# Patient Record
Sex: MALE | Race: OTHER | Employment: OTHER | URBAN - NONMETROPOLITAN AREA
[De-identification: names, ages, dates, MRNs, and addresses within clinical notes are randomized per-mention and may not be internally consistent; named-entity substitution may affect disease eponyms.]

---

## 2017-01-10 ENCOUNTER — HOSPITAL ENCOUNTER (EMERGENCY)
Age: 32
Discharge: PSYCHIATRIC OR SUBSTANCE USE FACILITY | End: 2017-01-10
Attending: EMERGENCY MEDICINE
Payer: MEDICARE

## 2017-01-10 ENCOUNTER — APPOINTMENT (OUTPATIENT)
Dept: GENERAL RADIOLOGY | Age: 32
End: 2017-01-10
Payer: MEDICARE

## 2017-01-10 ENCOUNTER — HOSPITAL ENCOUNTER (OUTPATIENT)
Dept: HOSPITAL 58 - AMBL | Age: 32
End: 2017-01-10
Attending: INTERNAL MEDICINE
Payer: COMMERCIAL

## 2017-01-10 VITALS
HEART RATE: 72 BPM | RESPIRATION RATE: 18 BRPM | OXYGEN SATURATION: 99 % | SYSTOLIC BLOOD PRESSURE: 129 MMHG | DIASTOLIC BLOOD PRESSURE: 81 MMHG | TEMPERATURE: 97.9 F

## 2017-01-10 VITALS — BODY MASS INDEX: 20.2 KG/M2

## 2017-01-10 DIAGNOSIS — F30.2: Primary | ICD-10-CM

## 2017-01-10 DIAGNOSIS — F20.0: ICD-10-CM

## 2017-01-10 DIAGNOSIS — F29 PSYCHOSIS, UNSPECIFIED PSYCHOSIS TYPE (HCC): ICD-10-CM

## 2017-01-10 DIAGNOSIS — F20.0 PARANOID SCHIZOPHRENIA (HCC): Primary | ICD-10-CM

## 2017-01-10 LAB
ALBUMIN SERPL-MCNC: 4.7 G/DL (ref 3.5–5.2)
ALP BLD-CCNC: 56 U/L (ref 40–130)
ALT SERPL-CCNC: 18 U/L (ref 5–41)
AMPHETAMINE SCREEN, URINE: NEGATIVE
ANION GAP SERPL CALCULATED.3IONS-SCNC: 15 MMOL/L (ref 7–19)
AST SERPL-CCNC: 18 U/L (ref 5–40)
BARBITURATE SCREEN URINE: NEGATIVE
BASOPHILS ABSOLUTE: 0.1 K/UL (ref 0–0.2)
BASOPHILS RELATIVE PERCENT: 0.6 % (ref 0–1)
BENZODIAZEPINE SCREEN, URINE: NEGATIVE
BILIRUB SERPL-MCNC: 0.4 MG/DL (ref 0.2–1.2)
BUN BLDV-MCNC: 9 MG/DL (ref 6–20)
CALCIUM SERPL-MCNC: 9.5 MG/DL (ref 8.6–10)
CANNABINOID SCREEN URINE: NEGATIVE
CHLORIDE BLD-SCNC: 100 MMOL/L (ref 98–111)
CO2: 26 MMOL/L (ref 22–29)
COCAINE METABOLITE SCREEN URINE: NEGATIVE
CREAT SERPL-MCNC: 0.8 MG/DL (ref 0.5–1.2)
EOSINOPHILS ABSOLUTE: 0.2 K/UL (ref 0–0.6)
EOSINOPHILS RELATIVE PERCENT: 1.9 % (ref 0–5)
ETHANOL: <10 MG/DL (ref 0–0.08)
GFR NON-AFRICAN AMERICAN: >60
GLOBULIN: 3.7 G/DL
GLUCOSE BLD-MCNC: 93 MG/DL (ref 74–109)
HCT VFR BLD CALC: 44 % (ref 42–52)
HEMOGLOBIN: 14.4 G/DL (ref 14–18)
LYMPHOCYTES ABSOLUTE: 1.8 K/UL (ref 1.1–4.5)
LYMPHOCYTES RELATIVE PERCENT: 20.8 % (ref 20–40)
Lab: NORMAL
MCH RBC QN AUTO: 28.4 PG (ref 27–31)
MCHC RBC AUTO-ENTMCNC: 32.7 G/DL (ref 33–37)
MCV RBC AUTO: 86.8 FL (ref 80–94)
MONOCYTES ABSOLUTE: 0.7 K/UL (ref 0–0.9)
MONOCYTES RELATIVE PERCENT: 7.5 % (ref 0–10)
NEUTROPHILS ABSOLUTE: 6 K/UL (ref 1.5–7.5)
NEUTROPHILS RELATIVE PERCENT: 69 % (ref 50–65)
OPIATE SCREEN URINE: NEGATIVE
PDW BLD-RTO: 13.7 % (ref 11.5–14.5)
PLATELET # BLD: 290 K/UL (ref 130–400)
PMV BLD AUTO: 10.6 FL (ref 7.4–10.4)
POTASSIUM SERPL-SCNC: 4 MMOL/L (ref 3.5–5)
RBC # BLD: 5.07 M/UL (ref 4.7–6.1)
SODIUM BLD-SCNC: 141 MMOL/L (ref 136–145)
TOTAL PROTEIN: 8.4 G/DL (ref 6.6–8.7)
WBC # BLD: 8.6 K/UL (ref 4.8–10.8)

## 2017-01-10 PROCEDURE — G0480 DRUG TEST DEF 1-7 CLASSES: HCPCS

## 2017-01-10 PROCEDURE — 80053 COMPREHEN METABOLIC PANEL: CPT

## 2017-01-10 PROCEDURE — 85025 COMPLETE CBC W/AUTO DIFF WBC: CPT

## 2017-01-10 PROCEDURE — 6370000000 HC RX 637 (ALT 250 FOR IP): Performed by: EMERGENCY MEDICINE

## 2017-01-10 PROCEDURE — 80307 DRUG TEST PRSMV CHEM ANLYZR: CPT

## 2017-01-10 PROCEDURE — 99284 EMERGENCY DEPT VISIT MOD MDM: CPT

## 2017-01-10 PROCEDURE — 99283 EMERGENCY DEPT VISIT LOW MDM: CPT | Performed by: EMERGENCY MEDICINE

## 2017-01-10 PROCEDURE — 36415 COLL VENOUS BLD VENIPUNCTURE: CPT

## 2017-01-10 RX ORDER — OLANZAPINE 10 MG/1
5 TABLET, ORALLY DISINTEGRATING ORAL ONCE
Status: COMPLETED | OUTPATIENT
Start: 2017-01-10 | End: 2017-01-10

## 2017-01-10 RX ADMIN — OLANZAPINE 5 MG: 10 TABLET, ORALLY DISINTEGRATING ORAL at 11:41

## 2017-04-25 ENCOUNTER — HOSPITAL ENCOUNTER (EMERGENCY)
Dept: HOSPITAL 58 - ED | Age: 32
Discharge: HOME | End: 2017-04-25

## 2017-04-25 VITALS — TEMPERATURE: 98.5 F | SYSTOLIC BLOOD PRESSURE: 132 MMHG | DIASTOLIC BLOOD PRESSURE: 90 MMHG

## 2017-04-25 VITALS — BODY MASS INDEX: 27.8 KG/M2

## 2017-04-25 DIAGNOSIS — L02.213: Primary | ICD-10-CM

## 2017-04-25 PROCEDURE — 87186 SC STD MICRODIL/AGAR DIL: CPT

## 2017-04-25 PROCEDURE — 99284 EMERGENCY DEPT VISIT MOD MDM: CPT

## 2017-04-25 PROCEDURE — 87070 CULTURE OTHR SPECIMN AEROBIC: CPT

## 2017-04-25 PROCEDURE — 96372 THER/PROPH/DIAG INJ SC/IM: CPT

## 2017-05-01 ENCOUNTER — HOSPITAL ENCOUNTER (OUTPATIENT)
Dept: HOSPITAL 58 - LAB | Age: 32
Discharge: HOME | End: 2017-05-01
Attending: SPECIALIST

## 2017-05-01 VITALS — BODY MASS INDEX: 27.8 KG/M2

## 2017-05-01 DIAGNOSIS — R03.0: ICD-10-CM

## 2017-05-01 DIAGNOSIS — L02.91: Primary | ICD-10-CM

## 2017-05-01 LAB
ALBUMIN SERPL-MCNC: 4.2 G/DL (ref 3.4–5)
ALBUMIN/GLOB SERPL: 0.98 {RATIO}
ALP SERPL-CCNC: 44 U/L (ref 50–136)
ALT SERPL-CCNC: 14 U/L (ref 12–78)
ANION GAP SERPL CALC-SCNC: 14 MMOL/L
AST SERPL-CCNC: 17 U/L (ref 15–37)
BASOPHILS # BLD AUTO: 0.1 K/UL (ref 0–0.2)
BASOPHILS NFR BLD AUTO: 0.7 % (ref 0–3)
BILIRUB SERPL-MCNC: 0.6 MG/DL (ref 0–1.2)
BUN SERPL-MCNC: 11 MG/DL (ref 7–18)
BUN/CREAT SERPL: 10.09
CALCIUM SERPL-MCNC: 9.5 MG/DL (ref 8.2–10.2)
CHLORIDE SERPL-SCNC: 105 MMOL/L (ref 98–107)
CO2 BLD-SCNC: 23 MMOL/L (ref 21–32)
CREAT SERPL-MCNC: 1.09 MG/DL (ref 0.6–1.1)
EOSINOPHIL # BLD AUTO: 0.2 K/UL (ref 0–0.7)
EOSINOPHIL NFR BLD AUTO: 2.2 % (ref 0–7)
GFR SERPLBLD BASED ON 1.73 SQ M-ARVRAT: 80 ML/MIN
GLOBULIN SER CALC-MCNC: 4.3 G/L
GLUCOSE SERPL-MCNC: 114 MG/DL (ref 70–100)
HCT VFR BLD AUTO: 45.6 % (ref 42–52)
HGB BLD-MCNC: 15.1 G/DL (ref 14–18)
IMM GRANULOCYTES NFR BLD AUTO: 0.7 % (ref 0–5)
LYMPHOCYTES # SPEC AUTO: 2.4 K/UL (ref 0.6–3.4)
LYMPHOCYTES NFR BLD AUTO: 25.7 % (ref 10–50)
MCH RBC QN: 27.5 PG (ref 27–31)
MCHC RBC AUTO-ENTMCNC: 33.1 G/DL (ref 31.8–35.4)
MCV RBC: 83.1 FL (ref 80–94)
MONOCYTES # BLD AUTO: 0.6 K/UL (ref 0.4–2)
MONOCYTES NFR BLD AUTO: 5.9 % (ref 0–10)
NEUTROPHILS # BLD AUTO: 6.1 K/UL (ref 2–6.9)
NEUTROPHILS NFR BLD AUTO: 64.8 %
PLATELET # BLD AUTO: 308 10^3/UL (ref 140–440)
POTASSIUM SERPL-SCNC: 4 MMOL/L (ref 3.5–5.1)
PROT SERPL-MCNC: 8.5 G/DL (ref 6.4–8.2)
RBC # BLD AUTO: 5.49 10^6/UL (ref 4.7–6.1)
SODIUM SERPL-SCNC: 138 MMOL/L (ref 136–145)
WBC # BLD AUTO: 9.35 K/UL (ref 4.2–10.2)

## 2017-05-01 PROCEDURE — 36415 COLL VENOUS BLD VENIPUNCTURE: CPT

## 2017-05-01 PROCEDURE — 80053 COMPREHEN METABOLIC PANEL: CPT

## 2017-05-01 PROCEDURE — 85025 COMPLETE CBC W/AUTO DIFF WBC: CPT

## 2017-09-13 ENCOUNTER — HOSPITAL ENCOUNTER (OUTPATIENT)
Dept: HOSPITAL 58 - LAB | Age: 32
Discharge: HOME | End: 2017-09-13
Payer: COMMERCIAL

## 2017-09-13 VITALS — BODY MASS INDEX: 27.8 KG/M2

## 2017-09-13 DIAGNOSIS — F25.9: Primary | ICD-10-CM

## 2017-09-13 DIAGNOSIS — F12.90: ICD-10-CM

## 2017-09-13 LAB
ALBUMIN SERPL-MCNC: 4.3 G/DL (ref 3.4–5)
ALBUMIN/GLOB SERPL: 0.93 {RATIO}
ALP SERPL-CCNC: 53 U/L (ref 50–136)
ALT SERPL-CCNC: 21 U/L (ref 12–78)
ANION GAP SERPL CALC-SCNC: 15 MMOL/L
AST SERPL-CCNC: 19 U/L (ref 15–37)
BASOPHILS # BLD AUTO: 0.1 K/UL (ref 0–0.2)
BASOPHILS NFR BLD AUTO: 0.7 % (ref 0–3)
BILIRUB SERPL-MCNC: 0.63 MG/DL (ref 0–1.2)
BUN SERPL-MCNC: 11 MG/DL (ref 7–18)
BUN/CREAT SERPL: 11.11
CALCIUM SERPL-MCNC: 10 MG/DL (ref 8.2–10.2)
CHLORIDE SERPL-SCNC: 102 MMOL/L (ref 98–107)
CO2 BLD-SCNC: 27 MMOL/L (ref 21–32)
CREAT SERPL-MCNC: 0.99 MG/DL (ref 0.6–1.1)
EOSINOPHIL # BLD AUTO: 0.4 K/UL (ref 0–0.7)
EOSINOPHIL NFR BLD AUTO: 4.8 % (ref 0–7)
GFR SERPLBLD BASED ON 1.73 SQ M-ARVRAT: 89 ML/MIN
GLOBULIN SER CALC-MCNC: 4.6 G/L
GLUCOSE SERPL-MCNC: 75 MG/DL (ref 70–100)
HCT VFR BLD AUTO: 45.9 % (ref 42–52)
HGB BLD-MCNC: 15.2 G/DL (ref 14–18)
IMM GRANULOCYTES NFR BLD AUTO: 0.4 % (ref 0–5)
LYMPHOCYTES # SPEC AUTO: 2 K/UL (ref 0.6–3.4)
LYMPHOCYTES NFR BLD AUTO: 24.1 % (ref 10–50)
MCH RBC QN: 28.1 PG (ref 27–31)
MCHC RBC AUTO-ENTMCNC: 33.1 G/DL (ref 31.8–35.4)
MCV RBC: 85 FL (ref 80–94)
MONOCYTES # BLD AUTO: 0.5 K/UL (ref 0.4–2)
MONOCYTES NFR BLD AUTO: 5.7 % (ref 0–10)
NEUTROPHILS # BLD AUTO: 5.4 K/UL (ref 2–6.9)
NEUTROPHILS NFR BLD AUTO: 64.3 %
PLATELET # BLD AUTO: 305 10^3/UL (ref 140–440)
POTASSIUM SERPL-SCNC: 4 MMOL/L (ref 3.5–5.1)
PROT SERPL-MCNC: 8.9 G/DL (ref 6.4–8.2)
RBC # BLD AUTO: 5.4 10^6/UL (ref 4.7–6.1)
SODIUM SERPL-SCNC: 140 MMOL/L (ref 136–145)
WBC # BLD AUTO: 8.38 K/UL (ref 4.2–10.2)

## 2017-09-13 PROCEDURE — 80053 COMPREHEN METABOLIC PANEL: CPT

## 2017-09-13 PROCEDURE — 84146 ASSAY OF PROLACTIN: CPT

## 2017-09-13 PROCEDURE — 36415 COLL VENOUS BLD VENIPUNCTURE: CPT

## 2017-09-13 PROCEDURE — 85025 COMPLETE CBC W/AUTO DIFF WBC: CPT

## 2017-12-17 ENCOUNTER — HOSPITAL ENCOUNTER (EMERGENCY)
Dept: HOSPITAL 58 - ED | Age: 32
Discharge: HOME | End: 2017-12-17

## 2017-12-17 VITALS — TEMPERATURE: 98.2 F | SYSTOLIC BLOOD PRESSURE: 115 MMHG | DIASTOLIC BLOOD PRESSURE: 81 MMHG

## 2017-12-17 VITALS — BODY MASS INDEX: 22.8 KG/M2

## 2017-12-17 DIAGNOSIS — L73.9: ICD-10-CM

## 2017-12-17 DIAGNOSIS — L03.211: Primary | ICD-10-CM

## 2017-12-17 PROCEDURE — 99282 EMERGENCY DEPT VISIT SF MDM: CPT

## 2017-12-17 PROCEDURE — 96372 THER/PROPH/DIAG INJ SC/IM: CPT

## 2019-05-21 ENCOUNTER — HOSPITAL ENCOUNTER (OUTPATIENT)
Dept: HOSPITAL 58 - AMBL | Age: 34
Discharge: ANOTHER HOSPITAL | End: 2019-05-21
Attending: FAMILY MEDICINE

## 2019-05-21 ENCOUNTER — HOSPITAL ENCOUNTER (EMERGENCY)
Age: 34
Discharge: PSYCHIATRIC OR SUBSTANCE USE FACILITY | End: 2019-05-21
Payer: MEDICARE

## 2019-05-21 VITALS
OXYGEN SATURATION: 98 % | SYSTOLIC BLOOD PRESSURE: 132 MMHG | TEMPERATURE: 98.2 F | HEART RATE: 70 BPM | DIASTOLIC BLOOD PRESSURE: 74 MMHG | RESPIRATION RATE: 17 BRPM

## 2019-05-21 VITALS — BODY MASS INDEX: 22.8 KG/M2

## 2019-05-21 DIAGNOSIS — F20.0 PARANOID SCHIZOPHRENIA (HCC): Primary | ICD-10-CM

## 2019-05-21 DIAGNOSIS — Z91.14: ICD-10-CM

## 2019-05-21 DIAGNOSIS — F99: Primary | ICD-10-CM

## 2019-05-21 DIAGNOSIS — F91.9: ICD-10-CM

## 2019-05-21 DIAGNOSIS — R63.0: ICD-10-CM

## 2019-05-21 LAB
ALBUMIN SERPL-MCNC: 4.7 G/DL (ref 3.5–5.2)
ALP BLD-CCNC: 54 U/L (ref 40–130)
ALT SERPL-CCNC: 14 U/L (ref 5–41)
AMPHETAMINE SCREEN, URINE: NEGATIVE
ANION GAP SERPL CALCULATED.3IONS-SCNC: 12 MMOL/L (ref 7–19)
AST SERPL-CCNC: 20 U/L (ref 5–40)
BARBITURATE SCREEN URINE: NEGATIVE
BASOPHILS ABSOLUTE: 0.1 K/UL (ref 0–0.2)
BASOPHILS RELATIVE PERCENT: 0.5 % (ref 0–1)
BENZODIAZEPINE SCREEN, URINE: NEGATIVE
BILIRUB SERPL-MCNC: 0.8 MG/DL (ref 0.2–1.2)
BUN BLDV-MCNC: 12 MG/DL (ref 6–20)
CALCIUM SERPL-MCNC: 9.7 MG/DL (ref 8.6–10)
CANNABINOID SCREEN URINE: POSITIVE
CHLORIDE BLD-SCNC: 104 MMOL/L (ref 98–111)
CO2: 27 MMOL/L (ref 22–29)
COCAINE METABOLITE SCREEN URINE: NEGATIVE
CREAT SERPL-MCNC: 1 MG/DL (ref 0.5–1.2)
EOSINOPHILS ABSOLUTE: 0.2 K/UL (ref 0–0.6)
EOSINOPHILS RELATIVE PERCENT: 1.6 % (ref 0–5)
ETHANOL: <10 MG/DL (ref 0–0.08)
GFR NON-AFRICAN AMERICAN: >60
GLUCOSE BLD-MCNC: 111 MG/DL (ref 74–109)
HCT VFR BLD CALC: 47.5 % (ref 42–52)
HEMOGLOBIN: 15.6 G/DL (ref 14–18)
LYMPHOCYTES ABSOLUTE: 1.6 K/UL (ref 1.1–4.5)
LYMPHOCYTES RELATIVE PERCENT: 13.5 % (ref 20–40)
Lab: ABNORMAL
MCH RBC QN AUTO: 28.4 PG (ref 27–31)
MCHC RBC AUTO-ENTMCNC: 32.8 G/DL (ref 33–37)
MCV RBC AUTO: 86.5 FL (ref 80–94)
MONOCYTES ABSOLUTE: 0.9 K/UL (ref 0–0.9)
MONOCYTES RELATIVE PERCENT: 7.5 % (ref 0–10)
NEUTROPHILS ABSOLUTE: 9.2 K/UL (ref 1.5–7.5)
NEUTROPHILS RELATIVE PERCENT: 76.5 % (ref 50–65)
OPIATE SCREEN URINE: NEGATIVE
PDW BLD-RTO: 13.7 % (ref 11.5–14.5)
PLATELET # BLD: 311 K/UL (ref 130–400)
PMV BLD AUTO: 10.8 FL (ref 9.4–12.4)
POTASSIUM SERPL-SCNC: 3.5 MMOL/L (ref 3.5–5)
RBC # BLD: 5.49 M/UL (ref 4.7–6.1)
SODIUM BLD-SCNC: 143 MMOL/L (ref 136–145)
TOTAL PROTEIN: 8.5 G/DL (ref 6.6–8.7)
WBC # BLD: 12.1 K/UL (ref 4.8–10.8)

## 2019-05-21 PROCEDURE — 99285 EMERGENCY DEPT VISIT HI MDM: CPT | Performed by: NURSE PRACTITIONER

## 2019-05-21 PROCEDURE — 99285 EMERGENCY DEPT VISIT HI MDM: CPT

## 2019-05-21 PROCEDURE — G0480 DRUG TEST DEF 1-7 CLASSES: HCPCS

## 2019-05-21 PROCEDURE — 80053 COMPREHEN METABOLIC PANEL: CPT

## 2019-05-21 PROCEDURE — 36415 COLL VENOUS BLD VENIPUNCTURE: CPT

## 2019-05-21 PROCEDURE — 80307 DRUG TEST PRSMV CHEM ANLYZR: CPT

## 2019-05-21 PROCEDURE — 6370000000 HC RX 637 (ALT 250 FOR IP): Performed by: NURSE PRACTITIONER

## 2019-05-21 PROCEDURE — 85025 COMPLETE CBC W/AUTO DIFF WBC: CPT

## 2019-05-21 RX ORDER — LORAZEPAM 1 MG/1
1 TABLET ORAL ONCE
Status: COMPLETED | OUTPATIENT
Start: 2019-05-21 | End: 2019-05-21

## 2019-05-21 RX ORDER — OLANZAPINE 10 MG/1
10 TABLET, ORALLY DISINTEGRATING ORAL NIGHTLY
Status: DISCONTINUED | OUTPATIENT
Start: 2019-05-21 | End: 2019-05-21 | Stop reason: HOSPADM

## 2019-05-21 RX ADMIN — LORAZEPAM 1 MG: 1 TABLET ORAL at 16:41

## 2019-05-21 NOTE — ED NOTES
Agree with plan of mid-level. Patient medically clear. Patient will be transferred to [facility] per psychiatry. Patient stable.      Adrián Mckee MD  05/21/19 7343

## 2019-05-21 NOTE — ED PROVIDER NOTES
[facility]  eMERGENCY dEPARTMENT eNCOUnter      Pt Name: Brianda Virk  MRN: [id number]  [place] 1985  Date of evaluation: 5/21/2019  Provider: MIGUEL Thomas    CHIEF COMPLAINT       Chief Complaint   Patient presents with    Mental Health Problem         HISTORY OF PRESENT ILLNESS   (Location/Symptom, Timing/Onset,Context/Setting, Quality, Duration, Modifying Factors, Severity)  Note limiting factors. Aaliyah Sauer a 34 y.o. male who presents to the emergency department for evaluation of mental health problem. Pt presents with mom who gives history of patient with bipolar disorder and paranoid schizophrenia. She relates that she has not been able to establish follow up with psychiatrist. She tells me that he has been taking zyprexa as prescribed he has not taken celexa over past couple of days. He has had increased decompensation over past week. He has been urinating in his bedroom. She doesn't endorse any new hallucinations or paranoia to me. Mom tells me that his mental status is same as previous. HPI    Nursing Notes were reviewed. REVIEW OF SYSTEMS    (2-9 systems for level 4, 10 or more for level 5)     Review of Systems   Unable to perform ROS: Psychiatric disorder       A complete review of systems was performed and is negative except as noted above in the HPI. PAST MEDICAL HISTORY   No past medical history on file. SURGICAL HISTORY     No past surgical history on file. CURRENT MEDICATIONS       There are no discharge medications for this patient. ALLERGIES     Patient has no known allergies. FAMILY HISTORY     No family history on file.        SOCIAL HISTORY       Social History     Socioeconomic History    Marital status: Single     Spouse name: Not on file    Number of children: Not on file    Years of education: Not on file    Highest education level: Not on file   Occupational History    Not on file   Social Needs    Financial resource strain: Not on file    Food insecurity:     Worry: Not on file     Inability: Not on file    Transportation needs:     Medical: Not on file     Non-medical: Not on file   Tobacco Use    Smoking status: Not on file   Substance and Sexual Activity    Alcohol use: Not on file    Drug use: Not on file    Sexual activity: Not on file   Lifestyle    Physical activity:     Days per week: Not on file     Minutes per session: Not on file    Stress: Not on file   Relationships    Social connections:     Talks on phone: Not on file     Gets together: Not on file     Attends Yazidi service: Not on file     Active member of club or organization: Not on file     Attends meetings of clubs or organizations: Not on file     Relationship status: Not on file    Intimate partner violence:     Fear of current or ex partner: Not on file     Emotionally abused: Not on file     Physically abused: Not on file     Forced sexual activity: Not on file   Other Topics Concern    Not on file   Social History Narrative    Not on file       SCREENINGS             PHYSICAL EXAM    (up to 7 for level 4, 8 or more for level 5)     ED Triage Vitals   BP Temp Temp src Pulse Resp SpO2 Height Weight   -- -- -- -- -- -- -- --     Vitals:    05/21/19 1907 05/21/19 1916   BP: 132/74    Pulse: 70    Resp:  17   Temp: 98.2 °F (36.8 °C)    SpO2: 98%        Physical Exam   Constitutional: He appears well-nourished. Tangential speech, answers questions intermittently    HENT:   Head: Normocephalic. Neck: Normal range of motion. Cardiovascular: Normal rate and regular rhythm. Pulmonary/Chest: Effort normal and breath sounds normal.   Abdominal: Soft. There is no tenderness. Musculoskeletal: Normal range of motion. Neurological: He is alert. Moves all 4 extremities equally   Vitals reviewed.       DIAGNOSTIC RESULTS     EKG: All EKG's are interpreted by the Emergency Department Physician who either signs or Co-signs this chart in the absence of acardiologist.        RADIOLOGY:   Non-plain film images such as CT, Ultrasound andMRI are read by the radiologist. Plain radiographic images are visualized and preliminarily interpreted by the emergency physician with the below findings:        Interpretation per the Radiologist below, if available at the time of this note:    No orders to display         ED BEDSIDE ULTRASOUND:   Performed by ED Physician - none    LABS:  Labs Reviewed   CBC WITH AUTO DIFFERENTIAL - Abnormal; Notable for the following components:       Result Value    WBC 12.1 (*)     MCHC 32.8 (*)     Neutrophils % 76.5 (*)     Lymphocytes % 13.5 (*)     Neutrophils # 9.2 (*)     All other components within normal limits   COMPREHENSIVE METABOLIC PANEL - Abnormal; Notable for the following components:    Glucose 111 (*)     All other components within normal limits   URINE DRUG SCREEN - Abnormal; Notable for the following components:    Cannabinoid Scrn, Ur Positive (*)     All other components within normal limits   ETHANOL       All other labs were within normal range or not returned as of this dictation. RE-ASSESSMENT     Discussed with Dr. Jaquelin Paredes. Mental health professional has evaluated patient with plan for transfer to [place] [address] and DIFFERENTIALDIAGNOSIS/MDM:   Vitals:    Vitals:    05/21/19 1907 05/21/19 1916   BP: 132/74    Pulse: 70    Resp:  17   Temp: 98.2 °F (36.8 °C)    SpO2: 98%        MDM      CONSULTS:  IP CONSULT TO PSYCHIATRY    PROCEDURES:  Unless otherwise notedbelow, none     Procedures    FINAL IMPRESSION     1. Paranoid schizophrenia ([facility] [address]          DISPOSITION/PLAN   DISPOSITION        PATIENT REFERRED TO:  No follow-up provider specified. DISCHARGE MEDICATIONS:       There are no discharge medications for this patient.       (Pleasenote that portions of this note were completed with a voice recognition program.  Efforts were made to edit the dictations but

## 2019-05-21 NOTE — BH NOTE
Psychiatry Initial Intake   TRANSFER TO [facility]    PATIENT ABOVE [facility] LEVEL OF CARE  SUICIDAL, CATATONIC, PARANOID SCHIZOPHRENIA WITH VISUAL & AUDITORY HALLUCINATIONS, MINIMAL VERBAL RESPONSE,   WS paperwork completed and given to CN in ED.    5/21/19     Hilda Lopez ,a 34 y.o. male, presents to the ED for a psychiatric assessment. MRN NUMBER:  [id number]    ED Admit time: 12:42  ED physician: [facility] Notification time: went down - 16:02  MADELAINE Assess time: 16:02  Psychiatrist call time:  Dr. Barrera [facility] 16:45    Patient is referred by: mother - reports he has had a mental breakdown, no med  within the last month other than gabapentin. Refuses to eat, refuses to bath, not taking his medication,  Has been urinating in his room, no threats at this time, other than being defiant, refuses. Mother reports he laughs to himself, isolates, awake at night, smokes cannabis. Reason for visit to ED - Presenting problem  Khanh stares into space, delays in answering, but mumbles incoherently. When asked why not taking medication, after long delay. \"maybe because I was taking medicine at the time. Questions were answered by yes or no - nod of head. Yes to SI, voices, visions, after long period of thought process. No response to harm others. No facial expressions, flat, staring eyes, sitting rigid in bed, legs out, hands on abd.  tense, eyes are wide, refused to change into scrubs,   Most of history was collateral from his mother.     Duration of symptoms: 1 week, not taking his schizophrenia medication for month,   Current Stressors:     jah  SI: yes  Wished you were dead, or go to sleep and not wake up?     [place]  Plan: jah  Past SI attempts:   Not that mother aware  Aborted, Interrupted, preparatory behavior:  Attention, revenge, could it have killed you:   Self injury:  Dates or Ages:   Currently able to contract for safety outside hospital:   C-SSRS Completed:  yes  HI: Surinder [place]  Delusions: Hallucinations:   Yes voices and seeing things  Risk of Harm to self:    Risk of Harm to others:   Anxiety 1-10:  juta  Agoraphobia:   Explain if increased:   Depression 1-10:  jah  Explain if increased:   Mood Swings/Irritability:  Racing/Persistent thoughts: jah  Risk taking behaviors (excessive spending, alcohol,drugs):  cannabis  Level of function outside hospital decreased:  Collateral from mother - not bathing, eating, urinating in room. Living Arrangements: lives with parents and daughter and 2 yr old     History of Psychiatric Treatment:   Previous Outpatient therapy Where & Dates of Outpatient treatment:   none  Are you compliant with appointments:   Psychiatric Hospitalizations: Where & When:   [facility], and [facility]   Previous Diagnosis:   Schizophrenia - 19  Previous psychiatric medications:   mutliple  Are you compliant with medications:   Violence and Trauma History:   History of violence by patient, court dates:  no  History of Trauma:   none  History of Abuse: By whom:   Family History:  Family history of mental illness & diagnosis  unaware   Family members with suicide attempt:     completed suicide:   denies    Substance Abuse History:   SBIRT Completed:   Current ETOH LEVELS:   ETOH Abuse:   Age of first drink:   occasional beer  Date of last drink:   Amount drinking daily:   Years of use:   Longest period of sobriety:   ETOH treatment history:   History of seizures, blackouts, etc. due to ETOH abuse:  Family history of ETOH abuse:    Legal consequences of chemical use:    Substance/Chemical Abuse/Recreational Drug History:   Age of first substance use:   Substance used:               Weed and unknown  Substance last used & date:    Amount of substance use daily:   Years of use:   Longest period of sobriety:  Substance treatment history:  Family history of substance abuse:   Legal consequences of chemical use: Tobacco use  Less than 1/2 pack   Opiates:  It was discussed with pt they would not be receiving opiates unless they were within 5 days post surgery/injury. Patient voiced understanding and agreed. Psychiatric Review Of Systems:   Recent Sleep changes:  Poor to none  Average Hours per night  With sleep aid:   Restful sleep:   Nightmares/flashbacks: no  Hypervigilance:  Difficulty falling asleep:    Difficulty staying asleep:  Difficulty awakening:    Repetitive behaviors (hand wash, praying, repeat words silently, etc)  Recent appetite changes:  Not eating  Recent weight changes:    Pounds gained (+) or lost (-) :   Energy level changes:  jah  Helpless/Worthless/Hopeless: Marlena Bianka  Interest/pleasure/anhedonia:    jah    Medical History:     Medical Diagnosis/Issues:  jah  Use of 02 or CPAP:  no  Ambulatory:  yes  Independent Self Care:  Yes, but not performing  Use of OTC:  denies  Somatic symptoms:    PCP: . None (Inactive)     Current Medications:   Scheduled Meds:   Current Facility-Administered Medications:     OLANZapine zydis (ZYPREXA) disintegrating tablet 10 mg, 10 mg, Oral, Nightly, Era Rogers, APRN    LORazepam (ATIVAN) tablet 1 mg, 1 mg, Oral, Once, Era Rogers, APRN  No current outpatient medications on file.        Mental Status Evaluation:     Appearance:   disheveled, unkempt   Behavior:  Catatonic, rigid, calm   Speech:  Little to none, mumbled, ncreased latency of response   Mood:  jah   Affect:  Flat, expressionless, catatonic   Thought Process:  blocked   Thought Content:  hallucinations and suicidal   Sensorium:  person   Cognition:  impaired due to psychosis     Insight:  limited   Judgment:  limited     Social Information:    Education:    11 grade  Employment where & how long   disability   Positive support system:  parents  Social Supports:  (History)    Collateral Information:  Relationship:   mother  Name:   Phone Number:   Collateral:     Disposition:      :        4. Transferred:       Patient was transferred due to:  [address] Martina Johnson

## 2019-05-22 NOTE — ED NOTES
Dr. Dickson Gregg spoke with Dr Erika Delong accept at [facility]\"     Lizett Lindsay RN  05/21/19 2921

## 2019-05-22 NOTE — ED NOTES
Report to Starla Rios at [facility].  [place] in ED for transport. Attempting to call pt's mom.      Riana Ohara RN  05/21/19 7457

## 2020-03-14 ENCOUNTER — HOSPITAL ENCOUNTER (EMERGENCY)
Age: 35
Discharge: PSYCHIATRIC OR SUBSTANCE USE FACILITY | End: 2020-03-15
Attending: EMERGENCY MEDICINE
Payer: MEDICARE

## 2020-03-14 VITALS
RESPIRATION RATE: 20 BRPM | DIASTOLIC BLOOD PRESSURE: 99 MMHG | OXYGEN SATURATION: 95 % | HEIGHT: 72 IN | TEMPERATURE: 98 F | HEART RATE: 95 BPM | SYSTOLIC BLOOD PRESSURE: 164 MMHG

## 2020-03-14 LAB
ALBUMIN SERPL-MCNC: 4.6 G/DL (ref 3.5–5.2)
ALP BLD-CCNC: 52 U/L (ref 40–130)
ALT SERPL-CCNC: 15 U/L (ref 5–41)
AMPHETAMINE SCREEN, URINE: NEGATIVE
ANION GAP SERPL CALCULATED.3IONS-SCNC: 17 MMOL/L (ref 7–19)
AST SERPL-CCNC: 21 U/L (ref 5–40)
BARBITURATE SCREEN URINE: NEGATIVE
BASOPHILS ABSOLUTE: 0.1 K/UL (ref 0–0.2)
BASOPHILS RELATIVE PERCENT: 0.6 % (ref 0–1)
BENZODIAZEPINE SCREEN, URINE: NEGATIVE
BILIRUB SERPL-MCNC: 0.9 MG/DL (ref 0.2–1.2)
BILIRUBIN URINE: NEGATIVE
BLOOD, URINE: NEGATIVE
BUN BLDV-MCNC: 10 MG/DL (ref 6–20)
CALCIUM SERPL-MCNC: 9.2 MG/DL (ref 8.6–10)
CANNABINOID SCREEN URINE: POSITIVE
CHLORIDE BLD-SCNC: 102 MMOL/L (ref 98–111)
CLARITY: CLEAR
CO2: 19 MMOL/L (ref 22–29)
COCAINE METABOLITE SCREEN URINE: NEGATIVE
COLOR: YELLOW
CREAT SERPL-MCNC: 1.1 MG/DL (ref 0.5–1.2)
EOSINOPHILS ABSOLUTE: 0.2 K/UL (ref 0–0.6)
EOSINOPHILS RELATIVE PERCENT: 1.7 % (ref 0–5)
ETHANOL: <10 MG/DL (ref 0–0.08)
GFR NON-AFRICAN AMERICAN: >60
GLUCOSE BLD-MCNC: 165 MG/DL (ref 74–109)
GLUCOSE URINE: NEGATIVE MG/DL
HBA1C MFR BLD: 5.2 % (ref 4–6)
HCT VFR BLD CALC: 48.4 % (ref 42–52)
HEMOGLOBIN: 16.1 G/DL (ref 14–18)
IMMATURE GRANULOCYTES #: 0 K/UL
KETONES, URINE: NEGATIVE MG/DL
LEUKOCYTE ESTERASE, URINE: NEGATIVE
LYMPHOCYTES ABSOLUTE: 2.7 K/UL (ref 1.1–4.5)
LYMPHOCYTES RELATIVE PERCENT: 24.1 % (ref 20–40)
Lab: ABNORMAL
MAGNESIUM: 1.8 MG/DL (ref 1.6–2.6)
MCH RBC QN AUTO: 28.6 PG (ref 27–31)
MCHC RBC AUTO-ENTMCNC: 33.3 G/DL (ref 33–37)
MCV RBC AUTO: 86.1 FL (ref 80–94)
MONOCYTES ABSOLUTE: 0.8 K/UL (ref 0–0.9)
MONOCYTES RELATIVE PERCENT: 7.1 % (ref 0–10)
NEUTROPHILS ABSOLUTE: 7.3 K/UL (ref 1.5–7.5)
NEUTROPHILS RELATIVE PERCENT: 66.2 % (ref 50–65)
NITRITE, URINE: NEGATIVE
OPIATE SCREEN URINE: NEGATIVE
PDW BLD-RTO: 13.2 % (ref 11.5–14.5)
PH UA: 6.5 (ref 5–8)
PLATELET # BLD: 323 K/UL (ref 130–400)
PMV BLD AUTO: 10.3 FL (ref 9.4–12.4)
POTASSIUM REFLEX MAGNESIUM: 3.3 MMOL/L (ref 3.5–5)
PROTEIN UA: NEGATIVE MG/DL
RBC # BLD: 5.62 M/UL (ref 4.7–6.1)
SODIUM BLD-SCNC: 138 MMOL/L (ref 136–145)
SPECIFIC GRAVITY UA: 1.02 (ref 1–1.03)
TOTAL PROTEIN: 8.4 G/DL (ref 6.6–8.7)
URINE REFLEX TO CULTURE: NORMAL
UROBILINOGEN, URINE: 1 E.U./DL
WBC # BLD: 11 K/UL (ref 4.8–10.8)

## 2020-03-14 PROCEDURE — 99284 EMERGENCY DEPT VISIT MOD MDM: CPT

## 2020-03-14 PROCEDURE — 81003 URINALYSIS AUTO W/O SCOPE: CPT

## 2020-03-14 PROCEDURE — 83735 ASSAY OF MAGNESIUM: CPT

## 2020-03-14 PROCEDURE — 85025 COMPLETE CBC W/AUTO DIFF WBC: CPT

## 2020-03-14 PROCEDURE — 80053 COMPREHEN METABOLIC PANEL: CPT

## 2020-03-14 PROCEDURE — G0480 DRUG TEST DEF 1-7 CLASSES: HCPCS

## 2020-03-14 PROCEDURE — 6370000000 HC RX 637 (ALT 250 FOR IP): Performed by: EMERGENCY MEDICINE

## 2020-03-14 PROCEDURE — 80307 DRUG TEST PRSMV CHEM ANLYZR: CPT

## 2020-03-14 PROCEDURE — 83036 HEMOGLOBIN GLYCOSYLATED A1C: CPT

## 2020-03-14 PROCEDURE — 36415 COLL VENOUS BLD VENIPUNCTURE: CPT

## 2020-03-14 RX ORDER — TOPIRAMATE 25 MG/1
50 CAPSULE, COATED PELLETS ORAL 2 TIMES DAILY
Status: ON HOLD | COMMUNITY
End: 2022-09-21

## 2020-03-14 RX ORDER — CITALOPRAM 20 MG/1
20 TABLET ORAL DAILY
COMMUNITY

## 2020-03-14 RX ORDER — OLANZAPINE 10 MG/1
10 TABLET, ORALLY DISINTEGRATING ORAL ONCE
Status: COMPLETED | OUTPATIENT
Start: 2020-03-14 | End: 2020-03-14

## 2020-03-14 RX ORDER — ARIPIPRAZOLE 20 MG/1
20 TABLET ORAL DAILY
COMMUNITY

## 2020-03-14 RX ADMIN — OLANZAPINE 10 MG: 10 TABLET, ORALLY DISINTEGRATING ORAL at 22:07

## 2020-03-14 NOTE — ED PROVIDER NOTES
[address] [facility]  eMERGENCY dEPARTMENT eNCOUnter      Pt Name: Abby Moeller  MRN: [id number]  [place] 1985  Date of evaluation: 3/14/2020  Provider: Elza Ko MD    CHIEF COMPLAINT       Chief Complaint   Patient presents with    Mental Health Problem     Bipolar, Schizophrenia         HISTORY OF PRESENT ILLNESS   (Location/Symptom, Timing/Onset,Context/Setting, Quality, Duration, Modifying Factors, Severity)  Note limiting factors. Abby Moeller is a 30 y.o. male who presents to the emergency department mental health evaluation    79-year-old male received to ED for mental health evaluation. History of paranoid schizophrenia. Last admission or transfer to [facility] by my records show May 2019. Patient's not cooperative with history. Or he cannot give me any history. He is speaking tangentially. Noticed some abrasions on his thenar eminence of the left hand. Patient states he was on the floor. That the police came her that they came into his house and took him and brought him here. According to the records he was brought in by police last time. He is unable to give me any more history. He denies being in pain. There is no one else here at this time to give me any other collateral information. I will attempt to get collateral information as we continue our evaluation. The history is provided by the patient and medical records. NursingNotes were reviewed. REVIEW OF SYSTEMS    (2-9 systems for level 4, 10 or more for level 5)     Review of Systems   Unable to perform ROS: Psychiatric disorder       A complete review of systems was performed and is negative except as noted above in the HPI. PAST MEDICAL HISTORY   No past medical history on file. SURGICAL HISTORY     No past surgical history on file.       CURRENT MEDICATIONS       Previous Medications    ARIPIPRAZOLE (ABILIFY) 20 MG TABLET    Take 20 mg by mouth daily    CITALOPRAM (CELEXA) 20 MG TABLET Head: Normocephalic and atraumatic. Right Ear: External ear normal.      Left Ear: External ear normal.      Nose: Nose normal.   Eyes:      General: No scleral icterus. Conjunctiva/sclera: Conjunctivae normal.      Pupils: Pupils are equal, round, and reactive to light. Neck:      Musculoskeletal: Normal range of motion. Cardiovascular:      Rate and Rhythm: Regular rhythm. Tachycardia present. Heart sounds: Normal heart sounds. Pulmonary:      Effort: Pulmonary effort is normal.      Breath sounds: Normal breath sounds. Musculoskeletal: Normal range of motion. General: Signs of injury (Superficial abrasions noted to the thenar eminence of the left hand. Full range of motion.) present. Skin:     General: Skin is warm and dry. Neurological:      Mental Status: He is alert. GCS: GCS eye subscore is 4. GCS verbal subscore is 5. GCS motor subscore is 6. Psychiatric:         Attention and Perception: He is inattentive (Patient keeps looking out the door. The ER is a little noisy. He seems paranoid. He will not confirm when I questioned him about hallucinations. ). Mood and Affect: Affect is inappropriate. Speech: Speech is tangential (He goes from one subject to the next. ). Behavior: Behavior is slowed. Thought Content:  Thought content is paranoid and delusional.         DIAGNOSTIC RESULTS     EKG: All EKG's are interpreted by the Emergency Department Physician who either signs or Co-signs this chart in the absence of a cardiologist.        RADIOLOGY:   Non-plain film images such as CT, Ultrasound and MRI are read by the radiologist. Plainradiographic images are visualized and preliminarily interpreted by the emergency physician with the below findings:        Interpretation per the Radiologist below, if available at the time of this note:    No orders to display         ED BEDSIDE ULTRASOUND:   Performed by ED Physician - none    LABS:  Labs Reviewed   COMPREHENSIVE METABOLIC PANEL W/ REFLEX TO MG FOR LOW K - Abnormal; Notable for the following components:       Result Value    Potassium reflex Magnesium 3.3 (*)     CO2 19 (*)     Glucose 165 (*)     All other components within normal limits   CBC WITH AUTO DIFFERENTIAL - Abnormal; Notable for the following components:    WBC 11.0 (*)     Neutrophils % 66.2 (*)     All other components within normal limits   URINE DRUG SCREEN - Abnormal; Notable for the following components:    Cannabinoid Scrn, Ur Positive (*)     All other components within normal limits   ETHANOL   URINE RT REFLEX TO CULTURE   MAGNESIUM   HEMOGLOBIN A1C       All other labs were within normal range or not returned as of this dictation. EMERGENCY DEPARTMENT COURSE and DIFFERENTIALDIAGNOSIS/MDM:   Vitals:    Vitals:    03/14/20 1527 03/14/20 1803   BP: (!) 157/105 (!) 164/99   Pulse: 125 95   Resp: 22 20   SpO2: 93% 95%   Height: 6' (1.829 m)        MDM  Number of Diagnoses or Management Options  Acute exacerbation of chronic paranoid schizophrenia [facility]):   Diagnosis management comments: 8 PM.  Psychiatry has evaluated to the best of their ability. Consensus from the psychiatrist on-call is transfer to [facility] for involuntary admission. So far the patient is remaining cooperative his mother is here. I will consider a dose of Zyprexa  closer to time for his trip for his comfort. Unless he becomes agitated or decompensates. 10 PM.  Patient's been evaluated by the qualified mental health professional.  Prince Rizo is been completed and the judges signature for order was of involuntary admission to [facility] completed. I discussed the case with Dr. Irais Garcia at [facility].  They are accepting would like the patient to be transported in the morning.   I am going to give the patient a dose of Zyprexa for his comfort and help him rest tonight while we make arrangements to transfer him first thing in the morning. [facility] unless there is  a change in his condition. 12 AM.  Patient is resting comfortably without incident. Awaiting transfer first thing in the morning. My shift has ended. I discussed the patient with Dr. Hyland Dubin the nighttime attending. He will have anything to do with this patient unless the patient requires other intervention. I just wanted him to be aware that the patient was boarded in the ED awaiting transfer. CONSULTS:  IP CONSULT TO PSYCHIATRY    PROCEDURES:  Unless otherwise notedbelow, none     Procedures    FINAL IMPRESSION     1.  Acute exacerbation of chronic paranoid schizophrenia ([facility] [address]          DISPOSITION/PLAN   DISPOSITION        PATIENT REFERRED TO:  @FUP@    DISCHARGE MEDICATIONS:  New Prescriptions    No medications on file          (Please note that portions of this note were completed with a voice recognition program.  Efforts were made to edit the dictations butoccasionally words are mis-transcribed.)    Xochitl Castaneda MD (electronically signed)  AttendingEmerArkansas State Psychiatric Hospital Physician          Bozena Hill MD  03/14/20 7705       Bozena Hill MD  03/14/20 0929

## 2020-03-14 NOTE — PROGRESS NOTES
ED Initial Intake Assessment     3/14/20    Jamey Siddiqi ,a 30 y.o. male, presents to the ED for a psychiatric assessment. ED Arrival time: [address]   [facility] Notification time: In ER  [facility] Assessment start time:   Psychiatrist call time:   Spoke with Dr. Nehemiah Akbar    Patient is referred by: Ambulance    Reason for visit to ED - Presenting problem:      Pt asked MADELAINE nurse \"Can you please get out of my room? \"    ER Physician Reports: Jamey Siddiqi is a 30 y.o. male who presents to the emergency department mental health evaluation  57-year-old male received to ED for mental health evaluation. History of paranoid schizophrenia. Last admission or transfer to [facility] by my records show May 2019. Patient's not cooperative with history. Or he cannot give me any history. He is speaking tangentially. Noticed some abrasions on his thenar eminence of the left hand. Patient states he was on the floor. That the police came her that they came into his house and took him and brought him here. According to the records he was brought in by police last time. He is unable to give me any more history. He denies being in pain. There is no one else here at this time to give me any other collateral information. I will attempt to get collateral information as we continue our evaluation.   The history is provided by the patient and medical records.      Duration of symptoms: NIXON    Current Stressors: NIXON  Current living arrangement:Lives with mom    C-SSRS Completed: no    SI:  NIXON   Plan: NIXON   Past SI attempts: NIXON   If yes, when and how many times:  Currently able to contract for safety outside hospital: NIXON   Describe:   HI: NIXON  If yes describe:   Delusions: NIXON  If yes describe:   Hallucinations: NIXON   If yes describe:   Risk of Harm to self: NIXON   If yes explain:   Was it within the past 6 months: NIXON   Risk of Harm to others: NIXON   If yes explain:   Was it within the past 6 months: NIXON Anxiety 1-10:  NIXON  Explain if increased:   Depression 1-10:  NIXON  Explain if increased:   Level of function outside hospital decreased: NIXON   If yes explain:     Psychiatric Hospitalizations: Yes   Where & When: [facility] 2017,2019  Outpatient Psychiatric Treatment:    Family History:    Family history of mental illness: NIXON   Family members with suicide attempt: NIXON   If yes explain:     Substance Abuse History:     SBIRT Completed: no     Current ETOH LEVELS: <10    ETOH Usage:     Amount drinking daily: NIXON   Date of last drink:     Substance/Chemical Abuse/Recreational Drug History:  Substance used: NIXON  Date of last substance use:      Opiates: It was discussed with pt they would not be receiving opiates unless they were within 3 days post surgery/acute injury. Patient voiced understanding and agreed. Psychiatric Review Of Systems:     Recent Sleep changes: NIXON   Recent appetite changes: NIXON   Recent weight changes/Pounds gained (+) or lost (-): NIXON      Medical History:     Medical Diagnosis/Issues:   CT today in ED:No  Use of 02 or CPAP: no  Ambulatory: yes  Independent or Need assistance with Self Care:     PCP: No primary care provider on file. Current Medications:   Scheduled Meds: No current facility-administered medications for this encounter.      Current Outpatient Medications:     ARIPiprazole (ABILIFY) 20 MG tablet, Take 20 mg by mouth daily, Disp: , Rfl:     topiramate (TOPAMAX SPRINKLE) 25 MG capsule, Take 50 mg by mouth 2 times daily, Disp: , Rfl:     citalopram (CELEXA) 20 MG tablet, Take 20 mg by mouth daily, Disp: , Rfl:      Mental Status Evaluation:     Appearance:  age appropriate   Behavior:  Restless & fidgety   Speech:  soft   Mood:  labile   Affect:  inappropriate   Thought Process:  tangential   Thought Content:  Paranoid and delusional   Sensorium:  nixon   Cognition:  impaired due to mental state   Insight:  limited       Collateral Information:     Name: Relationship:   Phone Number:   Collateral:     Disposition:     Choose one of the four options below for   disposition:     1. Decision to admit to :no    If yes, which unit Adult or Geriatric Unit:    Is patient voluntary:   If no has a 72 hold been initiated:   Does the patient have a guardian:   Has the guardian been notified:   Admission Diagnosis:     2. Referral to IOP/PHP:      3. Decision to Discharge:   Does not meet criteria for acceptance to   unit due to:     4.  Transferred:       Patient was transferred due to:Pt was transferred to [facility].    Other follow up information provided:      Paris Krueger RN

## 2020-03-15 PROCEDURE — 6360000002 HC RX W HCPCS: Performed by: EMERGENCY MEDICINE

## 2020-03-15 PROCEDURE — 96372 THER/PROPH/DIAG INJ SC/IM: CPT

## 2020-03-15 RX ORDER — ZIPRASIDONE MESYLATE 20 MG/ML
10 INJECTION, POWDER, LYOPHILIZED, FOR SOLUTION INTRAMUSCULAR ONCE
Status: COMPLETED | OUTPATIENT
Start: 2020-03-15 | End: 2020-03-15

## 2020-03-15 RX ORDER — LORAZEPAM 1 MG/1
2 TABLET ORAL ONCE
Status: DISCONTINUED | OUTPATIENT
Start: 2020-03-15 | End: 2020-03-15

## 2020-03-15 RX ADMIN — ZIPRASIDONE MESYLATE 10 MG: 20 INJECTION, POWDER, LYOPHILIZED, FOR SOLUTION INTRAMUSCULAR at 03:41

## 2020-03-15 NOTE — ED NOTES
Patient telling sitter \"you can't keep me here\"  Advised Dr. Moncada. Received order for Ativan 2 mg PO,  Patient refused to take the PO Ativan 2 mg.    Dr. Lynn Hui updated       Kerry Duggan RN  03/15/20 2545

## 2020-03-15 NOTE — ED NOTES
Attempted to give patient Geodon 10 MG IM. He became very agitated. Holding injection at this time and monitoring patient.        Cristian Lucio RN  03/15/20 8643

## 2022-08-14 ENCOUNTER — APPOINTMENT (OUTPATIENT)
Dept: GENERAL RADIOLOGY | Age: 37
End: 2022-08-14
Payer: MEDICARE

## 2022-08-14 ENCOUNTER — HOSPITAL ENCOUNTER (EMERGENCY)
Age: 37
Discharge: HOME | End: 2022-08-15
Attending: PEDIATRICS
Payer: MEDICARE

## 2022-08-14 DIAGNOSIS — R06.00 DYSPNEA, UNSPECIFIED TYPE: ICD-10-CM

## 2022-08-14 DIAGNOSIS — N39.0 URINARY TRACT INFECTION ASSOCIATED WITH INDWELLING URETHRAL CATHETER, INITIAL ENCOUNTER (HCC): Primary | ICD-10-CM

## 2022-08-14 DIAGNOSIS — T83.511A URINARY TRACT INFECTION ASSOCIATED WITH INDWELLING URETHRAL CATHETER, INITIAL ENCOUNTER (HCC): Primary | ICD-10-CM

## 2022-08-14 LAB
ALBUMIN SERPL-MCNC: 3.5 G/DL (ref 3.5–5.2)
ALP BLD-CCNC: 56 U/L (ref 40–130)
ALT SERPL-CCNC: 16 U/L (ref 5–41)
AMORPHOUS: ABNORMAL /HPF
ANION GAP SERPL CALCULATED.3IONS-SCNC: 11 MMOL/L (ref 7–19)
AST SERPL-CCNC: 12 U/L (ref 5–40)
BACTERIA: ABNORMAL /HPF
BASOPHILS ABSOLUTE: 0 K/UL (ref 0–0.2)
BASOPHILS RELATIVE PERCENT: 0.4 % (ref 0–1)
BILIRUB SERPL-MCNC: <0.2 MG/DL (ref 0.2–1.2)
BILIRUBIN URINE: NEGATIVE
BLOOD, URINE: ABNORMAL
BUN BLDV-MCNC: 14 MG/DL (ref 6–20)
CALCIUM SERPL-MCNC: 9.3 MG/DL (ref 8.6–10)
CHLORIDE BLD-SCNC: 101 MMOL/L (ref 98–111)
CLARITY: ABNORMAL
CO2: 26 MMOL/L (ref 22–29)
COARSE CASTS, UA: ABNORMAL /LPF (ref 0–5)
COLOR: YELLOW
CREAT SERPL-MCNC: 0.4 MG/DL (ref 0.5–1.2)
CRYSTALS, UA: ABNORMAL /HPF
EOSINOPHILS ABSOLUTE: 0.4 K/UL (ref 0–0.6)
EOSINOPHILS RELATIVE PERCENT: 5.1 % (ref 0–5)
GFR AFRICAN AMERICAN: >59
GFR NON-AFRICAN AMERICAN: >60
GLUCOSE BLD-MCNC: 119 MG/DL (ref 74–109)
GLUCOSE URINE: NEGATIVE MG/DL
HCT VFR BLD CALC: 37.2 % (ref 42–52)
HEMOGLOBIN: 10.9 G/DL (ref 14–18)
HYALINE CASTS: >87 /HPF (ref 0–8)
IMMATURE GRANULOCYTES #: 0 K/UL
KETONES, URINE: NEGATIVE MG/DL
LACTIC ACID: 1.8 MMOL/L (ref 0.5–1.9)
LEUKOCYTE ESTERASE, URINE: ABNORMAL
LYMPHOCYTES ABSOLUTE: 1.3 K/UL (ref 1.1–4.5)
LYMPHOCYTES RELATIVE PERCENT: 16.2 % (ref 20–40)
MCH RBC QN AUTO: 24.7 PG (ref 27–31)
MCHC RBC AUTO-ENTMCNC: 29.3 G/DL (ref 33–37)
MCV RBC AUTO: 84.2 FL (ref 80–94)
MONOCYTES ABSOLUTE: 0.6 K/UL (ref 0–0.9)
MONOCYTES RELATIVE PERCENT: 7 % (ref 0–10)
NEUTROPHILS ABSOLUTE: 5.7 K/UL (ref 1.5–7.5)
NEUTROPHILS RELATIVE PERCENT: 70.8 % (ref 50–65)
NITRITE, URINE: POSITIVE
PDW BLD-RTO: 17 % (ref 11.5–14.5)
PH UA: 8 (ref 5–8)
PLATELET # BLD: 296 K/UL (ref 130–400)
PMV BLD AUTO: 11.4 FL (ref 9.4–12.4)
POTASSIUM REFLEX MAGNESIUM: 4 MMOL/L (ref 3.5–5)
PRO-BNP: 18 PG/ML (ref 0–450)
PROTEIN UA: NEGATIVE MG/DL
RBC # BLD: 4.42 M/UL (ref 4.7–6.1)
RBC UA: ABNORMAL /HPF (ref 0–2)
SARS-COV-2, NAAT: DETECTED
SODIUM BLD-SCNC: 138 MMOL/L (ref 136–145)
SPECIFIC GRAVITY UA: 1.02 (ref 1–1.03)
TOTAL PROTEIN: 7.3 G/DL (ref 6.6–8.7)
TROPONIN: <0.01 NG/ML (ref 0–0.03)
UROBILINOGEN, URINE: 1 E.U./DL
WBC # BLD: 8 K/UL (ref 4.8–10.8)
WBC UA: ABNORMAL /HPF (ref 0–5)

## 2022-08-14 PROCEDURE — 71045 X-RAY EXAM CHEST 1 VIEW: CPT

## 2022-08-14 PROCEDURE — 71045 X-RAY EXAM CHEST 1 VIEW: CPT | Performed by: RADIOLOGY

## 2022-08-14 PROCEDURE — 84484 ASSAY OF TROPONIN QUANT: CPT

## 2022-08-14 PROCEDURE — 83605 ASSAY OF LACTIC ACID: CPT

## 2022-08-14 PROCEDURE — 87040 BLOOD CULTURE FOR BACTERIA: CPT

## 2022-08-14 PROCEDURE — 85025 COMPLETE CBC W/AUTO DIFF WBC: CPT

## 2022-08-14 PROCEDURE — 83880 ASSAY OF NATRIURETIC PEPTIDE: CPT

## 2022-08-14 PROCEDURE — 93005 ELECTROCARDIOGRAM TRACING: CPT

## 2022-08-14 PROCEDURE — 87086 URINE CULTURE/COLONY COUNT: CPT

## 2022-08-14 PROCEDURE — 36415 COLL VENOUS BLD VENIPUNCTURE: CPT

## 2022-08-14 PROCEDURE — 81001 URINALYSIS AUTO W/SCOPE: CPT

## 2022-08-14 PROCEDURE — 99285 EMERGENCY DEPT VISIT HI MDM: CPT

## 2022-08-14 PROCEDURE — 87186 SC STD MICRODIL/AGAR DIL: CPT

## 2022-08-14 PROCEDURE — 87635 SARS-COV-2 COVID-19 AMP PRB: CPT

## 2022-08-14 PROCEDURE — 80053 COMPREHEN METABOLIC PANEL: CPT

## 2022-08-15 VITALS
SYSTOLIC BLOOD PRESSURE: 120 MMHG | RESPIRATION RATE: 21 BRPM | DIASTOLIC BLOOD PRESSURE: 90 MMHG | OXYGEN SATURATION: 99 % | BODY MASS INDEX: 15.6 KG/M2 | HEART RATE: 102 BPM | TEMPERATURE: 98.5 F | WEIGHT: 115 LBS

## 2022-08-15 LAB
ALLENS TEST: ABNORMAL
BASE EXCESS ARTERIAL: 6.9 MMOL/L (ref -2–2)
CARBOXYHEMOGLOBIN ARTERIAL: 1.8 % (ref 0–5)
HCO3 ARTERIAL: 31.3 MMOL/L (ref 22–26)
HEMOGLOBIN, ART, EXTENDED: 11.4 G/DL (ref 14–18)
METHEMOGLOBIN ARTERIAL: 1 %
O2 CONTENT ARTERIAL: 15.9 ML/DL
O2 DELIVERY DEVICE: ABNORMAL
O2 SAT, ARTERIAL: 97.3 %
O2 THERAPY: ABNORMAL
PCO2 ARTERIAL: 43 MMHG (ref 35–45)
PH ARTERIAL: 7.47 (ref 7.35–7.45)
PO2 ARTERIAL: 155 MMHG (ref 80–100)
POTASSIUM, WHOLE BLOOD: 4

## 2022-08-15 PROCEDURE — 94640 AIRWAY INHALATION TREATMENT: CPT

## 2022-08-15 PROCEDURE — 82803 BLOOD GASES ANY COMBINATION: CPT

## 2022-08-15 PROCEDURE — 6360000002 HC RX W HCPCS: Performed by: PEDIATRICS

## 2022-08-15 PROCEDURE — 96365 THER/PROPH/DIAG IV INF INIT: CPT

## 2022-08-15 PROCEDURE — 36600 WITHDRAWAL OF ARTERIAL BLOOD: CPT

## 2022-08-15 RX ORDER — ACETYLCYSTEINE 200 MG/ML
600 SOLUTION ORAL; RESPIRATORY (INHALATION) 2 TIMES DAILY PRN
Status: DISCONTINUED | OUTPATIENT
Start: 2022-08-15 | End: 2022-08-15 | Stop reason: HOSPADM

## 2022-08-15 RX ORDER — GRANULES FOR ORAL 3 G/1
3 POWDER ORAL ONCE
Qty: 1 EACH | Refills: 0 | Status: SHIPPED | OUTPATIENT
Start: 2022-08-15 | End: 2022-08-15

## 2022-08-15 RX ORDER — MEROPENEM AND SODIUM CHLORIDE 1 G/50ML
1000 INJECTION, SOLUTION INTRAVENOUS ONCE
Status: COMPLETED | OUTPATIENT
Start: 2022-08-15 | End: 2022-08-15

## 2022-08-15 RX ORDER — ACETYLCYSTEINE 100 MG/ML
4 SOLUTION ORAL; RESPIRATORY (INHALATION) EVERY 4 HOURS
Qty: 30 ML | Refills: 0 | Status: ON HOLD | OUTPATIENT
Start: 2022-08-15

## 2022-08-15 RX ORDER — ALBUTEROL SULFATE 2.5 MG/3ML
2.5 SOLUTION RESPIRATORY (INHALATION) EVERY 4 HOURS PRN
Status: DISCONTINUED | OUTPATIENT
Start: 2022-08-15 | End: 2022-08-15 | Stop reason: HOSPADM

## 2022-08-15 RX ADMIN — ACETYLCYSTEINE 600 MG: 200 SOLUTION ORAL; RESPIRATORY (INHALATION) at 04:48

## 2022-08-15 RX ADMIN — MEROPENEM AND SODIUM CHLORIDE 1000 MG: 1 INJECTION, SOLUTION INTRAVENOUS at 03:23

## 2022-08-15 RX ADMIN — ALBUTEROL SULFATE 2.5 MG: 2.5 SOLUTION RESPIRATORY (INHALATION) at 04:48

## 2022-08-15 NOTE — PROGRESS NOTES
Latest Reference Range & Units 8/15/22 01:42   O2 Therapy  Unknown   Hemoglobin, Art, Extended 14.0 - 18.0 g/dL 11.4 (L)   pH, Arterial 7.350 - 7.450  7.470 (H)   pCO2, Arterial 35.0 - 45.0 mmHg 43.0   pO2, Arterial 80.0 - 100.0 mmHg 155.0 (H)   HCO3, Arterial 22.0 - 26.0 mmol/L 31.3 (H)   Base Excess, Arterial -2.0 - 2.0 mmol/L 6.9 (H)   O2 Sat, Arterial >92 % 97.3   O2 Content, Arterial Not Established mL/dL 15.9   Methemoglobin, Arterial <1.5 % 1.0   Carboxyhgb, Arterial 0.0 - 5.0 % 1.8   (L): Data is abnormally low  (H): Data is abnormally high  At+, left radial, R/A, results given to RN

## 2022-08-15 NOTE — DISCHARGE INSTRUCTIONS
Return with difficulty breathing, fever greater than 100.5, altered mental status, or other concerns.

## 2022-08-17 LAB
ORGANISM: ABNORMAL
URINE CULTURE, ROUTINE: ABNORMAL
URINE CULTURE, ROUTINE: ABNORMAL

## 2022-08-18 LAB
EKG P AXIS: 74 DEGREES
EKG P-R INTERVAL: 130 MS
EKG Q-T INTERVAL: 342 MS
EKG QRS DURATION: 84 MS
EKG QTC CALCULATION (BAZETT): 412 MS
EKG T AXIS: 81 DEGREES

## 2022-08-19 ASSESSMENT — ENCOUNTER SYMPTOMS
DIARRHEA: 0
VOMITING: 0
SHORTNESS OF BREATH: 1
EYE DISCHARGE: 0
COLOR CHANGE: 0
BLOOD IN STOOL: 0
RHINORRHEA: 0
COUGH: 1
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
## 2022-08-20 LAB
BLOOD CULTURE, ROUTINE: NORMAL
CULTURE, BLOOD 2: NORMAL

## 2022-09-02 ENCOUNTER — HOSPITAL ENCOUNTER (EMERGENCY)
Age: 37
Discharge: HOME | End: 2022-09-03
Payer: MEDICARE

## 2022-09-02 VITALS
RESPIRATION RATE: 16 BRPM | DIASTOLIC BLOOD PRESSURE: 73 MMHG | SYSTOLIC BLOOD PRESSURE: 105 MMHG | TEMPERATURE: 99.2 F | OXYGEN SATURATION: 99 % | HEART RATE: 77 BPM

## 2022-09-02 DIAGNOSIS — N30.01 ACUTE CYSTITIS WITH HEMATURIA: Primary | ICD-10-CM

## 2022-09-02 LAB
ALBUMIN SERPL-MCNC: 3.8 G/DL (ref 3.5–5.2)
ALP BLD-CCNC: 56 U/L (ref 40–130)
ALT SERPL-CCNC: 32 U/L (ref 5–41)
ANION GAP SERPL CALCULATED.3IONS-SCNC: 15 MMOL/L (ref 7–19)
AST SERPL-CCNC: 18 U/L (ref 5–40)
BACTERIA: ABNORMAL /HPF
BASOPHILS ABSOLUTE: 0 K/UL (ref 0–0.2)
BASOPHILS RELATIVE PERCENT: 0.5 % (ref 0–1)
BILIRUB SERPL-MCNC: 0.3 MG/DL (ref 0.2–1.2)
BILIRUBIN URINE: NEGATIVE
BLOOD, URINE: ABNORMAL
BUN BLDV-MCNC: 14 MG/DL (ref 6–20)
CALCIUM SERPL-MCNC: 9.1 MG/DL (ref 8.6–10)
CHLORIDE BLD-SCNC: 102 MMOL/L (ref 98–111)
CLARITY: ABNORMAL
CO2: 24 MMOL/L (ref 22–29)
COLOR: ABNORMAL
COMMENT UA: ABNORMAL
CREAT SERPL-MCNC: 0.4 MG/DL (ref 0.5–1.2)
EOSINOPHILS ABSOLUTE: 0.3 K/UL (ref 0–0.6)
EOSINOPHILS RELATIVE PERCENT: 3.8 % (ref 0–5)
GFR AFRICAN AMERICAN: >59
GFR NON-AFRICAN AMERICAN: >60
GLUCOSE BLD-MCNC: 94 MG/DL (ref 74–109)
GLUCOSE URINE: NEGATIVE MG/DL
HCT VFR BLD CALC: 36.4 % (ref 42–52)
HEMOGLOBIN: 11.2 G/DL (ref 14–18)
IMMATURE GRANULOCYTES #: 0.1 K/UL
KETONES, URINE: ABNORMAL MG/DL
LACTIC ACID, SEPSIS: 1 MG/DL (ref 0.5–1.9)
LEUKOCYTE ESTERASE, URINE: ABNORMAL
LYMPHOCYTES ABSOLUTE: 1.7 K/UL (ref 1.1–4.5)
LYMPHOCYTES RELATIVE PERCENT: 19.5 % (ref 20–40)
MCH RBC QN AUTO: 25.3 PG (ref 27–31)
MCHC RBC AUTO-ENTMCNC: 30.8 G/DL (ref 33–37)
MCV RBC AUTO: 82.2 FL (ref 80–94)
MONOCYTES ABSOLUTE: 0.6 K/UL (ref 0–0.9)
MONOCYTES RELATIVE PERCENT: 6.6 % (ref 0–10)
NEUTROPHILS ABSOLUTE: 6 K/UL (ref 1.5–7.5)
NEUTROPHILS RELATIVE PERCENT: 68.6 % (ref 50–65)
NITRITE, URINE: POSITIVE
PDW BLD-RTO: 17.8 % (ref 11.5–14.5)
PH UA: 6 (ref 5–8)
PLATELET # BLD: 352 K/UL (ref 130–400)
PMV BLD AUTO: 11.5 FL (ref 9.4–12.4)
POTASSIUM REFLEX MAGNESIUM: 4.1 MMOL/L (ref 3.5–5)
PROTEIN UA: 30 MG/DL
RBC # BLD: 4.43 M/UL (ref 4.7–6.1)
RBC UA: ABNORMAL /HPF (ref 0–2)
SODIUM BLD-SCNC: 141 MMOL/L (ref 136–145)
SPECIFIC GRAVITY UA: 1.02 (ref 1–1.03)
TOTAL PROTEIN: 7.4 G/DL (ref 6.6–8.7)
UROBILINOGEN, URINE: 1 E.U./DL
WBC # BLD: 8.8 K/UL (ref 4.8–10.8)
WBC UA: ABNORMAL /HPF (ref 0–5)

## 2022-09-02 PROCEDURE — 87186 SC STD MICRODIL/AGAR DIL: CPT

## 2022-09-02 PROCEDURE — 87086 URINE CULTURE/COLONY COUNT: CPT

## 2022-09-02 PROCEDURE — 96374 THER/PROPH/DIAG INJ IV PUSH: CPT

## 2022-09-02 PROCEDURE — 6360000002 HC RX W HCPCS: Performed by: PHYSICIAN ASSISTANT

## 2022-09-02 PROCEDURE — 87040 BLOOD CULTURE FOR BACTERIA: CPT

## 2022-09-02 PROCEDURE — 80053 COMPREHEN METABOLIC PANEL: CPT

## 2022-09-02 PROCEDURE — 6370000000 HC RX 637 (ALT 250 FOR IP): Performed by: PHYSICIAN ASSISTANT

## 2022-09-02 PROCEDURE — 83605 ASSAY OF LACTIC ACID: CPT

## 2022-09-02 PROCEDURE — 81001 URINALYSIS AUTO W/SCOPE: CPT

## 2022-09-02 PROCEDURE — 85025 COMPLETE CBC W/AUTO DIFF WBC: CPT

## 2022-09-02 PROCEDURE — 36415 COLL VENOUS BLD VENIPUNCTURE: CPT

## 2022-09-02 PROCEDURE — 99284 EMERGENCY DEPT VISIT MOD MDM: CPT

## 2022-09-02 PROCEDURE — 96375 TX/PRO/DX INJ NEW DRUG ADDON: CPT

## 2022-09-02 PROCEDURE — 2580000003 HC RX 258: Performed by: PHYSICIAN ASSISTANT

## 2022-09-02 RX ORDER — 0.9 % SODIUM CHLORIDE 0.9 %
500 INTRAVENOUS SOLUTION INTRAVENOUS ONCE
Status: COMPLETED | OUTPATIENT
Start: 2022-09-02 | End: 2022-09-02

## 2022-09-02 RX ORDER — HYDROCODONE BITARTRATE AND ACETAMINOPHEN 5; 325 MG/1; MG/1
1 TABLET ORAL ONCE
Status: COMPLETED | OUTPATIENT
Start: 2022-09-02 | End: 2022-09-02

## 2022-09-02 RX ORDER — LEVOFLOXACIN 750 MG/1
750 TABLET ORAL DAILY
Qty: 7 TABLET | Refills: 0 | Status: SHIPPED | OUTPATIENT
Start: 2022-09-02 | End: 2022-09-09

## 2022-09-02 RX ORDER — KETOROLAC TROMETHAMINE 30 MG/ML
30 INJECTION, SOLUTION INTRAMUSCULAR; INTRAVENOUS ONCE
Status: COMPLETED | OUTPATIENT
Start: 2022-09-02 | End: 2022-09-02

## 2022-09-02 RX ADMIN — WATER 1000 MG: 1 INJECTION INTRAMUSCULAR; INTRAVENOUS; SUBCUTANEOUS at 18:06

## 2022-09-02 RX ADMIN — SODIUM CHLORIDE 500 ML: 9 INJECTION, SOLUTION INTRAVENOUS at 17:30

## 2022-09-02 RX ADMIN — KETOROLAC TROMETHAMINE 30 MG: 30 INJECTION, SOLUTION INTRAMUSCULAR at 20:26

## 2022-09-02 RX ADMIN — HYDROCODONE BITARTRATE AND ACETAMINOPHEN 1 TABLET: 5; 325 TABLET ORAL at 20:26

## 2022-09-02 ASSESSMENT — ENCOUNTER SYMPTOMS
EYE DISCHARGE: 0
COUGH: 0
SHORTNESS OF BREATH: 0
BACK PAIN: 0
COLOR CHANGE: 0
APNEA: 0
EYE ITCHING: 0
PHOTOPHOBIA: 0

## 2022-09-02 ASSESSMENT — PAIN SCALES - GENERAL: PAINLEVEL_OUTOF10: 6

## 2022-09-02 NOTE — ED NOTES
Trach suctioned. Thick, white secretions produced. Pt tolerated well. Split gauze changed under trach.       Marielos Jones RN  09/02/22 7014

## 2022-09-02 NOTE — ED NOTES
Trach suctioned. Thick, white secretions suctioned out. Pt tolerated well.       Vipul Wilkins RN  09/02/22 8758

## 2022-09-02 NOTE — ED PROVIDER NOTES
[facility] EMERGENCY DEPT  eMERGENCYdEPARTMENT eNCOUnter      Pt Name: Curt Luna  MRN: [id number]  Birthdate 1985  Date of evaluation: 9/2/2022  Provider:MARINO Gannon    CHIEF COMPLAINT       Chief Complaint   Patient presents with    Other     Pt arrives to ED via EMS from home. Pt is non-verbal at baseline. Per EMS, pt caregiver (mother) noticed urine turning cloudy three days ago and is concerned for UTI. HISTORY OF PRESENT ILLNESS  (Location/Symptom, Timing/Onset, Context/Setting, Quality, Duration, Modifying Factors, Severity.)   Curt Luna is a 31 y.o. male who presents to the emergency department with complaints of concern for possible UTI. He has an indwelling Roca catheter chronic changed monthly prone to UTIs caregiver present tells me that he has been cloudy now for about 3 days he was having some mild abdominal pain has since resolved I think it was from constipation as he had a pretty successful bowel movement and is since been asymptomatic in that regard. 99.2 fever here caregiver says he is at baseline he is nonverbal as a feeding tube. 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 He is up-to-date on COVID vaccination. Was seen here on August 14 found to have a urinary tract infection at that time. Additionally had COVID at that time as well. On room air baseline 99% no tachycardia. HPI    Nursing Notes were reviewed and I agree. REVIEW OF SYSTEMS    (2-9 systems for level 4, 10 or more for level 5)     Review of Systems   Constitutional:  Negative for activity change, appetite change, chills and fever. HENT:  Negative for congestion and dental problem. Eyes:  Negative for photophobia, discharge and itching. Respiratory:  Negative for apnea, cough and shortness of breath. Cardiovascular:  Negative for chest pain. Genitourinary:         Cloudy urine.    Musculoskeletal:  Negative for arthralgias, back pain, gait problem, myalgias and neck pain. Skin:  Negative for color change, pallor and rash. Neurological:  Negative for dizziness, seizures and syncope. Psychiatric/Behavioral:  Negative for agitation. The patient is not nervous/anxious. Except as noted above the remainder of the review of systems was reviewed and negative. PAST MEDICAL HISTORY     Past Medical History:   Diagnosis Date    Nonverbal          SURGICAL HISTORY       Past Surgical History:   Procedure Laterality Date    TRACHEOSTOMY           CURRENT MEDICATIONS       Previous Medications    ACETYLCYSTEINE (MUCOMYST) 10 % NEBULIZER SOLUTION    Inhale 4 mLs into the lungs every 4 hours    ARIPIPRAZOLE (ABILIFY) 20 MG TABLET    Take 20 mg by mouth daily    CITALOPRAM (CELEXA) 20 MG TABLET    Take 20 mg by mouth daily    TOPIRAMATE (TOPAMAX SPRINKLE) 25 MG CAPSULE    Take 50 mg by mouth 2 times daily       ALLERGIES     Latex    FAMILY HISTORY     No family history on file. SOCIAL HISTORY       Social History     Socioeconomic History    Marital status: Single       SCREENINGS    [place] Coma Scale  Eye Opening: Spontaneous  Best Verbal Response: None  Best Motor Response: Obeys commands  [place] Coma Scale Score: 11      PHYSICAL EXAM    (up to 7 forlevel 4, 8 or more for level 5)     ED Triage Vitals [09/02/22 1550]   BP Temp Temp Source Heart Rate Resp SpO2 Height Weight   103/79 99.2 °F (37.3 °C) Axillary 88 20 99 % -- --       Physical Exam  Vitals and nursing note reviewed. Constitutional:       General: He is not in acute distress. Appearance: Normal appearance. He is well-developed. He is not diaphoretic. HENT:      Head: Normocephalic and atraumatic. Right Ear: External ear normal.      Left Ear: External ear normal.   Eyes:      Pupils: Pupils are equal, round, and reactive to light. Neck:      Trachea: No tracheal deviation. Cardiovascular:      Rate and Rhythm: Normal rate and regular rhythm.       Pulses: Normal pulses. Heart sounds: Normal heart sounds. No murmur heard. Pulmonary:      Effort: Pulmonary effort is normal.      Breath sounds: Normal breath sounds. No stridor. No wheezing. Chest:      Chest wall: No tenderness. Abdominal:      General: Bowel sounds are normal. There is no distension. Palpations: Abdomen is soft. Tenderness: There is no abdominal tenderness. Musculoskeletal:         General: Normal range of motion. Cervical back: Normal range of motion and neck supple. Skin:     General: Skin is warm and dry. Capillary Refill: Capillary refill takes less than 2 seconds. Neurological:      Mental Status: He is alert. Mental status is at baseline.    Psychiatric:      Comments: Nonverbal difficult to assess         DIAGNOSTIC RESULTS     RADIOLOGY:   Non-plain film images such as CT, Ultrasound and MRI are read by the radiologist. Plain radiographic images are visualized and preliminarilyinterpreted by No att. providers found with the below findings:      Interpretation per the Radiologist below, if available at the time of this note:    No orders to display       LABS:  Labs Reviewed   [address] [place] [address] - Abnormal; Notable for the following components:       Result Value    Color, UA DARK YELLOW (*)     Clarity, UA TURBID (*)     Ketones, Urine TRACE (*)     Blood, Urine LARGE (*)     Protein, UA 30 (*)     Nitrite, Urine POSITIVE (*)     Leukocyte Esterase, Urine LARGE (*)     All other components within normal limits   CBC WITH AUTO DIFFERENTIAL - Abnormal; Notable for the following components:    RBC 4.43 (*)     Hemoglobin 11.2 (*)     Hematocrit 36.4 (*)     MCH 25.3 (*)     MCHC 30.8 (*)     RDW 17.8 (*)     Neutrophils % 68.6 (*)     Lymphocytes % 19.5 (*)     All other components within normal limits   COMPREHENSIVE METABOLIC PANEL W/ REFLEX TO MG FOR LOW K - Abnormal; Notable for the following components:    Creatinine 0.4 (*)     All other components within normal limits   MICROSCOPIC URINALYSIS - Abnormal; Notable for the following components:    WBC, UA TNTC (*)     RBC, UA 31-50 (*)     Bacteria, UA 4+ (*)     All other components within normal limits   CULTURE, BLOOD 2   CULTURE, BLOOD 1   CULTURE, URINE   LACTATE, SEPSIS   LACTATE, SEPSIS       All other labs were within normal range or notreturned as of this dictation. RE-ASSESSMENT        EMERGENCY DEPARTMENT COURSE and DIFFERENTIAL DIAGNOSIS/MDM:   Vitals:    Vitals:    09/02/22 1550 09/02/22 1700 09/02/22 1800   BP: 103/79 108/73 105/73   Pulse: 88 85 77   Resp: 20 14 16   Temp: 99.2 °F (37.3 °C)     TempSrc: Axillary     SpO2: 99% 100% 99%         MDM  Plan will be for discharge. His white count is normal lactic is normal blood cultures pending. He does not have a fever here does not really look septic and his baseline per mother we did discuss possibility of an observation do not really feel that it strongly indicated we have a source that he will need to have this treated we have changed his Roca catheter and CLAIRE Steiner give him some Rocephin here before discharge. He grew out a couple different bacteria last time we can go and do Levaquin as that seemed to cover most of those last time as well. Mother can do through the feeding tube. They understand to follow closely with urology. PROCEDURES:    Procedures      FINAL IMPRESSION      1. Acute cystitis with hematuria          DISPOSITION/PLAN   DISPOSITION Discharge - Pending Orders Complete 09/02/2022 06:20:39 PM      PATIENT REFERRED TO:  [facility] EMERGENCY DEPT  Murray Albrecht  [phone]    If symptoms worsen    [place]oy.   [address] [place] [id number] [phone]  Schedule an appointment as soon as possible for a visit       DISCHARGE MEDICATIONS:  New Prescriptions    LEVOFLOXACIN (LEVAQUIN) 750 MG TABLET    Take 1 tablet by mouth daily for 7 days       (Please note that portions of this note were completed with a voice recognition program.  Efforts were made to edit the dictations but occasionallywords are mis-transcribed.)    Luther Allen, [address] [place] [address]  09/02/22 [address] [place] [address]  09/02/22 3198

## 2022-09-03 NOTE — ED NOTES
[facility] here to transport pt back home; paperwork given     Layne Broderick RN  09/03/22 6733

## 2022-09-04 LAB
ORGANISM: ABNORMAL
URINE CULTURE, ROUTINE: ABNORMAL
URINE CULTURE, ROUTINE: ABNORMAL

## 2022-09-07 LAB
BLOOD CULTURE, ROUTINE: NORMAL
CULTURE, BLOOD 2: NORMAL

## 2022-09-19 ENCOUNTER — HOSPITAL ENCOUNTER (INPATIENT)
Age: 37
LOS: 9 days | Discharge: HOME HEALTH | DRG: 698 | End: 2022-09-29
Attending: EMERGENCY MEDICINE | Admitting: HOSPITALIST
Payer: MEDICARE

## 2022-09-19 ENCOUNTER — APPOINTMENT (OUTPATIENT)
Dept: GENERAL RADIOLOGY | Age: 37
DRG: 698 | End: 2022-09-19
Payer: MEDICARE

## 2022-09-19 DIAGNOSIS — Z51.5 PALLIATIVE CARE PATIENT: ICD-10-CM

## 2022-09-19 DIAGNOSIS — T83.511A URINARY TRACT INFECTION ASSOCIATED WITH INDWELLING URETHRAL CATHETER, INITIAL ENCOUNTER (HCC): ICD-10-CM

## 2022-09-19 DIAGNOSIS — Z87.820 HISTORY OF TRAUMATIC BRAIN INJURY: ICD-10-CM

## 2022-09-19 DIAGNOSIS — J69.0 ASPIRATION PNEUMONIA OF UPPER LOBE, UNSPECIFIED ASPIRATION PNEUMONIA TYPE, UNSPECIFIED LATERALITY (HCC): ICD-10-CM

## 2022-09-19 DIAGNOSIS — J95.09 OTHER TRACHEOSTOMY COMPLICATION (HCC): Primary | ICD-10-CM

## 2022-09-19 DIAGNOSIS — S06.9X9D TRAUMATIC BRAIN INJURY WITH LOSS OF CONSCIOUSNESS, SUBSEQUENT ENCOUNTER: ICD-10-CM

## 2022-09-19 DIAGNOSIS — J96.90 RESPIRATORY FAILURE FOLLOWING TRAUMA (HCC): ICD-10-CM

## 2022-09-19 DIAGNOSIS — N39.0 URINARY TRACT INFECTION ASSOCIATED WITH INDWELLING URETHRAL CATHETER, INITIAL ENCOUNTER (HCC): ICD-10-CM

## 2022-09-19 PROCEDURE — 99285 EMERGENCY DEPT VISIT HI MDM: CPT

## 2022-09-19 PROCEDURE — 71045 X-RAY EXAM CHEST 1 VIEW: CPT

## 2022-09-19 PROCEDURE — 0B21XFZ CHANGE TRACHEOSTOMY DEVICE IN TRACHEA, EXTERNAL APPROACH: ICD-10-PCS | Performed by: HOSPITALIST

## 2022-09-19 PROCEDURE — 31502 CHANGE OF WINDPIPE AIRWAY: CPT

## 2022-09-19 RX ORDER — CEPHALEXIN 250 MG/5ML
500 POWDER, FOR SUSPENSION ORAL 2 TIMES DAILY
Qty: 140 ML | Refills: 0 | Status: SHIPPED | OUTPATIENT
Start: 2022-09-19 | End: 2022-09-28 | Stop reason: HOSPADM

## 2022-09-20 ENCOUNTER — APPOINTMENT (OUTPATIENT)
Dept: CT IMAGING | Age: 37
DRG: 698 | End: 2022-09-20
Payer: MEDICARE

## 2022-09-20 ENCOUNTER — APPOINTMENT (OUTPATIENT)
Dept: GENERAL RADIOLOGY | Age: 37
DRG: 698 | End: 2022-09-20
Payer: MEDICARE

## 2022-09-20 PROBLEM — A41.9 SEPSIS DUE TO PNEUMONIA (HCC): Status: ACTIVE | Noted: 2022-09-20

## 2022-09-20 PROBLEM — Z97.8 ENDOTRACHEAL TUBE PRESENT: Status: ACTIVE | Noted: 2022-09-20

## 2022-09-20 PROBLEM — J95.09 OTHER TRACHEOSTOMY COMPLICATION (HCC): Status: ACTIVE | Noted: 2022-09-20

## 2022-09-20 PROBLEM — N39.0 UTI (URINARY TRACT INFECTION): Status: ACTIVE | Noted: 2022-09-20

## 2022-09-20 PROBLEM — J18.9 SEPSIS DUE TO PNEUMONIA (HCC): Status: ACTIVE | Noted: 2022-09-20

## 2022-09-20 PROBLEM — R47.01 NONVERBAL: Status: ACTIVE | Noted: 2022-09-20

## 2022-09-20 PROBLEM — F20.9 SCHIZOPHRENIA (HCC): Status: ACTIVE | Noted: 2022-09-20

## 2022-09-20 PROBLEM — J69.0: Status: ACTIVE | Noted: 2022-09-20

## 2022-09-20 PROBLEM — E87.20 LACTIC ACIDOSIS: Status: ACTIVE | Noted: 2022-09-20

## 2022-09-20 PROBLEM — E43 SEVERE MALNUTRITION (HCC): Status: ACTIVE | Noted: 2022-09-20

## 2022-09-20 PROBLEM — F31.9 BIPOLAR DISORDER (HCC): Status: ACTIVE | Noted: 2022-09-20

## 2022-09-20 LAB
ALBUMIN SERPL-MCNC: 4.2 G/DL (ref 3.5–5.2)
ALP BLD-CCNC: 64 U/L (ref 40–130)
ALT SERPL-CCNC: 29 U/L (ref 5–41)
ANION GAP SERPL CALCULATED.3IONS-SCNC: 12 MMOL/L (ref 7–19)
ANION GAP SERPL CALCULATED.3IONS-SCNC: 13 MMOL/L (ref 7–19)
AST SERPL-CCNC: 19 U/L (ref 5–40)
BACTERIA: ABNORMAL /HPF
BASOPHILS ABSOLUTE: 0.1 K/UL (ref 0–0.2)
BASOPHILS ABSOLUTE: 0.1 K/UL (ref 0–0.2)
BASOPHILS RELATIVE PERCENT: 0.2 % (ref 0–1)
BASOPHILS RELATIVE PERCENT: 0.6 % (ref 0–1)
BILIRUB SERPL-MCNC: 0.4 MG/DL (ref 0.2–1.2)
BILIRUBIN URINE: NEGATIVE
BLOOD, URINE: ABNORMAL
BUN BLDV-MCNC: 13 MG/DL (ref 6–20)
BUN BLDV-MCNC: 16 MG/DL (ref 6–20)
CALCIUM SERPL-MCNC: 10 MG/DL (ref 8.6–10)
CALCIUM SERPL-MCNC: 10.4 MG/DL (ref 8.6–10)
CHLORIDE BLD-SCNC: 101 MMOL/L (ref 98–111)
CHLORIDE BLD-SCNC: 103 MMOL/L (ref 98–111)
CLARITY: ABNORMAL
CO2: 25 MMOL/L (ref 22–29)
CO2: 28 MMOL/L (ref 22–29)
COLOR: ABNORMAL
CREAT SERPL-MCNC: 0.4 MG/DL (ref 0.5–1.2)
CREAT SERPL-MCNC: 0.5 MG/DL (ref 0.5–1.2)
CRYSTALS, UA: ABNORMAL /HPF
EOSINOPHILS ABSOLUTE: 0.1 K/UL (ref 0–0.6)
EOSINOPHILS ABSOLUTE: 0.4 K/UL (ref 0–0.6)
EOSINOPHILS RELATIVE PERCENT: 0.2 % (ref 0–5)
EOSINOPHILS RELATIVE PERCENT: 3.4 % (ref 0–5)
EPITHELIAL CELLS, UA: 0 /HPF (ref 0–5)
GFR AFRICAN AMERICAN: >59
GFR AFRICAN AMERICAN: >59
GFR NON-AFRICAN AMERICAN: >60
GFR NON-AFRICAN AMERICAN: >60
GLUCOSE BLD-MCNC: 116 MG/DL (ref 74–109)
GLUCOSE BLD-MCNC: 128 MG/DL (ref 74–109)
GLUCOSE BLD-MCNC: 129 MG/DL (ref 70–99)
GLUCOSE URINE: NEGATIVE MG/DL
HCT VFR BLD CALC: 42.5 % (ref 42–52)
HCT VFR BLD CALC: 42.7 % (ref 42–52)
HEMOGLOBIN: 13.2 G/DL (ref 14–18)
HEMOGLOBIN: 13.4 G/DL (ref 14–18)
HYALINE CASTS: 3 /HPF (ref 0–8)
IMMATURE GRANULOCYTES #: 0 K/UL
IMMATURE GRANULOCYTES #: 0.1 K/UL
INR BLD: 1.23 (ref 0.88–1.18)
KETONES, URINE: ABNORMAL MG/DL
LACTIC ACID: 1.6 MMOL/L (ref 0.5–1.9)
LACTIC ACID: 2.2 MMOL/L (ref 0.5–1.9)
LEUKOCYTE ESTERASE, URINE: ABNORMAL
LYMPHOCYTES ABSOLUTE: 1.2 K/UL (ref 1.1–4.5)
LYMPHOCYTES ABSOLUTE: 2.8 K/UL (ref 1.1–4.5)
LYMPHOCYTES RELATIVE PERCENT: 25.4 % (ref 20–40)
LYMPHOCYTES RELATIVE PERCENT: 4.9 % (ref 20–40)
MAGNESIUM: 1.8 MG/DL (ref 1.6–2.6)
MCH RBC QN AUTO: 26 PG (ref 27–31)
MCH RBC QN AUTO: 26.2 PG (ref 27–31)
MCHC RBC AUTO-ENTMCNC: 31.1 G/DL (ref 33–37)
MCHC RBC AUTO-ENTMCNC: 31.4 G/DL (ref 33–37)
MCV RBC AUTO: 82.9 FL (ref 80–94)
MCV RBC AUTO: 84.3 FL (ref 80–94)
MONOCYTES ABSOLUTE: 0.8 K/UL (ref 0–0.9)
MONOCYTES ABSOLUTE: 1.5 K/UL (ref 0–0.9)
MONOCYTES RELATIVE PERCENT: 6.5 % (ref 0–10)
MONOCYTES RELATIVE PERCENT: 7.5 % (ref 0–10)
NEUTROPHILS ABSOLUTE: 20.6 K/UL (ref 1.5–7.5)
NEUTROPHILS ABSOLUTE: 6.9 K/UL (ref 1.5–7.5)
NEUTROPHILS RELATIVE PERCENT: 62.7 % (ref 50–65)
NEUTROPHILS RELATIVE PERCENT: 87.7 % (ref 50–65)
NITRITE, URINE: POSITIVE
OCCULT BLOOD QC: ABNORMAL
OCCULT BLOOD SCREENING: ABNORMAL
PDW BLD-RTO: 18.3 % (ref 11.5–14.5)
PDW BLD-RTO: 18.5 % (ref 11.5–14.5)
PERFORMED ON: ABNORMAL
PH UA: 6.5 (ref 5–8)
PHOSPHORUS: 4.7 MG/DL (ref 2.5–4.5)
PLATELET # BLD: 346 K/UL (ref 130–400)
PLATELET # BLD: 412 K/UL (ref 130–400)
PMV BLD AUTO: 11.4 FL (ref 9.4–12.4)
PMV BLD AUTO: 11.4 FL (ref 9.4–12.4)
POTASSIUM REFLEX MAGNESIUM: 4.3 MMOL/L (ref 3.5–5)
POTASSIUM SERPL-SCNC: 4.1 MMOL/L (ref 3.5–5)
PROCALCITONIN: 0.04 NG/ML (ref 0–0.09)
PROTEIN UA: 30 MG/DL
PROTHROMBIN TIME: 15.5 SEC (ref 12–14.6)
RBC # BLD: 5.04 M/UL (ref 4.7–6.1)
RBC # BLD: 5.15 M/UL (ref 4.7–6.1)
RBC UA: 85 /HPF (ref 0–4)
SARS-COV-2, NAAT: NOT DETECTED
SODIUM BLD-SCNC: 140 MMOL/L (ref 136–145)
SODIUM BLD-SCNC: 142 MMOL/L (ref 136–145)
SPECIFIC GRAVITY UA: 1.02 (ref 1–1.03)
TOTAL PROTEIN: 8.4 G/DL (ref 6.6–8.7)
UROBILINOGEN, URINE: 1 E.U./DL
VITAMIN D 25-HYDROXY: 33.3 NG/ML
WBC # BLD: 11 K/UL (ref 4.8–10.8)
WBC # BLD: 23.5 K/UL (ref 4.8–10.8)
WBC UA: 447 /HPF (ref 0–5)

## 2022-09-20 PROCEDURE — 2580000003 HC RX 258: Performed by: HOSPITALIST

## 2022-09-20 PROCEDURE — 87635 SARS-COV-2 COVID-19 AMP PRB: CPT

## 2022-09-20 PROCEDURE — 6360000004 HC RX CONTRAST MEDICATION: Performed by: SPECIALIST

## 2022-09-20 PROCEDURE — 71045 X-RAY EXAM CHEST 1 VIEW: CPT

## 2022-09-20 PROCEDURE — 84145 PROCALCITONIN (PCT): CPT

## 2022-09-20 PROCEDURE — 2580000003 HC RX 258: Performed by: EMERGENCY MEDICINE

## 2022-09-20 PROCEDURE — 82270 OCCULT BLOOD FECES: CPT

## 2022-09-20 PROCEDURE — 82947 ASSAY GLUCOSE BLOOD QUANT: CPT

## 2022-09-20 PROCEDURE — 85610 PROTHROMBIN TIME: CPT

## 2022-09-20 PROCEDURE — 99231 SBSQ HOSP IP/OBS SF/LOW 25: CPT | Performed by: OTOLARYNGOLOGY

## 2022-09-20 PROCEDURE — 87186 SC STD MICRODIL/AGAR DIL: CPT

## 2022-09-20 PROCEDURE — 6360000002 HC RX W HCPCS: Performed by: HOSPITALIST

## 2022-09-20 PROCEDURE — 6370000000 HC RX 637 (ALT 250 FOR IP): Performed by: HOSPITALIST

## 2022-09-20 PROCEDURE — 2700000000 HC OXYGEN THERAPY PER DAY

## 2022-09-20 PROCEDURE — 87086 URINE CULTURE/COLONY COUNT: CPT

## 2022-09-20 PROCEDURE — 83735 ASSAY OF MAGNESIUM: CPT

## 2022-09-20 PROCEDURE — 81001 URINALYSIS AUTO W/SCOPE: CPT

## 2022-09-20 PROCEDURE — 36415 COLL VENOUS BLD VENIPUNCTURE: CPT

## 2022-09-20 PROCEDURE — 94640 AIRWAY INHALATION TREATMENT: CPT

## 2022-09-20 PROCEDURE — 6360000002 HC RX W HCPCS: Performed by: PHYSICIAN ASSISTANT

## 2022-09-20 PROCEDURE — 82306 VITAMIN D 25 HYDROXY: CPT

## 2022-09-20 PROCEDURE — 87040 BLOOD CULTURE FOR BACTERIA: CPT

## 2022-09-20 PROCEDURE — 2580000003 HC RX 258: Performed by: PHYSICIAN ASSISTANT

## 2022-09-20 PROCEDURE — 84100 ASSAY OF PHOSPHORUS: CPT

## 2022-09-20 PROCEDURE — 99223 1ST HOSP IP/OBS HIGH 75: CPT | Performed by: INTERNAL MEDICINE

## 2022-09-20 PROCEDURE — 80053 COMPREHEN METABOLIC PANEL: CPT

## 2022-09-20 PROCEDURE — 85025 COMPLETE CBC W/AUTO DIFF WBC: CPT

## 2022-09-20 PROCEDURE — 2500000003 HC RX 250 WO HCPCS: Performed by: HOSPITALIST

## 2022-09-20 PROCEDURE — 2140000000 HC CCU INTERMEDIATE R&B

## 2022-09-20 PROCEDURE — 2580000003 HC RX 258: Performed by: SPECIALIST

## 2022-09-20 PROCEDURE — 2500000003 HC RX 250 WO HCPCS: Performed by: SPECIALIST

## 2022-09-20 PROCEDURE — 83605 ASSAY OF LACTIC ACID: CPT

## 2022-09-20 PROCEDURE — 74178 CT ABD&PLV WO CNTR FLWD CNTR: CPT

## 2022-09-20 PROCEDURE — 99231 SBSQ HOSP IP/OBS SF/LOW 25: CPT | Performed by: PSYCHIATRY & NEUROLOGY

## 2022-09-20 RX ORDER — ARIPIPRAZOLE 10 MG/1
20 TABLET ORAL DAILY
Status: DISCONTINUED | OUTPATIENT
Start: 2022-09-20 | End: 2022-09-29 | Stop reason: HOSPADM

## 2022-09-20 RX ORDER — ACETAMINOPHEN 650 MG/1
650 SUPPOSITORY RECTAL EVERY 6 HOURS PRN
Status: DISCONTINUED | OUTPATIENT
Start: 2022-09-20 | End: 2022-09-29 | Stop reason: HOSPADM

## 2022-09-20 RX ORDER — TOPIRAMATE 25 MG/1
50 CAPSULE, COATED PELLETS ORAL 2 TIMES DAILY
Status: DISCONTINUED | OUTPATIENT
Start: 2022-09-20 | End: 2022-09-21

## 2022-09-20 RX ORDER — IPRATROPIUM BROMIDE AND ALBUTEROL SULFATE 2.5; .5 MG/3ML; MG/3ML
1 SOLUTION RESPIRATORY (INHALATION)
Status: DISCONTINUED | OUTPATIENT
Start: 2022-09-20 | End: 2022-09-21

## 2022-09-20 RX ORDER — ONDANSETRON 4 MG/1
4 TABLET, ORALLY DISINTEGRATING ORAL EVERY 8 HOURS PRN
Status: DISCONTINUED | OUTPATIENT
Start: 2022-09-20 | End: 2022-09-29 | Stop reason: HOSPADM

## 2022-09-20 RX ORDER — POLYETHYLENE GLYCOL 3350 17 G/17G
17 POWDER, FOR SOLUTION ORAL DAILY PRN
Status: DISCONTINUED | OUTPATIENT
Start: 2022-09-20 | End: 2022-09-29 | Stop reason: HOSPADM

## 2022-09-20 RX ORDER — ONDANSETRON 2 MG/ML
4 INJECTION INTRAMUSCULAR; INTRAVENOUS EVERY 6 HOURS PRN
Status: DISCONTINUED | OUTPATIENT
Start: 2022-09-20 | End: 2022-09-29 | Stop reason: HOSPADM

## 2022-09-20 RX ORDER — OXYCODONE HYDROCHLORIDE 5 MG/1
5 TABLET ORAL EVERY 8 HOURS PRN
COMMUNITY

## 2022-09-20 RX ORDER — METOPROLOL TARTRATE 5 MG/5ML
5 INJECTION INTRAVENOUS ONCE
Status: COMPLETED | OUTPATIENT
Start: 2022-09-20 | End: 2022-09-20

## 2022-09-20 RX ORDER — SODIUM CHLORIDE, SODIUM LACTATE, POTASSIUM CHLORIDE, AND CALCIUM CHLORIDE .6; .31; .03; .02 G/100ML; G/100ML; G/100ML; G/100ML
30 INJECTION, SOLUTION INTRAVENOUS ONCE
Status: COMPLETED | OUTPATIENT
Start: 2022-09-20 | End: 2022-09-20

## 2022-09-20 RX ORDER — ENOXAPARIN SODIUM 100 MG/ML
40 INJECTION SUBCUTANEOUS DAILY
Status: DISCONTINUED | OUTPATIENT
Start: 2022-09-20 | End: 2022-09-21

## 2022-09-20 RX ORDER — POTASSIUM CHLORIDE 20 MEQ/1
40 TABLET, EXTENDED RELEASE ORAL PRN
Status: DISCONTINUED | OUTPATIENT
Start: 2022-09-20 | End: 2022-09-29 | Stop reason: HOSPADM

## 2022-09-20 RX ORDER — SODIUM CHLORIDE 9 MG/ML
INJECTION, SOLUTION INTRAVENOUS PRN
Status: DISCONTINUED | OUTPATIENT
Start: 2022-09-20 | End: 2022-09-29 | Stop reason: HOSPADM

## 2022-09-20 RX ORDER — METOPROLOL TARTRATE 5 MG/5ML
5 INJECTION INTRAVENOUS EVERY 6 HOURS
Status: DISCONTINUED | OUTPATIENT
Start: 2022-09-20 | End: 2022-09-20

## 2022-09-20 RX ORDER — ACETAMINOPHEN 325 MG/1
650 TABLET ORAL EVERY 4 HOURS PRN
Status: ON HOLD | COMMUNITY
End: 2022-11-10 | Stop reason: SDUPTHER

## 2022-09-20 RX ORDER — PROPRANOLOL HYDROCHLORIDE 20 MG/5ML
20 SOLUTION ORAL 4 TIMES DAILY PRN
COMMUNITY

## 2022-09-20 RX ORDER — CITALOPRAM 20 MG/1
20 TABLET ORAL DAILY
Status: DISCONTINUED | OUTPATIENT
Start: 2022-09-20 | End: 2022-09-21

## 2022-09-20 RX ORDER — ACETAMINOPHEN 325 MG/1
650 TABLET ORAL EVERY 6 HOURS PRN
Status: DISCONTINUED | OUTPATIENT
Start: 2022-09-20 | End: 2022-09-29 | Stop reason: HOSPADM

## 2022-09-20 RX ORDER — FAMOTIDINE 20 MG/1
20 TABLET, FILM COATED ORAL 2 TIMES DAILY PRN
Status: ON HOLD | COMMUNITY
End: 2022-11-10 | Stop reason: SDUPTHER

## 2022-09-20 RX ORDER — MAGNESIUM SULFATE 1 G/100ML
1000 INJECTION INTRAVENOUS PRN
Status: DISCONTINUED | OUTPATIENT
Start: 2022-09-20 | End: 2022-09-29 | Stop reason: HOSPADM

## 2022-09-20 RX ORDER — POTASSIUM CHLORIDE 7.45 MG/ML
10 INJECTION INTRAVENOUS PRN
Status: DISCONTINUED | OUTPATIENT
Start: 2022-09-20 | End: 2022-09-29 | Stop reason: HOSPADM

## 2022-09-20 RX ORDER — SODIUM CHLORIDE 0.9 % (FLUSH) 0.9 %
10 SYRINGE (ML) INJECTION EVERY 12 HOURS SCHEDULED
Status: DISCONTINUED | OUTPATIENT
Start: 2022-09-20 | End: 2022-09-29 | Stop reason: HOSPADM

## 2022-09-20 RX ORDER — SODIUM CHLORIDE 0.9 % (FLUSH) 0.9 %
10 SYRINGE (ML) INJECTION PRN
Status: DISCONTINUED | OUTPATIENT
Start: 2022-09-20 | End: 2022-09-29 | Stop reason: HOSPADM

## 2022-09-20 RX ORDER — ACETYLCYSTEINE 200 MG/ML
2 SOLUTION ORAL; RESPIRATORY (INHALATION) EVERY 4 HOURS
Status: DISCONTINUED | OUTPATIENT
Start: 2022-09-20 | End: 2022-09-23

## 2022-09-20 RX ADMIN — Medication 1500 MG: at 05:53

## 2022-09-20 RX ADMIN — IOPAMIDOL 70 ML: 755 INJECTION, SOLUTION INTRAVENOUS at 15:12

## 2022-09-20 RX ADMIN — METOPROLOL TARTRATE 5 MG: 1 INJECTION, SOLUTION INTRAVENOUS at 21:50

## 2022-09-20 RX ADMIN — PIPERACILLIN AND TAZOBACTAM 3375 MG: 3; .375 INJECTION, POWDER, FOR SOLUTION INTRAVENOUS at 19:14

## 2022-09-20 RX ADMIN — PIPERACILLIN AND TAZOBACTAM 3375 MG: 3; .375 INJECTION, POWDER, FOR SOLUTION INTRAVENOUS at 11:20

## 2022-09-20 RX ADMIN — SODIUM CHLORIDE, PRESERVATIVE FREE 20 MG: 5 INJECTION INTRAVENOUS at 20:48

## 2022-09-20 RX ADMIN — VANCOMYCIN HYDROCHLORIDE 1000 MG: 10 INJECTION, POWDER, LYOPHILIZED, FOR SOLUTION INTRAVENOUS at 17:59

## 2022-09-20 RX ADMIN — VANCOMYCIN HYDROCHLORIDE 1000 MG: 10 INJECTION, POWDER, LYOPHILIZED, FOR SOLUTION INTRAVENOUS at 22:37

## 2022-09-20 RX ADMIN — ACETYLCYSTEINE 400 MG: 200 SOLUTION ORAL; RESPIRATORY (INHALATION) at 19:30

## 2022-09-20 RX ADMIN — IPRATROPIUM BROMIDE AND ALBUTEROL SULFATE 1 AMPULE: 2.5; .5 SOLUTION RESPIRATORY (INHALATION) at 19:30

## 2022-09-20 RX ADMIN — PIPERACILLIN AND TAZOBACTAM 3375 MG: 3; .375 INJECTION, POWDER, FOR SOLUTION INTRAVENOUS at 01:44

## 2022-09-20 RX ADMIN — SODIUM CHLORIDE, POTASSIUM CHLORIDE, SODIUM LACTATE AND CALCIUM CHLORIDE 1000 ML: 600; 310; 30; 20 INJECTION, SOLUTION INTRAVENOUS at 02:20

## 2022-09-20 ASSESSMENT — PAIN SCALES - GENERAL: PAINLEVEL_OUTOF10: 0
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
## 2022-09-20 NOTE — PROGRESS NOTES
Pt mother requested tate to be removed as the pt can urinate with no issues. This nurse removed the tate as requested.      Electronically signed by Chino Mohan RN on 9/20/2022 at 6:07 AM

## 2022-09-20 NOTE — PROGRESS NOTES
[facility] Pharmacokinetic Monitoring Service - Vancomycin     Lola Chiang is a 31 y.o. male starting on vancomycin therapy for aspiration pneumonia, sepsis. Pharmacy consulted by Dr. Whitney Payne for monitoring and adjustment. Target Concentration: Goal AUC/ABDOUL 400-600 mg*hr/L    Additional Antimicrobials: Zosyn 3375 mg IV x 1 dose given in the ED    Pertinent Laboratory Values: Wt Readings from Last 1 Encounters:   09/20/22 141 lb (64 kg)     Temp Readings from Last 1 Encounters:   09/19/22 97.5 °F (36.4 °C)     Estimated Creatinine Clearance: 188 mL/min (based on SCr of 0.5 mg/dL). Recent Labs     09/20/22  0004   CREATININE 0.5   WBC 11.0*     Procalcitonin: 0.04 on 09/20/22    Pertinent Cultures:  Culture Date Source Results   09/20/22 Urine  No result   09/20/22 Blood x 2  Sent    MRSA Nasal Swab: N/A. Non-respiratory infection.     Plan:  Dosing recommendations based on Bayesian software  Start vancomycin 1500 mg IV x 1 dose, followed by 1000 mg IV every 8 hours  Anticipated AUC of 556 and trough concentration of 16.9 at steady state  Renal labs as indicated   Vancomycin concentration ordered for 09/21 @ [facility] will continue to monitor patient and adjust therapy as indicated    Thank you for the consult,  Darren Johnson, [facility]  9/20/2022 4:18 AM

## 2022-09-20 NOTE — PROGRESS NOTES
4 Eyes Skin Assessment    Terisa Reason is being assessed upon: Admission    I agree that Bonnie Gomez, RN, along with Jericho Greene, RN (either 2 RN's or 1 LPN and 1 RN) have performed a thorough Head to Toe Skin Assessment on the patient. ALL assessment sites listed below have been assessed. Areas assessed by both nurses:     [x]   Head, Face, and Ears   [x]   Shoulders, Back, and Chest  [x]   Arms, Elbows, and Hands   [x]   Coccyx, Sacrum, and Ischium  [x]   Legs, Feet, and Heels    Does the Patient have Skin Breakdown? Yes, wound(s) noted upon assessment. It is the responsibility of the Primary Nurse to assure that the following documentation, preventions, orders, and consults are complete on the above noted wound(s): Wound LDA initiated. LDA Flowsheet Documentation includes the Katherine-wound, Wound Assessment, Measurements, Dressing Treatment, Drainage, and Color.     Julio Prevention initiated: Yes  Wound Care Orders initiated: Yes    [address]  nurse consulted for Pressure Injury (Stage 3,4, Unstageable, DTI, NWPT, and Complex wounds) and New or Established Ostomies: Yes        Primary Nurse eSignature: Geoff Cristobal RN on 9/20/2022 at 5:57 AM      Co-Signer eSignature: {Esignature:[id number]}
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
## 2022-09-20 NOTE — PLAN OF CARE
Problem: Discharge Planning  Goal: Discharge to home or other facility with appropriate resources  Outcome: Progressing  Flowsheets (Taken 9/20/2022 1725)  Discharge to home or other facility with appropriate resources:   Identify discharge learning needs (meds, wound care, etc)   Identify barriers to discharge with patient and caregiver   Arrange for needed discharge resources and transportation as appropriate     Problem: Skin/Tissue Integrity  Goal: Absence of new skin breakdown  Description: 1. Monitor for areas of redness and/or skin breakdown  2. Assess vascular access sites hourly  3. Every 4-6 hours minimum:  Change oxygen saturation probe site  4. Every 4-6 hours:  If on nasal continuous positive airway pressure, respiratory therapy assess nares and determine need for appliance change or resting period.   Outcome: Progressing     Problem: ABCDS Injury Assessment  Goal: Absence of physical injury  Outcome: Progressing     Problem: Pain  Goal: Verbalizes/displays adequate comfort level or baseline comfort level  Outcome: Progressing     Problem: Safety - Adult  Goal: Free from fall injury  Outcome: Progressing

## 2022-09-20 NOTE — ED NOTES
[facility] to transport PT back to residence      Yolanda Layla, [place]  09/19/22 8680
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
## 2022-09-20 NOTE — DISCHARGE INSTRUCTIONS
Increase water intake via tubing   Follow up with GI as OP in one week- pls schedule tawanda prior to DC home today

## 2022-09-20 NOTE — PROGRESS NOTES
Family states he trough up coffee ground emesis in er and once in room. Updated mother on gi status. tgreeno rn

## 2022-09-20 NOTE — ED NOTES
Roca catheter removed per pt family request. Pt mother stated they had an appointment for removal tomorrow but are unable to go. Pt mother asked if we would be able to remove catheter here.  Brittany Pugh MD gave verbal order to remove catheter per family request.      Brayden Gavin RN  09/19/22 5387

## 2022-09-20 NOTE — PROGRESS NOTES
Comprehensive Nutrition Assessment    Type and Reason for Visit:  Initial, Positive Nutrition Screen    Nutrition Recommendations/Plan:   Follow for restarting TF     Malnutrition Assessment:  Malnutrition Status:  Severe malnutrition (09/20/22 1304)    Context:  Acute Illness     Findings of the 6 clinical characteristics of malnutrition:  Energy Intake:  Mild decrease in energy intake (Comment)  Weight Loss:  Greater than 7.5% over 3 months     Body Fat Loss: Moderate body fat loss     Muscle Mass Loss:  Mild muscle mass loss    Fluid Accumulation:  No significant fluid accumulation Extremities   Strength:  Not Performed    Nutrition Assessment:    +NS for TF PTA. At present time pt is NPO d/t emesis and possible aspiration pneumonia. Aware pt has G tube and receives bolus feedings of Jevity 1.2. Pt meets criteria for  malnutrition AEB fat and muscle mass depletion and is at risk for further compromise d/t pressure wound. Nutrition Related Findings:    Has G tube, trach and is nonverbal Wound Type: Pressure Injury       Current Nutrition Intake & Therapies:    Average Meal Intake: NPO  Average Supplements Intake: NPO  Diet NPO Exceptions are: Ice Chips, Sips of Water with Meds, Popsicles    Anthropometric Measures:  Height: 6' 2\" (188 cm)  Ideal Body Weight (IBW): 190 lbs (86 kg)    Admission Body Weight: 141 lb (64 kg)  Current Body Weight: 141 lb (64 kg), 74.2 % IBW. Weight Source: Bed Scale  Current BMI (kg/m2): 18.1  Usual Body Weight: 162 lb 6.4 oz (73.7 kg) (6/2022)  % Weight Change (Calculated): -13.2  Weight Adjustment For: Quadriplegia  Total Adjusted Percentage (Calculated): 12.5  Adjusted Ideal Body Weight (lbs) (Calculated): 166.3 lbs  Adjusted Ideal Body Weight (kg) (Calculated): 75.59 kg  Adjusted % Ideal Body Weight (Calculated): 84.8  Adjusted BMI (kg/m2) (Calculated): 20.4  BMI Categories: Normal Weight (BMI 18.5-24. 9)    Estimated Daily Nutrient Needs:  Energy Requirements Based On: Kcal/kg  Weight Used for Energy Requirements: Current  Energy (kcal/day): 7755-5870 kcals (25-30 kcals/kg)  Weight Used for Protein Requirements: Current  Protein (g/day): 64-128g  Method Used for Fluid Requirements: 1 ml/kcal  Fluid (ml/day): 7436-4041 ml    Nutrition Diagnosis:   Inadequate oral intake related to acute injury/trauma as evidenced by NPO or clear liquid status due to medical condition, nausea, vomiting    Nutrition Interventions:   Food and/or Nutrient Delivery: Continue NPO  Nutrition Education/Counseling: No recommendation at this time  Coordination of Nutrition Care: Continue to monitor while inpatient       Goals:     Goals: Meet at least 75% of estimated needs, PO intake 50% or greater       Nutrition Monitoring and Evaluation:      Food/Nutrient Intake Outcomes: Enteral Nutrition Intake/Tolerance  Physical Signs/Symptoms Outcomes: Biochemical Data, Fluid Status or Edema, Weight, Skin    Discharge Planning:     Too soon to determine     Saturnino Shaffer MS, RD, LD  Contact: [phone]

## 2022-09-20 NOTE — ED NOTES
Pt vomited coffee like emesis. o2 sat dropped to mid 80's. Respiratory at bedside.  Ezekiel Davis MD notified      Prerna Mak RN  09/20/22 4072

## 2022-09-20 NOTE — PROGRESS NOTES
Drsg changed to peg. Black drainage noted. Cleaned with soap/h20.  New drsg applied,abd to cover so pt can not pull on tube.edson rn

## 2022-09-20 NOTE — PLAN OF CARE
Patient's mother is a here. Discussed the need, benefits, risks, alternatives and limitations of EGD with patient and mother in person. She understands and she would like us to proceed with EGD tomorrow. Talked with the patient's nurse also. Patient had a CAT scan of the abdomen but the results are pending. EGD consent / shared decision making :    Consent obtained from: Patient's mother. She would like us to proceed with EGD tomorrow. Mother is at patient's bedside. Witness (es): Patient's sitter who is at bedside. Obtained consent in layperson's language, previously and immediately prior to.procedure. Explained the need, benefits, risks, alternatives and limitations of the procedure. Need and benefits: Make diagnosis, provide therapy and plan treatment including feeding into the stomach. Risks: Bleeding, perforation of esophagus, stomach, duodenum, splenic injury / laceration, liver injury / laceration, aspiration pneumonia, respiratory arrest, cardiac arrhythmia, cardiac arrest, death. infection and peritoneum, infection in blood. Tumor seeding: In case of oral, laryngeal, esophageal malignancy and gastric malignancy: The tumor may seed into esophagus, stomach, gastrostomy site in the stomach and gastrostomy site on the skin    Alternatives: Barium swallow, upper GI, SBFT, CT chest, CT abdomen, no testing, wait and watch, colonoscopy, obtain second opinion. Missed diagnosis and limitations: Missed diagnosis, including that of varices, gastric ulcer, duodenal ulcer, cancer. Also inability to complete the procedure due to various reasons-technical including equipment failure and non-technical.    Complications of sedation/analgesia: Including aspiration pneumonia, respiratory arrest, cardiac arrhythmia, cardiac arrest, hypotension, hypertension, paralysis. Opportunity was given for questions. The questions that were asked, were answered.   The healthcare client verbalized understanding.     Candi Mota MD  9/20/2022  5:39 PM        Disclaimer speech recognition software       (Please note that portions of this note were completed with a voice recognition program. Efforts were made to edit the dictations but occasionally words are mis-transcribed.)

## 2022-09-20 NOTE — PROGRESS NOTES
Contact Information   [phone] (Home Phone)   [phone] (Mobile)    Alternate    +2 more     Jarrell Carr (Parent)   [phone] (Mobile)     I called patient's mother at [phone]. Russ Lynsteven was a witness. Patient's mother would like us to do EGD and CAT scan of the abdomen. She does not want us to remove the old tube and put the new tube with the balloon because a new tube with the balloon will be easier to pull out as patient pulls out the tubes. He pulled out his tracheostomy tube yesterday. Opportunity was given to patient's mother to ask questions. The question that she asked were answered. She does not have any further questions and she would like us to proceed with EGD. EGD consent / shared decision making :    Consent obtained from: Patient's mother over the telephone. I called patient's mother at [phone] mobile phone number. Nurse See Khan was a witness. Patient's mother would like us to do EGD but cannot replace patient's old tube with new G-tube. Witness (es): See Khan RN. Obtained consent in layperson's language, previously and immediately prior to.procedure. Explained the need, benefits, risks, alternatives and limitations of the procedure. Need and benefits: Make diagnosis, provide therapy and plan treatment including feeding into the stomach. Risks: Bleeding, perforation of esophagus, stomach, duodenum, splenic injury / laceration, liver injury / laceration, aspiration pneumonia, respiratory arrest, cardiac arrhythmia, cardiac arrest, death. infection and peritoneum, infection in blood. Tumor seeding: In case of oral, laryngeal, esophageal malignancy and gastric malignancy: The tumor may seed into esophagus, stomach, gastrostomy site in the stomach and gastrostomy site on the skin    Alternatives: Barium swallow, upper GI, SBFT, CT chest, CT abdomen, no testing, wait and watch, colonoscopy, obtain second opinion.     Missed diagnosis and limitations: Missed diagnosis, including that of varices, gastric ulcer, duodenal ulcer, cancer. Also inability to complete the procedure due to various reasons-technical including equipment failure and non-technical.    Complications of sedation/analgesia: Including aspiration pneumonia, respiratory arrest, cardiac arrhythmia, cardiac arrest, hypotension, hypertension, paralysis. Opportunity was given for questions. The questions that were asked, were answered. The healthcare client verbalized understanding.     Js Burton MD  9/20/2022  1:56 PM        Disclaimer speech recognition software       (Please note that portions of this note were completed with a voice recognition program. Efforts were made to edit the dictations but occasionally words are mis-transcribed.)

## 2022-09-20 NOTE — CONSULTS
Department of Otolaryngology  Dr Addie Foreman Consult Note      Reason for Consult:  trach  Requesting Physician:  ED    CHIEF COMPLAINT: Patient is trach dependent and trach came out ER replaced with aspiration pneumonia    History Obtained From:  patient, electronic medical record    HISTORY OF PRESENT ILLNESS:                The patient is a 31 y.o. male with significant past medical history of Polar disorder who presents with break that came out. He was seen in the ER and unfortunately missed the call in the middle of the night. ER he was able to replace the tube and patient was admitted for GI bleeding and aspiration pneumonia.     Past Medical History:        Diagnosis Date    Nonverbal      Past Surgical History:        Procedure Laterality Date    TRACHEOSTOMY       Current Medications:   Current Facility-Administered Medications: topiramate (TOPAMAX SPRINKLE) capsule 50 mg (Patient Supplied), 50 mg, Oral, BID  citalopram (CELEXA) tablet 20 mg, 20 mg, Oral, Daily  ARIPiprazole (ABILIFY) tablet 20 mg, 20 mg, Oral, Daily  acetylcysteine (MUCOMYST) 20 % solution 400 mg, 2 mL, Inhalation, Q4H  sodium chloride flush 0.9 % injection 10 mL, 10 mL, IntraVENous, 2 times per day  sodium chloride flush 0.9 % injection 10 mL, 10 mL, IntraVENous, PRN  0.9 % sodium chloride infusion, , IntraVENous, PRN  potassium chloride (KLOR-CON M) extended release tablet 40 mEq, 40 mEq, Oral, PRN **OR** potassium bicarb-citric acid (EFFER-K) effervescent tablet 40 mEq, 40 mEq, Oral, PRN **OR** potassium chloride 10 mEq/100 mL IVPB (Peripheral Line), 10 mEq, IntraVENous, PRN  magnesium sulfate 1000 mg in dextrose 5% 100 mL IVPB, 1,000 mg, IntraVENous, PRN  enoxaparin (LOVENOX) injection 40 mg, 40 mg, SubCUTAneous, Daily  ondansetron (ZOFRAN-ODT) disintegrating tablet 4 mg, 4 mg, Oral, Q8H PRN **OR** ondansetron (ZOFRAN) injection 4 mg, 4 mg, IntraVENous, Q6H PRN  polyethylene glycol (GLYCOLAX) packet 17 g, 17 g, Oral, Daily PRN  acetaminophen (TYLENOL) tablet 650 mg, 650 mg, Oral, Q6H PRN **OR** acetaminophen (TYLENOL) suppository 650 mg, 650 mg, Rectal, Q6H PRN  vancomycin (VANCOCIN) intermittent dosing (placeholder), , Other, RX Placeholder  vancomycin (VANCOCIN) 1000 mg in dextrose 5% 250 mL IVPB, 1,000 mg, IntraVENous, Q8H  piperacillin-tazobactam (ZOSYN) 3,375 mg in dextrose 5 % 50 mL IVPB ([id number]), 3,375 mg, IntraVENous, Q8H  ipratropium-albuterol (DUONEB) nebulizer solution 1 ampule, 1 ampule, Inhalation, Q4H WA  Allergies:  Latex, Levetiracetam, Levofloxacin, Magnesium oxide, and Pantoprazole sodium    Social History:    Non contributory  Family History:   History reviewed. No pertinent family history. REVIEW OF SYSTEMS:    Review of systems not obtained due to patient factors - mental status    PHYSICAL EXAM:    VITALS:  /80   Pulse (!) 136   Temp (!) 100.9 °F (38.3 °C) (Temporal) Comment: reported to nurse  Resp 16   Ht 6' 2\" (1.88 m)   Wt 141 lb (64 kg)   SpO2 97%   BMI 18.10 kg/m²   CONSTITUTIONAL: Unresponsive  EYES: Extraocular muscles intact  ENT: Dejon Zelayae in place stoma healthy  NECK: Trach  MUSCULOSKELETAL: Contracted muscles  NEUROLOGIC: Unresponsive  SKIN:  no bruising or bleeding          IMPRESSION/RECOMMENDATIONS:      24-year-old trach dependent male with trach back in place admitted for aspiration pneumonia and GI bleeding. Dejon Betsy appears secured. We will keep an eye on this.

## 2022-09-20 NOTE — PROGRESS NOTES
Gabe Goldsmith arrived to room # [id number]. Presented with: aspiration pneumonia of both lungs due to vomit, unspecified part of lung  Mental Status: Patient is disoriented. Vitals:    09/20/22 0353   BP: (!) 132/99   Pulse: (!) 123   Resp:    Temp:    SpO2: 98%     Patient safety contract and falls prevention contract reviewed with patient Yes. Oriented Patient and Family to room. Call light within reach. Yes.   Needs, issues or concerns expressed at this time: no.      Electronically signed by Vinayak Rogers RN on 9/20/2022 at 5:59 AM

## 2022-09-20 NOTE — ED NOTES
[facility] at [facility] with Robby Coates Due to [address] at [facility] with [facility]. Gave patient Info, transferred call to [facility] ([address]) at 0636. Roberto called back at 1005. The only facility they have with a ICU bed is in [place], [place]. Family wants us to try closer facility. Will call [facility] back if we need them.         Vidal Coffee  09/20/22 0041       Vidal Coffee  09/20/22 0100
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
## 2022-09-21 ENCOUNTER — APPOINTMENT (OUTPATIENT)
Dept: GENERAL RADIOLOGY | Age: 37
DRG: 698 | End: 2022-09-21
Payer: MEDICARE

## 2022-09-21 PROBLEM — R11.2 NAUSEA AND VOMITING: Status: ACTIVE | Noted: 2022-09-19

## 2022-09-21 LAB
ALLENS TEST: ABNORMAL
AMPHETAMINE SCREEN, URINE: NEGATIVE
ANION GAP SERPL CALCULATED.3IONS-SCNC: 13 MMOL/L (ref 7–19)
BARBITURATE SCREEN URINE: NEGATIVE
BASE EXCESS ARTERIAL: 4.7 MMOL/L (ref -2–2)
BASOPHILS ABSOLUTE: 0.1 K/UL (ref 0–0.2)
BASOPHILS RELATIVE PERCENT: 0.4 % (ref 0–1)
BENZODIAZEPINE SCREEN, URINE: NEGATIVE
BUN BLDV-MCNC: 12 MG/DL (ref 6–20)
BUPRENORPHINE URINE: NEGATIVE
CALCIUM SERPL-MCNC: 9.8 MG/DL (ref 8.6–10)
CANNABINOID SCREEN URINE: NEGATIVE
CARBOXYHEMOGLOBIN ARTERIAL: 1.7 % (ref 0–5)
CHLORIDE BLD-SCNC: 100 MMOL/L (ref 98–111)
CO2: 25 MMOL/L (ref 22–29)
COCAINE METABOLITE SCREEN URINE: NEGATIVE
CREAT SERPL-MCNC: 0.5 MG/DL (ref 0.5–1.2)
D DIMER: 3.39 UG/ML FEU (ref 0–0.48)
EOSINOPHILS ABSOLUTE: 0 K/UL (ref 0–0.6)
EOSINOPHILS RELATIVE PERCENT: 0.1 % (ref 0–5)
FERRITIN: 188.3 NG/ML (ref 30–400)
FIO2: 70 %
FOLATE: 10.5 NG/ML (ref 4.5–32.2)
GFR AFRICAN AMERICAN: >59
GFR NON-AFRICAN AMERICAN: >60
GLUCOSE BLD-MCNC: 123 MG/DL (ref 74–109)
HCO3 ARTERIAL: 28.2 MMOL/L (ref 22–26)
HCT VFR BLD CALC: 38.9 % (ref 42–52)
HEMOGLOBIN, ART, EXTENDED: 11.5 G/DL (ref 14–18)
HEMOGLOBIN: 12.2 G/DL (ref 14–18)
IMMATURE GRANULOCYTES #: 0.1 K/UL
IRON SATURATION: 5 % (ref 14–50)
IRON: 14 UG/DL (ref 59–158)
LACTIC ACID: 1.1 MMOL/L (ref 0.5–1.9)
LYMPHOCYTES ABSOLUTE: 1.1 K/UL (ref 1.1–4.5)
LYMPHOCYTES RELATIVE PERCENT: 6.8 % (ref 20–40)
Lab: NORMAL
MAGNESIUM: 1.6 MG/DL (ref 1.6–2.6)
MCH RBC QN AUTO: 25.9 PG (ref 27–31)
MCHC RBC AUTO-ENTMCNC: 31.4 G/DL (ref 33–37)
MCV RBC AUTO: 82.6 FL (ref 80–94)
METHADONE SCREEN, URINE: NEGATIVE
METHAMPHETAMINE, URINE: NEGATIVE
METHEMOGLOBIN ARTERIAL: 1.7 %
MONOCYTES ABSOLUTE: 0.9 K/UL (ref 0–0.9)
MONOCYTES RELATIVE PERCENT: 5.6 % (ref 0–10)
NEUTROPHILS ABSOLUTE: 14 K/UL (ref 1.5–7.5)
NEUTROPHILS RELATIVE PERCENT: 86.7 % (ref 50–65)
O2 CONTENT ARTERIAL: 15.8 ML/DL
O2 DELIVERY DEVICE: ABNORMAL
O2 SAT, ARTERIAL: 96.7 %
O2 THERAPY: ABNORMAL
OPIATE SCREEN URINE: NEGATIVE
OXYCODONE URINE: NEGATIVE
PCO2 ARTERIAL: 37 MMHG (ref 35–45)
PDW BLD-RTO: 18.8 % (ref 11.5–14.5)
PH ARTERIAL: 7.49 (ref 7.35–7.45)
PHENCYCLIDINE SCREEN URINE: NEGATIVE
PLATELET # BLD: 301 K/UL (ref 130–400)
PMV BLD AUTO: 11.2 FL (ref 9.4–12.4)
PO2 ARTERIAL: 121 MMHG (ref 80–100)
POTASSIUM REFLEX MAGNESIUM: 4 MMOL/L (ref 3.5–5)
POTASSIUM, WHOLE BLOOD: 3.4
PRO-BNP: 333 PG/ML (ref 0–450)
PROCALCITONIN: 0.23 NG/ML (ref 0–0.09)
PROPOXYPHENE SCREEN: NEGATIVE
RBC # BLD: 4.71 M/UL (ref 4.7–6.1)
SAMPLE SOURCE: ABNORMAL
SODIUM BLD-SCNC: 138 MMOL/L (ref 136–145)
SPONT RATE(BPM): 16
TOTAL IRON BINDING CAPACITY: 298 UG/DL (ref 250–400)
TRICYCLIC, URINE: NEGATIVE
TSH SERPL DL<=0.05 MIU/L-ACNC: 1.15 UIU/ML (ref 0.27–4.2)
VANCOMYCIN TROUGH: 16.1 UG/ML (ref 10–20)
VITAMIN B-12: 1459 PG/ML (ref 211–946)
WBC # BLD: 16.1 K/UL (ref 4.8–10.8)

## 2022-09-21 PROCEDURE — 83880 ASSAY OF NATRIURETIC PEPTIDE: CPT

## 2022-09-21 PROCEDURE — 71045 X-RAY EXAM CHEST 1 VIEW: CPT | Performed by: RADIOLOGY

## 2022-09-21 PROCEDURE — 87040 BLOOD CULTURE FOR BACTERIA: CPT

## 2022-09-21 PROCEDURE — 6370000000 HC RX 637 (ALT 250 FOR IP): Performed by: HOSPITALIST

## 2022-09-21 PROCEDURE — 74018 RADEX ABDOMEN 1 VIEW: CPT

## 2022-09-21 PROCEDURE — 36600 WITHDRAWAL OF ARTERIAL BLOOD: CPT

## 2022-09-21 PROCEDURE — 99231 SBSQ HOSP IP/OBS SF/LOW 25: CPT | Performed by: SPECIALIST

## 2022-09-21 PROCEDURE — 85379 FIBRIN DEGRADATION QUANT: CPT

## 2022-09-21 PROCEDURE — 2700000000 HC OXYGEN THERAPY PER DAY

## 2022-09-21 PROCEDURE — 82803 BLOOD GASES ANY COMBINATION: CPT

## 2022-09-21 PROCEDURE — 80202 ASSAY OF VANCOMYCIN: CPT

## 2022-09-21 PROCEDURE — 80048 BASIC METABOLIC PNL TOTAL CA: CPT

## 2022-09-21 PROCEDURE — 83735 ASSAY OF MAGNESIUM: CPT

## 2022-09-21 PROCEDURE — 71045 X-RAY EXAM CHEST 1 VIEW: CPT

## 2022-09-21 PROCEDURE — 36415 COLL VENOUS BLD VENIPUNCTURE: CPT

## 2022-09-21 PROCEDURE — 2500000003 HC RX 250 WO HCPCS: Performed by: SPECIALIST

## 2022-09-21 PROCEDURE — 84443 ASSAY THYROID STIM HORMONE: CPT

## 2022-09-21 PROCEDURE — 2500000003 HC RX 250 WO HCPCS: Performed by: HOSPITALIST

## 2022-09-21 PROCEDURE — 94761 N-INVAS EAR/PLS OXIMETRY MLT: CPT

## 2022-09-21 PROCEDURE — 6360000002 HC RX W HCPCS: Performed by: HOSPITALIST

## 2022-09-21 PROCEDURE — 97535 SELF CARE MNGMENT TRAINING: CPT

## 2022-09-21 PROCEDURE — 2580000003 HC RX 258: Performed by: HOSPITALIST

## 2022-09-21 PROCEDURE — 97166 OT EVAL MOD COMPLEX 45 MIN: CPT

## 2022-09-21 PROCEDURE — 82728 ASSAY OF FERRITIN: CPT

## 2022-09-21 PROCEDURE — 87205 SMEAR GRAM STAIN: CPT

## 2022-09-21 PROCEDURE — 83550 IRON BINDING TEST: CPT

## 2022-09-21 PROCEDURE — 87086 URINE CULTURE/COLONY COUNT: CPT

## 2022-09-21 PROCEDURE — 80306 DRUG TEST PRSMV INSTRMNT: CPT

## 2022-09-21 PROCEDURE — 87070 CULTURE OTHR SPECIMN AEROBIC: CPT

## 2022-09-21 PROCEDURE — 84145 PROCALCITONIN (PCT): CPT

## 2022-09-21 PROCEDURE — 2580000003 HC RX 258: Performed by: SPECIALIST

## 2022-09-21 PROCEDURE — 85025 COMPLETE CBC W/AUTO DIFF WBC: CPT

## 2022-09-21 PROCEDURE — 83540 ASSAY OF IRON: CPT

## 2022-09-21 PROCEDURE — 94640 AIRWAY INHALATION TREATMENT: CPT

## 2022-09-21 PROCEDURE — 2140000000 HC CCU INTERMEDIATE R&B

## 2022-09-21 PROCEDURE — 83605 ASSAY OF LACTIC ACID: CPT

## 2022-09-21 PROCEDURE — 92523 SPEECH SOUND LANG COMPREHEN: CPT

## 2022-09-21 PROCEDURE — 82746 ASSAY OF FOLIC ACID SERUM: CPT

## 2022-09-21 PROCEDURE — 82607 VITAMIN B-12: CPT

## 2022-09-21 PROCEDURE — 87186 SC STD MICRODIL/AGAR DIL: CPT

## 2022-09-21 PROCEDURE — 99233 SBSQ HOSP IP/OBS HIGH 50: CPT | Performed by: INTERNAL MEDICINE

## 2022-09-21 PROCEDURE — 86403 PARTICLE AGGLUT ANTBDY SCRN: CPT

## 2022-09-21 RX ORDER — 0.9 % SODIUM CHLORIDE 0.9 %
1000 INTRAVENOUS SOLUTION INTRAVENOUS ONCE
Status: DISCONTINUED | OUTPATIENT
Start: 2022-09-21 | End: 2022-09-22

## 2022-09-21 RX ORDER — OXYCODONE HYDROCHLORIDE AND ACETAMINOPHEN 5; 325 MG/1; MG/1
1 TABLET ORAL EVERY 6 HOURS PRN
Status: DISCONTINUED | OUTPATIENT
Start: 2022-09-21 | End: 2022-09-29 | Stop reason: HOSPADM

## 2022-09-21 RX ORDER — 0.9 % SODIUM CHLORIDE 0.9 %
1000 INTRAVENOUS SOLUTION INTRAVENOUS ONCE
Status: COMPLETED | OUTPATIENT
Start: 2022-09-21 | End: 2022-09-21

## 2022-09-21 RX ORDER — LORAZEPAM 2 MG/ML
0.5 INJECTION INTRAMUSCULAR ONCE
Status: COMPLETED | OUTPATIENT
Start: 2022-09-21 | End: 2022-09-21

## 2022-09-21 RX ORDER — METOPROLOL TARTRATE 5 MG/5ML
2.5 INJECTION INTRAVENOUS EVERY 5 MIN PRN
Status: DISCONTINUED | OUTPATIENT
Start: 2022-09-21 | End: 2022-09-29 | Stop reason: HOSPADM

## 2022-09-21 RX ORDER — IPRATROPIUM BROMIDE AND ALBUTEROL SULFATE 2.5; .5 MG/3ML; MG/3ML
1 SOLUTION RESPIRATORY (INHALATION) 4 TIMES DAILY
Status: DISCONTINUED | OUTPATIENT
Start: 2022-09-21 | End: 2022-09-26

## 2022-09-21 RX ORDER — LEVETIRACETAM 100 MG/ML
500 SOLUTION ORAL 2 TIMES DAILY
Status: ON HOLD | COMMUNITY
End: 2022-11-10 | Stop reason: SDUPTHER

## 2022-09-21 RX ORDER — LEVETIRACETAM 100 MG/ML
500 SOLUTION ORAL 2 TIMES DAILY
Status: DISCONTINUED | OUTPATIENT
Start: 2022-09-21 | End: 2022-09-23

## 2022-09-21 RX ORDER — PROPRANOLOL HYDROCHLORIDE 20 MG/1
20 TABLET ORAL 3 TIMES DAILY
Status: DISCONTINUED | OUTPATIENT
Start: 2022-09-21 | End: 2022-09-29 | Stop reason: HOSPADM

## 2022-09-21 RX ORDER — CITALOPRAM 10 MG/1
10 TABLET ORAL DAILY
Status: DISCONTINUED | OUTPATIENT
Start: 2022-09-22 | End: 2022-09-29 | Stop reason: HOSPADM

## 2022-09-21 RX ADMIN — Medication 10 ML: at 22:48

## 2022-09-21 RX ADMIN — PROPRANOLOL HYDROCHLORIDE 20 MG: 20 TABLET ORAL at 22:35

## 2022-09-21 RX ADMIN — PIPERACILLIN AND TAZOBACTAM 3375 MG: 3; .375 INJECTION, POWDER, FOR SOLUTION INTRAVENOUS at 16:52

## 2022-09-21 RX ADMIN — LORAZEPAM 0.5 MG: 2 INJECTION, SOLUTION INTRAMUSCULAR; INTRAVENOUS at 00:46

## 2022-09-21 RX ADMIN — VANCOMYCIN HYDROCHLORIDE 1000 MG: 10 INJECTION, POWDER, LYOPHILIZED, FOR SOLUTION INTRAVENOUS at 14:41

## 2022-09-21 RX ADMIN — METOPROLOL TARTRATE 2.5 MG: 1 INJECTION, SOLUTION INTRAVENOUS at 13:11

## 2022-09-21 RX ADMIN — SODIUM CHLORIDE 1000 ML: 9 INJECTION, SOLUTION INTRAVENOUS at 16:43

## 2022-09-21 RX ADMIN — SODIUM CHLORIDE, PRESERVATIVE FREE 20 MG: 5 INJECTION INTRAVENOUS at 10:05

## 2022-09-21 RX ADMIN — VANCOMYCIN HYDROCHLORIDE 1000 MG: 10 INJECTION, POWDER, LYOPHILIZED, FOR SOLUTION INTRAVENOUS at 06:29

## 2022-09-21 RX ADMIN — ACETYLCYSTEINE 400 MG: 200 SOLUTION ORAL; RESPIRATORY (INHALATION) at 10:55

## 2022-09-21 RX ADMIN — PROPRANOLOL HYDROCHLORIDE 20 MG: 20 TABLET ORAL at 12:00

## 2022-09-21 RX ADMIN — ARIPIPRAZOLE 20 MG: 10 TABLET ORAL at 10:04

## 2022-09-21 RX ADMIN — IPRATROPIUM BROMIDE AND ALBUTEROL SULFATE 1 AMPULE: 2.5; .5 SOLUTION RESPIRATORY (INHALATION) at 06:59

## 2022-09-21 RX ADMIN — SODIUM CHLORIDE, PRESERVATIVE FREE 20 MG: 5 INJECTION INTRAVENOUS at 22:37

## 2022-09-21 RX ADMIN — IPRATROPIUM BROMIDE AND ALBUTEROL SULFATE 1 AMPULE: 2.5; .5 SOLUTION RESPIRATORY (INHALATION) at 19:30

## 2022-09-21 RX ADMIN — ACETYLCYSTEINE 400 MG: 200 SOLUTION ORAL; RESPIRATORY (INHALATION) at 19:30

## 2022-09-21 RX ADMIN — PIPERACILLIN AND TAZOBACTAM 3375 MG: 3; .375 INJECTION, POWDER, FOR SOLUTION INTRAVENOUS at 00:46

## 2022-09-21 RX ADMIN — LEVETIRACETAM 500 MG: 100 SOLUTION ORAL at 23:16

## 2022-09-21 RX ADMIN — Medication 10 ML: at 10:06

## 2022-09-21 RX ADMIN — ACETYLCYSTEINE 400 MG: 200 SOLUTION ORAL; RESPIRATORY (INHALATION) at 06:58

## 2022-09-21 RX ADMIN — IPRATROPIUM BROMIDE AND ALBUTEROL SULFATE 1 AMPULE: 2.5; .5 SOLUTION RESPIRATORY (INHALATION) at 10:55

## 2022-09-21 RX ADMIN — VANCOMYCIN HYDROCHLORIDE 1000 MG: 10 INJECTION, POWDER, LYOPHILIZED, FOR SOLUTION INTRAVENOUS at 22:54

## 2022-09-21 RX ADMIN — PIPERACILLIN AND TAZOBACTAM 3375 MG: 3; .375 INJECTION, POWDER, FOR SOLUTION INTRAVENOUS at 10:04

## 2022-09-21 RX ADMIN — ACETAMINOPHEN 650 MG: 325 TABLET, FILM COATED ORAL at 13:18

## 2022-09-21 RX ADMIN — ACETYLCYSTEINE 400 MG: 200 SOLUTION ORAL; RESPIRATORY (INHALATION) at 23:59

## 2022-09-21 RX ADMIN — ACETYLCYSTEINE 400 MG: 200 SOLUTION ORAL; RESPIRATORY (INHALATION) at 14:21

## 2022-09-21 RX ADMIN — IPRATROPIUM BROMIDE AND ALBUTEROL SULFATE 1 AMPULE: 2.5; .5 SOLUTION RESPIRATORY (INHALATION) at 23:59

## 2022-09-21 RX ADMIN — IPRATROPIUM BROMIDE AND ALBUTEROL SULFATE 1 AMPULE: 2.5; .5 SOLUTION RESPIRATORY (INHALATION) at 14:21

## 2022-09-21 ASSESSMENT — PAIN SCALES - WONG BAKER: WONGBAKER_NUMERICALRESPONSE: 0

## 2022-09-21 ASSESSMENT — PAIN SCALES - GENERAL: PAINLEVEL_OUTOF10: 0

## 2022-09-21 NOTE — SIGNIFICANT EVENT
Patient presented to [facility] ER on 9/19 due to pulling out his tracheostomy tube. Initially patient was to be transferred back home however while in the ER had large vomitus that went into both his lungs from the open tracheostomy site. He was admitted to hospitalist medicine due to sepsis with aspiration pneumonia. Rapid response called at 2140 due to increased labored breathing. Upon assessment patient is diaphoretic respiratory rate 40, heart rate 140 bpm, 145/95. Respiratory is at bedside and provides inline suction. Patient mouth is also suctioning via [place]. provided Lopressor 5 mg IV x1. Vital signs 127/78, heart rate trending downward 120 and respirations have decreased to 26. We will continue to closely monitor.

## 2022-09-21 NOTE — PROGRESS NOTES
Pulmonary and Critical Care Progress note. [address]    MRN# [id number]    Acct# [de-identified]  9/21/2022   4:16 PM CDT    Referring Jazzy Weinstein, *      Chief Complaint: resp failure on t piece. HPI: Had a fever last night. Remains with a tracheostomy and T-piece with excessive secretions. Medications    propranolol, 20 mg, Oral, TID    ipratropium-albuterol, 1 ampule, Inhalation, 4x daily    [START ON 9/22/2022] citalopram, 10 mg, Oral, Daily    sodium chloride, 1,000 mL, IntraVENous, Once    topiramate, 50 mg, Oral, BID    ARIPiprazole, 20 mg, Oral, Daily    acetylcysteine, 2 mL, Inhalation, Q4H    sodium chloride flush, 10 mL, IntraVENous, 2 times per day    vancomycin (VANCOCIN) intermittent dosing (placeholder), , Other, RX Placeholder    vancomycin, 1,000 mg, IntraVENous, Q8H    piperacillin-tazobactam, 3,375 mg, IntraVENous, Q8H    famotidine (PEPCID) injection, 20 mg, IntraVENous, BID     Review of Systems:  Unable to obtain due to mental status    Physical Exam:  /84   Pulse (!) 121   Temp 99.3 °F (37.4 °C) (Temporal)   Resp 20   Ht 6' 2\" (1.88 m)   Wt 138 lb 4.8 oz (62.7 kg)   SpO2 100%   BMI 17.76 kg/m²   Intake/Output Summary (Last 24 hours) at 9/21/2022 1616  Last data filed at 9/21/2022 1528  Gross per 24 hour   Intake 120 ml   Output 550 ml   Net -430 ml       General appearance:  Young white male does not appear to be in distress   HEENT:normocephalic atraumatic tracheostomy tube in place  Heart:S1S2 no murmurs  Lungs:diminished bilaterally no rubs or tenderness or dullness to percussion  Abdomen:Soft non tender no organomegaly  Extremities:non clubbing cyanosis or edema  Neuro:no focal findings  Skin:intact    Recent Labs     09/20/22  0004 09/20/22  0413 09/21/22  0410   WBC 11.0* 23.5* 16.1*   RBC 5.04 5.15 4.71   HGB 13.2* 13.4* 12.2*   HCT 42.5 42.7 38.9*   * 346 301   MCV 84.3 82.9 82.6   MCH 26.2* 26.0* 25.9* MCHC 31.1* 31.4* 31.4*   RDW 18.5* 18.3* 18.8*      Recent Labs     09/20/22 0004 09/20/22 0413 09/21/22 0411 09/21/22  1009    140 138  --    K 4.1 4.3 4.0 3.4    103 100  --    CO2 28 25 25  --    BUN 13 16 12  --    CREATININE 0.5 0.4* 0.5  --    CALCIUM 10.4* 10.0 9.8  --    GLUCOSE 128* 116* 123*  --       Recent Labs     09/21/22  1009   PHART 7.490*   TSC4VAM 37.0   PO2ART 121.0*   UFF6RAT 28.2*   T3EAXMGY 96.7   BEART 4.7*     Recent Labs     09/20/22 0004 09/20/22 0004 09/20/22 0413 09/20/22  1415 09/21/22 0411 09/21/22  0951   AST 19  --   --   --   --   --    ALT 29  --   --   --   --   --    ALKPHOS 64  --   --   --   --   --    BILITOT 0.4  --   --   --   --   --    MG  --    < > 1.8  --   --  1.6   CALCIUM 10.4*  --  10.0  --  9.8  --    PHOS  --   --  4.7*  --   --   --    PROBNP  --   --   --   --   --  333   LACTA 2.2*  --  1.6  --   --  1.1   INR  --   --   --  1.23*  --   --    TSH  --   --   --   --   --  1.150   DDIMER  --   --   --   --   --  3.39*   PROCAL 0.04  --   --   --   --  0.23*    < > = values in this interval not displayed. Recent Labs     09/20/22  0004   BC No Growth to date. Any change in status will be called. Radiograph: XR CHEST PORTABLE    Result Date: 9/20/2022  No acute findings in the chest.  No change. Electronically signed by Mary Freire M.D. on 09-20-22 at 0133    XR CHEST PORTABLE    Result Date: 9/19/2022  Tip of tracheostomy tube is about 2 cm above the scott. Electronically signed by Emil Robert M.D. on 09-20-22 at [address] [place]        My radiograph interpretation/independent review of imaging: Reviewed    Problem list generated by [facility] Problems             Last Modified POA    * (Principal) Aspiration pneumonia of both lungs due to vomit, unspecified part of lung ([address] 9/20/2022 Yes    Nonverbal 9/20/2022 Yes    Sepsis due to pneumonia ([address] 9/20/2022 Yes    UTI (urinary tract infection) 9/20/2022 Yes    Schizophrenia ([address] 9/20/2022 Yes    Bipolar disorder ([facility] 9/20/2022 Yes    Endotracheal tube present 9/20/2022 Yes    Lactic acidosis 9/20/2022 Yes    Severe malnutrition ([address] 9/20/2022 Yes    Other tracheostomy complication ([facility] 9/89/2281 Yes    Nausea and vomiting 9/21/2022 Unknown    Overview Signed 9/21/2022 12:32 PM by Robert Austin MD     Added automatically from request for surgery [id number]             Pulmonary Assessment/Plan:     Possible aspiration pneumonia although seems to be less likely. Chest x-ray does not show lobar or interstitial infiltrates. He does however have excessive secretions. We will send a sputum culture. Urinary tract infection with Klebsiella and Enterococcus in the urine. Infectious disease consult. Status post traumatic brain injury at baseline. Psychiatry following for agitation. DVT prophylaxis on Lovenox although DVT is less likely due to his current bedridden state. Silvana Navas MD, [facility], [facility]    The above note was generated using voice recognition software. Inadvertent typographical errors in transcription may have occurred.       Electronically signed by Silvana Navas MD on 09/21/22 at 4:16 PM

## 2022-09-21 NOTE — CONSULTS
Comprehensive Nutrition Assessment    Type and Reason for Visit:  Reassess, Consult    Nutrition Recommendations/Plan:   Initiate EN: Jevity 1.2 with a goal rate of 60 mL/hr. Flush tube with 25 mL/H2O every hr. This regimen will provide 1728 kcals, 80 g/protein, 244 g/CHO, and 1787 mL/fluid daily. Malnutrition Assessment:  Malnutrition Status:  Severe malnutrition (09/20/22 1304)      Nutrition Assessment:    Consult received for TF ordering and management. Will initiate continuous EN feeding at this time. Current Nutrition Intake & Therapies:    Average Meal Intake: NPO  Diet NPO Exceptions are: Ice Chips, Sips of Water with Meds, Popsicles  Diet NPO    Anthropometric Measures:  Height: 6' 2\" (188 cm)  Ideal Body Weight (IBW): 190 lbs (86 kg)    Admission Body Weight: 141 lb (64 kg)  Current Body Weight: 138 lb (62.6 kg), 74.2 % IBW. Weight Source: Bed Scale  Current BMI (kg/m2): 17.7  Usual Body Weight: 162 lb 6.4 oz (73.7 kg) (6/2022)  % Weight Change (Calculated): -13.2  Weight Adjustment For: Quadriplegia  Total Adjusted Percentage (Calculated): 12.5  Adjusted Ideal Body Weight (lbs) (Calculated): 166.3 lbs  Adjusted Ideal Body Weight (kg) (Calculated): 75.59 kg  Adjusted % Ideal Body Weight (Calculated): 84.8  Adjusted BMI (kg/m2) (Calculated): 19.9  BMI Categories: Normal Weight (BMI 18.5-24. 9)    Estimated Daily Nutrient Needs:  Energy Requirements Based On: Kcal/kg (25-30)  Weight Used for Energy Requirements: Current  Energy (kcal/day): 8450-1242 kcals/day  Weight Used for Protein Requirements: Current (1.0-2.0)  Protein (g/day):  g/protein/day  Method Used for Fluid Requirements: 1 ml/kcal  Fluid (ml/day): 0780-2428 mL/day    Nutrition Diagnosis:   Inadequate oral intake related to acute injury/trauma as evidenced by NPO or clear liquid status due to medical condition    Nutrition Interventions:   Food and/or Nutrient Delivery: Continue NPO, Start Tube Feeding  Coordination of Nutrition Care: Continue to monitor while inpatient    Goals:  Goals: Initiate nutrition support    Nutrition Monitoring and Evaluation:   Food/Nutrient Intake Outcomes: Enteral Nutrition Intake/Tolerance  Physical Signs/Symptoms Outcomes: Biochemical Data, Nutrition Focused Physical Findings, Weight, Skin    Karli MS Steph, RD, LD  Contact: [phone]

## 2022-09-21 NOTE — PROGRESS NOTES
Patient has temp of 101.1  Notified Dr. Beverly Neal via perfect serve. Received an order to draw blood cultures, urine culture, and CXR. Orders placed.     Electronically signed by Nakita Roa RN on 9/21/2022 at 10:41 AM

## 2022-09-21 NOTE — PROGRESS NOTES
Physical Therapy    Pt has no mobility or ROM goals for this setting. OT to follow for positioning and increased communication.      Electronically signed by Grazyna Joseph PT on 9/21/2022 at 3:31 PM

## 2022-09-21 NOTE — PROGRESS NOTES
[address] Pharmacokinetic Monitoring Service - Vancomycin    Consulting Provider: Dr. Rg Hong   Indication: Aspiration pneumonia, sepsis  Target Concentration: Goal AUC/ABDOUL 400-600 mg*hr/L  Day of Therapy: 2  Additional Antimicrobials: Zosyn    Pertinent Laboratory Values: Wt Readings from Last 1 Encounters:   09/21/22 138 lb 4.8 oz (62.7 kg)     Temp Readings from Last 1 Encounters:   09/21/22 99 °F (37.2 °C) (Oral)     Estimated Creatinine Clearance: 185 mL/min (based on SCr of 0.5 mg/dL). Recent Labs     09/20/22  0413 09/21/22  0410 09/21/22  0411   CREATININE 0.4*  --  0.5   WBC 23.5* 16.1*  --      Procalcitonin: last level = 0.04 on 09/20/22    Pertinent Cultures:  Culture Date Source Results   09/20/22 Urine  No result   09/20/22 Blood x 2  Sent      MRSA Nasal Swab: N/A. Non-respiratory infection. Recent vancomycin administrations                     vancomycin (VANCOCIN) 1000 mg in dextrose 5% 250 mL IVPB (mg) 1,000 mg New Bag 09/20/22 2237     1,000 mg New Bag  1759    vancomycin (VANCOCIN) 1500 mg in dextrose 5% 500 mL IVPB (mg) 1,500 mg New Bag 09/20/22 0553                    Assessment:  Date/Time Current Dose Concentration Timing of Concentration (h) AUC   09/21 at 0410 Vancomycin 1000 mg IV every 8 hours 16.1 5 hours 33 minutes 522   Note: Serum concentrations collected for AUC dosing may appear elevated if collected in close proximity to the dose administered, this is not necessarily an indication of toxicity    Plan:  Current dosing regimen is therapeutic  Continue current dose. This regimen is continuing to predict an AUC of 522 and trough of 15.5.   Repeat vancomycin concentration will not be ordered at this time  Pharmacy will continue to monitor patient and adjust therapy as indicated    Thank you for the consult,  Adrian Faustin, [address]  9/21/2022 5:12 AM

## 2022-09-21 NOTE — CARE COORDINATION
Patient is current with [address] [address] for Skilled Nursing, PT, OT, ST Services. Will follow. Please advise when patient discharges. WILL NEED RESUMPTION OF CARE ORDERS TO RESUME HH SERVICES WHEN PATIENT DISCHARGES. Thank you. [address] [place] [address] [phone]. [phone].     Natalia Grace RN, Home Care Liaison  [phone] P  Electronically signed by Natalia Grace on 9/21/2022 at 8:26 AM
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
## 2022-09-21 NOTE — PROGRESS NOTES
Spoke with endo nurse to see if patient is on the schedule for the EGD today. Was informed that they needed to speak with Dr. Azael Aguilera and she will update me whether the procedure will be done today or tomorrow due to patients condition and rapid response that was called overnight.     Electronically signed by Martin Ramos RN on 9/21/2022 at 12:09 PM

## 2022-09-21 NOTE — PROGRESS NOTES
GI  - PROGRESS NOTE    Subjective:   Admit Date: 9/19/2022  PCP: No primary care provider on file. Patient being seen for: 9/20/2022: GI consult: Seen for nausea and vomiting. Patient status post tracheostomy and aspiration pneumonia. Patient has history of G-tube placement. Also patient had diarrhea. 24 hr events/Interval History: Patient was scheduled to have an EGD today. However anesthesia would prefer to do it tomorrow because patient developed fever and respiratory difficulty. Patient's mother and patient's nurse at bedside.      Medications:    Scheduled Meds:   propranolol  20 mg Oral TID    ipratropium-albuterol  1 ampule Inhalation 4x daily    [START ON 9/22/2022] citalopram  10 mg Oral Daily    topiramate  50 mg Oral BID    ARIPiprazole  20 mg Oral Daily    acetylcysteine  2 mL Inhalation Q4H    sodium chloride flush  10 mL IntraVENous 2 times per day    vancomycin (VANCOCIN) intermittent dosing (placeholder)   Other RX Placeholder    vancomycin  1,000 mg IntraVENous Q8H    piperacillin-tazobactam  3,375 mg IntraVENous Q8H    famotidine (PEPCID) injection  20 mg IntraVENous BID       Continuous Infusions:   sodium chloride         PRN Meds:.metoprolol, sodium chloride flush, sodium chloride, potassium chloride **OR** potassium alternative oral replacement **OR** potassium chloride, magnesium sulfate, ondansetron **OR** ondansetron, polyethylene glycol, acetaminophen **OR** acetaminophen      Labs:     Recent Labs     09/20/22  0004 09/20/22  0413 09/21/22  0410   WBC 11.0* 23.5* 16.1*   RBC 5.04 5.15 4.71   HGB 13.2* 13.4* 12.2*   HCT 42.5 42.7 38.9*   MCV 84.3 82.9 82.6   MCH 26.2* 26.0* 25.9*   MCHC 31.1* 31.4* 31.4*   * 346 301       Recent Labs     09/20/22  0004 09/20/22  0413 09/21/22  0411 09/21/22  1009    140 138  --    K 4.1 4.3 4.0 3.4   ANIONGAP 13 12 13  --     103 100  --    CO2 28 25 25  --    BUN 13 16 12  -- CREATININE 0.5 0.4* 0.5  --    GLUCOSE 128* 116* 123*  --    CALCIUM 10.4* 10.0 9.8  --        Recent Labs     09/20/22  0413 09/21/22  0951   MG 1.8 1.6   PHOS 4.7*  --        Recent Labs     09/20/22  0004   AST 19   ALT 29   BILITOT 0.4   ALKPHOS 64       HgBA1c:  No components found for: HGBA1C    FLP:  No results found for: TRIG, HDL, LDLCALC, LDLDIRECT, LABVLDL    TSH:    Lab Results   Component Value Date/Time    TSH 1.150 09/21/2022 09:51 AM       Troponin T: No results for input(s): TROPONINI in the last 72 hours. INR:   Recent Labs     09/20/22  1415   INR 1.23*       No results for input(s): LIPASE in the last 72 hours. -----------------------------------------------------------------      Physical Exam:     Vitals:    09/21/22 0454 09/21/22 0845 09/21/22 1033 09/21/22 1315   BP: (!) 128/95  (!) 135/91 124/82   Pulse: (!) 132 (!) 131 (!) 130 (!) 115   Resp: 30  28    Temp: 99 °F (37.2 °C)  (!) 101.1 °F (38.4 °C) (!) 100.9 °F (38.3 °C)   TempSrc: Oral  Temporal    SpO2: 100% 97% 100%    Weight:       Height:           24HR INTAKE/OUTPUT:    Intake/Output Summary (Last 24 hours) at 9/21/2022 1427  Last data filed at 9/21/2022 1006  Gross per 24 hour   Intake 20 ml   Output 350 ml   Net -330 ml       General appearance: Patient is sleeping. Appears stated age. Head: normal cephalic. No jaundice. Neck: No JVD. Abdomen: Soft and nontender. Extremities: No leg edema. Skin: No jaundice. Neurologic: Patient sleeping. Impression:         Nausea and vomiting. Pneumonia. Fever. Plan:     EGD postponed till tomorrow. Discussed plan with patient mother and patient's nurse. Discussed with Angeles Moreno, nurse in charge of endoscopy.     Araceli Kevin MD    9/21/2022    2:27 PM        Disclaimer speech recognition software       (Please note that portions of this note were completed with a voice recognition program. Efforts were made to edit the dictations but occasionally words are mis-transcribed.)
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
## 2022-09-21 NOTE — PROGRESS NOTES
Speech Language Pathology  Facility/Department: [facility] PROGRESSIVE CARE  Initial Language Assessment    NAME: Fay Torres  : 1985   MRN: [id number]  ADMISSION DATE: 2022  ADMITTING DIAGNOSIS: has Aspiration pneumonia of both lungs due to vomit, unspecified part of lung ([facility] Nonverbal; Sepsis due to pneumonia [facility]); UTI (urinary tract infection); Schizophrenia ([facility] Bipolar disorder ([facility] Endotracheal tube present; Lactic acidosis; Severe malnutrition ([address] and Other tracheostomy complication ([facility] on their problem list.    Date of Eval: 2022   Evaluating Therapist: MAICOL Sanford    Patient considered alert, in bed, upon entry. No family/support group member present. Patient currently with trach with t-piece. Patient was 0% following simple 1 step auditory directions, without cues, at independent level. Patient did not increase accuracy with provision of repetition of commands. Patient did not attempt to model directions. Patient was 0% answering simple yes/no questions regarding immediate environment and current state of being independently. Patient did not increase accuracy with provision of repetition of questions. Patient was 0% imitating mouth postures for speech production at independent level. Patient exhibited no attempt to mouth words, syllables, or sounds in isolation. Unaware of baseline functioning. Will continue to follow.     Electronically signed by MAICOL Sanford on 2022 at 11:32 AM

## 2022-09-21 NOTE — PROGRESS NOTES
Patients mom stated that she would bring in patients topamax for us to use here.      Electronically signed by Mónica Ho RN on 9/21/2022 at 1:34 PM

## 2022-09-22 ENCOUNTER — ANESTHESIA EVENT (OUTPATIENT)
Dept: ENDOSCOPY | Age: 37
DRG: 698 | End: 2022-09-22
Payer: MEDICARE

## 2022-09-22 ENCOUNTER — ANESTHESIA (OUTPATIENT)
Dept: ENDOSCOPY | Age: 37
DRG: 698 | End: 2022-09-22
Payer: MEDICARE

## 2022-09-22 ENCOUNTER — APPOINTMENT (OUTPATIENT)
Dept: NUCLEAR MEDICINE | Age: 37
DRG: 698 | End: 2022-09-22
Payer: MEDICARE

## 2022-09-22 LAB
ADENOVIRUS F 40 41 PCR: NOT DETECTED
ANION GAP SERPL CALCULATED.3IONS-SCNC: 14 MMOL/L (ref 7–19)
ASTROVIRUS PCR: NOT DETECTED
BASOPHILS ABSOLUTE: 0 K/UL (ref 0–0.2)
BASOPHILS RELATIVE PERCENT: 0.3 % (ref 0–1)
BILIRUBIN URINE: NEGATIVE
BLOOD, URINE: ABNORMAL
BUN BLDV-MCNC: 19 MG/DL (ref 6–20)
CALCIUM SERPL-MCNC: 9.4 MG/DL (ref 8.6–10)
CAMPYLOBACTER PCR: NOT DETECTED
CHLORIDE BLD-SCNC: 102 MMOL/L (ref 98–111)
CLARITY: CLEAR
CLOSTRIDIUM DIFFICILE, PCR: DETECTED
CO2: 23 MMOL/L (ref 22–29)
COLOR: YELLOW
CREAT SERPL-MCNC: 1.8 MG/DL (ref 0.5–1.2)
CRYPTOSPORIDIUM PCR: NOT DETECTED
CYCLOSPORA CAYETANENSIS PCR: NOT DETECTED
E COLI ENTEROAGGREGATIVE PCR: NOT DETECTED
E COLI ENTEROPATHOGENIC PCR: NOT DETECTED
E COLI ENTEROTOXIGENIC PCR: NOT DETECTED
E COLI SHIGELLA/ENTEROINVASIVE PCR: NOT DETECTED
ENTAMOEBA HISTOLYTICA PCR: NOT DETECTED
EOSINOPHILS ABSOLUTE: 0.1 K/UL (ref 0–0.6)
EOSINOPHILS RELATIVE PERCENT: 1.5 % (ref 0–5)
EPITHELIAL CELLS, UA: ABNORMAL /HPF
GFR AFRICAN AMERICAN: 52
GFR NON-AFRICAN AMERICAN: 43
GIARDIA LAMBLIA PCR: NOT DETECTED
GLUCOSE BLD-MCNC: 100 MG/DL (ref 74–109)
GLUCOSE URINE: NEGATIVE MG/DL
HCT VFR BLD CALC: 34.9 % (ref 42–52)
HEMOGLOBIN: 10.7 G/DL (ref 14–18)
IMMATURE GRANULOCYTES #: 0 K/UL
KETONES, URINE: NEGATIVE MG/DL
LEUKOCYTE ESTERASE, URINE: ABNORMAL
LYMPHOCYTES ABSOLUTE: 1 K/UL (ref 1.1–4.5)
LYMPHOCYTES RELATIVE PERCENT: 11.2 % (ref 20–40)
MAGNESIUM: 1.7 MG/DL (ref 1.6–2.6)
MCH RBC QN AUTO: 25.9 PG (ref 27–31)
MCHC RBC AUTO-ENTMCNC: 30.7 G/DL (ref 33–37)
MCV RBC AUTO: 84.5 FL (ref 80–94)
MONOCYTES ABSOLUTE: 0.6 K/UL (ref 0–0.9)
MONOCYTES RELATIVE PERCENT: 7.1 % (ref 0–10)
NEUTROPHILS ABSOLUTE: 6.9 K/UL (ref 1.5–7.5)
NEUTROPHILS RELATIVE PERCENT: 79.6 % (ref 50–65)
NITRITE, URINE: NEGATIVE
NOROVIRUS GI GII PCR: NOT DETECTED
PDW BLD-RTO: 18.2 % (ref 11.5–14.5)
PH UA: 5.5 (ref 5–8)
PLATELET # BLD: 198 K/UL (ref 130–400)
PLESIOMONAS SHIGELLOIDES PCR: NOT DETECTED
PMV BLD AUTO: 11.3 FL (ref 9.4–12.4)
POTASSIUM REFLEX MAGNESIUM: 3.3 MMOL/L (ref 3.5–5)
PROTEIN UA: 30 MG/DL
RBC # BLD: 4.13 M/UL (ref 4.7–6.1)
RBC UA: ABNORMAL /HPF (ref 0–2)
ROTAVIRUS A PCR: NOT DETECTED
SALMONELLA PCR: NOT DETECTED
SAPOVIRUS PCR: NOT DETECTED
SHIGA-LIKE TOXIN-PRODUCING E. COLI (STEC) STX1/STX2: NOT DETECTED
SODIUM BLD-SCNC: 139 MMOL/L (ref 136–145)
SPECIFIC GRAVITY UA: 1.01 (ref 1–1.03)
UROBILINOGEN, URINE: 0.2 E.U./DL
VANCOMYCIN TROUGH: 42 UG/ML (ref 10–20)
VIBRIO CHOLERAE PCR: NOT DETECTED
VIBRIO PCR: NOT DETECTED
WBC # BLD: 8.7 K/UL (ref 4.8–10.8)
WBC UA: ABNORMAL /HPF (ref 0–5)
YERSINIA ENTEROCOLITICA PCR: NOT DETECTED

## 2022-09-22 PROCEDURE — 2500000003 HC RX 250 WO HCPCS: Performed by: SPECIALIST

## 2022-09-22 PROCEDURE — 2580000003 HC RX 258: Performed by: SPECIALIST

## 2022-09-22 PROCEDURE — 3609017100 HC EGD: Performed by: SPECIALIST

## 2022-09-22 PROCEDURE — 7100000001 HC PACU RECOVERY - ADDTL 15 MIN: Performed by: SPECIALIST

## 2022-09-22 PROCEDURE — 87086 URINE CULTURE/COLONY COUNT: CPT

## 2022-09-22 PROCEDURE — 94640 AIRWAY INHALATION TREATMENT: CPT

## 2022-09-22 PROCEDURE — 85025 COMPLETE CBC W/AUTO DIFF WBC: CPT

## 2022-09-22 PROCEDURE — 6360000002 HC RX W HCPCS: Performed by: NURSE ANESTHETIST, CERTIFIED REGISTERED

## 2022-09-22 PROCEDURE — 6370000000 HC RX 637 (ALT 250 FOR IP): Performed by: HOSPITALIST

## 2022-09-22 PROCEDURE — 2580000003 HC RX 258: Performed by: NURSE ANESTHETIST, CERTIFIED REGISTERED

## 2022-09-22 PROCEDURE — 87070 CULTURE OTHR SPECIMN AEROBIC: CPT

## 2022-09-22 PROCEDURE — 3430000000 HC RX DIAGNOSTIC RADIOPHARMACEUTICAL: Performed by: HOSPITALIST

## 2022-09-22 PROCEDURE — 94669 MECHANICAL CHEST WALL OSCILL: CPT

## 2022-09-22 PROCEDURE — 81001 URINALYSIS AUTO W/SCOPE: CPT

## 2022-09-22 PROCEDURE — 43200 ESOPHAGOSCOPY FLEXIBLE BRUSH: CPT | Performed by: SPECIALIST

## 2022-09-22 PROCEDURE — 6360000002 HC RX W HCPCS: Performed by: HOSPITALIST

## 2022-09-22 PROCEDURE — 2580000003 HC RX 258: Performed by: HOSPITALIST

## 2022-09-22 PROCEDURE — 2700000000 HC OXYGEN THERAPY PER DAY

## 2022-09-22 PROCEDURE — 2500000003 HC RX 250 WO HCPCS: Performed by: NURSE ANESTHETIST, CERTIFIED REGISTERED

## 2022-09-22 PROCEDURE — 80202 ASSAY OF VANCOMYCIN: CPT

## 2022-09-22 PROCEDURE — 86403 PARTICLE AGGLUT ANTBDY SCRN: CPT

## 2022-09-22 PROCEDURE — 7100000000 HC PACU RECOVERY - FIRST 15 MIN: Performed by: SPECIALIST

## 2022-09-22 PROCEDURE — 6370000000 HC RX 637 (ALT 250 FOR IP): Performed by: SPECIALIST

## 2022-09-22 PROCEDURE — 6360000002 HC RX W HCPCS: Performed by: SPECIALIST

## 2022-09-22 PROCEDURE — A9540 TC99M MAA: HCPCS | Performed by: HOSPITALIST

## 2022-09-22 PROCEDURE — 2000000000 HC ICU R&B

## 2022-09-22 PROCEDURE — 2500000003 HC RX 250 WO HCPCS: Performed by: HOSPITALIST

## 2022-09-22 PROCEDURE — 80048 BASIC METABOLIC PNL TOTAL CA: CPT

## 2022-09-22 PROCEDURE — 78580 LUNG PERFUSION IMAGING: CPT | Performed by: RADIOLOGY

## 2022-09-22 PROCEDURE — 99233 SBSQ HOSP IP/OBS HIGH 50: CPT | Performed by: INTERNAL MEDICINE

## 2022-09-22 PROCEDURE — 0DJ08ZZ INSPECTION OF UPPER INTESTINAL TRACT, VIA NATURAL OR ARTIFICIAL OPENING ENDOSCOPIC: ICD-10-PCS | Performed by: SPECIALIST

## 2022-09-22 PROCEDURE — 78580 LUNG PERFUSION IMAGING: CPT

## 2022-09-22 PROCEDURE — 2709999900 HC NON-CHARGEABLE SUPPLY: Performed by: SPECIALIST

## 2022-09-22 PROCEDURE — 97535 SELF CARE MNGMENT TRAINING: CPT

## 2022-09-22 PROCEDURE — 36415 COLL VENOUS BLD VENIPUNCTURE: CPT

## 2022-09-22 PROCEDURE — 3700000000 HC ANESTHESIA ATTENDED CARE: Performed by: SPECIALIST

## 2022-09-22 PROCEDURE — 3700000001 HC ADD 15 MINUTES (ANESTHESIA): Performed by: SPECIALIST

## 2022-09-22 PROCEDURE — 51702 INSERT TEMP BLADDER CATH: CPT

## 2022-09-22 PROCEDURE — 87205 SMEAR GRAM STAIN: CPT

## 2022-09-22 PROCEDURE — 83735 ASSAY OF MAGNESIUM: CPT

## 2022-09-22 PROCEDURE — 87507 IADNA-DNA/RNA PROBE TQ 12-25: CPT

## 2022-09-22 RX ORDER — DEXMEDETOMIDINE HYDROCHLORIDE 4 UG/ML
.1-1.5 INJECTION, SOLUTION INTRAVENOUS CONTINUOUS
Status: DISCONTINUED | OUTPATIENT
Start: 2022-09-22 | End: 2022-09-25

## 2022-09-22 RX ORDER — DIPHENHYDRAMINE HYDROCHLORIDE 50 MG/ML
12.5 INJECTION INTRAMUSCULAR; INTRAVENOUS
Status: ACTIVE | OUTPATIENT
Start: 2022-09-22 | End: 2022-09-22

## 2022-09-22 RX ORDER — SODIUM CHLORIDE 9 MG/ML
INJECTION, SOLUTION INTRAVENOUS PRN
Status: DISCONTINUED | OUTPATIENT
Start: 2022-09-22 | End: 2022-09-29 | Stop reason: HOSPADM

## 2022-09-22 RX ORDER — SODIUM CHLORIDE 0.9 % (FLUSH) 0.9 %
5-40 SYRINGE (ML) INJECTION PRN
Status: DISCONTINUED | OUTPATIENT
Start: 2022-09-22 | End: 2022-09-29 | Stop reason: HOSPADM

## 2022-09-22 RX ORDER — MEROPENEM AND SODIUM CHLORIDE 1 G/50ML
1000 INJECTION, SOLUTION INTRAVENOUS ONCE
Status: COMPLETED | OUTPATIENT
Start: 2022-09-22 | End: 2022-09-22

## 2022-09-22 RX ORDER — POTASSIUM CHLORIDE 7.45 MG/ML
10 INJECTION INTRAVENOUS
Status: ACTIVE | OUTPATIENT
Start: 2022-09-22 | End: 2022-09-22

## 2022-09-22 RX ORDER — SODIUM CHLORIDE 9 MG/ML
INJECTION, SOLUTION INTRAVENOUS CONTINUOUS
Status: DISCONTINUED | OUTPATIENT
Start: 2022-09-22 | End: 2022-09-22

## 2022-09-22 RX ORDER — MEROPENEM AND SODIUM CHLORIDE 1 G/50ML
1000 INJECTION, SOLUTION INTRAVENOUS EVERY 12 HOURS
Status: COMPLETED | OUTPATIENT
Start: 2022-09-23 | End: 2022-09-26

## 2022-09-22 RX ORDER — PROPOFOL 10 MG/ML
INJECTION, EMULSION INTRAVENOUS PRN
Status: DISCONTINUED | OUTPATIENT
Start: 2022-09-22 | End: 2022-09-22 | Stop reason: SDUPTHER

## 2022-09-22 RX ORDER — GLYCOPYRROLATE 0.2 MG/ML
0.3 INJECTION INTRAMUSCULAR; INTRAVENOUS 2 TIMES DAILY
Status: DISCONTINUED | OUTPATIENT
Start: 2022-09-22 | End: 2022-09-26

## 2022-09-22 RX ORDER — LIDOCAINE HYDROCHLORIDE 10 MG/ML
INJECTION, SOLUTION EPIDURAL; INFILTRATION; INTRACAUDAL; PERINEURAL PRN
Status: DISCONTINUED | OUTPATIENT
Start: 2022-09-22 | End: 2022-09-22 | Stop reason: SDUPTHER

## 2022-09-22 RX ORDER — SODIUM CHLORIDE 9 MG/ML
INJECTION, SOLUTION INTRAVENOUS CONTINUOUS
Status: DISCONTINUED | OUTPATIENT
Start: 2022-09-22 | End: 2022-09-24

## 2022-09-22 RX ORDER — SODIUM CHLORIDE, SODIUM LACTATE, POTASSIUM CHLORIDE, CALCIUM CHLORIDE 600; 310; 30; 20 MG/100ML; MG/100ML; MG/100ML; MG/100ML
INJECTION, SOLUTION INTRAVENOUS CONTINUOUS PRN
Status: DISCONTINUED | OUTPATIENT
Start: 2022-09-22 | End: 2022-09-22 | Stop reason: SDUPTHER

## 2022-09-22 RX ORDER — PROPOFOL 10 MG/ML
INJECTION, EMULSION INTRAVENOUS
Status: DISCONTINUED
Start: 2022-09-22 | End: 2022-09-22 | Stop reason: WASHOUT

## 2022-09-22 RX ORDER — LANSOPRAZOLE
30 KIT
Status: DISCONTINUED | OUTPATIENT
Start: 2022-09-22 | End: 2022-09-29 | Stop reason: HOSPADM

## 2022-09-22 RX ORDER — ONDANSETRON 2 MG/ML
4 INJECTION INTRAMUSCULAR; INTRAVENOUS
Status: ACTIVE | OUTPATIENT
Start: 2022-09-22 | End: 2022-09-22

## 2022-09-22 RX ORDER — SODIUM CHLORIDE 0.9 % (FLUSH) 0.9 %
5-40 SYRINGE (ML) INJECTION EVERY 12 HOURS SCHEDULED
Status: DISCONTINUED | OUTPATIENT
Start: 2022-09-22 | End: 2022-09-29 | Stop reason: HOSPADM

## 2022-09-22 RX ADMIN — Medication 30 MG: at 15:13

## 2022-09-22 RX ADMIN — POTASSIUM BICARBONATE 40 MEQ: 782 TABLET, EFFERVESCENT ORAL at 18:07

## 2022-09-22 RX ADMIN — IPRATROPIUM BROMIDE AND ALBUTEROL SULFATE 1 AMPULE: 2.5; .5 SOLUTION RESPIRATORY (INHALATION) at 10:10

## 2022-09-22 RX ADMIN — ACETYLCYSTEINE 400 MG: 200 SOLUTION ORAL; RESPIRATORY (INHALATION) at 18:58

## 2022-09-22 RX ADMIN — IPRATROPIUM BROMIDE AND ALBUTEROL SULFATE 1 AMPULE: 2.5; .5 SOLUTION RESPIRATORY (INHALATION) at 06:06

## 2022-09-22 RX ADMIN — MEROPENEM AND SODIUM CHLORIDE 1000 MG: 1 INJECTION, SOLUTION INTRAVENOUS at 19:32

## 2022-09-22 RX ADMIN — Medication 250 MG: at 19:31

## 2022-09-22 RX ADMIN — ACETYLCYSTEINE 400 MG: 200 SOLUTION ORAL; RESPIRATORY (INHALATION) at 06:06

## 2022-09-22 RX ADMIN — PROPRANOLOL HYDROCHLORIDE 20 MG: 20 TABLET ORAL at 15:13

## 2022-09-22 RX ADMIN — IPRATROPIUM BROMIDE AND ALBUTEROL SULFATE 1 AMPULE: 2.5; .5 SOLUTION RESPIRATORY (INHALATION) at 13:50

## 2022-09-22 RX ADMIN — SODIUM CHLORIDE, PRESERVATIVE FREE 20 MG: 5 INJECTION INTRAVENOUS at 08:45

## 2022-09-22 RX ADMIN — SODIUM CHLORIDE, SODIUM LACTATE, POTASSIUM CHLORIDE, AND CALCIUM CHLORIDE: 600; 310; 30; 20 INJECTION, SOLUTION INTRAVENOUS at 11:29

## 2022-09-22 RX ADMIN — LEVETIRACETAM 500 MG: 100 SOLUTION ORAL at 20:59

## 2022-09-22 RX ADMIN — PIPERACILLIN AND TAZOBACTAM 3375 MG: 3; .375 INJECTION, POWDER, FOR SOLUTION INTRAVENOUS at 18:18

## 2022-09-22 RX ADMIN — DEXMEDETOMIDINE HYDROCHLORIDE 1.2 MCG/KG/HR: 4 INJECTION, SOLUTION INTRAVENOUS at 23:50

## 2022-09-22 RX ADMIN — ARIPIPRAZOLE 20 MG: 10 TABLET ORAL at 08:45

## 2022-09-22 RX ADMIN — GLYCOPYRROLATE 0.3 MG: 0.2 INJECTION INTRAMUSCULAR; INTRAVENOUS at 19:31

## 2022-09-22 RX ADMIN — Medication 10 ML: at 19:32

## 2022-09-22 RX ADMIN — ACETYLCYSTEINE 400 MG: 200 SOLUTION ORAL; RESPIRATORY (INHALATION) at 13:50

## 2022-09-22 RX ADMIN — CITALOPRAM HYDROBROMIDE 10 MG: 10 TABLET ORAL at 08:46

## 2022-09-22 RX ADMIN — ACETYLCYSTEINE 400 MG: 200 SOLUTION ORAL; RESPIRATORY (INHALATION) at 10:10

## 2022-09-22 RX ADMIN — Medication 250 MG: at 18:33

## 2022-09-22 RX ADMIN — LEVETIRACETAM 500 MG: 100 SOLUTION ORAL at 15:14

## 2022-09-22 RX ADMIN — PROPOFOL 200 MG: 10 INJECTION, EMULSION INTRAVENOUS at 11:38

## 2022-09-22 RX ADMIN — Medication 5 MILLICURIE: at 10:01

## 2022-09-22 RX ADMIN — IPRATROPIUM BROMIDE AND ALBUTEROL SULFATE 1 AMPULE: 2.5; .5 SOLUTION RESPIRATORY (INHALATION) at 18:58

## 2022-09-22 RX ADMIN — VANCOMYCIN HYDROCHLORIDE 1000 MG: 10 INJECTION, POWDER, LYOPHILIZED, FOR SOLUTION INTRAVENOUS at 06:13

## 2022-09-22 RX ADMIN — LIDOCAINE HYDROCHLORIDE 50 MG: 10 INJECTION, SOLUTION EPIDURAL; INFILTRATION; INTRACAUDAL; PERINEURAL at 11:38

## 2022-09-22 RX ADMIN — PIPERACILLIN AND TAZOBACTAM 3375 MG: 3; .375 INJECTION, POWDER, FOR SOLUTION INTRAVENOUS at 01:26

## 2022-09-22 RX ADMIN — SODIUM CHLORIDE: 9 INJECTION, SOLUTION INTRAVENOUS at 12:56

## 2022-09-22 RX ADMIN — DEXMEDETOMIDINE HYDROCHLORIDE 0.4 MCG/KG/HR: 4 INJECTION, SOLUTION INTRAVENOUS at 18:09

## 2022-09-22 RX ADMIN — Medication 10 ML: at 15:13

## 2022-09-22 RX ADMIN — SODIUM CHLORIDE, PRESERVATIVE FREE 20 MG: 5 INJECTION INTRAVENOUS at 19:31

## 2022-09-22 RX ADMIN — PIPERACILLIN AND TAZOBACTAM 3375 MG: 3; .375 INJECTION, POWDER, FOR SOLUTION INTRAVENOUS at 08:44

## 2022-09-22 RX ADMIN — PROPRANOLOL HYDROCHLORIDE 20 MG: 20 TABLET ORAL at 08:45

## 2022-09-22 ASSESSMENT — PAIN SCALES - GENERAL
PAINLEVEL_OUTOF10: 0
PAINLEVEL_OUTOF10: 0

## 2022-09-22 ASSESSMENT — PULMONARY FUNCTION TESTS: PEFR_L/MIN: 22

## 2022-09-22 NOTE — PLAN OF CARE
Problem: Discharge Planning  Goal: Discharge to home or other facility with appropriate resources  Outcome: Progressing     Problem: Skin/Tissue Integrity  Goal: Absence of new skin breakdown  Description: 1. Monitor for areas of redness and/or skin breakdown  2. Assess vascular access sites hourly  3. Every 4-6 hours minimum:  Change oxygen saturation probe site  4. Every 4-6 hours:  If on nasal continuous positive airway pressure, respiratory therapy assess nares and determine need for appliance change or resting period.   Outcome: Progressing     Problem: ABCDS Injury Assessment  Goal: Absence of physical injury  Outcome: Progressing     Problem: Pain  Goal: Verbalizes/displays adequate comfort level or baseline comfort level  Outcome: Progressing     Problem: Safety - Adult  Goal: Free from fall injury  Outcome: Progressing     Problem: Nutrition Deficit:  Goal: Optimize nutritional status  Outcome: Progressing  Flowsheets (Taken 9/21/2022 1429 by Angie Chatterjee, MS, RD, LD)  Nutrient intake appropriate for improving, restoring, or maintaining nutritional needs: Recommend, monitor, and adjust tube feedings and TPN/PPN based on assessed needs
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
## 2022-09-22 NOTE — PROGRESS NOTES
Per patient's mother, patient only takes Keppra once in the evening, not BID.     Electronically signed by Mariela Reeves RN on 9/22/2022 at 4:08 AM

## 2022-09-22 NOTE — PROGRESS NOTES
Pt's O2 sats down into the 70's and pt became extremely restless while RN was at bedside. Pt bagged and suctioned and a large mucus plug was removed. O2 sats came back up and pt became more relaxed once the plug was removed. Dr. Raúl Ortega notified of episode with new orders rec'd to move Pt to a higher level of care. House Supervisor notified.

## 2022-09-22 NOTE — ANESTHESIA PRE PROCEDURE
Department of Anesthesiology  Preprocedure Note       Name:  Juan Smith   Age:  31 y.o.  :  1985                                          MRN:  [id number]         Date:  2022      Surgeon: Faith Fan):  Hillary Quiñones MD    Procedure: Procedure(s):  EGD DIAGNOSTIC ONLY    Medications prior to admission:   Prior to Admission medications    Medication Sig Start Date End Date Taking? Authorizing Provider   levETIRAcetam (KEPPRA) 100 MG/ML solution Take 500 mg by mouth 2 times daily   Yes Historical Provider, MD   oxyCODONE (ROXICODONE) 5 MG immediate release tablet Take 5 mg by mouth every 8 hours as needed for Pain.    Yes Historical Provider, MD   famotidine (PEPCID) 20 MG tablet Take 20 mg by mouth 2 times daily as needed   Yes Historical Provider, MD   acetaminophen (TYLENOL) 325 MG tablet Take 650 mg by mouth every 4 hours as needed for Pain   Yes Historical Provider, MD   guaiFENesin (ROBITUSSIN) 100 MG/5ML syrup Take 200 mg by mouth 3 times daily as needed for Cough   Yes Historical Provider, MD   bisacodyl (DULCOLAX) 5 MG EC tablet Take 5 mg by mouth daily as needed for Constipation   Yes Historical Provider, MD   propranolol (INDERAL) 20 MG/5ML solution Take 20 mg by mouth 4 times daily as needed   Yes Historical Provider, MD   cephALEXin (KEFLEX) 250 MG/5ML suspension Take 10 mLs by mouth in the morning and at bedtime for 7 days 22 Yes Cameron Beverly MD   acetylcysteine (MUCOMYST) 10 % nebulizer solution Inhale 4 mLs into the lungs every 4 hours 8/15/22   Randee Michelle MD   citalopram (CELEXA) 20 MG tablet Take 20 mg by mouth daily  Patient not taking: Reported on 2022    Historical Provider, MD   ARIPiprazole (ABILIFY) 20 MG tablet Take 20 mg by mouth daily  Patient not taking: Reported on 2022    Historical Provider, MD       Current medications:    Current Facility-Administered Medications   Medication Dose Route Frequency Provider Last Rate Last Admin    vancomycin (VANCOCIN) oral solution 250 mg  250 mg Oral 4x daily Pieter Morocho MD        0.9 % sodium chloride infusion   IntraVENous Continuous Pieter Morocho MD        propranolol (INDERAL) tablet 20 mg  20 mg Oral TID Pieter Morocho MD   20 mg at 09/22/22 0845    ipratropium-albuterol (DUONEB) nebulizer solution 1 ampule  1 ampule Inhalation 4x daily Pieter Morocho MD   1 ampule at 09/22/22 1010    metoprolol (LOPRESSOR) injection 2.5 mg  2.5 mg IntraVENous Q5 Min PRN Pieter Morocho MD   2.5 mg at 09/21/22 1311    citalopram (CELEXA) tablet 10 mg  10 mg Oral Daily Pieter Morocho MD   10 mg at 09/22/22 0846    levETIRAcetam (KEPPRA) 100 MG/ML solution 500 mg (Patient Supplied)  500 mg PEG Tube BID Pieter Morocho MD   500 mg at 09/21/22 2316    oxyCODONE-acetaminophen (PERCOCET) 5-325 MG per tablet 1 tablet  1 tablet Oral Q6H PRN Pieter Morocho MD        ARIPiprazole (ABILIFY) tablet 20 mg  20 mg Oral Daily Pranay Birmingham MD   20 mg at 09/22/22 0845    acetylcysteine (MUCOMYST) 20 % solution 400 mg  2 mL Inhalation Q4H Pranay Birmingham MD   400 mg at 09/22/22 1010    sodium chloride flush 0.9 % injection 10 mL  10 mL IntraVENous 2 times per day Pranay Birmingham MD   10 mL at 09/21/22 2248    sodium chloride flush 0.9 % injection 10 mL  10 mL IntraVENous PRN Pranay Birmingham MD        0.9 % sodium chloride infusion   IntraVENous PRN Pranay Birmingham MD        potassium chloride (KLOR-CON M) extended release tablet 40 mEq  40 mEq Oral PRN Pranay Birmingham MD        Or    potassium bicarb-citric acid (EFFER-K) effervescent tablet 40 mEq  40 mEq Oral PRN Pranay Birmingham MD        Or    potassium chloride 10 mEq/100 mL IVPB (Peripheral Line)  10 mEq IntraVENous PRN Pranay Birmingham MD        magnesium sulfate 1000 mg in dextrose 5% 100 mL IVPB  1,000 mg IntraVENous PRN Pranay Birmingham MD        ondansetron (ZOFRAN-ODT) disintegrating tablet 4 mg  4 mg Oral Q8H PRN Pranay Birmingham MD        Or    ondansetron (ZOFRAN) injection 4 mg  4 mg IntraVENous Q6H PRN Pranay Birmingham MD        polyethylene glycol (GLYCOLAX) packet 17 g  17 g Oral Daily PRN Pranay Birmingham MD        acetaminophen (TYLENOL) tablet 650 mg  650 mg Oral Q6H PRN Pranay Birmingham MD   650 mg at 09/21/22 1318    Or    acetaminophen (TYLENOL) suppository 650 mg  650 mg Rectal Q6H PRN Pranay Birmingham MD        vancomycin (VANCOCIN) intermittent dosing (placeholder)   Other RX Placeholder Pranay Birmingham MD        vancomycin (VANCOCIN) 1000 mg in dextrose 5% 250 mL IVPB  1,000 mg IntraVENous Q8H Pranay Birmingham MD   Stopped at 09/22/22 0713    piperacillin-tazobactam (ZOSYN) 3,375 mg in dextrose 5 % 50 mL IVPB ([id number])  3,375 mg IntraVENous Q8H Isatu Bailey MD 12.5 mL/hr at 09/22/22 0844 3,375 mg at 09/22/22 0844    famotidine (PEPCID) 20 mg in sodium chloride (PF) 10 mL injection  20 mg IntraVENous BID Shola Mercado MD   20 mg at 09/22/22 0845       Allergies:     Allergies   Allergen Reactions    Latex     Levofloxacin      Causes kicking and screaming    Magnesium Oxide     Pantoprazole Sodium      Rash on face,turns red    Levetiracetam      Looks spaced out and scared       Problem List:    Patient Active Problem List   Diagnosis Code    Aspiration pneumonia of both lungs due to vomit, unspecified part of lung ([address] J69.0    Nonverbal R47.01    Sepsis due to pneumonia ([address] J18.9, A41.9    UTI (urinary tract infection) N39.0    Schizophrenia ([facility]) F20.9    Bipolar disorder ([address] F31.9    Endotracheal tube present Z97.8    Lactic acidosis E87.2    Severe malnutrition ([address] E43    Other tracheostomy complication ([facility]) R89.84    Nausea and vomiting R11.2       Past Medical History:        Diagnosis Date    Nonverbal        Past Surgical History:        Procedure Laterality Date    TRACHEOSTOMY         Social History:    Social History     Tobacco Use    Smoking status: Not on file    Smokeless tobacco: Not on file   Substance Use Topics    Alcohol use: Not on file                                Counseling given: Not Answered      Vital Signs (Current):   Vitals:    09/22/22 0606 09/22/22 0634 09/22/22 1000 09/22/22 1010   BP:  125/84 124/83    Pulse: 94 92 98 97   Resp: 18 18 20 20   Temp:  97.3 °F (36.3 °C) 98.1 °F (36.7 °C)    TempSrc:  Temporal Temporal    SpO2: 99% 98% 94% 93%   Weight:       Height:                                                  BP Readings from Last 3 Encounters:   09/22/22 124/83   09/02/22 105/73   08/15/22 (!) 120/90       NPO Status:                                                                                 BMI:   Wt Readings from Last 3 Encounters:   09/21/22 138 lb 4.8 oz (62.7 kg)   08/14/22 115 lb (52.2 kg)     Body mass index is 17.76 kg/m².     CBC:   Lab Results   Component Value Date/Time    WBC 8.7 09/22/2022 02:46 AM    RBC 4.13 09/22/2022 02:46 AM    HGB 10.7 09/22/2022 02:46 AM    HCT 34.9 09/22/2022 02:46 AM    MCV 84.5 09/22/2022 02:46 AM    RDW 18.2 09/22/2022 02:46 AM     09/22/2022 02:46 AM       CMP:   Lab Results   Component Value Date/Time     09/22/2022 02:46 AM    K 3.3 09/22/2022 02:46 AM     09/22/2022 02:46 AM    CO2 23 09/22/2022 02:46 AM    BUN 19 09/22/2022 02:46 AM    CREATININE 1.8 09/22/2022 02:46 AM    GFRAA 52 09/22/2022 02:46 AM    LABGLOM 43 09/22/2022 02:46 AM    GLUCOSE 100 09/22/2022 02:46 AM    PROT 8.4 09/20/2022 12:04 AM    CALCIUM 9.4 09/22/2022 02:46 AM    BILITOT 0.4 09/20/2022 12:04 AM    ALKPHOS 64 09/20/2022 12:04 AM    AST 19 09/20/2022 12:04 AM    ALT 29 09/20/2022 12:04 AM       POC Tests:   Recent Labs     09/20/22  2140   POCGLU 129*       Coags:   Lab Results   Component Value Date/Time    PROTIME 15.5 09/20/2022 02:15 PM    INR 1.23 09/20/2022 02:15 PM       HCG (If Applicable): No results found for: PREGTESTUR, PREGSERUM, HCG, HCGQUANT     ABGs:   Lab Results   Component Value Date/Time    PHART 7.490 09/21/2022 10:09 AM    PO2ART 121.0 09/21/2022 10:09 AM    JZB7QVR 37.0 09/21/2022 10:09 AM    QUX3ZRZ 28.2 09/21/2022 10:09 AM    BEART 4.7 09/21/2022 10:09 AM    W6FQZWIQ 96.7 09/21/2022 10:09 AM        Type & Screen (If Applicable):  No results found for: LABABO, LABRH    Drug/Infectious Status (If Applicable):  No results found for: HIV, HEPCAB    COVID-19 Screening (If Applicable):   Lab Results   Component Value Date/Time    COVID19 Not Detected 09/20/2022 12:56 AM           Anesthesia Evaluation  Nursing notes reviewed  Airway: Mallampati: II  TM distance: >3 FB   Neck ROM: full  Mouth opening: > = 3 FB   Dental:          Pulmonary:normal exam    (+) pneumonia: unresolved,                            ROS comment: Aspiration pneumonia due to vomit   Nonverbal   Trach    Cardiovascular:Negative CV ROS                      Neuro/Psych:   (+) psychiatric history: stable without treatment             ROS comment: Nonverbal  Schizophrenia   Bipolar  GI/Hepatic/Renal: Neg GI/Hepatic/Renal ROS           ROS comment: Severe malnutrition  N&V   UTI  C-diff. Endo/Other:    (+) electrolyte abnormalities, . ROS comment: Lactic acidosis  Abdominal:             Vascular: negative vascular ROS. Other Findings:           Anesthesia Plan      general     ASA 3 - emergent       Induction: intravenous. Anesthetic plan and risks discussed with legal guardian.                         MIGUEL Merchant - CRNA   9/22/2022

## 2022-09-22 NOTE — PROGRESS NOTES
Pharmacy Renal Adjustment    Kostas Hardy is a 31 y.o. male. Pharmacy has renally adjusted medications per protocol. Recent Labs     09/21/22  0411 09/22/22  0246   BUN 12 19       Recent Labs     09/21/22  0411 09/22/22  0246   CREATININE 0.5 1.8*       Estimated Creatinine Clearance: 53 mL/min (A) (based on SCr of 1.8 mg/dL (H)). Height:   Ht Readings from Last 1 Encounters:   09/20/22 6' 2\" (1.88 m)     Weight:  Wt Readings from Last 1 Encounters:   09/22/22 142 lb (64.4 kg)       CKD stage:  CATY    Plan: Adjust the following medications based on renal function:           Change Merrem to 1 gram ovr 30 min x 1 dose, followed by 1 gram over 180 minutes every 12 hours    Electronically signed by Carolyn [facility] on 9/22/2022 at 6:31 PM

## 2022-09-22 NOTE — PROGRESS NOTES
Patient transported to  with assistance from this nurse, Radha Tillman respiratory, Mynor Ceballos RN, and Mavis Lowe transportation.     Electronically signed by Jose Rivas RN on 9/22/2022 at 5:27 PM

## 2022-09-22 NOTE — PROCEDURES
friability, erosions, exudate in the distal esophagus. Distal esophagus narrow. The mucosa sloughs off easily in the form of white sheets. Stomach: Not entered. Duodenum: Not entered. Exam Extent: Distal esophagus to 35 cm. Estimated blood loss: None. Immediate complications: None. Anesthesia / Sedation:   MAC  was administered by anesthesia who monitored the patient during the procedure. Procedure:     Consent was obtained prior to procedure. We discussed the procedure itself, and I have discussed the risks of EGD (including-- but not limited to-- pain, discomfort, bleeding potentially requiring second endoscopic procedure and/or blood transfusion, organ perforation requiring operative repair, damage to adjacent organs, infection, aspiration, cardiopulmonary/allergic reaction). Additionally, we discussed options other than EGD. The patient / health care client expressed understanding. Patient's that were asked, mother answered. See separate documentation about informed consent in the chart. A time out was performed. The patient was assessed again immediately before the procedure, the patient was placed in the left lateral position. After reviewing the patient's chart and obtaining informed consent, the patient was placed in the left lateral decubitus position. A forward-viewing lubricated endoscope was lubricated and inserted through the mouth into the oropharynx. Under direct visualization, the upper esophagus was intubated. The scope was advanced to the extent mentioned. Scope was slowly withdrawn into the stomach with careful inspection of the mucosal surfaces. The scope was retroflexed for inspection of the gastric body, incisura, gastric fundus and cardia. Findings and maneuvers are listed. The scope was then straightened out. The scope was then gradually withdrawn while examining the GE junction, esophagus and throat.   Secretions were suctioned to prevent aspiration. The scope was then completely withdrawn from the patient. The patient tolerated the procedure well. There were no immediate complications.        Reginaldo De La Garza MD    9/22/2022    11:43 AM        Disclaimer speech recognition software       (Please note that portions of this note were completed with a voice recognition program. Efforts were made to edit the dictations but occasionally words are mis-transcribed.)     Reginaldo De La Garza MD  9/22/2022

## 2022-09-22 NOTE — ANESTHESIA POSTPROCEDURE EVALUATION
Department of Anesthesiology  Postprocedure Note    Patient: Carey Napier  MRN: [id number]  YOB: 1985  Date of evaluation: 9/22/2022      Procedure Summary     Date: 09/22/22 Room / Location: [address]    Anesthesia Start: 1129 Anesthesia Stop: 7800    Procedure: EGD DIAGNOSTIC ONLY Diagnosis:       Nausea and vomiting, intractability of vomiting not specified, unspecified vomiting type      (nausea/vomiting)    Surgeons: Andreia Evans MD Responsible Provider: MIGUEL Johnson CRNA    Anesthesia Type: general ASA Status: 3 - Emergent          Anesthesia Type: No value filed.     Марина Phase I:      Марина Phase II:        Anesthesia Post Evaluation    Patient location during evaluation: bedside  Patient participation: complete - patient participated  Level of consciousness: sleepy but conscious  Pain score: 0  Airway patency: patent  Nausea & Vomiting: no nausea and no vomiting  Complications: no  Cardiovascular status: hemodynamically stable  Respiratory status: acceptable  Hydration status: euvolemic

## 2022-09-22 NOTE — PLAN OF CARE
Patient here for EGD because of nausea and vomiting. Informed consent obtained from  mother yesterday. Patient is nonverbal.     EGD consent / shared decision making : Consent obtained from: Mother. She understood well and agreed with the procedure. Witness (es): RN.    Obtained consent in layperson's language, previously and immediately prior to.procedure. Explained the need, benefits, risks, alternatives and limitations of the procedure. Need and benefits: Make diagnosis, provide therapy and plan treatment including feeding into the stomach. Risks: Bleeding, perforation of esophagus, stomach, duodenum, splenic injury / laceration, liver injury / laceration, aspiration pneumonia, respiratory arrest, cardiac arrhythmia, cardiac arrest, death. infection and peritoneum, infection in blood. Tumor seeding: In case of oral, laryngeal, esophageal malignancy and gastric malignancy: The tumor may seed into esophagus, stomach, gastrostomy site in the stomach and gastrostomy site on the skin    Alternatives: Barium swallow, upper GI, SBFT, CT chest, CT abdomen, no testing, wait and watch, colonoscopy, obtain second opinion. Missed diagnosis and limitations: Missed diagnosis, including that of varices, gastric ulcer, duodenal ulcer, cancer. Also inability to complete the procedure due to various reasons-technical including equipment failure and non-technical.    Complications of sedation/analgesia: Including aspiration pneumonia, respiratory arrest, cardiac arrhythmia, cardiac arrest, hypotension, hypertension, paralysis. Opportunity was given for questions. The questions that were asked, were answered. The healthcare client verbalized understanding.     Valentina Nunes MD  9/22/2022  11:33 AM        Disclaimer speech recognition software       (Please note that portions of this note were completed with a voice recognition program. Efforts were made to edit the dictations but occasionally words are mis-transcribed.)

## 2022-09-22 NOTE — PROGRESS NOTES
[address] [facility] Pharmacokinetic Monitoring Service - Vancomycin    Consulting Provider: Dr Matamoros Rising   Indication: Aspiration PNA, sepsis  Target Concentration: Goal AUC/ABDOUL 400-600 mg*hr/L  Day of Therapy: 3  Additional Antimicrobials: Zosyn    Pertinent Laboratory Values: Wt Readings from Last 1 Encounters:   09/21/22 138 lb 4.8 oz (62.7 kg)     Temp Readings from Last 1 Encounters:   09/22/22 98 °F (36.7 °C) (Temporal)     Estimated Creatinine Clearance: 51 mL/min (A) (based on SCr of 1.8 mg/dL (H)).   Recent Labs     09/21/22  0410 09/21/22  0411 09/22/22  0246   CREATININE  --  0.5 1.8*   WBC 16.1*  --  8.7     Procalcitonin: 0.23 on  09/21/22    Pertinent Cultures:  Culture Date Source Results   09/21/22 Respiratory MRSA  Serratia marcescens   09/21/22 Blood No growth to date   09/20/22 Blood No growth to date   09/20/22 urine Klebsiella pneumoniae ESBL   Enterococcus faecalis       Recent vancomycin administrations                     vancomycin (VANCOCIN) 1000 mg in dextrose 5% 250 mL IVPB (mg) 1,000 mg New Bag 09/22/22 0613     1,000 mg New Bag 09/21/22 2254     1,000 mg New Bag  1441     1,000 mg New Bag  0629     1,000 mg New Bag 09/20/22 2237     1,000 mg New Bag  1759    vancomycin (VANCOCIN) 1500 mg in dextrose 5% 500 mL IVPB (mg) 1,500 mg New Bag 09/20/22 0553                    Assessment:  Date/Time Current Dose Concentration Timing of Concentration (h) AUC   09/22/22 @ 1422 1000 mg Q 8Hr 42 8 hr 9 min 1688   Note: Serum concentrations collected for AUC dosing may appear elevated if collected in close proximity to the dose administered, this is not necessarily an indication of toxicity    Plan:  Current dosing regimen is supra-therapeutic  Hold Vancomycin dosing today  Repeat vancomycin concentration ordered for 09/23/22 @ 0500   Pharmacy will continue to monitor patient and adjust therapy as indicated    Thank you for the consult,  Carolyn East, [facility]  9/22/2022 4:34 PM

## 2022-09-22 NOTE — PROGRESS NOTES
Tube feeding stopped at 0030 due to EGD procedure in AM.    Electronically signed by Hyacinth Dejesus RN on 9/22/2022 at 1:56 AM

## 2022-09-22 NOTE — PROGRESS NOTES
Pulmonary and Critical Care Progress note. [address]    MRN# [id number]    Acct# [de-identified]  9/22/2022   4:16 PM CDT    Referring Jessica Santos, *      Chief Complaint: resp failure on t piece. HPI: Had a fever last night. Remains with a tracheostomy and T-piece remains some sedated from earlier endoscopy. Medications    sodium chloride flush, 5-40 mL, IntraVENous, 2 times per day    vancomycin, 250 mg, Oral, 4x daily    lansoprazole, 30 mg, Oral, BID AC    propranolol, 20 mg, Oral, TID    ipratropium-albuterol, 1 ampule, Inhalation, 4x daily    citalopram, 10 mg, Oral, Daily    levETIRAcetam, 500 mg, PEG Tube, BID    ARIPiprazole, 20 mg, Oral, Daily    acetylcysteine, 2 mL, Inhalation, Q4H    sodium chloride flush, 10 mL, IntraVENous, 2 times per day    vancomycin (VANCOCIN) intermittent dosing (placeholder), , Other, RX Placeholder    vancomycin, 1,000 mg, IntraVENous, Q8H    piperacillin-tazobactam, 3,375 mg, IntraVENous, Q8H    famotidine (PEPCID) injection, 20 mg, IntraVENous, BID     Review of Systems:  Unable to obtain due to mental status    Physical Exam:  /86   Pulse 90   Temp 98 °F (36.7 °C) (Temporal)   Resp 20   Ht 6' 2\" (1.88 m)   Wt 138 lb 4.8 oz (62.7 kg)   SpO2 96%   BMI 17.76 kg/m²   Intake/Output Summary (Last 24 hours) at 9/22/2022 1417  Last data filed at 9/22/2022 1143  Gross per 24 hour   Intake 1002.03 ml   Output 350 ml   Net 652.03 ml         General appearance:  [employer] white male does not appear to be in distress   HEENT:normocephalic atraumatic tracheostomy tube in place  Heart:S1S2 no murmurs  Lungs:diminished bilaterally no rubs or tenderness or dullness to percussion  Abdomen:Soft non tender no organomegaly  Extremities:non clubbing cyanosis or edema  Neuro:no focal findings  Skin:intact    Recent Labs     09/20/22  0004 09/20/22  0413 09/21/22  0410 09/22/22  0246   WBC 11.0* 23.5* 16.1* 8.7   RBC 5.04 5.15 4.71 4.13*   HGB 13.2* 13.4* 12.2* 10.7*   HCT 42.5 42.7 38.9* 34.9*   * 346 301 198   MCV 84.3 82.9 82.6 84.5   MCH 26.2* 26.0* 25.9* 25.9*   MCHC 31.1* 31.4* 31.4* 30.7*   RDW 18.5* 18.3* 18.8* 18.2*        Recent Labs     09/20/22 0004 09/20/22  0413 09/21/22  0411 09/21/22  1009 09/22/22  0246    140 138  --  139   K 4.1 4.3 4.0 3.4 3.3*    103 100  --  102   CO2 28 25 25  --  23   BUN 13 16 12  --  19   CREATININE 0.5 0.4* 0.5  --  1.8*   CALCIUM 10.4* 10.0 9.8  --  9.4   GLUCOSE 128* 116* 123*  --  100        Recent Labs     09/21/22  1009   PHART 7.490*   RFD2VZR 37.0   PO2ART 121.0*   SPO3VMS 28.2*   Z6KVTICP 96.7   BEART 4.7*       Recent Labs     09/20/22 0004 09/20/22 0004 09/20/22 0413 09/20/22  1415 09/21/22  0411 09/21/22  0951 09/22/22  0246   AST 19  --   --   --   --   --   --    ALT 29  --   --   --   --   --   --    ALKPHOS 64  --   --   --   --   --   --    BILITOT 0.4  --   --   --   --   --   --    MG  --    < > 1.8  --   --  1.6 1.7   CALCIUM 10.4*  --  10.0  --    < >  --  9.4   PHOS  --   --  4.7*  --   --   --   --    PROBNP  --   --   --   --   --  333  --    LACTA 2.2*  --  1.6  --   --  1.1  --    INR  --   --   --  1.23*  --   --   --    TSH  --   --   --   --   --  1.150  --    DDIMER  --   --   --   --   --  3.39*  --    PROCAL 0.04  --   --   --   --  0.23*  --     < > = values in this interval not displayed. Recent Labs     09/20/22  0004 09/21/22  1128 09/21/22  1754   BC No Growth to date. Any change in status will be called. No Growth to date. Any change in status will be called. --    LABGRAM  --   --  Many WBC's (Polymorphonuclear)  Few Gram positive cocci  in pairs  Rare Gram negative rods     CULTRESP  --   --  Moderate growth  Sensitivity to follow  CONTACT PRECAUTIONS INDICATED  *  Moderate growth  Sensitivity to follow             Radiograph: XR CHEST PORTABLE    Result Date: 9/20/2022  No acute findings in the chest.  No change. Electronically signed by Roberta Briceño M.D. on 09-20-22 at 0133    XR CHEST PORTABLE    Result Date: 9/19/2022  Tip of tracheostomy tube is about 2 cm above the scott. Electronically signed by Oren Clay M.D. on 09-20-22 at [address] radiograph interpretation/independent review of imaging: Reviewed    Problem list generated by [facility] Problems             Last Modified POA    * (Principal) Aspiration pneumonia of both lungs due to vomit, unspecified part of lung ([address] 9/20/2022 Yes    Nonverbal 9/20/2022 Yes    Sepsis due to pneumonia ([address] 9/20/2022 Yes    UTI (urinary tract infection) 9/20/2022 Yes    Schizophrenia ([address] 9/20/2022 Yes    Bipolar disorder ([address] 9/20/2022 Yes    Endotracheal tube present 9/20/2022 Yes    Lactic acidosis 9/20/2022 Yes    Severe malnutrition ([address] 9/20/2022 Yes    Other tracheostomy complication ([address] 3/12/0038 Yes    Nausea and vomiting 9/21/2022 Unknown    Overview Signed 9/21/2022 12:32 PM by Riley Mooney MD     Added automatically from request for surgery [id number]             Pulmonary Assessment/Plan:     Possible aspiration pneumonia on empirical antibiotics. Urinary tract infection with Klebsiella and Enterococcus in the urine. Infectious disease consulted. On empirical antibiotics. Status post traumatic brain injury at baseline. Psychiatry following for agitation. DVT prophylaxis        Alonzo Lawrence MD, [facility], [facility]    The above note was generated using voice recognition software. Inadvertent typographical errors in transcription may have occurred.       Electronically signed by Zoltan Perry MD on 09/22/22 at 2:17 PM

## 2022-09-23 PROBLEM — R13.12 TRANSFER DYSPHAGIA: Status: ACTIVE | Noted: 2022-09-23

## 2022-09-23 PROBLEM — R65.20 SEPSIS WITH ACUTE RENAL FAILURE (HCC): Status: ACTIVE | Noted: 2022-09-23

## 2022-09-23 PROBLEM — N17.9 SEPSIS WITH ACUTE RENAL FAILURE (HCC): Status: ACTIVE | Noted: 2022-09-23

## 2022-09-23 PROBLEM — S06.9X9A TRAUMATIC BRAIN INJURY WITH LOSS OF CONSCIOUSNESS (HCC): Status: ACTIVE | Noted: 2022-02-11

## 2022-09-23 PROBLEM — Z51.5 PALLIATIVE CARE PATIENT: Status: ACTIVE | Noted: 2022-09-23

## 2022-09-23 PROBLEM — I26.99 ACUTE PULMONARY EMBOLISM (HCC): Status: ACTIVE | Noted: 2022-04-29

## 2022-09-23 PROBLEM — A41.9 SEPSIS WITH ACUTE RENAL FAILURE (HCC): Status: ACTIVE | Noted: 2022-09-23

## 2022-09-23 PROBLEM — K21.00 GASTROESOPHAGEAL REFLUX DISEASE WITH ESOPHAGITIS WITHOUT HEMORRHAGE: Status: ACTIVE | Noted: 2022-09-23

## 2022-09-23 PROBLEM — J96.90 RESPIRATORY FAILURE FOLLOWING TRAUMA (HCC): Status: ACTIVE | Noted: 2022-02-11

## 2022-09-23 PROBLEM — Z87.820 HISTORY OF TRAUMATIC BRAIN INJURY: Status: ACTIVE | Noted: 2022-09-23

## 2022-09-23 PROBLEM — G91.9 HYDROCEPHALUS (HCC): Status: ACTIVE | Noted: 2022-04-29

## 2022-09-23 PROBLEM — T17.500A MUCUS PLUGGING OF BRONCHI: Status: ACTIVE | Noted: 2022-09-23

## 2022-09-23 PROBLEM — I46.9 CARDIAC ARREST (HCC): Status: ACTIVE | Noted: 2022-04-22

## 2022-09-23 PROBLEM — S06.6XAA SUBARACHNOID HEMORRHAGE FOLLOWING INJURY: Status: ACTIVE | Noted: 2022-02-11

## 2022-09-23 LAB
ALBUMIN SERPL-MCNC: 3.3 G/DL (ref 3.5–5.2)
ALP BLD-CCNC: 45 U/L (ref 40–130)
ALT SERPL-CCNC: 18 U/L (ref 5–41)
ANION GAP SERPL CALCULATED.3IONS-SCNC: 11 MMOL/L (ref 7–19)
ANION GAP SERPL CALCULATED.3IONS-SCNC: 16 MMOL/L (ref 7–19)
AST SERPL-CCNC: 17 U/L (ref 5–40)
BASOPHILS ABSOLUTE: 0 K/UL (ref 0–0.2)
BASOPHILS RELATIVE PERCENT: 0.4 % (ref 0–1)
BILIRUB SERPL-MCNC: 0.5 MG/DL (ref 0.2–1.2)
BUN BLDV-MCNC: 24 MG/DL (ref 6–20)
BUN BLDV-MCNC: 25 MG/DL (ref 6–20)
CALCIUM SERPL-MCNC: 8.5 MG/DL (ref 8.6–10)
CALCIUM SERPL-MCNC: 9.2 MG/DL (ref 8.6–10)
CHLORIDE BLD-SCNC: 106 MMOL/L (ref 98–111)
CHLORIDE BLD-SCNC: 108 MMOL/L (ref 98–111)
CO2: 20 MMOL/L (ref 22–29)
CO2: 25 MMOL/L (ref 22–29)
CREAT SERPL-MCNC: 2.9 MG/DL (ref 0.5–1.2)
CREAT SERPL-MCNC: 3 MG/DL (ref 0.5–1.2)
EOSINOPHILS ABSOLUTE: 0.3 K/UL (ref 0–0.6)
EOSINOPHILS RELATIVE PERCENT: 3.8 % (ref 0–5)
GFR AFRICAN AMERICAN: 29
GFR AFRICAN AMERICAN: 30
GFR NON-AFRICAN AMERICAN: 24
GFR NON-AFRICAN AMERICAN: 25
GLUCOSE BLD-MCNC: 87 MG/DL (ref 74–109)
GLUCOSE BLD-MCNC: 96 MG/DL (ref 74–109)
HCT VFR BLD CALC: 32.4 % (ref 42–52)
HEMOGLOBIN: 10 G/DL (ref 14–18)
IMMATURE GRANULOCYTES #: 0 K/UL
LYMPHOCYTES ABSOLUTE: 1.1 K/UL (ref 1.1–4.5)
LYMPHOCYTES RELATIVE PERCENT: 16.5 % (ref 20–40)
MAGNESIUM: 2 MG/DL (ref 1.6–2.6)
MCH RBC QN AUTO: 26.6 PG (ref 27–31)
MCHC RBC AUTO-ENTMCNC: 30.9 G/DL (ref 33–37)
MCV RBC AUTO: 86.2 FL (ref 80–94)
MONOCYTES ABSOLUTE: 0.6 K/UL (ref 0–0.9)
MONOCYTES RELATIVE PERCENT: 8.3 % (ref 0–10)
NEUTROPHILS ABSOLUTE: 4.8 K/UL (ref 1.5–7.5)
NEUTROPHILS RELATIVE PERCENT: 70.7 % (ref 50–65)
ORGANISM: ABNORMAL
ORGANISM: ABNORMAL
PDW BLD-RTO: 17.4 % (ref 11.5–14.5)
PLATELET # BLD: 201 K/UL (ref 130–400)
PMV BLD AUTO: 12 FL (ref 9.4–12.4)
POTASSIUM SERPL-SCNC: 3.7 MMOL/L (ref 3.5–5)
POTASSIUM SERPL-SCNC: 4 MMOL/L (ref 3.5–5)
RBC # BLD: 3.76 M/UL (ref 4.7–6.1)
SODIUM BLD-SCNC: 142 MMOL/L (ref 136–145)
SODIUM BLD-SCNC: 144 MMOL/L (ref 136–145)
TOTAL PROTEIN: 6.5 G/DL (ref 6.6–8.7)
URINE CULTURE, ROUTINE: ABNORMAL
URINE CULTURE, ROUTINE: NORMAL
WBC # BLD: 6.8 K/UL (ref 4.8–10.8)

## 2022-09-23 PROCEDURE — 36415 COLL VENOUS BLD VENIPUNCTURE: CPT

## 2022-09-23 PROCEDURE — 6360000002 HC RX W HCPCS: Performed by: HOSPITALIST

## 2022-09-23 PROCEDURE — 99233 SBSQ HOSP IP/OBS HIGH 50: CPT | Performed by: INTERNAL MEDICINE

## 2022-09-23 PROCEDURE — 6370000000 HC RX 637 (ALT 250 FOR IP): Performed by: SPECIALIST

## 2022-09-23 PROCEDURE — 83735 ASSAY OF MAGNESIUM: CPT

## 2022-09-23 PROCEDURE — 2000000000 HC ICU R&B

## 2022-09-23 PROCEDURE — 85025 COMPLETE CBC W/AUTO DIFF WBC: CPT

## 2022-09-23 PROCEDURE — 80053 COMPREHEN METABOLIC PANEL: CPT

## 2022-09-23 PROCEDURE — 51798 US URINE CAPACITY MEASURE: CPT

## 2022-09-23 PROCEDURE — 2580000003 HC RX 258: Performed by: HOSPITALIST

## 2022-09-23 PROCEDURE — 2580000003 HC RX 258: Performed by: SPECIALIST

## 2022-09-23 PROCEDURE — 6370000000 HC RX 637 (ALT 250 FOR IP): Performed by: HOSPITALIST

## 2022-09-23 PROCEDURE — 94640 AIRWAY INHALATION TREATMENT: CPT

## 2022-09-23 PROCEDURE — A4216 STERILE WATER/SALINE, 10 ML: HCPCS | Performed by: SPECIALIST

## 2022-09-23 PROCEDURE — 2500000003 HC RX 250 WO HCPCS: Performed by: HOSPITALIST

## 2022-09-23 PROCEDURE — 2500000003 HC RX 250 WO HCPCS: Performed by: SPECIALIST

## 2022-09-23 PROCEDURE — 99223 1ST HOSP IP/OBS HIGH 75: CPT | Performed by: PHYSICIAN ASSISTANT

## 2022-09-23 PROCEDURE — 94669 MECHANICAL CHEST WALL OSCILL: CPT

## 2022-09-23 PROCEDURE — 99232 SBSQ HOSP IP/OBS MODERATE 35: CPT | Performed by: SPECIALIST

## 2022-09-23 PROCEDURE — 2700000000 HC OXYGEN THERAPY PER DAY

## 2022-09-23 PROCEDURE — 2580000003 HC RX 258: Performed by: NURSE ANESTHETIST, CERTIFIED REGISTERED

## 2022-09-23 RX ORDER — LACTULOSE 10 G/15ML
20 SOLUTION ORAL 2 TIMES DAILY
Status: DISCONTINUED | OUTPATIENT
Start: 2022-09-23 | End: 2022-09-27

## 2022-09-23 RX ORDER — LEVETIRACETAM 100 MG/ML
500 SOLUTION ORAL NIGHTLY
Status: DISCONTINUED | OUTPATIENT
Start: 2022-09-23 | End: 2022-09-29 | Stop reason: HOSPADM

## 2022-09-23 RX ORDER — ENOXAPARIN SODIUM 100 MG/ML
30 INJECTION SUBCUTANEOUS DAILY
Status: DISCONTINUED | OUTPATIENT
Start: 2022-09-23 | End: 2022-09-29 | Stop reason: HOSPADM

## 2022-09-23 RX ORDER — ALBUTEROL SULFATE 2.5 MG/3ML
2.5 SOLUTION RESPIRATORY (INHALATION) 4 TIMES DAILY
Status: DISCONTINUED | OUTPATIENT
Start: 2022-09-23 | End: 2022-09-26

## 2022-09-23 RX ORDER — ACETYLCYSTEINE 200 MG/ML
2 SOLUTION ORAL; RESPIRATORY (INHALATION) 4 TIMES DAILY
Status: DISCONTINUED | OUTPATIENT
Start: 2022-09-23 | End: 2022-09-26

## 2022-09-23 RX ADMIN — Medication 250 MG: at 18:49

## 2022-09-23 RX ADMIN — LEVETIRACETAM 500 MG: 100 SOLUTION ORAL at 08:46

## 2022-09-23 RX ADMIN — Medication 20 MG: at 15:19

## 2022-09-23 RX ADMIN — SODIUM CHLORIDE: 9 INJECTION, SOLUTION INTRAVENOUS at 10:54

## 2022-09-23 RX ADMIN — Medication 250 MG: at 08:27

## 2022-09-23 RX ADMIN — AMPICILLIN SODIUM AND SULBACTAM SODIUM 3000 MG: 2; 1 INJECTION, POWDER, FOR SOLUTION INTRAMUSCULAR; INTRAVENOUS at 10:54

## 2022-09-23 RX ADMIN — SODIUM CHLORIDE: 9 INJECTION, SOLUTION INTRAVENOUS at 01:57

## 2022-09-23 RX ADMIN — IPRATROPIUM BROMIDE AND ALBUTEROL SULFATE 1 AMPULE: 2.5; .5 SOLUTION RESPIRATORY (INHALATION) at 06:00

## 2022-09-23 RX ADMIN — ACETYLCYSTEINE 400 MG: 200 SOLUTION ORAL; RESPIRATORY (INHALATION) at 19:26

## 2022-09-23 RX ADMIN — CITALOPRAM HYDROBROMIDE 10 MG: 10 TABLET ORAL at 08:28

## 2022-09-23 RX ADMIN — ALBUTEROL SULFATE 2.5 MG: 2.5 SOLUTION RESPIRATORY (INHALATION) at 14:19

## 2022-09-23 RX ADMIN — SODIUM CHLORIDE: 9 INJECTION, SOLUTION INTRAVENOUS at 15:01

## 2022-09-23 RX ADMIN — ARIPIPRAZOLE 20 MG: 10 TABLET ORAL at 08:28

## 2022-09-23 RX ADMIN — Medication 30 MG: at 18:49

## 2022-09-23 RX ADMIN — Medication 10 ML: at 08:28

## 2022-09-23 RX ADMIN — DEXMEDETOMIDINE HYDROCHLORIDE 0.2 MCG/KG/HR: 4 INJECTION, SOLUTION INTRAVENOUS at 21:41

## 2022-09-23 RX ADMIN — SODIUM CHLORIDE, PRESERVATIVE FREE 10 ML: 5 INJECTION INTRAVENOUS at 20:33

## 2022-09-23 RX ADMIN — MEROPENEM AND SODIUM CHLORIDE 1000 MG: 1 INJECTION, SOLUTION INTRAVENOUS at 06:12

## 2022-09-23 RX ADMIN — PROPRANOLOL HYDROCHLORIDE 20 MG: 20 TABLET ORAL at 15:19

## 2022-09-23 RX ADMIN — LEVETIRACETAM 500 MG: 100 SOLUTION ORAL at 20:46

## 2022-09-23 RX ADMIN — MEROPENEM AND SODIUM CHLORIDE 1000 MG: 1 INJECTION, SOLUTION INTRAVENOUS at 18:46

## 2022-09-23 RX ADMIN — ENOXAPARIN SODIUM 30 MG: 100 INJECTION SUBCUTANEOUS at 12:51

## 2022-09-23 RX ADMIN — DEXMEDETOMIDINE HYDROCHLORIDE 1.2 MCG/KG/HR: 4 INJECTION, SOLUTION INTRAVENOUS at 01:56

## 2022-09-23 RX ADMIN — PROPRANOLOL HYDROCHLORIDE 20 MG: 20 TABLET ORAL at 20:46

## 2022-09-23 RX ADMIN — DEXMEDETOMIDINE HYDROCHLORIDE 0.8 MCG/KG/HR: 4 INJECTION, SOLUTION INTRAVENOUS at 09:51

## 2022-09-23 RX ADMIN — DOXYCYCLINE 100 MG: 100 INJECTION, POWDER, LYOPHILIZED, FOR SOLUTION INTRAVENOUS at 22:17

## 2022-09-23 RX ADMIN — Medication 30 MG: at 08:31

## 2022-09-23 RX ADMIN — IPRATROPIUM BROMIDE AND ALBUTEROL SULFATE 1 AMPULE: 2.5; .5 SOLUTION RESPIRATORY (INHALATION) at 10:17

## 2022-09-23 RX ADMIN — Medication 10 ML: at 20:32

## 2022-09-23 RX ADMIN — LACTULOSE 20 G: 20 SOLUTION ORAL at 20:46

## 2022-09-23 RX ADMIN — OXYCODONE HYDROCHLORIDE AND ACETAMINOPHEN 1 TABLET: 5; 325 TABLET ORAL at 17:17

## 2022-09-23 RX ADMIN — PROPRANOLOL HYDROCHLORIDE 20 MG: 20 TABLET ORAL at 08:28

## 2022-09-23 RX ADMIN — ACETYLCYSTEINE 400 MG: 200 SOLUTION ORAL; RESPIRATORY (INHALATION) at 14:19

## 2022-09-23 RX ADMIN — Medication 250 MG: at 23:13

## 2022-09-23 RX ADMIN — AMPICILLIN SODIUM AND SULBACTAM SODIUM 3000 MG: 2; 1 INJECTION, POWDER, FOR SOLUTION INTRAMUSCULAR; INTRAVENOUS at 22:19

## 2022-09-23 RX ADMIN — AMPICILLIN SODIUM AND SULBACTAM SODIUM 3000 MG: 2; 1 INJECTION, POWDER, FOR SOLUTION INTRAMUSCULAR; INTRAVENOUS at 17:39

## 2022-09-23 RX ADMIN — DOXYCYCLINE 100 MG: 100 INJECTION, POWDER, LYOPHILIZED, FOR SOLUTION INTRAVENOUS at 11:37

## 2022-09-23 RX ADMIN — ALBUTEROL SULFATE 2.5 MG: 2.5 SOLUTION RESPIRATORY (INHALATION) at 19:26

## 2022-09-23 RX ADMIN — ACETYLCYSTEINE 400 MG: 200 SOLUTION ORAL; RESPIRATORY (INHALATION) at 10:18

## 2022-09-23 RX ADMIN — GLYCOPYRROLATE 0.3 MG: 0.2 INJECTION INTRAMUSCULAR; INTRAVENOUS at 08:28

## 2022-09-23 ASSESSMENT — PAIN SCALES - GENERAL: PAINLEVEL_OUTOF10: 0

## 2022-09-23 ASSESSMENT — PAIN SCALES - WONG BAKER: WONGBAKER_NUMERICALRESPONSE: 0
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
## 2022-09-23 NOTE — PROGRESS NOTES
GI  - PROGRESS NOTE    Subjective:   Admit Date: 9/19/2022  PCP: No primary care provider on file. Patient being seen for: 9/20/2022: GI consult: Seen for nausea and vomiting. Patient status post tracheostomy and aspiration pneumonia. Patient has history of G-tube placement. Also patient had diarrhea. 24 hr events/Interval History: Severe distal reflux esophagitis and esophageal stricture. Stricture could be due to severe inflammation plus minus scar tissue, we cannot tell the difference because of severe inflammation. Stomach could not be entered. Patient on lansoprazole 30 mg twice daily through the G-tube. The patient is non communicative. Talked with patient's nurse. Plan is to start tube feeding. Patient is on antibiotics for pneumonia. Will be started on lactulose for fecal impaction seen on CAT scan.      9/22/2022: Esophagoscopy only: Severe reflux esophagitis. Distal esophageal stricture probably mostly inflammatory. May be partly due to scar tissue. Is hard to differentiate between the 2. Unable to advance the scope into the stomach.     Medications:    Scheduled Meds:   acetylcysteine  2 mL Inhalation 4x daily    ampicillin-sulbactam  3,000 mg IntraVENous Q6H    doxycycline (VIBRAMYCIN) IV  100 mg IntraVENous Q12H    enoxaparin  30 mg SubCUTAneous Daily    famotidine (PEPCID) injection  20 mg IntraVENous Daily    albuterol  2.5 mg Nebulization 4x daily    levETIRAcetam  500 mg PEG Tube Nightly    vancomycin  250 mg Oral 4 times per day    sodium chloride flush  5-40 mL IntraVENous 2 times per day    lansoprazole  30 mg Oral BID AC    [Held by provider] Glycopyrrolate  0.3 mg IntraVENous BID    meropenem  1,000 mg IntraVENous Q12H    propranolol  20 mg Oral TID    [Held by provider] ipratropium-albuterol  1 ampule Inhalation 4x daily    citalopram  10 mg Oral Daily    ARIPiprazole  20 mg Oral Daily    sodium chloride flush  10 mL IntraVENous 2 times per day       Continuous Infusions:   sodium chloride 125 mL/hr at 09/23/22 1501    sodium chloride      dexmedetomidine HCl in NaCl 0.8 mcg/kg/hr (09/23/22 0951)    sodium chloride 15 mL/hr at 09/23/22 1054       PRN Meds:.sodium chloride flush, sodium chloride, metoprolol, oxyCODONE-acetaminophen, sodium chloride flush, sodium chloride, potassium chloride **OR** potassium alternative oral replacement **OR** potassium chloride, magnesium sulfate, ondansetron **OR** ondansetron, polyethylene glycol, acetaminophen **OR** acetaminophen      Labs:     Recent Labs     09/21/22  0410 09/22/22  0246 09/23/22  0709   WBC 16.1* 8.7 6.8   RBC 4.71 4.13* 3.76*   HGB 12.2* 10.7* 10.0*   HCT 38.9* 34.9* 32.4*   MCV 82.6 84.5 86.2   MCH 25.9* 25.9* 26.6*   MCHC 31.4* 30.7* 30.9*    198 201         Recent Labs     09/21/22  0411 09/21/22  1009 09/22/22  0246 09/23/22  0709     --  139 142   K 4.0 3.4 3.3* 4.0   ANIONGAP 13  --  14 11     --  102 106   CO2 25  --  23 25   BUN 12  --  19 24*   CREATININE 0.5  --  1.8* 2.9*   GLUCOSE 123*  --  100 96   CALCIUM 9.8  --  9.4 9.2         Recent Labs     09/21/22  0951 09/22/22  0246 09/23/22  0709   MG 1.6 1.7 2.0         Recent Labs     09/23/22  0709   AST 17   ALT 18   BILITOT 0.5   ALKPHOS 45         HgBA1c:  No components found for: HGBA1C    FLP:  No results found for: TRIG, HDL, LDLCALC, LDLDIRECT, LABVLDL    TSH:    Lab Results   Component Value Date/Time    TSH 1.150 09/21/2022 09:51 AM       Troponin T: No results for input(s): TROPONINI in the last 72 hours. INR:   No results for input(s): INR in the last 72 hours. No results for input(s): LIPASE in the last 72 hours.   -----------------------------------------------------------------      Physical Exam:     Vitals:    09/23/22 1200 09/23/22 1241 09/23/22 1400 09/23/22 1421   BP: 107/67  106/62    Pulse: 65  65 69   Resp: 23 22 22   Temp: 97.7 °F (36.5 °C)      TempSrc: Temporal SpO2: 97%   98%   Weight:       Height:  6' 2\" (1.88 m)         24HR INTAKE/OUTPUT:    Intake/Output Summary (Last 24 hours) at 9/23/2022 1858  Last data filed at 9/23/2022 1400  Gross per 24 hour   Intake 2044.62 ml   Output 1010 ml   Net 1034.62 ml         General appearance: Patient is noncommunicative. Appears stated age. Head: normal cephalic. No jaundice. Neck: No JVD. Abdomen: Soft and nontender. G-tube in place. No signs of infection. Extremities: No leg edema. Skin: No jaundice. Neurologic: Patient sleeping. Impression:       Severe distal esophagitis, LA grade D. Unable to advance the scope through the esophageal stricture. Stricture is most likely combination of severe inflammation and may have scar tissue. On lansoprazole 30 mg through the G-tube twice daily. Transfer dysphagia. Status post G-tube. Pneumonia. Status post tracheostomy. 9/20/2092: CT abdomen and pelvis without contrast: With IV contrast.    Lung bases airspace consolidation or posterior basal segment with air bronchogram.    Small bilateral pleural effusions. Liver normal.  No biliary dilation. Gallbladder distended without stones. Spleen, pancreas and adrenal glands normal.    No kidney stones. Appendix not visualized. Impression by radiologist: No acute pathology in the abdomen or pelvis. Airspace consolidation involving both lung bases with air bronchograms and small bilateral pleural effusions. Fecal impaction seen in rectal vault. Plan:     Continue lansoprazole through the G-tube. EGD in 2-3 months to document healing of the reflux esophagitis and rule out Crystal's esophagus. Lactulose through G-tube, 20 g twice daily. Discussed with Patel Lawrence pharmacist.  Lactulose not likely to block the G-tube. However, MiraLAX may block the G-tube. Partners of the G-tube every 6 hours to prevent clogging of the G-tube. I have signed off.  Please call me if further assistance needed in the management of this patient. My GI call ends on Sunday evening. After that I will be out of town for 4-5 weeks. Dr. Theo Tanner will be on-call.     Tariq Kenyon MD    9/23/2022    6:58 PM        Disclaimer speech recognition software       (Please note that portions of this note were completed with a voice recognition program. Efforts were made to edit the dictations but occasionally words are mis-transcribed.)

## 2022-09-23 NOTE — PROGRESS NOTES
Pharmacy Renal Adjustment    Sydney Sanders is a 31 y.o. male. Pharmacy has renally adjusted medications per protocol. Recent Labs     09/22/22  0246 09/23/22  0709   BUN 19 24*       Recent Labs     09/22/22  0246 09/23/22  0709   CREATININE 1.8* 2.9*       Estimated Creatinine Clearance: 31 mL/min (A) (based on SCr of 2.9 mg/dL (H)). Height:   Ht Readings from Last 1 Encounters:   09/20/22 6' 2\" (1.88 m)     Weight:  Wt Readings from Last 1 Encounters:   09/23/22 135 lb 6 oz (61.4 kg)         Plan: Adjust the following medications based on renal function:  Change Pepcid to 20mg IV daily.     Coleen Patel, PHARM D, 9/23/2022, 12:41 PM
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
## 2022-09-23 NOTE — PROGRESS NOTES
Physician Progress Note      PATIENT:               Rishi Lam  CSN #:                  [id number]  :                       1985  ADMIT DATE:       2022 7:18 PM  [address] [place] DATE:  RESPONDING  PROVIDER #:        Lynne Delgado MD          QUERY TEXT:    Pt admitted with Sepsis due to pneumonitis. Noted documentation of severe   malnutrition by dietary  consultant. If possible, please document in progress   notes and discharge summary:    The medical record reflects the following:  Risk Factors:  Trach dependent. Aspiration pneumonia, underweight. Clinical Indicators: Moderate body fat loss, muscle mass loss, Mild decrease   in energy intact. [facility] for Severe malnutrition by dietary   consult. Treatment: Dietary consult and treatment. At present time pt is NPO d/t emesis   and possible aspiration pneumonia    Thank you    Analia Quiroz RN, BSN, [facility]  [phone]  Options provided:  -- Severe malnutrition confirmed present on admission  -- Severe malnutrition ruled out  -- Other - I will add my own diagnosis  -- Disagree - Not applicable / Not valid  -- Disagree - Clinically unable to determine / Unknown  -- Refer to Clinical Documentation Reviewer    PROVIDER RESPONSE TEXT:    The diagnosis of severe malnutrition was confirmed as present on admission.     Query created by: Sav Monreal on 2022 3:14 PM      Electronically signed by:  Lynne Delgado MD 2022 9:30 AM

## 2022-09-23 NOTE — PROGRESS NOTES
Pulmonary and Critical Care Progress note. [address]    MRN# [id number]    Acct# [de-identified]  9/23/2022   4:16 PM CDT    Referring Modesta Helen, *      Chief Complaint: resp failure on t piece. HPI: Transferred to ICU for closer observation. He had mucous plugging yesterday. Currently sleeping. Medications    acetylcysteine, 2 mL, Inhalation, 4x daily    ampicillin-sulbactam, 3,000 mg, IntraVENous, Q6H    doxycycline (VIBRAMYCIN) IV, 100 mg, IntraVENous, Q12H    enoxaparin, 30 mg, SubCUTAneous, Daily    sodium chloride flush, 5-40 mL, IntraVENous, 2 times per day    lansoprazole, 30 mg, Oral, BID AC    Glycopyrrolate, 0.3 mg, IntraVENous, BID    meropenem, 1,000 mg, IntraVENous, Q12H    propranolol, 20 mg, Oral, TID    [Held by provider] ipratropium-albuterol, 1 ampule, Inhalation, 4x daily    citalopram, 10 mg, Oral, Daily    levETIRAcetam, 500 mg, PEG Tube, BID    ARIPiprazole, 20 mg, Oral, Daily    sodium chloride flush, 10 mL, IntraVENous, 2 times per day    famotidine (PEPCID) injection, 20 mg, IntraVENous, BID     Review of Systems:  Unable to obtain due to mental status    Physical Exam:  /67   Pulse 65   Temp 97.7 °F (36.5 °C) (Temporal)   Resp 23   Ht 6' 2\" (1.88 m)   Wt 135 lb 6 oz (61.4 kg)   SpO2 97%   BMI 17.38 kg/m²   Intake/Output Summary (Last 24 hours) at 9/23/2022 1223  Last data filed at 9/23/2022 1200  Gross per 24 hour   Intake 1429.29 ml   Output 870 ml   Net 559.29 ml         General appearance:  Young white male does not appear to be in distress   HEENT:normocephalic atraumatic tracheostomy tube in place  Heart:S1S2 no murmurs  Lungs:diminished bilaterally no rubs or tenderness or dullness to percussion  Abdomen:Soft non tender no organomegaly  Extremities:non clubbing cyanosis or edema  Neuro:no focal findings  Skin:intact    Recent Labs     09/21/22  0410 09/22/22  0246 09/23/22  0709   WBC 16.1* 8.7 6.8   RBC 4.71 4.13* 3.76*   HGB 12.2* 10.7* 10.0*   HCT 38.9* 34.9* 32.4*    198 201   MCV 82.6 84.5 86.2   MCH 25.9* 25.9* 26.6*   MCHC 31.4* 30.7* 30.9*   RDW 18.8* 18.2* 17.4*        Recent Labs     09/21/22  0411 09/21/22  1009 09/22/22  0246 09/23/22  0709     --  139 142   K 4.0 3.4 3.3* 4.0     --  102 106   CO2 25  --  23 25   BUN 12  --  19 24*   CREATININE 0.5  --  1.8* 2.9*   CALCIUM 9.8  --  9.4 9.2   GLUCOSE 123*  --  100 96        Recent Labs     09/21/22  1009   PHART 7.490*   EGU7RXB 37.0   PO2ART 121.0*   CMP5NGH 28.2*   M6VXQOVY 96.7   BEART 4.7*       Recent Labs     09/20/22  1415 09/21/22  0411 09/21/22  0951 09/22/22  0246 09/23/22  0709   AST  --   --   --   --  17   ALT  --   --   --   --  18   ALKPHOS  --   --   --   --  45   BILITOT  --   --   --   --  0.5   MG  --   --  1.6   < > 2.0   CALCIUM  --    < >  --    < > 9.2   PROBNP  --   --  333  --   --    LACTA  --   --  1.1  --   --    INR 1.23*  --   --   --   --    TSH  --   --  1.150  --   --    DDIMER  --   --  3.39*  --   --    PROCAL  --   --  0.23*  --   --     < > = values in this interval not displayed. Recent Labs     09/21/22  1128 09/21/22  1754 09/22/22  1754   BC No Growth to date. Any change in status will be called. --   --    LABGRAM  --  Many WBC's (Polymorphonuclear)  Few Gram positive cocci  in pairs  Rare Gram negative rods   Many WBC's (Polymorphonuclear)  Rare Gram negative rods  Rare Gram positive cocci  in pairs     CULTRESP  --  Further report to follow*  Moderate growth  CONTACT PRECAUTIONS INDICATED  *  Moderate growth Growth too young, additional incubation required           Radiograph: XR CHEST PORTABLE    Result Date: 9/20/2022  No acute findings in the chest.  No change. Electronically signed by Bonnie Romero M.D. on 09-20-22 at 0133    XR CHEST PORTABLE    Result Date: 9/19/2022  Tip of tracheostomy tube is about 2 cm above the scott. Electronically signed by Caro Banegas M.D. on 09-20-22 at 0041       My radiograph interpretation/independent review of imaging: Reviewed    Problem list generated by [facility]:  Hospital Problems             Last Modified POA    * (Principal) Aspiration pneumonia of both lungs due to vomit, unspecified part of lung ([address] 9/20/2022 Yes    Nonverbal 9/20/2022 Yes    Sepsis due to pneumonia ([address] 9/20/2022 Yes    UTI (urinary tract infection) 9/20/2022 Yes    Schizophrenia ([address] 9/20/2022 Yes    Bipolar disorder ([address] 9/20/2022 Yes    Endotracheal tube present 9/20/2022 Yes    Lactic acidosis 9/20/2022 Yes    Severe malnutrition ([address] 9/20/2022 Yes    Other tracheostomy complication ([address] 4/26/3054 Yes    Palliative care patient 9/23/2022 Yes    Nausea and vomiting 9/21/2022 Unknown    Overview Signed 9/21/2022 12:32 PM by Remigio Robles MD     Added automatically from request for surgery [id number]             Pulmonary Assessment/Plan:     Possible aspiration pneumonia on empirical antibiotics. Continue airway toilet. Would limit the use of Mucomyst to no longer than 2 to 3 days. Mucomyst should be used with albuterol. Urinary tract infection with Klebsiella and Enterococcus in the urine. Infectious disease consulted. On empirical antibiotics. Status post traumatic brain injury at baseline. Psychiatry following for agitation. DVT prophylaxis        Alonzo Lawrence MD, [facility], [facility]    The above note was generated using voice recognition software. Inadvertent typographical errors in transcription may have occurred.       Electronically signed by Prem Sánchez MD on 09/23/22 at 12:23 PM

## 2022-09-23 NOTE — PROGRESS NOTES
Pharmacy Renal Adjustment    Gabe Goldsmith is a 31 y.o. male. Pharmacy has renally adjusted medications per protocol. Recent Labs     09/22/22  0246 09/23/22  0709   BUN 19 24*       Recent Labs     09/22/22  0246 09/23/22  0709   CREATININE 1.8* 2.9*       Estimated Creatinine Clearance: 31 mL/min (A) (based on SCr of 2.9 mg/dL (H)).     Height:   Ht Readings from Last 1 Encounters:   09/20/22 6' 2\" (1.88 m)     Weight:  Wt Readings from Last 1 Encounters:   09/23/22 135 lb 6 oz (61.4 kg)       CKD stage: CATY         Baseline SCr: 0.4    Plan: Adjust the following medications based on renal function:           Change Lovenox to 30 mg daily for CATY    Electronically signed by Daiana Coles [facility] on 9/23/2022 at 11:01 AM

## 2022-09-23 NOTE — PROGRESS NOTES
Comprehensive Nutrition Assessment    Type and Reason for Visit:  Consult    Nutrition Recommendations/Plan:   Start PEG feeding--Jevity 1.2 at 25ml and increase by 10 ml every 6 hours until goal rate of 62ml is reached. Give 25ml free water flush hourly     Malnutrition Assessment:  Malnutrition Status:  Severe malnutrition (09/23/22 1247)    Context:  Acute Illness     Findings of the 6 clinical characteristics of malnutrition:  Energy Intake:  Mild decrease in energy intake (Comment)  Weight Loss:  Greater than 7.5% over 3 months     Body Fat Loss: Moderate body fat loss     Muscle Mass Loss:  Mild muscle mass loss    Fluid Accumulation:  No significant fluid accumulation Extremities   Strength:  Not Performed    Nutrition Assessment:    Received verbal consult from RN that TF would like to be started. Will start as continuous and can modify to bolus as family desires. Goal rate for Jevity 1.2 is 60ml/hr    Nutrition Related Findings:    PEG, trach, nonverbal Wound Type: None       Current Nutrition Intake & Therapies:    Average Meal Intake: NPO  Average Supplements Intake: NPO  Current Tube Feeding (TF) Orders:  Feeding Route: PEG  Formula: Other Tube Feeding (Comment) (Jevity 1.2)  Schedule: Continuous  Feeding Regimen: Jevity 1.2 goal rate 60ml/hr with 25ml free water flush hourly  Additives/Modulars: None  Water Flushes: 25ml hourly  Current TF & Flush Orders Provides: 0  Goal TF & Flush Orders Provides: Jevity 1.2 @ 60ml/hr with 25ml free water flush = 1728 kcals with 80g protein, 243g CHO and 1162 ml free water from formula. 600ml free water from flush    Anthropometric Measures:  Height: 6' 2\" (188 cm)  Ideal Body Weight (IBW): 190 lbs (86 kg)    Admission Body Weight: 141 lb (64 kg)  Current Body Weight: 135 lb 6 oz (61.4 kg), 71.3 % IBW.  Weight Source: Bed Scale  Current BMI (kg/m2): 17.4  Usual Body Weight: 162 lb 6.4 oz (73.7 kg) (6/2022)  % Weight Change (Calculated): -13.2  Weight Adjustment For: No Adjustment    BMI Categories: Normal Weight (BMI 18.5-24. 9)    Estimated Daily Nutrient Needs:  Energy Requirements Based On: Kcal/kg (25-30)  Weight Used for Energy Requirements: Current  Energy (kcal/day): 2451-4133 kcals/day  Weight Used for Protein Requirements: Current (1.0-2.0)  Protein (g/day):  g/protein/day  Method Used for Fluid Requirements: 1 ml/kcal  Fluid (ml/day): 8573-1760 mL/day    Nutrition Diagnosis:   Inadequate oral intake related to acute injury/trauma, swallowing difficulty as evidenced by NPO or clear liquid status due to medical condition, nutrition support - enteral nutrition    Nutrition Interventions:   Food and/or Nutrient Delivery: Continue NPO, Start Tube Feeding  Nutrition Education/Counseling: No recommendation at this time  Coordination of Nutrition Care: Continue to monitor while inpatient  Plan of Care discussed with: nursing    Goals:  Previous Goal Met: Progressing toward Goal(s)  Goals: Initiate nutrition support       Nutrition Monitoring and Evaluation:   Behavioral-Environmental Outcomes: None Identified  Food/Nutrient Intake Outcomes: Enteral Nutrition Intake/Tolerance  Physical Signs/Symptoms Outcomes: Biochemical Data, GI Status, Fluid Status or Edema, Weight, Skin    Discharge Planning:     Too soon to determine     Ivania Chaudhry, MS, RD, LD  Contact: [phone]

## 2022-09-23 NOTE — PLAN OF CARE
Problem: Nutrition Deficit:  Goal: Optimize nutritional status  Outcome: Progressing  Flowsheets (Taken 9/23/2022 1241)  Nutrient intake appropriate for improving, restoring, or maintaining nutritional needs:   Assess nutritional status and recommend course of action   Recommend, monitor, and adjust tube feedings and TPN/PPN based on assessed needs

## 2022-09-24 ENCOUNTER — APPOINTMENT (OUTPATIENT)
Dept: ULTRASOUND IMAGING | Age: 37
DRG: 698 | End: 2022-09-24
Payer: MEDICARE

## 2022-09-24 LAB
ALBUMIN SERPL-MCNC: 2.9 G/DL (ref 3.5–5.2)
ALP BLD-CCNC: 37 U/L (ref 40–130)
ALT SERPL-CCNC: 14 U/L (ref 5–41)
ANION GAP SERPL CALCULATED.3IONS-SCNC: 13 MMOL/L (ref 7–19)
AST SERPL-CCNC: 13 U/L (ref 5–40)
BACTERIA: NEGATIVE /HPF
BASOPHILS ABSOLUTE: 0 K/UL (ref 0–0.2)
BASOPHILS RELATIVE PERCENT: 0.4 % (ref 0–1)
BILIRUB SERPL-MCNC: 0.3 MG/DL (ref 0.2–1.2)
BILIRUBIN URINE: NEGATIVE
BLOOD, URINE: ABNORMAL
BUN BLDV-MCNC: 24 MG/DL (ref 6–20)
CALCIUM SERPL-MCNC: 8.4 MG/DL (ref 8.6–10)
CHLORIDE BLD-SCNC: 111 MMOL/L (ref 98–111)
CLARITY: CLEAR
CO2: 23 MMOL/L (ref 22–29)
COLOR: YELLOW
CREAT SERPL-MCNC: 3 MG/DL (ref 0.5–1.2)
CRYSTALS, UA: ABNORMAL /HPF
CULTURE, RESPIRATORY: ABNORMAL
EOSINOPHILS ABSOLUTE: 0.4 K/UL (ref 0–0.6)
EOSINOPHILS RELATIVE PERCENT: 4.7 % (ref 0–5)
EPITHELIAL CELLS, UA: 1 /HPF (ref 0–5)
FERRITIN: 220.8 NG/ML (ref 30–400)
GFR AFRICAN AMERICAN: 29
GFR NON-AFRICAN AMERICAN: 24
GLUCOSE BLD-MCNC: 98 MG/DL (ref 74–109)
GLUCOSE URINE: NEGATIVE MG/DL
GRAM STAIN RESULT: ABNORMAL
GRAM STAIN RESULT: ABNORMAL
HCT VFR BLD CALC: 31.1 % (ref 42–52)
HEMOGLOBIN: 9.6 G/DL (ref 14–18)
HYALINE CASTS: 2 /HPF (ref 0–8)
IMMATURE GRANULOCYTES #: 0 K/UL
IRON SATURATION: 10 % (ref 14–50)
IRON: 20 UG/DL (ref 59–158)
KETONES, URINE: NEGATIVE MG/DL
LEUKOCYTE ESTERASE, URINE: ABNORMAL
LYMPHOCYTES ABSOLUTE: 1 K/UL (ref 1.1–4.5)
LYMPHOCYTES RELATIVE PERCENT: 12.8 % (ref 20–40)
MAGNESIUM: 1.9 MG/DL (ref 1.6–2.6)
MCH RBC QN AUTO: 26.6 PG (ref 27–31)
MCHC RBC AUTO-ENTMCNC: 30.9 G/DL (ref 33–37)
MCV RBC AUTO: 86.1 FL (ref 80–94)
MONOCYTES ABSOLUTE: 0.7 K/UL (ref 0–0.9)
MONOCYTES RELATIVE PERCENT: 9.3 % (ref 0–10)
NEUTROPHILS ABSOLUTE: 5.4 K/UL (ref 1.5–7.5)
NEUTROPHILS RELATIVE PERCENT: 72.3 % (ref 50–65)
NITRITE, URINE: NEGATIVE
ORGANISM: ABNORMAL
PARATHYROID HORMONE INTACT: 21 PG/ML (ref 15–65)
PDW BLD-RTO: 17.2 % (ref 11.5–14.5)
PH UA: 5.5 (ref 5–8)
PLATELET # BLD: 219 K/UL (ref 130–400)
PMV BLD AUTO: 11.8 FL (ref 9.4–12.4)
POTASSIUM SERPL-SCNC: 3.7 MMOL/L (ref 3.5–5)
PROTEIN UA: ABNORMAL MG/DL
RBC # BLD: 3.61 M/UL (ref 4.7–6.1)
RBC UA: 6 /HPF (ref 0–4)
SODIUM BLD-SCNC: 147 MMOL/L (ref 136–145)
SODIUM URINE: 57 MMOL/L
SPECIFIC GRAVITY UA: 1.01 (ref 1–1.03)
TOTAL IRON BINDING CAPACITY: 197 UG/DL (ref 250–400)
TOTAL PROTEIN: 5.4 G/DL (ref 6.6–8.7)
URINE CULTURE, ROUTINE: NORMAL
UROBILINOGEN, URINE: 0.2 E.U./DL
VANCOMYCIN RANDOM: 21.3 UG/ML
VITAMIN D 25-HYDROXY: 26 NG/ML
WBC # BLD: 7.4 K/UL (ref 4.8–10.8)
WBC UA: 3 /HPF (ref 0–5)

## 2022-09-24 PROCEDURE — 83735 ASSAY OF MAGNESIUM: CPT

## 2022-09-24 PROCEDURE — 2000000000 HC ICU R&B

## 2022-09-24 PROCEDURE — 84300 ASSAY OF URINE SODIUM: CPT

## 2022-09-24 PROCEDURE — 82306 VITAMIN D 25 HYDROXY: CPT

## 2022-09-24 PROCEDURE — 6360000002 HC RX W HCPCS: Performed by: HOSPITALIST

## 2022-09-24 PROCEDURE — 82570 ASSAY OF URINE CREATININE: CPT

## 2022-09-24 PROCEDURE — 76770 US EXAM ABDO BACK WALL COMP: CPT

## 2022-09-24 PROCEDURE — 6370000000 HC RX 637 (ALT 250 FOR IP): Performed by: HOSPITALIST

## 2022-09-24 PROCEDURE — 83550 IRON BINDING TEST: CPT

## 2022-09-24 PROCEDURE — 94669 MECHANICAL CHEST WALL OSCILL: CPT

## 2022-09-24 PROCEDURE — 2580000003 HC RX 258: Performed by: NURSE ANESTHETIST, CERTIFIED REGISTERED

## 2022-09-24 PROCEDURE — 6370000000 HC RX 637 (ALT 250 FOR IP): Performed by: SPECIALIST

## 2022-09-24 PROCEDURE — 2500000003 HC RX 250 WO HCPCS: Performed by: SPECIALIST

## 2022-09-24 PROCEDURE — 83970 ASSAY OF PARATHORMONE: CPT

## 2022-09-24 PROCEDURE — 80053 COMPREHEN METABOLIC PANEL: CPT

## 2022-09-24 PROCEDURE — 85025 COMPLETE CBC W/AUTO DIFF WBC: CPT

## 2022-09-24 PROCEDURE — 81001 URINALYSIS AUTO W/SCOPE: CPT

## 2022-09-24 PROCEDURE — 2700000000 HC OXYGEN THERAPY PER DAY

## 2022-09-24 PROCEDURE — 2580000003 HC RX 258: Performed by: SPECIALIST

## 2022-09-24 PROCEDURE — 94640 AIRWAY INHALATION TREATMENT: CPT

## 2022-09-24 PROCEDURE — 36415 COLL VENOUS BLD VENIPUNCTURE: CPT

## 2022-09-24 PROCEDURE — 82043 UR ALBUMIN QUANTITATIVE: CPT

## 2022-09-24 PROCEDURE — 83540 ASSAY OF IRON: CPT

## 2022-09-24 PROCEDURE — 80202 ASSAY OF VANCOMYCIN: CPT

## 2022-09-24 PROCEDURE — 82728 ASSAY OF FERRITIN: CPT

## 2022-09-24 PROCEDURE — 2580000003 HC RX 258: Performed by: HOSPITALIST

## 2022-09-24 RX ORDER — DEXTROSE AND SODIUM CHLORIDE 5; .2 G/100ML; G/100ML
INJECTION, SOLUTION INTRAVENOUS CONTINUOUS
Status: DISCONTINUED | OUTPATIENT
Start: 2022-09-24 | End: 2022-09-25

## 2022-09-24 RX ORDER — ERGOCALCIFEROL 1.25 MG/1
50000 CAPSULE ORAL WEEKLY
Status: DISCONTINUED | OUTPATIENT
Start: 2022-09-24 | End: 2022-09-29 | Stop reason: HOSPADM

## 2022-09-24 RX ORDER — LACTOBACILLUS RHAMNOSUS GG 10B CELL
1 CAPSULE ORAL DAILY
Status: DISCONTINUED | OUTPATIENT
Start: 2022-09-24 | End: 2022-09-29 | Stop reason: HOSPADM

## 2022-09-24 RX ADMIN — ALBUTEROL SULFATE 2.5 MG: 2.5 SOLUTION RESPIRATORY (INHALATION) at 06:46

## 2022-09-24 RX ADMIN — CITALOPRAM HYDROBROMIDE 10 MG: 10 TABLET ORAL at 07:29

## 2022-09-24 RX ADMIN — ACETYLCYSTEINE 400 MG: 200 SOLUTION ORAL; RESPIRATORY (INHALATION) at 10:34

## 2022-09-24 RX ADMIN — ENOXAPARIN SODIUM 30 MG: 100 INJECTION SUBCUTANEOUS at 07:30

## 2022-09-24 RX ADMIN — Medication 10 ML: at 07:30

## 2022-09-24 RX ADMIN — AMPICILLIN SODIUM AND SULBACTAM SODIUM 3000 MG: 2; 1 INJECTION, POWDER, FOR SOLUTION INTRAMUSCULAR; INTRAVENOUS at 21:27

## 2022-09-24 RX ADMIN — LEVETIRACETAM 500 MG: 100 SOLUTION ORAL at 21:21

## 2022-09-24 RX ADMIN — PROPRANOLOL HYDROCHLORIDE 20 MG: 20 TABLET ORAL at 21:20

## 2022-09-24 RX ADMIN — MEROPENEM AND SODIUM CHLORIDE 1000 MG: 1 INJECTION, SOLUTION INTRAVENOUS at 05:47

## 2022-09-24 RX ADMIN — OXYCODONE HYDROCHLORIDE AND ACETAMINOPHEN 1 TABLET: 5; 325 TABLET ORAL at 22:38

## 2022-09-24 RX ADMIN — MEROPENEM AND SODIUM CHLORIDE 1000 MG: 1 INJECTION, SOLUTION INTRAVENOUS at 18:15

## 2022-09-24 RX ADMIN — DEXTROSE AND SODIUM CHLORIDE: 5; 200 INJECTION, SOLUTION INTRAVENOUS at 08:43

## 2022-09-24 RX ADMIN — ACETYLCYSTEINE 400 MG: 200 SOLUTION ORAL; RESPIRATORY (INHALATION) at 06:46

## 2022-09-24 RX ADMIN — PROPRANOLOL HYDROCHLORIDE 20 MG: 20 TABLET ORAL at 15:01

## 2022-09-24 RX ADMIN — Medication 250 MG: at 05:54

## 2022-09-24 RX ADMIN — ACETYLCYSTEINE 400 MG: 200 SOLUTION ORAL; RESPIRATORY (INHALATION) at 18:50

## 2022-09-24 RX ADMIN — ACETYLCYSTEINE 400 MG: 200 SOLUTION ORAL; RESPIRATORY (INHALATION) at 14:33

## 2022-09-24 RX ADMIN — PROPRANOLOL HYDROCHLORIDE 20 MG: 20 TABLET ORAL at 07:29

## 2022-09-24 RX ADMIN — LACTULOSE 20 G: 20 SOLUTION ORAL at 21:00

## 2022-09-24 RX ADMIN — ALBUTEROL SULFATE 2.5 MG: 2.5 SOLUTION RESPIRATORY (INHALATION) at 10:33

## 2022-09-24 RX ADMIN — ALBUTEROL SULFATE 2.5 MG: 2.5 SOLUTION RESPIRATORY (INHALATION) at 18:50

## 2022-09-24 RX ADMIN — LACTULOSE 20 G: 20 SOLUTION ORAL at 07:30

## 2022-09-24 RX ADMIN — AMPICILLIN SODIUM AND SULBACTAM SODIUM 3000 MG: 2; 1 INJECTION, POWDER, FOR SOLUTION INTRAMUSCULAR; INTRAVENOUS at 16:16

## 2022-09-24 RX ADMIN — Medication 1 CAPSULE: at 09:22

## 2022-09-24 RX ADMIN — Medication 10 ML: at 21:21

## 2022-09-24 RX ADMIN — ALBUTEROL SULFATE 2.5 MG: 2.5 SOLUTION RESPIRATORY (INHALATION) at 14:33

## 2022-09-24 RX ADMIN — ARIPIPRAZOLE 20 MG: 10 TABLET ORAL at 07:29

## 2022-09-24 RX ADMIN — Medication 20 MG: at 07:29

## 2022-09-24 RX ADMIN — AMPICILLIN SODIUM AND SULBACTAM SODIUM 3000 MG: 2; 1 INJECTION, POWDER, FOR SOLUTION INTRAMUSCULAR; INTRAVENOUS at 10:55

## 2022-09-24 RX ADMIN — AMPICILLIN SODIUM AND SULBACTAM SODIUM 3000 MG: 2; 1 INJECTION, POWDER, FOR SOLUTION INTRAMUSCULAR; INTRAVENOUS at 04:58

## 2022-09-24 RX ADMIN — Medication 250 MG: at 12:05

## 2022-09-24 RX ADMIN — Medication 30 MG: at 16:13

## 2022-09-24 RX ADMIN — OXYCODONE HYDROCHLORIDE AND ACETAMINOPHEN 1 TABLET: 5; 325 TABLET ORAL at 15:07

## 2022-09-24 RX ADMIN — SODIUM CHLORIDE, PRESERVATIVE FREE 10 ML: 5 INJECTION INTRAVENOUS at 21:22

## 2022-09-24 RX ADMIN — Medication 250 MG: at 18:16

## 2022-09-24 RX ADMIN — ERGOCALCIFEROL 50000 UNITS: 1.25 CAPSULE ORAL at 18:15

## 2022-09-24 RX ADMIN — Medication 30 MG: at 07:30
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
## 2022-09-24 NOTE — PROGRESS NOTES
[address] [facility] Pharmacokinetic Monitoring Service - Vancomycin     Mirtha Carbajal is a 31 y.o. male starting on vancomycin therapy for Pneumonia (HAP). Pharmacy consulted by Dr. Fernando Molina for monitoring and adjustment. Target Concentration: Concentration-guided dosing due to renal impairment/insufficiency. Target trough=</=15 mg/L    Additional Antimicrobials: Unasyn, Merrem, and oral Vancomycin     Pertinent Laboratory Values: Wt Readings from Last 1 Encounters:   09/23/22 135 lb 6 oz (61.4 kg)     Temp Readings from Last 1 Encounters:   09/24/22 97.7 °F (36.5 °C) (Temporal)     Estimated Creatinine Clearance: 30 mL/min (A) (based on SCr of 3 mg/dL (H)). Recent Labs     09/23/22  0709 09/23/22 2014 09/24/22  0358   CREATININE 2.9* 3.0* 3.0*   WBC 6.8  --  7.4     Procalcitonin: 9/21=0.23    Pertinent Cultures:  Culture Date Source Results   09/22/22 Respiratory MRSA  Klebsiella pneumoniae  Serratia marcescens     09/22/22 urine No growth   09/20/22 Respiratory MRSA  Klebsiella pneumoniae ESBL  Serratia marcescens   09/20/22 urine Enterococcus faecalisVRE  Klebsiella pneumoniae ESBL   MRSA Nasal Swab: not ordered. Order placed by pharmacy. on 9/22---not needed. Positive on respiratory culture. Plan:  Concentration-guided dosing due to renal impairment/insufficiency  Vancomycin originally started 9/20 with a loading dose of 1500mg and then 1000mg IV q 8hr til Scr popped to 1.8. Vancomycin level then 9/22 @1422=42. Reconsulted to restart Vancomycin IV today. Vancomycin stat random level ordered= [email or web address] 21.3. Hold vancomycin today. Renal labs as indicated   Vancomycin concentration ordered for 09/25 @ 0200 with am labs.    Pharmacy will continue to monitor patient and adjust therapy as indicated    Thank you for the consult,  Rosalba Aaron, [facility]  9/24/2022 10:02 AM
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
## 2022-09-25 LAB
ALBUMIN SERPL-MCNC: 3.3 G/DL (ref 3.5–5.2)
ALP BLD-CCNC: 42 U/L (ref 40–130)
ALT SERPL-CCNC: 12 U/L (ref 5–41)
ANION GAP SERPL CALCULATED.3IONS-SCNC: 12 MMOL/L (ref 7–19)
AST SERPL-CCNC: 13 U/L (ref 5–40)
BASOPHILS ABSOLUTE: 0 K/UL (ref 0–0.2)
BASOPHILS RELATIVE PERCENT: 0.5 % (ref 0–1)
BILIRUB SERPL-MCNC: 0.3 MG/DL (ref 0.2–1.2)
BLOOD CULTURE, ROUTINE: NORMAL
BUN BLDV-MCNC: 20 MG/DL (ref 6–20)
CALCIUM SERPL-MCNC: 9 MG/DL (ref 8.6–10)
CHLORIDE BLD-SCNC: 113 MMOL/L (ref 98–111)
CO2: 24 MMOL/L (ref 22–29)
CREAT SERPL-MCNC: 2.7 MG/DL (ref 0.5–1.2)
CREATININE URINE: 34 MG/DL (ref 4.2–622)
CULTURE, BLOOD 2: NORMAL
EOSINOPHILS ABSOLUTE: 0.5 K/UL (ref 0–0.6)
EOSINOPHILS RELATIVE PERCENT: 5.7 % (ref 0–5)
GFR AFRICAN AMERICAN: 33
GFR NON-AFRICAN AMERICAN: 27
GLUCOSE BLD-MCNC: 84 MG/DL (ref 74–109)
HCT VFR BLD CALC: 37.9 % (ref 42–52)
HEMOGLOBIN: 10.6 G/DL (ref 14–18)
HYPOCHROMIA: ABNORMAL
IMMATURE GRANULOCYTES #: 0.1 K/UL
LYMPHOCYTES ABSOLUTE: 1.2 K/UL (ref 1.1–4.5)
LYMPHOCYTES RELATIVE PERCENT: 15.1 % (ref 20–40)
MAGNESIUM: 1.9 MG/DL (ref 1.6–2.6)
MCH RBC QN AUTO: 26.4 PG (ref 27–31)
MCHC RBC AUTO-ENTMCNC: 28 G/DL (ref 33–37)
MCV RBC AUTO: 94.3 FL (ref 80–94)
MICROALBUMIN UR-MCNC: 6.3 MG/DL (ref 0–19)
MICROALBUMIN/CREAT UR-RTO: 185.3 MG/G
MONOCYTES ABSOLUTE: 0.8 K/UL (ref 0–0.9)
MONOCYTES RELATIVE PERCENT: 9.7 % (ref 0–10)
NEUTROPHILS ABSOLUTE: 5.4 K/UL (ref 1.5–7.5)
NEUTROPHILS RELATIVE PERCENT: 68.1 % (ref 50–65)
PDW BLD-RTO: 17.5 % (ref 11.5–14.5)
PLATELET # BLD: 244 K/UL (ref 130–400)
PLATELET SLIDE REVIEW: ADEQUATE
PMV BLD AUTO: 11.2 FL (ref 9.4–12.4)
POTASSIUM SERPL-SCNC: 3.6 MMOL/L (ref 3.5–5)
RBC # BLD: 4.02 M/UL (ref 4.7–6.1)
SODIUM BLD-SCNC: 149 MMOL/L (ref 136–145)
TOTAL PROTEIN: 6.1 G/DL (ref 6.6–8.7)
VANCOMYCIN RANDOM: 19.2 UG/ML
VANCOMYCIN TROUGH: 15.6 UG/ML (ref 10–20)
WBC # BLD: 8 K/UL (ref 4.8–10.8)

## 2022-09-25 PROCEDURE — 94640 AIRWAY INHALATION TREATMENT: CPT

## 2022-09-25 PROCEDURE — 83735 ASSAY OF MAGNESIUM: CPT

## 2022-09-25 PROCEDURE — 2500000003 HC RX 250 WO HCPCS: Performed by: SPECIALIST

## 2022-09-25 PROCEDURE — 80202 ASSAY OF VANCOMYCIN: CPT

## 2022-09-25 PROCEDURE — 6370000000 HC RX 637 (ALT 250 FOR IP): Performed by: SPECIALIST

## 2022-09-25 PROCEDURE — 6360000002 HC RX W HCPCS: Performed by: HOSPITALIST

## 2022-09-25 PROCEDURE — 99232 SBSQ HOSP IP/OBS MODERATE 35: CPT | Performed by: INTERNAL MEDICINE

## 2022-09-25 PROCEDURE — 2580000003 HC RX 258: Performed by: SPECIALIST

## 2022-09-25 PROCEDURE — 2580000003 HC RX 258: Performed by: HOSPITALIST

## 2022-09-25 PROCEDURE — 2700000000 HC OXYGEN THERAPY PER DAY

## 2022-09-25 PROCEDURE — 2500000003 HC RX 250 WO HCPCS: Performed by: HOSPITALIST

## 2022-09-25 PROCEDURE — 80053 COMPREHEN METABOLIC PANEL: CPT

## 2022-09-25 PROCEDURE — 94669 MECHANICAL CHEST WALL OSCILL: CPT

## 2022-09-25 PROCEDURE — 85025 COMPLETE CBC W/AUTO DIFF WBC: CPT

## 2022-09-25 PROCEDURE — 1210000000 HC MED SURG R&B

## 2022-09-25 PROCEDURE — 36415 COLL VENOUS BLD VENIPUNCTURE: CPT

## 2022-09-25 PROCEDURE — 6370000000 HC RX 637 (ALT 250 FOR IP): Performed by: HOSPITALIST

## 2022-09-25 RX ORDER — DEXTROSE MONOHYDRATE 50 MG/ML
INJECTION, SOLUTION INTRAVENOUS CONTINUOUS
Status: DISCONTINUED | OUTPATIENT
Start: 2022-09-25 | End: 2022-09-29 | Stop reason: HOSPADM

## 2022-09-25 RX ORDER — LORAZEPAM 2 MG/ML
1 INJECTION INTRAMUSCULAR EVERY 6 HOURS PRN
Status: DISCONTINUED | OUTPATIENT
Start: 2022-09-25 | End: 2022-09-29 | Stop reason: HOSPADM

## 2022-09-25 RX ADMIN — Medication 250 MG: at 00:00

## 2022-09-25 RX ADMIN — ENOXAPARIN SODIUM 30 MG: 100 INJECTION SUBCUTANEOUS at 10:04

## 2022-09-25 RX ADMIN — ACETYLCYSTEINE 400 MG: 200 SOLUTION ORAL; RESPIRATORY (INHALATION) at 06:10

## 2022-09-25 RX ADMIN — ALBUTEROL SULFATE 2.5 MG: 2.5 SOLUTION RESPIRATORY (INHALATION) at 10:26

## 2022-09-25 RX ADMIN — IRON SUCROSE 200 MG: 20 INJECTION, SOLUTION INTRAVENOUS at 04:27

## 2022-09-25 RX ADMIN — Medication 250 MG: at 12:10

## 2022-09-25 RX ADMIN — ACETYLCYSTEINE 400 MG: 200 SOLUTION ORAL; RESPIRATORY (INHALATION) at 10:26

## 2022-09-25 RX ADMIN — Medication 30 MG: at 19:52

## 2022-09-25 RX ADMIN — ALBUTEROL SULFATE 2.5 MG: 2.5 SOLUTION RESPIRATORY (INHALATION) at 14:36

## 2022-09-25 RX ADMIN — ARIPIPRAZOLE 20 MG: 10 TABLET ORAL at 10:04

## 2022-09-25 RX ADMIN — Medication 250 MG: at 19:45

## 2022-09-25 RX ADMIN — MEROPENEM AND SODIUM CHLORIDE 1000 MG: 1 INJECTION, SOLUTION INTRAVENOUS at 04:32

## 2022-09-25 RX ADMIN — PROPRANOLOL HYDROCHLORIDE 20 MG: 20 TABLET ORAL at 22:02

## 2022-09-25 RX ADMIN — Medication 30 MG: at 10:05

## 2022-09-25 RX ADMIN — PROPRANOLOL HYDROCHLORIDE 20 MG: 20 TABLET ORAL at 10:05

## 2022-09-25 RX ADMIN — ACETYLCYSTEINE 400 MG: 200 SOLUTION ORAL; RESPIRATORY (INHALATION) at 14:36

## 2022-09-25 RX ADMIN — PROPRANOLOL HYDROCHLORIDE 20 MG: 20 TABLET ORAL at 13:58

## 2022-09-25 RX ADMIN — Medication 250 MG: at 04:33

## 2022-09-25 RX ADMIN — OXYCODONE HYDROCHLORIDE AND ACETAMINOPHEN 1 TABLET: 5; 325 TABLET ORAL at 10:43

## 2022-09-25 RX ADMIN — LORAZEPAM 1 MG: 2 INJECTION INTRAMUSCULAR; INTRAVENOUS at 08:28

## 2022-09-25 RX ADMIN — AMPICILLIN SODIUM AND SULBACTAM SODIUM 3000 MG: 2; 1 INJECTION, POWDER, FOR SOLUTION INTRAMUSCULAR; INTRAVENOUS at 04:27

## 2022-09-25 RX ADMIN — Medication 20 MG: at 11:37

## 2022-09-25 RX ADMIN — DEXMEDETOMIDINE HYDROCHLORIDE 0.7 MCG/KG/HR: 4 INJECTION, SOLUTION INTRAVENOUS at 02:11

## 2022-09-25 RX ADMIN — LEVETIRACETAM 500 MG: 100 SOLUTION ORAL at 22:02

## 2022-09-25 RX ADMIN — ALBUTEROL SULFATE 2.5 MG: 2.5 SOLUTION RESPIRATORY (INHALATION) at 18:52

## 2022-09-25 RX ADMIN — Medication 1 CAPSULE: at 10:05

## 2022-09-25 RX ADMIN — LACTULOSE 20 G: 20 SOLUTION ORAL at 22:02

## 2022-09-25 RX ADMIN — ACETYLCYSTEINE 400 MG: 200 SOLUTION ORAL; RESPIRATORY (INHALATION) at 18:52

## 2022-09-25 RX ADMIN — AMPICILLIN SODIUM AND SULBACTAM SODIUM 3000 MG: 2; 1 INJECTION, POWDER, FOR SOLUTION INTRAMUSCULAR; INTRAVENOUS at 10:43

## 2022-09-25 RX ADMIN — DEXTROSE MONOHYDRATE: 50 INJECTION, SOLUTION INTRAVENOUS at 10:26

## 2022-09-25 RX ADMIN — CITALOPRAM HYDROBROMIDE 10 MG: 10 TABLET ORAL at 10:05

## 2022-09-25 RX ADMIN — LORAZEPAM 1 MG: 2 INJECTION INTRAMUSCULAR; INTRAVENOUS at 15:05

## 2022-09-25 RX ADMIN — ALBUTEROL SULFATE 2.5 MG: 2.5 SOLUTION RESPIRATORY (INHALATION) at 06:10

## 2022-09-25 RX ADMIN — MEROPENEM AND SODIUM CHLORIDE 1000 MG: 1 INJECTION, SOLUTION INTRAVENOUS at 19:41

## 2022-09-25 RX ADMIN — AMPICILLIN SODIUM AND SULBACTAM SODIUM 3000 MG: 2; 1 INJECTION, POWDER, FOR SOLUTION INTRAMUSCULAR; INTRAVENOUS at 19:45

## 2022-09-25 ASSESSMENT — PAIN SCALES - GENERAL
PAINLEVEL_OUTOF10: 0
PAINLEVEL_OUTOF10: 7

## 2022-09-25 ASSESSMENT — PULMONARY FUNCTION TESTS: PEFR_L/MIN: 20
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
## 2022-09-25 NOTE — PROGRESS NOTES
[address] [facility] Pharmacokinetic Monitoring Service - Vancomycin    Consulting Provider: Dr Elke Mckee   Indication: Pneumonia  Target Concentration: Dosing based on anticipated concentration <15 mg/L due to renal impairment/insufficiency  Day of Therapy: 2  Additional Antimicrobials: Merrem    Pertinent Laboratory Values: Wt Readings from Last 1 Encounters:   09/23/22 135 lb 6 oz (61.4 kg)     Temp Readings from Last 1 Encounters:   09/24/22 97.1 °F (36.2 °C)     Estimated Creatinine Clearance: 33 mL/min (A) (based on SCr of 2.7 mg/dL (H)). Recent Labs     09/24/22  0358 09/25/22  0217   CREATININE 3.0* 2.7*   WBC 7.4 8.0     Procalcitonin: 09/20= 0.04      09/21= 0.23    Pertinent Cultures:  Culture Date Source Results   09/22/22 Resp Klebsiella pneumoniae  Serratia marcescens  MRSA   09/21/22 09/20/22 Urine No growth  Klebsiella pneumoniae ESBL  Enterococcus faecalis VRE   09/21/22 09/20/22 Blood No growth   MRSA Nasal Swab: was ordered by provider, awaiting results. Recent vancomycin administrations                     vancomycin (VANCOCIN) 1000 mg in dextrose 5% 250 mL IVPB (mg) 1,000 mg New Bag 09/22/22 9416                    Assessment:  Date/Time Current Dose Concentration Timing of Concentration (h) AUC   09/25/22 Pulse dosing 19.2 Random level    Note: Serum concentrations collected for AUC dosing may appear elevated if collected in close proximity to the dose administered, this is not necessarily an indication of toxicity    Plan:  Current dosing regimen is therapeutic  Will get another level in 18 hours.    Repeat vancomycin concentration ordered for 09/25 @ 2000   Pharmacy will continue to monitor patient and adjust therapy as indicated    Thank you for the consult,  Tessie Archibald, [facility]  9/25/2022 3:16 AM

## 2022-09-25 NOTE — PLAN OF CARE
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
## 2022-09-26 LAB
ALBUMIN SERPL-MCNC: 3 G/DL (ref 3.5–5.2)
ALP BLD-CCNC: 44 U/L (ref 40–130)
ALT SERPL-CCNC: 10 U/L (ref 5–41)
ANION GAP SERPL CALCULATED.3IONS-SCNC: 12 MMOL/L (ref 7–19)
ANISOCYTOSIS: ABNORMAL
AST SERPL-CCNC: 13 U/L (ref 5–40)
ATYPICAL LYMPHOCYTE RELATIVE PERCENT: 1 % (ref 0–8)
BASOPHILS ABSOLUTE: 0 K/UL (ref 0–0.2)
BASOPHILS RELATIVE PERCENT: 0 % (ref 0–1)
BILIRUB SERPL-MCNC: <0.2 MG/DL (ref 0.2–1.2)
BLOOD CULTURE, ROUTINE: NORMAL
BUN BLDV-MCNC: 17 MG/DL (ref 6–20)
CALCIUM SERPL-MCNC: 8.5 MG/DL (ref 8.6–10)
CHLORIDE BLD-SCNC: 115 MMOL/L (ref 98–111)
CO2: 24 MMOL/L (ref 22–29)
CREAT SERPL-MCNC: 2.6 MG/DL (ref 0.5–1.2)
CULTURE, BLOOD 2: NORMAL
EOSINOPHILS ABSOLUTE: 0.5 K/UL (ref 0–0.6)
EOSINOPHILS RELATIVE PERCENT: 6 % (ref 0–5)
GFR AFRICAN AMERICAN: 34
GFR NON-AFRICAN AMERICAN: 28
GLUCOSE BLD-MCNC: 93 MG/DL (ref 74–109)
HCT VFR BLD CALC: 33.6 % (ref 42–52)
HEMOGLOBIN: 10.4 G/DL (ref 14–18)
HYPOCHROMIA: ABNORMAL
IMMATURE GRANULOCYTES #: 0.1 K/UL
LYMPHOCYTES ABSOLUTE: 1.3 K/UL (ref 1.1–4.5)
LYMPHOCYTES RELATIVE PERCENT: 15 % (ref 20–40)
MAGNESIUM: 1.9 MG/DL (ref 1.6–2.6)
MCH RBC QN AUTO: 26.5 PG (ref 27–31)
MCHC RBC AUTO-ENTMCNC: 31 G/DL (ref 33–37)
MCV RBC AUTO: 85.5 FL (ref 80–94)
MONOCYTES ABSOLUTE: 0.4 K/UL (ref 0–0.9)
MONOCYTES RELATIVE PERCENT: 5 % (ref 0–10)
NEUTROPHILS ABSOLUTE: 6.1 K/UL (ref 1.5–7.5)
NEUTROPHILS RELATIVE PERCENT: 73 % (ref 50–65)
PDW BLD-RTO: 17.5 % (ref 11.5–14.5)
PLATELET # BLD: 282 K/UL (ref 130–400)
PLATELET SLIDE REVIEW: ADEQUATE
PMV BLD AUTO: 10.8 FL (ref 9.4–12.4)
POTASSIUM SERPL-SCNC: 3.4 MMOL/L (ref 3.5–5)
RBC # BLD: 3.93 M/UL (ref 4.7–6.1)
SODIUM BLD-SCNC: 151 MMOL/L (ref 136–145)
TOTAL PROTEIN: 5.8 G/DL (ref 6.6–8.7)
WBC # BLD: 8.4 K/UL (ref 4.8–10.8)

## 2022-09-26 PROCEDURE — 80053 COMPREHEN METABOLIC PANEL: CPT

## 2022-09-26 PROCEDURE — 97530 THERAPEUTIC ACTIVITIES: CPT

## 2022-09-26 PROCEDURE — 6360000002 HC RX W HCPCS: Performed by: HOSPITALIST

## 2022-09-26 PROCEDURE — 83735 ASSAY OF MAGNESIUM: CPT

## 2022-09-26 PROCEDURE — 99232 SBSQ HOSP IP/OBS MODERATE 35: CPT | Performed by: PHYSICIAN ASSISTANT

## 2022-09-26 PROCEDURE — 97161 PT EVAL LOW COMPLEX 20 MIN: CPT

## 2022-09-26 PROCEDURE — 6370000000 HC RX 637 (ALT 250 FOR IP): Performed by: INTERNAL MEDICINE

## 2022-09-26 PROCEDURE — 2580000003 HC RX 258: Performed by: SPECIALIST

## 2022-09-26 PROCEDURE — 6370000000 HC RX 637 (ALT 250 FOR IP): Performed by: SPECIALIST

## 2022-09-26 PROCEDURE — 1210000000 HC MED SURG R&B

## 2022-09-26 PROCEDURE — 2580000003 HC RX 258: Performed by: HOSPITALIST

## 2022-09-26 PROCEDURE — 85025 COMPLETE CBC W/AUTO DIFF WBC: CPT

## 2022-09-26 PROCEDURE — 6370000000 HC RX 637 (ALT 250 FOR IP): Performed by: HOSPITALIST

## 2022-09-26 PROCEDURE — 2500000003 HC RX 250 WO HCPCS: Performed by: SPECIALIST

## 2022-09-26 PROCEDURE — 99232 SBSQ HOSP IP/OBS MODERATE 35: CPT | Performed by: INTERNAL MEDICINE

## 2022-09-26 PROCEDURE — 36415 COLL VENOUS BLD VENIPUNCTURE: CPT

## 2022-09-26 PROCEDURE — 94669 MECHANICAL CHEST WALL OSCILL: CPT

## 2022-09-26 PROCEDURE — 94640 AIRWAY INHALATION TREATMENT: CPT

## 2022-09-26 PROCEDURE — 92610 EVALUATE SWALLOWING FUNCTION: CPT

## 2022-09-26 PROCEDURE — 97535 SELF CARE MNGMENT TRAINING: CPT

## 2022-09-26 RX ORDER — MEROPENEM AND SODIUM CHLORIDE 1 G/50ML
1000 INJECTION, SOLUTION INTRAVENOUS EVERY 8 HOURS
Status: DISCONTINUED | OUTPATIENT
Start: 2022-09-26 | End: 2022-09-26 | Stop reason: SDUPTHER

## 2022-09-26 RX ORDER — AMLODIPINE BESYLATE 10 MG/1
10 TABLET ORAL DAILY
Status: DISCONTINUED | OUTPATIENT
Start: 2022-09-26 | End: 2022-09-29 | Stop reason: HOSPADM

## 2022-09-26 RX ORDER — MEROPENEM AND SODIUM CHLORIDE 1 G/50ML
1000 INJECTION, SOLUTION INTRAVENOUS EVERY 12 HOURS
Status: DISCONTINUED | OUTPATIENT
Start: 2022-09-26 | End: 2022-09-26

## 2022-09-26 RX ORDER — POTASSIUM CHLORIDE 7.45 MG/ML
10 INJECTION INTRAVENOUS ONCE
Status: COMPLETED | OUTPATIENT
Start: 2022-09-26 | End: 2022-09-26

## 2022-09-26 RX ORDER — MEROPENEM AND SODIUM CHLORIDE 1 G/50ML
1000 INJECTION, SOLUTION INTRAVENOUS EVERY 12 HOURS
Status: DISCONTINUED | OUTPATIENT
Start: 2022-09-26 | End: 2022-09-28

## 2022-09-26 RX ADMIN — DEXTROSE MONOHYDRATE: 50 INJECTION, SOLUTION INTRAVENOUS at 22:40

## 2022-09-26 RX ADMIN — IRON SUCROSE 200 MG: 20 INJECTION, SOLUTION INTRAVENOUS at 05:58

## 2022-09-26 RX ADMIN — Medication 10 ML: at 09:51

## 2022-09-26 RX ADMIN — Medication 250 MG: at 16:08

## 2022-09-26 RX ADMIN — CITALOPRAM HYDROBROMIDE 10 MG: 10 TABLET ORAL at 09:51

## 2022-09-26 RX ADMIN — Medication 30 MG: at 05:58

## 2022-09-26 RX ADMIN — PROPRANOLOL HYDROCHLORIDE 20 MG: 20 TABLET ORAL at 09:51

## 2022-09-26 RX ADMIN — Medication 250 MG: at 00:14

## 2022-09-26 RX ADMIN — DEXTROSE MONOHYDRATE: 50 INJECTION, SOLUTION INTRAVENOUS at 14:11

## 2022-09-26 RX ADMIN — ACETYLCYSTEINE 400 MG: 200 SOLUTION ORAL; RESPIRATORY (INHALATION) at 07:05

## 2022-09-26 RX ADMIN — PROPRANOLOL HYDROCHLORIDE 20 MG: 20 TABLET ORAL at 22:52

## 2022-09-26 RX ADMIN — Medication 250 MG: at 22:51

## 2022-09-26 RX ADMIN — Medication 30 MG: at 16:02

## 2022-09-26 RX ADMIN — AMPICILLIN SODIUM AND SULBACTAM SODIUM 3000 MG: 2; 1 INJECTION, POWDER, FOR SOLUTION INTRAMUSCULAR; INTRAVENOUS at 16:03

## 2022-09-26 RX ADMIN — Medication 20 MG: at 09:51

## 2022-09-26 RX ADMIN — Medication 250 MG: at 05:58

## 2022-09-26 RX ADMIN — AMPICILLIN SODIUM AND SULBACTAM SODIUM 3000 MG: 2; 1 INJECTION, POWDER, FOR SOLUTION INTRAMUSCULAR; INTRAVENOUS at 22:40

## 2022-09-26 RX ADMIN — Medication 10 ML: at 22:47

## 2022-09-26 RX ADMIN — POTASSIUM CHLORIDE 10 MEQ: 7.45 INJECTION INTRAVENOUS at 10:49

## 2022-09-26 RX ADMIN — LEVETIRACETAM 500 MG: 100 SOLUTION ORAL at 22:37

## 2022-09-26 RX ADMIN — ALBUTEROL SULFATE 2.5 MG: 2.5 SOLUTION RESPIRATORY (INHALATION) at 07:05

## 2022-09-26 RX ADMIN — VANCOMYCIN HYDROCHLORIDE 1000 MG: 10 INJECTION, POWDER, LYOPHILIZED, FOR SOLUTION INTRAVENOUS at 00:11

## 2022-09-26 RX ADMIN — LACTULOSE 20 G: 20 SOLUTION ORAL at 22:36

## 2022-09-26 RX ADMIN — Medication 250 MG: at 12:30

## 2022-09-26 RX ADMIN — AMLODIPINE BESYLATE 10 MG: 10 TABLET ORAL at 10:44

## 2022-09-26 RX ADMIN — PROPRANOLOL HYDROCHLORIDE 20 MG: 20 TABLET ORAL at 16:02

## 2022-09-26 RX ADMIN — AMPICILLIN SODIUM AND SULBACTAM SODIUM 3000 MG: 2; 1 INJECTION, POWDER, FOR SOLUTION INTRAMUSCULAR; INTRAVENOUS at 09:53

## 2022-09-26 RX ADMIN — MEROPENEM AND SODIUM CHLORIDE 1000 MG: 1 INJECTION, SOLUTION INTRAVENOUS at 17:34

## 2022-09-26 RX ADMIN — Medication 1 CAPSULE: at 09:51

## 2022-09-26 RX ADMIN — DEXTROSE MONOHYDRATE: 50 INJECTION, SOLUTION INTRAVENOUS at 10:40

## 2022-09-26 RX ADMIN — AMPICILLIN SODIUM AND SULBACTAM SODIUM 3000 MG: 2; 1 INJECTION, POWDER, FOR SOLUTION INTRAMUSCULAR; INTRAVENOUS at 03:10

## 2022-09-26 RX ADMIN — OXYCODONE HYDROCHLORIDE AND ACETAMINOPHEN 1 TABLET: 5; 325 TABLET ORAL at 18:41

## 2022-09-26 RX ADMIN — ARIPIPRAZOLE 20 MG: 10 TABLET ORAL at 09:51

## 2022-09-26 RX ADMIN — ENOXAPARIN SODIUM 30 MG: 100 INJECTION SUBCUTANEOUS at 09:51

## 2022-09-26 ASSESSMENT — PAIN SCALES - GENERAL
PAINLEVEL_OUTOF10: 5
PAINLEVEL_OUTOF10: 0
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
## 2022-09-26 NOTE — PROGRESS NOTES
Facility/Department: [facility] ONCOLOGY UNIT  Daily Treatment Note  NAME: [facility]  : 1985  MRN: [id number]    Date of Service: 2022    Discharge Recommendations:  Patient would benefit from continued therapy after discharge       Assessment   Assessment: Continuing to work with patient on use of active movement to communicate vis hitting a target. Treatment Diagnosis: Aspiration pneumonia            Patient Diagnosis(es): The primary encounter diagnosis was Other tracheostomy complication ([facility] [address] Diagnoses of History of traumatic brain injury, Urinary tract infection associated with indwelling urethral catheter, initial encounter ([facility] [address] and Aspiration pneumonia of upper lobe, unspecified aspiration pneumonia type, unspecified laterality ([facility] [address] were also pertinent to this visit. has a past medical history of Acute pulmonary embolism ([facility] [address] Bipolar disorder ([facility] [address] Cardiac arrest ([facility] [address] Hydrocephalus ([facility] [address] Nonverbal, Palliative care patient, Respiratory failure following trauma ([facility] [address] Subarachnoid hemorrhage following injury ([facility] [address] and Traumatic brain injury with loss of consciousness ([facility] [address] has a past surgical history that includes tracheostomy and Upper gastrointestinal endoscopy (N/A, 2022).     Restrictions  Restrictions/Precautions  Restrictions/Precautions: Fall Risk, Isolation  Position Activity Restriction  Other position/activity restrictions: trach, g tube, external catheter    Subjective   General  Chart Reviewed: Yes  Patient assessed for rehabilitation services?: Yes  Family / Caregiver Present: No  General Comment  Comments: Nurse okays therapy  Pre Treatment Pain Screening  Pain at present: 0  Scale Used: Numeric Score  Intervention List: Patient able to continue with treatment  Comments / Details: no distress noted     Objective    ADL  Feeding: NPO  Grooming: Dependent/Total  UE Bathing: Dependent/Total  LE Bathing: Dependent/Total  UE Dressing: Dependent/Total  LE Dressing: Dependent/Total  Toileting: Dependent/Total              Transfers  Transfer Comments: would require maxi move                       Cognition  Cognition Comment: Awake and calm. Worked on all 4 extremities for passive and active movement with the goal of hitting a target on demand as a precursor to use of a switch or communication tool. Most reliable this date was his right hand. He as able to extend his elbow to hit one of two targets to answer 3/3 questions accurately, (targets were simple written words).                                          Plan   Plan  Times per Week: 3-5    Goals (On-Going)   Short Term Goals  Short Term Goal 1: The patient will demonstrate a consistent yes/ no motor response  Short Term Goal 2: Patient and family will be independent with therapeutic positioning devices       Tx Time  Minutes  [id number] [address], OT/L  Electronically signed by Mary Ellen Boss OT/L on 9/26/2022 at 3:09 PM.

## 2022-09-26 NOTE — PROGRESS NOTES
Nutrition Note      TF formula Jevity 1.2 is not available currently, order modified to provide enteral nutrition with Jevity 1.5. Will follow for pt tolerance.     Electronically signed by Emma Hogue MS, RD, LD on 9/26/22 at 12:10 PM CDT    Contact: [phone]

## 2022-09-26 NOTE — PROGRESS NOTES
Pharmacy Renal Adjustment    Terisa Reason is a 31 y.o. male. Pharmacy has renally adjusted medications per protocol. Recent Labs     09/25/22  0217 09/26/22  0411   BUN 20 17       Recent Labs     09/25/22  0217 09/26/22  0411   CREATININE 2.7* 2.6*       Estimated Creatinine Clearance: 35 mL/min (A) (based on SCr of 2.6 mg/dL (H)). Height:   Ht Readings from Last 1 Encounters:   09/23/22 6' 2\" (1.88 m)     Weight:  Wt Readings from Last 1 Encounters:   09/23/22 135 lb 6 oz (61.4 kg)       CKD stage: Acute kidney injury stage II         Baseline SCr: 0.4-0.5    Plan: Adjust the following medications based on renal function:           Meropenem 1 gm IV every 8 hours standard infusion adjusted to Meropenem 1 gm IV every 12 hours extended infusion.      Electronically signed by EMIGDIO Sanchez [facility] on 9/26/2022 at 5:19 PM

## 2022-09-26 NOTE — PROGRESS NOTES
[address] [facility] Pharmacokinetic Monitoring Service - Vancomycin    Consulting Provider: Dr Natalie Marc   Indication: Pneumonia  Target Concentration: Dosing based on anticipated concentration <15 mg/L due to renal impairment/insufficiency  Day of Therapy: 2  Additional Antimicrobials: Unasyn and Merrem    Pertinent Laboratory Values: Wt Readings from Last 1 Encounters:   09/23/22 135 lb 6 oz (61.4 kg)     Temp Readings from Last 1 Encounters:   09/25/22 98.8 °F (37.1 °C)     Estimated Creatinine Clearance: 33 mL/min (A) (based on SCr of 2.7 mg/dL (H)). Recent Labs     09/24/22  0358 09/25/22  0217   CREATININE 3.0* 2.7*   WBC 7.4 8.0     Procalcitonin: 09/20= 0.04      09/21= 0.23    Pertinent Cultures:  Culture Date Source Results   09/21/22 09/20/22 Blood No growth   09/22/22 09/21/22 Resp  Klebsiella pneumoniae  Serratia marcescens  MRSA   09/22/22 09/21/22 09/20/22 Urine No growth    Klebsiella pneumoniae ESBL  Enterococcus faecalis VRE   MRSA Nasal Swab:    Recent vancomycin administrations        No vancomycin IV orders with administrations found.             Orders not given:            vancomycin (VANCOCIN) oral solution 250 mg    vancomycin (VANCOCIN) intermittent dosing (placeholder)    vancomycin (VANCOCIN) 1000 mg in dextrose 5% 250 mL IVPB                    Assessment:  Date/Time Current Dose Concentration Timing of Concentration (h) AUC   09/25/22 Pulse doisng 15.5 Random level     Note: Serum concentrations collected for AUC dosing may appear elevated if collected in close proximity to the dose administered, this is not necessarily an indication of toxicity    Plan:  Current dosing regimen is therapeutic  Give Vancomycin 1000mg x1 dose today with random level 9/27  Repeat vancomycin concentration ordered for 09/27 @ 0200   Pharmacy will continue to monitor patient and adjust therapy as indicated    Thank you for the consult,  Lauryn Bentley, [facility]  9/25/2022 8:54 PM
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
## 2022-09-26 NOTE — PROGRESS NOTES
Pulmonary and Critical Care Progress note. [address]    MRN# [id number]    Acct# [de-identified]  9/26/2022   4:16 PM CDT    Referring Skinny Mendoza, *      Chief Complaint: resp failure on t piece. HPI: the patient is out of the ICU. On RA. Copious secretions per nursing staff. Medications    amLODIPine, 10 mg, PEG Tube, Daily    meropenem, 1,000 mg, IntraVENous, Q12H    lactobacillus, 1 capsule, Oral, Daily    vancomycin (VANCOCIN) intermittent dosing (placeholder), , Other, RX Placeholder    vitamin D, 50,000 Units, Oral, Weekly    iron sucrose, 200 mg, IntraVENous, Q24H    ampicillin-sulbactam, 3,000 mg, IntraVENous, Q6H    enoxaparin, 30 mg, SubCUTAneous, Daily    famotidine (PEPCID) injection, 20 mg, IntraVENous, Daily    levETIRAcetam, 500 mg, PEG Tube, Nightly    vancomycin, 250 mg, Oral, 4 times per day    lactulose, 20 g, Per G Tube, BID    sodium chloride flush, 5-40 mL, IntraVENous, 2 times per day    lansoprazole, 30 mg, Oral, BID AC    propranolol, 20 mg, Oral, TID    citalopram, 10 mg, Oral, Daily    ARIPiprazole, 20 mg, Oral, Daily    sodium chloride flush, 10 mL, IntraVENous, 2 times per day     Review of Systems:  Unable to obtain due to mental status    Physical Exam:  BP (!) 146/80   Pulse 88   Temp 98.8 °F (37.1 °C)   Resp 19   Ht 6' 2\" (1.88 m)   Wt 135 lb 6 oz (61.4 kg)   SpO2 95%   BMI 17.38 kg/m²   Intake/Output Summary (Last 24 hours) at 9/26/2022 1653  Last data filed at 9/26/2022 1641  Gross per 24 hour   Intake 1287 ml   Output 3050 ml   Net -1763 ml         General appearance:  Young white male does not appear to be in distress   HEENT:normocephalic atraumatic tracheostomy tube in place  Heart:S1S2 no murmurs  Lungs:diminished bilaterally no rubs or tenderness or dullness to percussion  Abdomen:Soft non tender no organomegaly  Extremities:non clubbing cyanosis or edema  Neuro:no focal findings  Skin:intact    Recent Labs 09/24/22 0358 09/25/22 0217 09/26/22 0411   WBC 7.4 8.0 8.4   RBC 3.61* 4.02* 3.93*   HGB 9.6* 10.6* 10.4*   HCT 31.1* 37.9* 33.6*    244 282   MCV 86.1 94.3* 85.5   MCH 26.6* 26.4* 26.5*   MCHC 30.9* 28.0* 31.0*   RDW 17.2* 17.5* 17.5*        Recent Labs     09/23/22 2014 09/24/22 0358 09/25/22 0217 09/26/22 0411    147* 149* 151*   K 3.7 3.7 3.6 3.4*    111 113* 115*   CO2 20* 23 24 24   BUN 25* 24* 20 17   CREATININE 3.0* 3.0* 2.7* 2.6*   CALCIUM 8.5* 8.4* 9.0 8.5*   GLUCOSE 87 98 84 93        No results for input(s): PHART, LBU9RHS, PO2ART, IJO2ZIP, L7XSZNMN, BEART in the last 72 hours. Recent Labs     09/26/22 0411   AST 13   ALT 10   ALKPHOS 44   BILITOT <0.2   MG 1.9   CALCIUM 8.5*       No results for input(s): BC, LABGRAM, CULTRESP, BFCX in the last 72 hours. Radiograph: XR CHEST PORTABLE    Result Date: 9/20/2022  No acute findings in the chest.  No change. Electronically signed by Kamlesh Caballero M.D. on 09-20-22 at 0133    XR CHEST PORTABLE    Result Date: 9/19/2022  Tip of tracheostomy tube is about 2 cm above the scott. Electronically signed by Dolores Weiner M.D. on 09-20-22 at [address]       My radiograph interpretation/independent review of imaging: Reviewed    Problem list generated by [facility] Problems             Last Modified POA    * (Principal) Aspiration pneumonia of both lungs due to vomit, unspecified part of lung ([address] 9/20/2022 Yes    Nonverbal 9/20/2022 Yes    Sepsis due to pneumonia ([address] 9/20/2022 Yes    UTI (urinary tract infection) 9/20/2022 Yes    Schizophrenia ([address] 9/20/2022 Yes    Bipolar disorder ([address] 9/20/2022 Yes    Endotracheal tube present 9/20/2022 Yes    Lactic acidosis 9/20/2022 Yes    Severe malnutrition ([address] 9/20/2022 Yes    Other tracheostomy complication ([address] 9/02/0091 Yes    Palliative care patient 9/23/2022 Yes    History of traumatic brain injury 9/23/2022 Yes    Sepsis with acute renal failure ([address] 9/23/2022 Yes Gastroesophageal reflux disease with esophagitis without hemorrhage 9/23/2022 Yes    Transfer dysphagia 9/23/2022 Yes    Nausea and vomiting 9/21/2022 Unknown    Overview Signed 9/21/2022 12:32 PM by Mary Red MD     Added automatically from request for surgery [id number]             Pulmonary Assessment/Plan:     Possible aspiration pneumonia on empirical antibiotics. Continue airway toilet. Urinary tract infection with Klebsiella and Enterococcus in the urine. Infectious disease consulted. On empirical antibiotics. Status post traumatic brain injury at baseline. Psychiatry following for agitation. DVT prophylaxis   DC planning       Alonzo Lawrence MD, [facility], [facility]    The above note was generated using voice recognition software. Inadvertent typographical errors in transcription may have occurred.       Electronically signed by Kurtis Ring MD on 09/26/22 at 4:53 PM

## 2022-09-26 NOTE — PROGRESS NOTES
Paged Dr. Hamida Colin regarding new order for ID consult.      Electronically signed by Riana Espinoza RN on 9/26/2022 at 6:33 PM

## 2022-09-26 NOTE — PROGRESS NOTES
Spoke with Dr. Tayler Celeste regarding the ID consult placed 9/23. He states hospitalist needs to be notified consult was never called and need for consult should be re-evaluated and reordered if necessary. Dr. Deyvi Stuart notified.     Electronically signed by Elke Michel RN on 9/26/2022 at 7:44 AM

## 2022-09-27 LAB
ALBUMIN SERPL-MCNC: 3.1 G/DL (ref 3.5–5.2)
ALP BLD-CCNC: 47 U/L (ref 40–130)
ALT SERPL-CCNC: 10 U/L (ref 5–41)
ANION GAP SERPL CALCULATED.3IONS-SCNC: 11 MMOL/L (ref 7–19)
AST SERPL-CCNC: 15 U/L (ref 5–40)
BASOPHILS ABSOLUTE: 0 K/UL (ref 0–0.2)
BASOPHILS RELATIVE PERCENT: 0.3 % (ref 0–1)
BILIRUB SERPL-MCNC: <0.2 MG/DL (ref 0.2–1.2)
BUN BLDV-MCNC: 17 MG/DL (ref 6–20)
CALCIUM SERPL-MCNC: 8.6 MG/DL (ref 8.6–10)
CHLORIDE BLD-SCNC: 111 MMOL/L (ref 98–111)
CO2: 26 MMOL/L (ref 22–29)
CREAT SERPL-MCNC: 2.4 MG/DL (ref 0.5–1.2)
EOSINOPHILS ABSOLUTE: 0.4 K/UL (ref 0–0.6)
EOSINOPHILS RELATIVE PERCENT: 4.7 % (ref 0–5)
GFR AFRICAN AMERICAN: 37
GFR NON-AFRICAN AMERICAN: 31
GLUCOSE BLD-MCNC: 96 MG/DL (ref 74–109)
HCT VFR BLD CALC: 33.5 % (ref 42–52)
HEMOGLOBIN: 10.3 G/DL (ref 14–18)
IMMATURE GRANULOCYTES #: 0.1 K/UL
LYMPHOCYTES ABSOLUTE: 1.6 K/UL (ref 1.1–4.5)
LYMPHOCYTES RELATIVE PERCENT: 18 % (ref 20–40)
MAGNESIUM: 2 MG/DL (ref 1.6–2.6)
MCH RBC QN AUTO: 26.3 PG (ref 27–31)
MCHC RBC AUTO-ENTMCNC: 30.7 G/DL (ref 33–37)
MCV RBC AUTO: 85.7 FL (ref 80–94)
MONOCYTES ABSOLUTE: 1 K/UL (ref 0–0.9)
MONOCYTES RELATIVE PERCENT: 10.6 % (ref 0–10)
NEUTROPHILS ABSOLUTE: 6 K/UL (ref 1.5–7.5)
NEUTROPHILS RELATIVE PERCENT: 65.7 % (ref 50–65)
PDW BLD-RTO: 17.9 % (ref 11.5–14.5)
PLATELET # BLD: 276 K/UL (ref 130–400)
PMV BLD AUTO: 10.7 FL (ref 9.4–12.4)
POTASSIUM SERPL-SCNC: 3.7 MMOL/L (ref 3.5–5)
RBC # BLD: 3.91 M/UL (ref 4.7–6.1)
SODIUM BLD-SCNC: 148 MMOL/L (ref 136–145)
TOTAL PROTEIN: 5.9 G/DL (ref 6.6–8.7)
VANCOMYCIN TROUGH: 18.6 UG/ML (ref 10–20)
WBC # BLD: 9.1 K/UL (ref 4.8–10.8)

## 2022-09-27 PROCEDURE — 6370000000 HC RX 637 (ALT 250 FOR IP): Performed by: SPECIALIST

## 2022-09-27 PROCEDURE — 51702 INSERT TEMP BLADDER CATH: CPT

## 2022-09-27 PROCEDURE — 6360000002 HC RX W HCPCS: Performed by: HOSPITALIST

## 2022-09-27 PROCEDURE — 2580000003 HC RX 258: Performed by: HOSPITALIST

## 2022-09-27 PROCEDURE — 85025 COMPLETE CBC W/AUTO DIFF WBC: CPT

## 2022-09-27 PROCEDURE — 94669 MECHANICAL CHEST WALL OSCILL: CPT

## 2022-09-27 PROCEDURE — 83735 ASSAY OF MAGNESIUM: CPT

## 2022-09-27 PROCEDURE — 80202 ASSAY OF VANCOMYCIN: CPT

## 2022-09-27 PROCEDURE — 2500000003 HC RX 250 WO HCPCS: Performed by: SPECIALIST

## 2022-09-27 PROCEDURE — 2580000003 HC RX 258: Performed by: SPECIALIST

## 2022-09-27 PROCEDURE — 1210000000 HC MED SURG R&B

## 2022-09-27 PROCEDURE — 80053 COMPREHEN METABOLIC PANEL: CPT

## 2022-09-27 PROCEDURE — 6370000000 HC RX 637 (ALT 250 FOR IP): Performed by: INTERNAL MEDICINE

## 2022-09-27 PROCEDURE — 36415 COLL VENOUS BLD VENIPUNCTURE: CPT

## 2022-09-27 PROCEDURE — 6370000000 HC RX 637 (ALT 250 FOR IP): Performed by: HOSPITALIST

## 2022-09-27 RX ADMIN — AMPICILLIN SODIUM AND SULBACTAM SODIUM 3000 MG: 2; 1 INJECTION, POWDER, FOR SOLUTION INTRAMUSCULAR; INTRAVENOUS at 03:48

## 2022-09-27 RX ADMIN — DEXTROSE MONOHYDRATE: 50 INJECTION, SOLUTION INTRAVENOUS at 21:29

## 2022-09-27 RX ADMIN — VANCOMYCIN HYDROCHLORIDE 750 MG: 750 INJECTION, POWDER, LYOPHILIZED, FOR SOLUTION INTRAVENOUS at 10:48

## 2022-09-27 RX ADMIN — Medication 10 ML: at 21:35

## 2022-09-27 RX ADMIN — AMPICILLIN SODIUM AND SULBACTAM SODIUM 3000 MG: 2; 1 INJECTION, POWDER, FOR SOLUTION INTRAMUSCULAR; INTRAVENOUS at 21:32

## 2022-09-27 RX ADMIN — Medication 30 MG: at 06:44

## 2022-09-27 RX ADMIN — PROPRANOLOL HYDROCHLORIDE 20 MG: 20 TABLET ORAL at 11:04

## 2022-09-27 RX ADMIN — AMLODIPINE BESYLATE 10 MG: 10 TABLET ORAL at 11:04

## 2022-09-27 RX ADMIN — Medication 250 MG: at 17:55

## 2022-09-27 RX ADMIN — ARIPIPRAZOLE 20 MG: 10 TABLET ORAL at 11:04

## 2022-09-27 RX ADMIN — Medication 10 ML: at 11:08

## 2022-09-27 RX ADMIN — IRON SUCROSE 200 MG: 20 INJECTION, SOLUTION INTRAVENOUS at 06:42

## 2022-09-27 RX ADMIN — ENOXAPARIN SODIUM 30 MG: 100 INJECTION SUBCUTANEOUS at 11:04

## 2022-09-27 RX ADMIN — LEVETIRACETAM 500 MG: 100 SOLUTION ORAL at 21:34

## 2022-09-27 RX ADMIN — PROPRANOLOL HYDROCHLORIDE 20 MG: 20 TABLET ORAL at 14:44

## 2022-09-27 RX ADMIN — MEROPENEM AND SODIUM CHLORIDE 1000 MG: 1 INJECTION, SOLUTION INTRAVENOUS at 06:49

## 2022-09-27 RX ADMIN — AMPICILLIN SODIUM AND SULBACTAM SODIUM 3000 MG: 2; 1 INJECTION, POWDER, FOR SOLUTION INTRAMUSCULAR; INTRAVENOUS at 10:49

## 2022-09-27 RX ADMIN — Medication 30 MG: at 14:44

## 2022-09-27 RX ADMIN — AMPICILLIN SODIUM AND SULBACTAM SODIUM 3000 MG: 2; 1 INJECTION, POWDER, FOR SOLUTION INTRAMUSCULAR; INTRAVENOUS at 14:47

## 2022-09-27 RX ADMIN — Medication 20 MG: at 11:05

## 2022-09-27 RX ADMIN — CITALOPRAM HYDROBROMIDE 10 MG: 10 TABLET ORAL at 11:04

## 2022-09-27 RX ADMIN — LACTULOSE 20 G: 20 SOLUTION ORAL at 11:04

## 2022-09-27 RX ADMIN — Medication 1 CAPSULE: at 11:04

## 2022-09-27 RX ADMIN — MEROPENEM AND SODIUM CHLORIDE 1000 MG: 1 INJECTION, SOLUTION INTRAVENOUS at 18:00

## 2022-09-27 RX ADMIN — PROPRANOLOL HYDROCHLORIDE 20 MG: 20 TABLET ORAL at 21:35

## 2022-09-27 RX ADMIN — Medication 250 MG: at 06:43

## 2022-09-27 RX ADMIN — Medication 250 MG: at 14:44

## 2022-09-27 ASSESSMENT — PAIN SCALES - GENERAL: PAINLEVEL_OUTOF10: 1
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
## 2022-09-27 NOTE — PROGRESS NOTES
[address] [facility] Pharmacokinetic Monitoring Service - Vancomycin    Consulting Provider: Dr. Soni Tilley   Indication: pneumonia (HAP)  Target Concentration: Dosing based on anticipated concentration <15 mg/L due to renal impairment/insufficiency  Day of Therapy: 4  Additional Antimicrobials: Unasyn, Meropenem    Pertinent Laboratory Values: Wt Readings from Last 1 Encounters:   09/23/22 135 lb 6 oz (61.4 kg)     Temp Readings from Last 1 Encounters:   09/26/22 97.5 °F (36.4 °C) (Temporal)     Estimated Creatinine Clearance: 38 mL/min (A) (based on SCr of 2.4 mg/dL (H)).   Recent Labs     09/26/22  0411 09/27/22  0325   CREATININE 2.6* 2.4*   WBC 8.4 9.1     Procalcitonin: no new level    Pertinent Cultures:  Culture Date Source Results   09/21/22 09/20/22 Blood No growth   09/22/22 09/21/22 Resp  Klebsiella pneumoniae  Serratia marcescens  MRSA   09/22/22 09/21/22 09/20/22 Urine No growth     Klebsiella pneumoniae ESBL  Enterococcus faecalis VRE     MRSA Nasal Swab:  MRSA positive on 09/21 sputum    Recent vancomycin administrations                     vancomycin (VANCOCIN) 1000 mg in dextrose 5% 250 mL IVPB (mg) 1,000 mg New Bag 09/26/22 0011                    Assessment:  Date/Time Current Dose Concentration Timing of Concentration (h) AUC   09/27 at 0325 Vancomycin pulse dosing  18.6 ~ 27 hour level post Vancomycin 1000 mg     Note: Serum concentrations collected for AUC dosing may appear elevated if collected in close proximity to the dose administered, this is not necessarily an indication of toxicity    Plan:  Current dosing regimen is therapeutic  Give Vancomycin 750 mg IV x 1 dose today  Repeat vancomycin concentration ordered for 09/28 @ [facility] will continue to monitor patient and adjust therapy as indicated    Thank you for the consult,  Luiza Thayer, [facility]  9/27/2022 4:51 AM
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
## 2022-09-27 NOTE — PROGRESS NOTES
Nutrition Assessment     Type and Reason for Visit: Consult    Nutrition Recommendations/Plan:   Will modify order to restart Jevity 1.2 once available, as ordered for home feedings. Continue current POC at this time. Malnutrition Assessment:  Malnutrition Status: Severe malnutrition    Nutrition Assessment:  TF currently @ goal rate, Jevity 1.5 @ 50 mL/hr. Order modified yesterday as Jevity 1.2 was not available, pt is tolerating new formula. Will restart Jevity 1.2 once available as at home. Continue to monitor.     Estimated Daily Nutrient Needs:  Energy (kcal):  8019-3941 kcals/day Weight Used for Energy Requirements: Admission     Protein (g):   g/protein/day Weight Used for Protein Requirements: Admission        Fluid (ml/day):  0432-0798 mL/day Method Used for Fluid Requirements: 1 ml/kcal    Nutrition Related Findings:   PEG, trach, nonverbal Wound Type: None    Current Nutrition Therapies:    Diet NPO Exceptions are: Sips of Water with Meds  ADULT TUBE FEEDING; PEG; Standard with Fiber; Continuous; 25; Yes; 10; Q 6 hours; 50; 35; Q 1 hour    Anthropometric Measures:  Height: 6' 2\" (188 cm)  Current Body Wt: 135 lb 6 oz (61.4 kg)   BMI: 17.4    Nutrition Diagnosis:   Inadequate oral intake related to acute injury/trauma, swallowing difficulty as evidenced by NPO or clear liquid status due to medical condition, nutrition support - enteral nutrition    Nutrition Interventions:   Food and/or Nutrient Delivery: Continue Current Tube Feeding  Nutrition Education/Counseling: No recommendation at this time  Coordination of Nutrition Care: Continue to monitor while inpatient  Plan of Care discussed with: nursing    Goals:  Previous Goal Met: Progressing toward Goal(s)  Goals: Meet at least 75% of estimated needs       Nutrition Monitoring and Evaluation:   Behavioral-Environmental Outcomes: None Identified  Food/Nutrient Intake Outcomes: Enteral Nutrition Intake/Tolerance  Physical Signs/Symptoms Outcomes: Biochemical Data, GI Status, Fluid Status or Edema, Weight, Skin    Discharge Planning:     Too soon to determine     Alexander Counts, MS, RD, LD  Contact: [phone]

## 2022-09-28 LAB
ALBUMIN SERPL-MCNC: 3.1 G/DL (ref 3.5–5.2)
ALP BLD-CCNC: 56 U/L (ref 40–130)
ALT SERPL-CCNC: 10 U/L (ref 5–41)
ANION GAP SERPL CALCULATED.3IONS-SCNC: 12 MMOL/L (ref 7–19)
AST SERPL-CCNC: 15 U/L (ref 5–40)
BILIRUB SERPL-MCNC: <0.2 MG/DL (ref 0.2–1.2)
BUN BLDV-MCNC: 17 MG/DL (ref 6–20)
CALCIUM SERPL-MCNC: 8.3 MG/DL (ref 8.6–10)
CHLORIDE BLD-SCNC: 108 MMOL/L (ref 98–111)
CO2: 26 MMOL/L (ref 22–29)
CREAT SERPL-MCNC: 2.1 MG/DL (ref 0.5–1.2)
GFR AFRICAN AMERICAN: 44
GFR NON-AFRICAN AMERICAN: 36
GLUCOSE BLD-MCNC: 96 MG/DL (ref 74–109)
HCT VFR BLD CALC: 36.3 % (ref 42–52)
HEMOGLOBIN: 10.8 G/DL (ref 14–18)
MCH RBC QN AUTO: 26.2 PG (ref 27–31)
MCHC RBC AUTO-ENTMCNC: 29.8 G/DL (ref 33–37)
MCV RBC AUTO: 87.9 FL (ref 80–94)
PDW BLD-RTO: 17.9 % (ref 11.5–14.5)
PLATELET # BLD: 286 K/UL (ref 130–400)
PMV BLD AUTO: 11.8 FL (ref 9.4–12.4)
POTASSIUM SERPL-SCNC: 3.8 MMOL/L (ref 3.5–5)
RBC # BLD: 4.13 M/UL (ref 4.7–6.1)
SODIUM BLD-SCNC: 146 MMOL/L (ref 136–145)
TOTAL PROTEIN: 6.2 G/DL (ref 6.6–8.7)
VANCOMYCIN TROUGH: 22.5 UG/ML (ref 10–20)
WBC # BLD: 10.8 K/UL (ref 4.8–10.8)

## 2022-09-28 PROCEDURE — 6370000000 HC RX 637 (ALT 250 FOR IP): Performed by: INTERNAL MEDICINE

## 2022-09-28 PROCEDURE — 80053 COMPREHEN METABOLIC PANEL: CPT

## 2022-09-28 PROCEDURE — 85027 COMPLETE CBC AUTOMATED: CPT

## 2022-09-28 PROCEDURE — 2580000003 HC RX 258: Performed by: HOSPITALIST

## 2022-09-28 PROCEDURE — 6360000002 HC RX W HCPCS: Performed by: HOSPITALIST

## 2022-09-28 PROCEDURE — 36415 COLL VENOUS BLD VENIPUNCTURE: CPT

## 2022-09-28 PROCEDURE — 6370000000 HC RX 637 (ALT 250 FOR IP): Performed by: SPECIALIST

## 2022-09-28 PROCEDURE — 2580000003 HC RX 258: Performed by: SPECIALIST

## 2022-09-28 PROCEDURE — 6370000000 HC RX 637 (ALT 250 FOR IP): Performed by: HOSPITALIST

## 2022-09-28 PROCEDURE — 2500000003 HC RX 250 WO HCPCS: Performed by: SPECIALIST

## 2022-09-28 PROCEDURE — 1210000000 HC MED SURG R&B

## 2022-09-28 PROCEDURE — 2580000003 HC RX 258: Performed by: NURSE ANESTHETIST, CERTIFIED REGISTERED

## 2022-09-28 PROCEDURE — 80202 ASSAY OF VANCOMYCIN: CPT

## 2022-09-28 RX ORDER — LANSOPRAZOLE
30 KIT
Qty: 300 ML | Refills: 0 | Status: ON HOLD | OUTPATIENT
Start: 2022-09-29 | End: 2022-11-10 | Stop reason: SDUPTHER

## 2022-09-28 RX ORDER — LACTOBACILLUS RHAMNOSUS GG 10B CELL
1 CAPSULE ORAL DAILY
Qty: 30 CAPSULE | Refills: 0 | Status: ON HOLD | OUTPATIENT
Start: 2022-09-29 | End: 2022-11-10 | Stop reason: SDUPTHER

## 2022-09-28 RX ORDER — ERGOCALCIFEROL 1.25 MG/1
50000 CAPSULE ORAL WEEKLY
Qty: 5 CAPSULE | Refills: 0 | Status: SHIPPED | OUTPATIENT
Start: 2022-10-01 | End: 2022-10-06

## 2022-09-28 RX ADMIN — LEVETIRACETAM 500 MG: 100 SOLUTION ORAL at 21:53

## 2022-09-28 RX ADMIN — SODIUM CHLORIDE, PRESERVATIVE FREE 10 ML: 5 INJECTION INTRAVENOUS at 10:10

## 2022-09-28 RX ADMIN — PROPRANOLOL HYDROCHLORIDE 20 MG: 20 TABLET ORAL at 21:55

## 2022-09-28 RX ADMIN — PROPRANOLOL HYDROCHLORIDE 20 MG: 20 TABLET ORAL at 14:34

## 2022-09-28 RX ADMIN — Medication 250 MG: at 00:07

## 2022-09-28 RX ADMIN — AMLODIPINE BESYLATE 10 MG: 10 TABLET ORAL at 10:09

## 2022-09-28 RX ADMIN — Medication 20 MG: at 10:09

## 2022-09-28 RX ADMIN — Medication 1 CAPSULE: at 10:09

## 2022-09-28 RX ADMIN — Medication 30 MG: at 14:38

## 2022-09-28 RX ADMIN — Medication 30 MG: at 05:18

## 2022-09-28 RX ADMIN — MEROPENEM AND SODIUM CHLORIDE 1000 MG: 1 INJECTION, SOLUTION INTRAVENOUS at 05:19

## 2022-09-28 RX ADMIN — Medication 250 MG: at 05:17

## 2022-09-28 RX ADMIN — DEXTROSE MONOHYDRATE: 50 INJECTION, SOLUTION INTRAVENOUS at 05:28

## 2022-09-28 RX ADMIN — PROPRANOLOL HYDROCHLORIDE 20 MG: 20 TABLET ORAL at 10:09

## 2022-09-28 RX ADMIN — Medication 10 ML: at 10:08

## 2022-09-28 RX ADMIN — Medication 250 MG: at 21:54

## 2022-09-28 RX ADMIN — CITALOPRAM HYDROBROMIDE 10 MG: 10 TABLET ORAL at 10:09

## 2022-09-28 RX ADMIN — ENOXAPARIN SODIUM 30 MG: 100 INJECTION SUBCUTANEOUS at 10:08

## 2022-09-28 RX ADMIN — OXYCODONE HYDROCHLORIDE AND ACETAMINOPHEN 1 TABLET: 5; 325 TABLET ORAL at 17:14

## 2022-09-28 RX ADMIN — AMPICILLIN SODIUM AND SULBACTAM SODIUM 3000 MG: 2; 1 INJECTION, POWDER, FOR SOLUTION INTRAMUSCULAR; INTRAVENOUS at 04:11

## 2022-09-28 RX ADMIN — Medication 10 ML: at 21:59

## 2022-09-28 RX ADMIN — AMPICILLIN SODIUM AND SULBACTAM SODIUM 3000 MG: 2; 1 INJECTION, POWDER, FOR SOLUTION INTRAMUSCULAR; INTRAVENOUS at 10:07

## 2022-09-28 RX ADMIN — ARIPIPRAZOLE 20 MG: 10 TABLET ORAL at 10:09

## 2022-09-28 ASSESSMENT — PAIN SCALES - GENERAL: PAINLEVEL_OUTOF10: 7
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
## 2022-09-28 NOTE — PROGRESS NOTES
[address] [facility] Pharmacokinetic Monitoring Service - Vancomycin    Consulting Provider: Dr. Jacob Dickens   Indication: Pneumonia (HAP)  Target Concentration: Dosing based on anticipated concentration <15 mg/L due to renal impairment/insufficiency  Day of Therapy: 9  Additional Antimicrobials: Unasyn, Meropenem    Pertinent Laboratory Values: Wt Readings from Last 1 Encounters:   09/23/22 135 lb 6 oz (61.4 kg)     Temp Readings from Last 1 Encounters:   09/28/22 97.4 °F (36.3 °C)     Estimated Creatinine Clearance: 43 mL/min (A) (based on SCr of 2.1 mg/dL (H)). Recent Labs     09/27/22  0325 09/28/22  0714   CREATININE 2.4* 2.1*   WBC 9.1 10.8     Procalcitonin: none    Pertinent Cultures:  Culture Date Source Results   09/21/22 09/20/22 Blood No growth   09/22/22 09/21/22 Resp  Klebsiella pneumoniae  Serratia marcescens  MRSA   09/22/22 09/21/22 09/20/22 Urine No growth     Klebsiella pneumoniae ESBL  Enterococcus faecalis VRE   MRSA Nasal Swab: MRSA positive on 09/21 sputum    Recent vancomycin administrations                     vancomycin (VANCOCIN) 750 mg in dextrose 5 % 250 mL IVPB (mg) 750 mg New Bag 09/27/22 1048    vancomycin (VANCOCIN) 1000 mg in dextrose 5% 250 mL IVPB (mg) 1,000 mg New Bag 09/26/22 0011                    Assessment:  Date/Time Current Dose Concentration Timing of Concentration (h)   09/28 0714 750 mg x 1 22.5 ~20 hours   Note: Serum concentrations collected for AUC dosing may appear elevated if collected in close proximity to the dose administered, this is not necessarily an indication of toxicity    Plan: Will hold vancomycin for now.    Repeat vancomycin concentration ordered for 09/29 @ 0100   Pharmacy will continue to monitor patient and adjust therapy as indicated    Thank you for the consult,  Tomas Agosto, [facility]  9/28/2022 9:38 AM

## 2022-09-29 ENCOUNTER — TELEPHONE (OUTPATIENT)
Dept: GASTROENTEROLOGY | Age: 37
End: 2022-09-29

## 2022-09-29 VITALS
BODY MASS INDEX: 17.37 KG/M2 | SYSTOLIC BLOOD PRESSURE: 136 MMHG | HEART RATE: 86 BPM | DIASTOLIC BLOOD PRESSURE: 87 MMHG | HEIGHT: 74 IN | RESPIRATION RATE: 16 BRPM | OXYGEN SATURATION: 96 % | WEIGHT: 135.38 LBS | TEMPERATURE: 97.3 F

## 2022-09-29 LAB
ALBUMIN SERPL-MCNC: 2.7 G/DL (ref 3.5–5.2)
ALP BLD-CCNC: 53 U/L (ref 40–130)
ALT SERPL-CCNC: 8 U/L (ref 5–41)
ANION GAP SERPL CALCULATED.3IONS-SCNC: 10 MMOL/L (ref 7–19)
AST SERPL-CCNC: 14 U/L (ref 5–40)
BILIRUB SERPL-MCNC: <0.2 MG/DL (ref 0.2–1.2)
BUN BLDV-MCNC: 18 MG/DL (ref 6–20)
CALCIUM SERPL-MCNC: 8.2 MG/DL (ref 8.6–10)
CHLORIDE BLD-SCNC: 104 MMOL/L (ref 98–111)
CO2: 28 MMOL/L (ref 22–29)
CREAT SERPL-MCNC: 2.1 MG/DL (ref 0.5–1.2)
GFR AFRICAN AMERICAN: 44
GFR NON-AFRICAN AMERICAN: 36
GLUCOSE BLD-MCNC: 103 MG/DL (ref 74–109)
HCT VFR BLD CALC: 32.6 % (ref 42–52)
HEMOGLOBIN: 10.2 G/DL (ref 14–18)
MCH RBC QN AUTO: 26.1 PG (ref 27–31)
MCHC RBC AUTO-ENTMCNC: 31.3 G/DL (ref 33–37)
MCV RBC AUTO: 83.4 FL (ref 80–94)
PDW BLD-RTO: 17.6 % (ref 11.5–14.5)
PLATELET # BLD: 264 K/UL (ref 130–400)
PMV BLD AUTO: 10.7 FL (ref 9.4–12.4)
POTASSIUM SERPL-SCNC: 3.5 MMOL/L (ref 3.5–5)
RBC # BLD: 3.91 M/UL (ref 4.7–6.1)
SODIUM BLD-SCNC: 142 MMOL/L (ref 136–145)
TOTAL PROTEIN: 5.8 G/DL (ref 6.6–8.7)
WBC # BLD: 8.1 K/UL (ref 4.8–10.8)

## 2022-09-29 PROCEDURE — 2580000003 HC RX 258: Performed by: SPECIALIST

## 2022-09-29 PROCEDURE — 2500000003 HC RX 250 WO HCPCS: Performed by: SPECIALIST

## 2022-09-29 PROCEDURE — 6370000000 HC RX 637 (ALT 250 FOR IP): Performed by: SPECIALIST

## 2022-09-29 PROCEDURE — 99231 SBSQ HOSP IP/OBS SF/LOW 25: CPT | Performed by: PHYSICIAN ASSISTANT

## 2022-09-29 PROCEDURE — 80053 COMPREHEN METABOLIC PANEL: CPT

## 2022-09-29 PROCEDURE — 85027 COMPLETE CBC AUTOMATED: CPT

## 2022-09-29 PROCEDURE — 36415 COLL VENOUS BLD VENIPUNCTURE: CPT

## 2022-09-29 PROCEDURE — 6370000000 HC RX 637 (ALT 250 FOR IP): Performed by: INTERNAL MEDICINE

## 2022-09-29 PROCEDURE — 6370000000 HC RX 637 (ALT 250 FOR IP): Performed by: HOSPITALIST

## 2022-09-29 PROCEDURE — 6360000002 HC RX W HCPCS: Performed by: HOSPITALIST

## 2022-09-29 RX ADMIN — PROPRANOLOL HYDROCHLORIDE 20 MG: 20 TABLET ORAL at 09:50

## 2022-09-29 RX ADMIN — AMLODIPINE BESYLATE 10 MG: 10 TABLET ORAL at 09:49

## 2022-09-29 RX ADMIN — ENOXAPARIN SODIUM 30 MG: 100 INJECTION SUBCUTANEOUS at 09:49

## 2022-09-29 RX ADMIN — CITALOPRAM HYDROBROMIDE 10 MG: 10 TABLET ORAL at 09:49

## 2022-09-29 RX ADMIN — Medication 10 ML: at 09:50

## 2022-09-29 RX ADMIN — Medication 20 MG: at 09:49

## 2022-09-29 RX ADMIN — ARIPIPRAZOLE 20 MG: 10 TABLET ORAL at 09:49

## 2022-09-29 RX ADMIN — Medication 1 CAPSULE: at 09:49

## 2022-09-29 RX ADMIN — Medication 250 MG: at 14:11

## 2022-09-29 RX ADMIN — Medication 30 MG: at 09:49

## 2022-09-29 NOTE — PLAN OF CARE
Problem: Discharge Planning  Goal: Discharge to home or other facility with appropriate resources  9/29/2022 0329 by Holly Victoria RN  Outcome: Progressing  9/29/2022 0328 by Holly Victoria RN  Outcome: Progressing     Problem: Skin/Tissue Integrity  Goal: Absence of new skin breakdown  Description: 1. Monitor for areas of redness and/or skin breakdown  2. Assess vascular access sites hourly  3. Every 4-6 hours minimum:  Change oxygen saturation probe site  4. Every 4-6 hours:  If on nasal continuous positive airway pressure, respiratory therapy assess nares and determine need for appliance change or resting period.   9/29/2022 0329 by Holly Victoria RN  Outcome: Progressing  9/29/2022 0328 by Holly Victoria RN  Outcome: Progressing     Problem: ABCDS Injury Assessment  Goal: Absence of physical injury  9/29/2022 0329 by Holly Victoria RN  Outcome: Progressing  9/29/2022 0328 by Holly Victoria RN  Outcome: Progressing     Problem: Pain  Goal: Verbalizes/displays adequate comfort level or baseline comfort level  9/29/2022 0329 by Holly Victoria RN  Outcome: Progressing  9/29/2022 0328 by Holly Victoria RN  Outcome: Progressing     Problem: Safety - Adult  Goal: Free from fall injury  9/29/2022 0329 by Holly Victoria RN  Outcome: Progressing  9/29/2022 0328 by Holly Victoria RN  Outcome: Progressing     Problem: Nutrition Deficit:  Goal: Optimize nutritional status  9/29/2022 0329 by Holly Victoria RN  Outcome: Progressing  9/29/2022 0328 by Holly Victoria RN  Outcome: Progressing
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
## 2022-09-29 NOTE — CARE COORDINATION
[address] [address] notified of patient discharge today. DC Summary and DC med list faxed.   Electronically signed by Zac Rojo on 9/29/22 at 1:16 PM CDT

## 2022-09-29 NOTE — PROGRESS NOTES
Spoke with pt mother (primary caretaker) over the phone regarding pt discharge home this morning. Discussed follow-up appts and new medications, gave opportunity for mother to ask any questions and answered those as well. Pt mother verbalized understanding of discharge instructions and that a copy would be coming home with pt. Pt tate removed per MD order and mother's request, DME orders for in&out cath have been sent to [facility].  Pt was able to void after catheter removal.
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
## 2022-09-29 NOTE — TELEPHONE ENCOUNTER
Gabyb Yap from the 4th floor [facility] called to schedule patient for a 1 week hospital follow up per Dr. Leia Graves. [facility] unable to accommodate. Patient scheduled for 11/7.22. If any cancellations or able to schedule for a sooner appointment please contact patient.      Thank you ,
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
## 2022-09-29 NOTE — PLAN OF CARE
Problem: Discharge Planning  Goal: Discharge to home or other facility with appropriate resources  9/29/2022 0858 by Louie Essex, RN  Outcome: Completed     Problem: Skin/Tissue Integrity  Goal: Absence of new skin breakdown  Description: 1. Monitor for areas of redness and/or skin breakdown  2. Assess vascular access sites hourly  3. Every 4-6 hours minimum:  Change oxygen saturation probe site  4. Every 4-6 hours:  If on nasal continuous positive airway pressure, respiratory therapy assess nares and determine need for appliance change or resting period.   9/29/2022 0858 by Louie Essex, RN  Outcome: Completed     Problem: ABCDS Injury Assessment  Goal: Absence of physical injury  9/29/2022 0858 by Louie Essex, RN  Outcome: Completed     Problem: Pain  Goal: Verbalizes/displays adequate comfort level or baseline comfort level  9/29/2022 0858 by Louie Essex, RN  Outcome: Completed     Problem: Safety - Adult  Goal: Free from fall injury  9/29/2022 0858 by Louie Essex, RN  Outcome: Completed     Problem: Nutrition Deficit:  Goal: Optimize nutritional status  9/29/2022 0858 by Louie Essex, RN  Outcome: Completed

## 2022-10-08 ENCOUNTER — HOSPITAL ENCOUNTER (INPATIENT)
Age: 37
LOS: 3 days | Discharge: HOME HEALTH | DRG: 919 | End: 2022-10-11
Attending: EMERGENCY MEDICINE | Admitting: STUDENT IN AN ORGANIZED HEALTH CARE EDUCATION/TRAINING PROGRAM
Payer: MEDICARE

## 2022-10-08 ENCOUNTER — APPOINTMENT (OUTPATIENT)
Dept: GENERAL RADIOLOGY | Age: 37
DRG: 919 | End: 2022-10-08
Payer: MEDICARE

## 2022-10-08 ENCOUNTER — APPOINTMENT (OUTPATIENT)
Dept: CT IMAGING | Age: 37
DRG: 919 | End: 2022-10-08
Payer: MEDICARE

## 2022-10-08 DIAGNOSIS — D64.9 ANEMIA, UNSPECIFIED TYPE: ICD-10-CM

## 2022-10-08 DIAGNOSIS — K94.20 COMPLICATION OF FEEDING TUBE (HCC): Primary | ICD-10-CM

## 2022-10-08 DIAGNOSIS — Z87.820 HISTORY OF TRAUMATIC BRAIN INJURY: ICD-10-CM

## 2022-10-08 PROBLEM — N17.9 AKI (ACUTE KIDNEY INJURY) (HCC): Status: ACTIVE | Noted: 2022-10-08

## 2022-10-08 LAB
ALBUMIN SERPL-MCNC: 3.8 G/DL (ref 3.5–5.2)
ALP BLD-CCNC: 68 U/L (ref 40–130)
ALT SERPL-CCNC: 38 U/L (ref 5–41)
ANION GAP SERPL CALCULATED.3IONS-SCNC: 11 MMOL/L (ref 7–19)
AST SERPL-CCNC: 29 U/L (ref 5–40)
BASOPHILS ABSOLUTE: 0.1 K/UL (ref 0–0.2)
BASOPHILS RELATIVE PERCENT: 1 % (ref 0–1)
BILIRUB SERPL-MCNC: 0.5 MG/DL (ref 0.2–1.2)
BUN BLDV-MCNC: 21 MG/DL (ref 6–20)
CALCIUM SERPL-MCNC: 9.7 MG/DL (ref 8.6–10)
CHLORIDE BLD-SCNC: 99 MMOL/L (ref 98–111)
CO2: 25 MMOL/L (ref 22–29)
CREAT SERPL-MCNC: 1.5 MG/DL (ref 0.5–1.2)
EOSINOPHILS ABSOLUTE: 0.2 K/UL (ref 0–0.6)
EOSINOPHILS RELATIVE PERCENT: 2.3 % (ref 0–5)
GFR AFRICAN AMERICAN: >59
GFR NON-AFRICAN AMERICAN: 53
GLUCOSE BLD-MCNC: 81 MG/DL (ref 74–109)
HCT VFR BLD CALC: 39.4 % (ref 42–52)
HEMOGLOBIN: 11.9 G/DL (ref 14–18)
IMMATURE GRANULOCYTES #: 0 K/UL
LYMPHOCYTES ABSOLUTE: 1.5 K/UL (ref 1.1–4.5)
LYMPHOCYTES RELATIVE PERCENT: 18.2 % (ref 20–40)
MCH RBC QN AUTO: 26.3 PG (ref 27–31)
MCHC RBC AUTO-ENTMCNC: 30.2 G/DL (ref 33–37)
MCV RBC AUTO: 87.2 FL (ref 80–94)
MONOCYTES ABSOLUTE: 0.6 K/UL (ref 0–0.9)
MONOCYTES RELATIVE PERCENT: 6.8 % (ref 0–10)
NEUTROPHILS ABSOLUTE: 5.8 K/UL (ref 1.5–7.5)
NEUTROPHILS RELATIVE PERCENT: 71.2 % (ref 50–65)
PDW BLD-RTO: 17.9 % (ref 11.5–14.5)
PLATELET # BLD: 484 K/UL (ref 130–400)
PMV BLD AUTO: 11.1 FL (ref 9.4–12.4)
POTASSIUM SERPL-SCNC: 4.4 MMOL/L (ref 3.5–5)
RBC # BLD: 4.52 M/UL (ref 4.7–6.1)
REASON FOR REJECTION: NORMAL
REJECTED TEST: NORMAL
SARS-COV-2, NAAT: NOT DETECTED
SODIUM BLD-SCNC: 135 MMOL/L (ref 136–145)
TOTAL PROTEIN: 8.5 G/DL (ref 6.6–8.7)
WBC # BLD: 8.2 K/UL (ref 4.8–10.8)

## 2022-10-08 PROCEDURE — 87635 SARS-COV-2 COVID-19 AMP PRB: CPT

## 2022-10-08 PROCEDURE — 6360000002 HC RX W HCPCS: Performed by: EMERGENCY MEDICINE

## 2022-10-08 PROCEDURE — 74176 CT ABD & PELVIS W/O CONTRAST: CPT | Performed by: RADIOLOGY

## 2022-10-08 PROCEDURE — 74176 CT ABD & PELVIS W/O CONTRAST: CPT

## 2022-10-08 PROCEDURE — 74018 RADEX ABDOMEN 1 VIEW: CPT | Performed by: RADIOLOGY

## 2022-10-08 PROCEDURE — 96372 THER/PROPH/DIAG INJ SC/IM: CPT

## 2022-10-08 PROCEDURE — 94640 AIRWAY INHALATION TREATMENT: CPT

## 2022-10-08 PROCEDURE — 80177 DRUG SCRN QUAN LEVETIRACETAM: CPT

## 2022-10-08 PROCEDURE — 96365 THER/PROPH/DIAG IV INF INIT: CPT

## 2022-10-08 PROCEDURE — 6360000002 HC RX W HCPCS

## 2022-10-08 PROCEDURE — 94760 N-INVAS EAR/PLS OXIMETRY 1: CPT

## 2022-10-08 PROCEDURE — 6370000000 HC RX 637 (ALT 250 FOR IP)

## 2022-10-08 PROCEDURE — 99222 1ST HOSP IP/OBS MODERATE 55: CPT | Performed by: INTERNAL MEDICINE

## 2022-10-08 PROCEDURE — 99285 EMERGENCY DEPT VISIT HI MDM: CPT

## 2022-10-08 PROCEDURE — 2580000003 HC RX 258: Performed by: EMERGENCY MEDICINE

## 2022-10-08 PROCEDURE — 36415 COLL VENOUS BLD VENIPUNCTURE: CPT

## 2022-10-08 PROCEDURE — 74018 RADEX ABDOMEN 1 VIEW: CPT

## 2022-10-08 PROCEDURE — 1210000000 HC MED SURG R&B

## 2022-10-08 PROCEDURE — 6360000002 HC RX W HCPCS: Performed by: STUDENT IN AN ORGANIZED HEALTH CARE EDUCATION/TRAINING PROGRAM

## 2022-10-08 PROCEDURE — 80053 COMPREHEN METABOLIC PANEL: CPT

## 2022-10-08 PROCEDURE — 43762 RPLC GTUBE NO REVJ TRC: CPT

## 2022-10-08 PROCEDURE — 85025 COMPLETE CBC W/AUTO DIFF WBC: CPT

## 2022-10-08 RX ORDER — LEVETIRACETAM 5 MG/ML
500 INJECTION INTRAVASCULAR EVERY 12 HOURS
Status: DISCONTINUED | OUTPATIENT
Start: 2022-10-08 | End: 2022-10-11 | Stop reason: HOSPADM

## 2022-10-08 RX ORDER — ENOXAPARIN SODIUM 100 MG/ML
40 INJECTION SUBCUTANEOUS EVERY 24 HOURS
Status: DISCONTINUED | OUTPATIENT
Start: 2022-10-08 | End: 2022-10-11 | Stop reason: HOSPADM

## 2022-10-08 RX ORDER — SODIUM CHLORIDE 9 MG/ML
INJECTION, SOLUTION INTRAVENOUS PRN
Status: DISCONTINUED | OUTPATIENT
Start: 2022-10-08 | End: 2022-10-11 | Stop reason: HOSPADM

## 2022-10-08 RX ORDER — ACETAMINOPHEN 650 MG/1
650 SUPPOSITORY RECTAL EVERY 6 HOURS PRN
Status: DISCONTINUED | OUTPATIENT
Start: 2022-10-08 | End: 2022-10-11 | Stop reason: HOSPADM

## 2022-10-08 RX ORDER — ENOXAPARIN SODIUM 100 MG/ML
40 INJECTION SUBCUTANEOUS EVERY 24 HOURS
Status: DISCONTINUED | OUTPATIENT
Start: 2022-10-08 | End: 2022-10-08

## 2022-10-08 RX ORDER — POLYETHYLENE GLYCOL 3350 17 G/17G
17 POWDER, FOR SOLUTION ORAL DAILY PRN
Status: DISCONTINUED | OUTPATIENT
Start: 2022-10-08 | End: 2022-10-11 | Stop reason: HOSPADM

## 2022-10-08 RX ORDER — ACETYLCYSTEINE 200 MG/ML
2 SOLUTION ORAL; RESPIRATORY (INHALATION) EVERY 4 HOURS
Status: DISCONTINUED | OUTPATIENT
Start: 2022-10-08 | End: 2022-10-09

## 2022-10-08 RX ORDER — IPRATROPIUM BROMIDE AND ALBUTEROL SULFATE 2.5; .5 MG/3ML; MG/3ML
SOLUTION RESPIRATORY (INHALATION)
Status: DISPENSED
Start: 2022-10-08 | End: 2022-10-09

## 2022-10-08 RX ORDER — SODIUM CHLORIDE 0.9 % (FLUSH) 0.9 %
5-40 SYRINGE (ML) INJECTION EVERY 12 HOURS SCHEDULED
Status: DISCONTINUED | OUTPATIENT
Start: 2022-10-08 | End: 2022-10-11 | Stop reason: HOSPADM

## 2022-10-08 RX ORDER — HYDROMORPHONE HYDROCHLORIDE 1 MG/ML
0.25 INJECTION, SOLUTION INTRAMUSCULAR; INTRAVENOUS; SUBCUTANEOUS EVERY 4 HOURS PRN
Status: DISCONTINUED | OUTPATIENT
Start: 2022-10-08 | End: 2022-10-11 | Stop reason: HOSPADM

## 2022-10-08 RX ORDER — ACETAMINOPHEN 325 MG/1
650 TABLET ORAL EVERY 6 HOURS PRN
Status: DISCONTINUED | OUTPATIENT
Start: 2022-10-08 | End: 2022-10-11 | Stop reason: HOSPADM

## 2022-10-08 RX ORDER — SODIUM CHLORIDE 9 MG/ML
INJECTION, SOLUTION INTRAVENOUS CONTINUOUS
Status: DISCONTINUED | OUTPATIENT
Start: 2022-10-08 | End: 2022-10-10

## 2022-10-08 RX ORDER — DEXTROSE MONOHYDRATE 100 MG/ML
INJECTION, SOLUTION INTRAVENOUS CONTINUOUS PRN
Status: DISCONTINUED | OUTPATIENT
Start: 2022-10-08 | End: 2022-10-11 | Stop reason: HOSPADM

## 2022-10-08 RX ORDER — SODIUM CHLORIDE 0.9 % (FLUSH) 0.9 %
5-40 SYRINGE (ML) INJECTION PRN
Status: DISCONTINUED | OUTPATIENT
Start: 2022-10-08 | End: 2022-10-11 | Stop reason: HOSPADM

## 2022-10-08 RX ORDER — HYDROMORPHONE HYDROCHLORIDE 1 MG/ML
2 INJECTION, SOLUTION INTRAMUSCULAR; INTRAVENOUS; SUBCUTANEOUS ONCE
Status: COMPLETED | OUTPATIENT
Start: 2022-10-08 | End: 2022-10-08

## 2022-10-08 RX ORDER — ONDANSETRON 2 MG/ML
4 INJECTION INTRAMUSCULAR; INTRAVENOUS EVERY 6 HOURS PRN
Status: DISCONTINUED | OUTPATIENT
Start: 2022-10-08 | End: 2022-10-11 | Stop reason: HOSPADM

## 2022-10-08 RX ORDER — SODIUM CHLORIDE, SODIUM LACTATE, POTASSIUM CHLORIDE, AND CALCIUM CHLORIDE .6; .31; .03; .02 G/100ML; G/100ML; G/100ML; G/100ML
1000 INJECTION, SOLUTION INTRAVENOUS ONCE
Status: COMPLETED | OUTPATIENT
Start: 2022-10-08 | End: 2022-10-08

## 2022-10-08 RX ORDER — IPRATROPIUM BROMIDE AND ALBUTEROL SULFATE 2.5; .5 MG/3ML; MG/3ML
1 SOLUTION RESPIRATORY (INHALATION)
Status: DISCONTINUED | OUTPATIENT
Start: 2022-10-08 | End: 2022-10-11 | Stop reason: HOSPADM

## 2022-10-08 RX ORDER — ONDANSETRON 4 MG/1
4 TABLET, ORALLY DISINTEGRATING ORAL EVERY 8 HOURS PRN
Status: DISCONTINUED | OUTPATIENT
Start: 2022-10-08 | End: 2022-10-11 | Stop reason: HOSPADM

## 2022-10-08 RX ORDER — ZIPRASIDONE MESYLATE 20 MG/ML
10 INJECTION, POWDER, LYOPHILIZED, FOR SOLUTION INTRAMUSCULAR EVERY 12 HOURS PRN
Status: DISCONTINUED | OUTPATIENT
Start: 2022-10-08 | End: 2022-10-11 | Stop reason: HOSPADM

## 2022-10-08 RX ADMIN — HYDROMORPHONE HYDROCHLORIDE 2 MG: 1 INJECTION, SOLUTION INTRAMUSCULAR; INTRAVENOUS; SUBCUTANEOUS at 13:35

## 2022-10-08 RX ADMIN — IPRATROPIUM BROMIDE AND ALBUTEROL SULFATE 1 AMPULE: 2.5; .5 SOLUTION RESPIRATORY (INHALATION) at 21:55

## 2022-10-08 RX ADMIN — IPRATROPIUM BROMIDE AND ALBUTEROL SULFATE 1 AMPULE: 2.5; .5 SOLUTION RESPIRATORY (INHALATION) at 18:18

## 2022-10-08 RX ADMIN — ACETYLCYSTEINE 400 MG: 200 SOLUTION ORAL; RESPIRATORY (INHALATION) at 21:55

## 2022-10-08 RX ADMIN — SODIUM CHLORIDE: 9 INJECTION, SOLUTION INTRAVENOUS at 18:02

## 2022-10-08 RX ADMIN — PIPERACILLIN AND TAZOBACTAM 3375 MG: 3; .375 INJECTION, POWDER, FOR SOLUTION INTRAVENOUS at 15:35

## 2022-10-08 RX ADMIN — HYDROMORPHONE HYDROCHLORIDE 0.25 MG: 1 INJECTION, SOLUTION INTRAMUSCULAR; INTRAVENOUS; SUBCUTANEOUS at 18:08

## 2022-10-08 RX ADMIN — SODIUM CHLORIDE, POTASSIUM CHLORIDE, SODIUM LACTATE AND CALCIUM CHLORIDE 1000 ML: 600; 310; 30; 20 INJECTION, SOLUTION INTRAVENOUS at 15:37

## 2022-10-08 RX ADMIN — ACETYLCYSTEINE 400 MG: 200 SOLUTION ORAL; RESPIRATORY (INHALATION) at 18:19

## 2022-10-08 RX ADMIN — LEVETIRACETAM 500 MG: 5 INJECTION INTRAVENOUS at 21:07

## 2022-10-08 RX ADMIN — ENOXAPARIN SODIUM 40 MG: 100 INJECTION SUBCUTANEOUS at 21:06

## 2022-10-08 ASSESSMENT — PAIN SCALES - WONG BAKER
WONGBAKER_NUMERICALRESPONSE: 0
WONGBAKER_NUMERICALRESPONSE: 0

## 2022-10-08 ASSESSMENT — PAIN SCALES - GENERAL: PAINLEVEL_OUTOF10: 1

## 2022-10-08 NOTE — ED NOTES
G tube replaced w/ MD [facility] at bedside - secured per orders     HOLLY Banuelos  10/08/22 2095
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
## 2022-10-08 NOTE — ED NOTES
PT accompanied to CT and back with rad tech and this nurse. PT back to ED via stretcher. Jennifer Colbert at bedside at this time with pt while his family member goes to their vehicle as family reported pt was pulling at his G-tube and trach prior to imaging.      Danielito Connolly RN  10/08/22 9642
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
## 2022-10-09 ENCOUNTER — APPOINTMENT (OUTPATIENT)
Dept: GENERAL RADIOLOGY | Age: 37
DRG: 919 | End: 2022-10-09
Payer: MEDICARE

## 2022-10-09 LAB
ALBUMIN SERPL-MCNC: 4.2 G/DL (ref 3.5–5.2)
ALP BLD-CCNC: 70 U/L (ref 40–130)
ALT SERPL-CCNC: 46 U/L (ref 5–41)
ANION GAP SERPL CALCULATED.3IONS-SCNC: 13 MMOL/L (ref 7–19)
AST SERPL-CCNC: 42 U/L (ref 5–40)
BILIRUB SERPL-MCNC: 0.5 MG/DL (ref 0.2–1.2)
BUN BLDV-MCNC: 19 MG/DL (ref 6–20)
CALCIUM SERPL-MCNC: 9.8 MG/DL (ref 8.6–10)
CHLORIDE BLD-SCNC: 103 MMOL/L (ref 98–111)
CO2: 26 MMOL/L (ref 22–29)
CREAT SERPL-MCNC: 1.6 MG/DL (ref 0.5–1.2)
GFR AFRICAN AMERICAN: >59
GFR NON-AFRICAN AMERICAN: 49
GLUCOSE BLD-MCNC: 75 MG/DL (ref 70–99)
GLUCOSE BLD-MCNC: 78 MG/DL (ref 70–99)
GLUCOSE BLD-MCNC: 87 MG/DL (ref 70–99)
GLUCOSE BLD-MCNC: 87 MG/DL (ref 74–109)
GLUCOSE BLD-MCNC: 93 MG/DL (ref 70–99)
HCT VFR BLD CALC: 33.8 % (ref 42–52)
HEMOGLOBIN: 10.2 G/DL (ref 14–18)
MCH RBC QN AUTO: 26.2 PG (ref 27–31)
MCHC RBC AUTO-ENTMCNC: 30.2 G/DL (ref 33–37)
MCV RBC AUTO: 86.9 FL (ref 80–94)
PDW BLD-RTO: 17.5 % (ref 11.5–14.5)
PERFORMED ON: NORMAL
PLATELET # BLD: 368 K/UL (ref 130–400)
PMV BLD AUTO: 11.9 FL (ref 9.4–12.4)
POTASSIUM REFLEX MAGNESIUM: 5.4 MMOL/L (ref 3.5–5)
RBC # BLD: 3.89 M/UL (ref 4.7–6.1)
REASON FOR REJECTION: NORMAL
REJECTED TEST: NORMAL
SODIUM BLD-SCNC: 142 MMOL/L (ref 136–145)
TOTAL PROTEIN: 8.1 G/DL (ref 6.6–8.7)
WBC # BLD: 9.2 K/UL (ref 4.8–10.8)

## 2022-10-09 PROCEDURE — 74018 RADEX ABDOMEN 1 VIEW: CPT

## 2022-10-09 PROCEDURE — 99221 1ST HOSP IP/OBS SF/LOW 40: CPT | Performed by: SURGERY

## 2022-10-09 PROCEDURE — 6370000000 HC RX 637 (ALT 250 FOR IP)

## 2022-10-09 PROCEDURE — 2580000003 HC RX 258

## 2022-10-09 PROCEDURE — 94640 AIRWAY INHALATION TREATMENT: CPT

## 2022-10-09 PROCEDURE — 1210000000 HC MED SURG R&B

## 2022-10-09 PROCEDURE — 85027 COMPLETE CBC AUTOMATED: CPT

## 2022-10-09 PROCEDURE — 36415 COLL VENOUS BLD VENIPUNCTURE: CPT

## 2022-10-09 PROCEDURE — 6360000002 HC RX W HCPCS

## 2022-10-09 PROCEDURE — 80053 COMPREHEN METABOLIC PANEL: CPT

## 2022-10-09 PROCEDURE — 82947 ASSAY GLUCOSE BLOOD QUANT: CPT

## 2022-10-09 RX ORDER — ACETYLCYSTEINE 200 MG/ML
2 SOLUTION ORAL; RESPIRATORY (INHALATION)
Status: DISCONTINUED | OUTPATIENT
Start: 2022-10-09 | End: 2022-10-11 | Stop reason: HOSPADM

## 2022-10-09 RX ORDER — SODIUM CHLORIDE 0.9 % (FLUSH) 0.9 %
5-40 SYRINGE (ML) INJECTION EVERY 12 HOURS SCHEDULED
Status: CANCELLED | OUTPATIENT
Start: 2022-10-09

## 2022-10-09 RX ORDER — SODIUM CHLORIDE 9 MG/ML
25 INJECTION, SOLUTION INTRAVENOUS PRN
Status: CANCELLED | OUTPATIENT
Start: 2022-10-09

## 2022-10-09 RX ORDER — SODIUM CHLORIDE 0.9 % (FLUSH) 0.9 %
5-40 SYRINGE (ML) INJECTION PRN
Status: CANCELLED | OUTPATIENT
Start: 2022-10-09

## 2022-10-09 RX ADMIN — IPRATROPIUM BROMIDE AND ALBUTEROL SULFATE 1 AMPULE: 2.5; .5 SOLUTION RESPIRATORY (INHALATION) at 15:12

## 2022-10-09 RX ADMIN — ACETYLCYSTEINE 400 MG: 200 SOLUTION ORAL; RESPIRATORY (INHALATION) at 10:55

## 2022-10-09 RX ADMIN — ACETYLCYSTEINE 400 MG: 200 SOLUTION ORAL; RESPIRATORY (INHALATION) at 18:44

## 2022-10-09 RX ADMIN — SODIUM CHLORIDE, PRESERVATIVE FREE 10 ML: 5 INJECTION INTRAVENOUS at 08:27

## 2022-10-09 RX ADMIN — LEVETIRACETAM 500 MG: 5 INJECTION INTRAVENOUS at 05:32

## 2022-10-09 RX ADMIN — HYDROMORPHONE HYDROCHLORIDE 0.25 MG: 1 INJECTION, SOLUTION INTRAMUSCULAR; INTRAVENOUS; SUBCUTANEOUS at 03:38

## 2022-10-09 RX ADMIN — IPRATROPIUM BROMIDE AND ALBUTEROL SULFATE 1 AMPULE: 2.5; .5 SOLUTION RESPIRATORY (INHALATION) at 18:44

## 2022-10-09 RX ADMIN — ACETYLCYSTEINE 400 MG: 200 SOLUTION ORAL; RESPIRATORY (INHALATION) at 15:13

## 2022-10-09 RX ADMIN — IPRATROPIUM BROMIDE AND ALBUTEROL SULFATE 1 AMPULE: 2.5; .5 SOLUTION RESPIRATORY (INHALATION) at 10:55

## 2022-10-09 ASSESSMENT — PAIN SCALES - WONG BAKER
WONGBAKER_NUMERICALRESPONSE: 0

## 2022-10-09 ASSESSMENT — PAIN SCALES - GENERAL
PAINLEVEL_OUTOF10: 6
PAINLEVEL_OUTOF10: 0

## 2022-10-09 NOTE — PLAN OF CARE
Family has requested several times for pt to rec pain med. Each time this nurse has been in the room pt has been resting quietly without distress unless being suctioned on attempts made to start IV. IV access lost and attempts made x 5 by this nurse and 2 others, unable to obtain access. House notified and someone from ED will attempt to start IV when available.
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
## 2022-10-09 NOTE — PLAN OF CARE
Problem: Discharge Planning  Goal: Discharge to home or other facility with appropriate resources  Outcome: Progressing  Flowsheets (Taken 10/9/2022 0730)  Discharge to home or other facility with appropriate resources:   Identify barriers to discharge with patient and caregiver   Arrange for needed discharge resources and transportation as appropriate   Refer to discharge planning if patient needs post-hospital services based on physician order or complex needs related to functional status, cognitive ability or social support system   Identify discharge learning needs (meds, wound care, etc)     Problem: Safety - Adult  Goal: Free from fall injury  Outcome: Progressing     Problem: ABCDS Injury Assessment  Goal: Absence of physical injury  Outcome: Progressing     Problem: Confusion  Goal: Confusion, delirium, dementia, or psychosis is improved or at baseline  Description: INTERVENTIONS:  1. Assess for possible contributors to thought disturbance, including medications, impaired vision or hearing, underlying metabolic abnormalities, dehydration, psychiatric diagnoses, and notify attending LIP  2. [place] high risk fall precautions, as indicated  3. Provide frequent short contacts to provide reality reorientation, refocusing and direction  4. Decrease environmental stimuli, including noise as appropriate  5. Monitor and intervene to maintain adequate nutrition, hydration, elimination, sleep and activity  6. If unable to ensure safety without constant attention obtain sitter and review sitter guidelines with assigned personnel  7.  Initiate Psychosocial CNS and Spiritual Care consult, as indicated  Outcome: Progressing     Problem: Pain  Goal: Verbalizes/displays adequate comfort level or baseline comfort level  Outcome: Progressing  Flowsheets (Taken 10/9/2022 1600)  Verbalizes/displays adequate comfort level or baseline comfort level: Administer analgesics based on type and severity of pain and evaluate response     Problem: Skin/Tissue Integrity  Goal: Absence of new skin breakdown  Description: 1. Monitor for areas of redness and/or skin breakdown  2. Assess vascular access sites hourly  3. Every 4-6 hours minimum:  Change oxygen saturation probe site  4. Every 4-6 hours:  If on nasal continuous positive airway pressure, respiratory therapy assess nares and determine need for appliance change or resting period.   Outcome: Progressing

## 2022-10-09 NOTE — PLAN OF CARE
Problem: Discharge Planning  Goal: Discharge to home or other facility with appropriate resources  Outcome: Progressing  Flowsheets  Taken 10/8/2022 2000 by Chanel Leavitt RN  Discharge to home or other facility with appropriate resources: Identify barriers to discharge with patient and caregiver  Taken 10/8/2022 1836 by Tabatha Malave RN  Discharge to home or other facility with appropriate resources:   Identify barriers to discharge with patient and caregiver   Identify discharge learning needs (meds, wound care, etc)   Refer to discharge planning if patient needs post-hospital services based on physician order or complex needs related to functional status, cognitive ability or social support system   Arrange for needed discharge resources and transportation as appropriate     Problem: Safety - Adult  Goal: Free from fall injury  Outcome: Progressing     Problem: ABCDS Injury Assessment  Goal: Absence of physical injury  Outcome: Progressing  Flowsheets (Taken 10/8/2022 1833 by Tabatha Malave RN)  Absence of Physical Injury: Implement safety measures based on patient assessment     Problem: Confusion  Goal: Confusion, delirium, dementia, or psychosis is improved or at baseline  Description: INTERVENTIONS:  1. Assess for possible contributors to thought disturbance, including medications, impaired vision or hearing, underlying metabolic abnormalities, dehydration, psychiatric diagnoses, and notify attending LIP  2. [place] high risk fall precautions, as indicated  3. Provide frequent short contacts to provide reality reorientation, refocusing and direction  4. Decrease environmental stimuli, including noise as appropriate  5. Monitor and intervene to maintain adequate nutrition, hydration, elimination, sleep and activity  6. If unable to ensure safety without constant attention obtain sitter and review sitter guidelines with assigned personnel  7.  Initiate Psychosocial CNS and Spiritual Care consult, as indicated  Outcome: Progressing     Problem: Pain  Goal: Verbalizes/displays adequate comfort level or baseline comfort level  Outcome: Progressing     Problem: Skin/Tissue Integrity  Goal: Absence of new skin breakdown  Description: 1. Monitor for areas of redness and/or skin breakdown  2. Assess vascular access sites hourly  3. Every 4-6 hours minimum:  Change oxygen saturation probe site  4. Every 4-6 hours:  If on nasal continuous positive airway pressure, respiratory therapy assess nares and determine need for appliance change or resting period.   Outcome: Progressing

## 2022-10-09 NOTE — CONSULTS
[facility]    Patient ID: Nyla Moses  31 y.o.  male  YOB: 1985    Admitting Diagnosis: History of traumatic brain injury [R06.193]  Complication of feeding tube [facility]) [K94.20]    Chief Complaint:  Chief Complaint   Patient presents with    G Tube Complications     Dislodged tube per EMS       Subjective:    Mr. Jennifer Joseph is a 31 y.o. male who presented to the emergency room yesterday due to dislodgement of his PEG. This was placed in February of 2022, following an accident that left him with significant TBI. Attempts were made at replacement but the tube was placed above the peritoneum and not into the abdomen or through the stomach wall. This tube was removed. Patient unable to provide any history. Chart reviewed.       Past Medical History:   Diagnosis Date    Acute pulmonary embolism ([address] 4/29/2022    Formatting of this note might be different from the original. Added automatically from request for surgery [id number]    Bipolar disorder ([address] 9/20/2022    Cardiac arrest ([address] 4/22/2022    Hydrocephalus ([address] 4/29/2022    Formatting of this note might be different from the original. Added automatically from request for surgery [id number]    Nonverbal     Palliative care patient 09/23/2022    Respiratory failure following trauma ([address] 2/11/2022    Subarachnoid hemorrhage following injury 2/11/2022    Traumatic brain injury with loss of consciousness ([address] 2/11/2022    Formatting of this note might be different from the original. Added automatically from request for surgery [id number]     Past Surgical History:   Procedure Laterality Date    TRACHEOSTOMY      UPPER GASTROINTESTINAL ENDOSCOPY N/A 09/22/2022    Dr Hui Shoulder distal reflux esophagitis causing stricture of distal esophagus, unable to advance the scope into the stomach, distal esophagus severely inflamed, repeat in 2 months     Current Facility-Administered Medications   Medication Dose Route Frequency Provider Last Rate Last Admin    acetylcysteine (MUCOMYST) 20 % solution 400 mg  2 mL Inhalation Q4H WA Michael Sober, APRN - CNP   400 mg at 10/09/22 1055    0.9 % sodium chloride infusion   IntraVENous Continuous Oscar García  mL/hr at 10/08/22 1802 New Bag at 10/08/22 1802    sodium chloride flush 0.9 % injection 5-40 mL  5-40 mL IntraVENous 2 times per day Michael Sober, APRN - CNP   10 mL at 10/09/22 0827    sodium chloride flush 0.9 % injection 5-40 mL  5-40 mL IntraVENous PRN Mcihael Sober, APRN - CNP        0.9 % sodium chloride infusion   IntraVENous PRN Michael Sober, APRN - CNP        ondansetron (ZOFRAN-ODT) disintegrating tablet 4 mg  4 mg Oral Q8H PRN Michael Sober, APRN - CNP        Or    ondans[facility]ron [facility] injection 4 mg  4 mg IntraVENous Q6H PRN Michael Sober, APRN - CNP        polyethylene glycol (GLYCOLAX) packet 17 g  17 g Oral Daily PRN Michael Sober, APRN - CNP        acetaminophen (TYLENOL) tablet 650 mg  650 mg Oral Q6H PRN Michael Sober, APRN - CNP        Or    acetaminophen (TYLENOL) suppository 650 mg  650 mg Rectal Q6H PRN Michael Sober, APRN - CNP        HYDROmorphone HCl PF (DILAUDID) injection 0.25 mg  0.25 mg IntraVENous Q4H PRN Michael Sober, APRN - CNP   0.25 mg at 10/09/22 0338    levETIRAcetam (KEPPRA) 500 mg/100 mL IVPB  500 mg IntraVENous Q12H Michael Sober, APRN - CNP   Stopped at 10/09/22 3266    glucose chewable tablet 16 g  4 tablet Oral PRN Michael Sober, APRN - CNP        dextrose bolus 10% 125 mL  125 mL IntraVENous PRN Michael Sober, APRN - CNP        Or    dextrose bolus 10% 250 mL  250 mL IntraVENous PRN Michael Sober, APRN - CNP        glucagon (rDNA) injection 1 mg  1 mg SubCUTAneous PRN Michael Sober, APRN - CNP        dextrose 10 % infusion   IntraVENous Continuous PRN Michael Sober, APRN - CNP        ipratropium-albuterol (DUONEB) nebulizer solution 1 ampule  1 ampule Inhalation Q4H MIGUEL Hernandez CNP   1 ampule at 10/09/22 1055    [Held by provider] enoxaparin (LOVENOX) injection 40 mg  40 mg SubCUTAneous Q24H Patria Mazariegos MD   40 mg at 10/08/22 2106    ziprasidone (GEODON) injection 10 mg  10 mg IntraMUSCular Q12H PRN MIGUEL Perez - CNP         Allergies: Latex, Levofloxacin, Magnesium oxide, Pantoprazole sodium, and Levetiracetam    History reviewed. No pertinent family history. Social History     Tobacco Use    Smoking status: Not on file    Smokeless tobacco: Not on file   Substance Use Topics    Alcohol use: Not on file       Review of Systems   Unable to perform ROS: Patient nonverbal     Objective:   BP (!) 147/86   Pulse 86   Temp 97.5 °F (36.4 °C)   Resp 16   Wt 135 lb (61.2 kg)   SpO2 95%   BMI 17.33 kg/m²     Intake/Output Summary (Last 24 hours) at 10/9/2022 1142  Last data filed at 10/9/2022 0957  Gross per 24 hour   Intake 0 ml   Output 400 ml   Net -400 ml     Physical Exam  HENT:      Head: Normocephalic. Neck:      Comments: Trach in place. Abdominal:      Comments: Soft. Wound from peg site clean and dry. No rebound, rigidity, or guarding. Musculoskeletal:      Comments: Contracture to upper and lower extremities. Neurological:      Mental Status: He is alert. Mental status is at baseline. Data:  CBC:   Recent Labs     10/08/22  1533 10/09/22  0741   WBC 8.2 9.2   RBC 4.52* 3.89*   HGB 11.9* 10.2*   HCT 39.4* 33.8*   MCV 87.2 86.9   RDW 17.9* 17.5*   * 368     BMP:   Recent Labs     10/08/22  1633 10/09/22  0331   * 142   K 4.4 5.4*   CL 99 103   CO2 25 26   ANIONGAP 11 13   GLUCOSE 81 87   CREATININE 1.5* 1.6*   LABGLOM 53* 49*   CALCIUM 9.7 9.8     LFT:   Recent Labs     10/08/22  1633 10/09/22  0331   PROT 8.5 8.1   LABALBU 3.8 4.2   BILITOT 0.5 0.5   ALKPHOS 68 70   ALT 38 46*   AST 29 42*     PT/INR:No results for input(s): PROTIME, INR in the last 72 hours.     Assessment:     Principal Problem:    Complication of feeding tube [facility])  Active Problems: Nonverbal    Schizophrenia [address] [address]    History of traumatic brain injury    CATY (acute kidney injury) ([facility] [address]  Resolved Problems:    * No resolved hospital problems. *      Plan:     Abdominal wound without signs of infection or complications. Recommend continued management by GI with replacement of tube via endoscopy. No further surgical intervention required. Please reconsult as needed. Thank you for the courtesy of this consultation.        Electronically signed by Shreya Pisano DO on 20/5/1179 at 11:42 AM

## 2022-10-10 ENCOUNTER — APPOINTMENT (OUTPATIENT)
Dept: GENERAL RADIOLOGY | Age: 37
DRG: 919 | End: 2022-10-10
Payer: MEDICARE

## 2022-10-10 ENCOUNTER — ANESTHESIA EVENT (OUTPATIENT)
Dept: ENDOSCOPY | Age: 37
DRG: 919 | End: 2022-10-10
Payer: MEDICARE

## 2022-10-10 ENCOUNTER — ANESTHESIA (OUTPATIENT)
Dept: ENDOSCOPY | Age: 37
DRG: 919 | End: 2022-10-10
Payer: MEDICARE

## 2022-10-10 PROBLEM — D64.9 ANEMIA: Status: ACTIVE | Noted: 2022-10-08

## 2022-10-10 LAB
ALBUMIN SERPL-MCNC: 3.8 G/DL (ref 3.5–5.2)
ALP BLD-CCNC: 69 U/L (ref 40–130)
ALT SERPL-CCNC: 61 U/L (ref 5–41)
ANION GAP SERPL CALCULATED.3IONS-SCNC: 16 MMOL/L (ref 7–19)
AST SERPL-CCNC: 47 U/L (ref 5–40)
BILIRUB SERPL-MCNC: 0.6 MG/DL (ref 0.2–1.2)
BUN BLDV-MCNC: 15 MG/DL (ref 6–20)
CALCIUM SERPL-MCNC: 9.8 MG/DL (ref 8.6–10)
CHLORIDE BLD-SCNC: 106 MMOL/L (ref 98–111)
CO2: 21 MMOL/L (ref 22–29)
CREAT SERPL-MCNC: 1.3 MG/DL (ref 0.5–1.2)
GFR AFRICAN AMERICAN: >59
GFR NON-AFRICAN AMERICAN: >60
GLUCOSE BLD-MCNC: 82 MG/DL (ref 70–99)
GLUCOSE BLD-MCNC: 84 MG/DL (ref 70–99)
GLUCOSE BLD-MCNC: 95 MG/DL (ref 74–109)
GLUCOSE BLD-MCNC: 96 MG/DL (ref 70–99)
HCT VFR BLD CALC: 34.9 % (ref 42–52)
HEMOGLOBIN: 10.6 G/DL (ref 14–18)
KEPPRA: <2 UG/ML (ref 10–40)
MCH RBC QN AUTO: 26.6 PG (ref 27–31)
MCHC RBC AUTO-ENTMCNC: 30.4 G/DL (ref 33–37)
MCV RBC AUTO: 87.7 FL (ref 80–94)
PDW BLD-RTO: 17.2 % (ref 11.5–14.5)
PERFORMED ON: NORMAL
PLATELET # BLD: 413 K/UL (ref 130–400)
PMV BLD AUTO: 11.3 FL (ref 9.4–12.4)
POTASSIUM REFLEX MAGNESIUM: 4.5 MMOL/L (ref 3.5–5)
PROCALCITONIN: 0.16 NG/ML (ref 0–0.09)
RBC # BLD: 3.98 M/UL (ref 4.7–6.1)
SODIUM BLD-SCNC: 143 MMOL/L (ref 136–145)
TOTAL PROTEIN: 7.7 G/DL (ref 6.6–8.7)
WBC # BLD: 21.4 K/UL (ref 4.8–10.8)

## 2022-10-10 PROCEDURE — 88342 IMHCHEM/IMCYTCHM 1ST ANTB: CPT

## 2022-10-10 PROCEDURE — 2500000003 HC RX 250 WO HCPCS: Performed by: NURSE ANESTHETIST, CERTIFIED REGISTERED

## 2022-10-10 PROCEDURE — 36415 COLL VENOUS BLD VENIPUNCTURE: CPT

## 2022-10-10 PROCEDURE — 6370000000 HC RX 637 (ALT 250 FOR IP): Performed by: INTERNAL MEDICINE

## 2022-10-10 PROCEDURE — 6360000002 HC RX W HCPCS: Performed by: NURSE ANESTHETIST, CERTIFIED REGISTERED

## 2022-10-10 PROCEDURE — 43239 EGD BIOPSY SINGLE/MULTIPLE: CPT | Performed by: INTERNAL MEDICINE

## 2022-10-10 PROCEDURE — 6360000002 HC RX W HCPCS: Performed by: INTERNAL MEDICINE

## 2022-10-10 PROCEDURE — 6370000000 HC RX 637 (ALT 250 FOR IP)

## 2022-10-10 PROCEDURE — 71045 X-RAY EXAM CHEST 1 VIEW: CPT | Performed by: RADIOLOGY

## 2022-10-10 PROCEDURE — 3609013300 HC EGD TUBE PLACEMENT: Performed by: INTERNAL MEDICINE

## 2022-10-10 PROCEDURE — 82947 ASSAY GLUCOSE BLOOD QUANT: CPT

## 2022-10-10 PROCEDURE — 0DB78ZX EXCISION OF STOMACH, PYLORUS, VIA NATURAL OR ARTIFICIAL OPENING ENDOSCOPIC, DIAGNOSTIC: ICD-10-PCS | Performed by: INTERNAL MEDICINE

## 2022-10-10 PROCEDURE — 0DB68ZX EXCISION OF STOMACH, VIA NATURAL OR ARTIFICIAL OPENING ENDOSCOPIC, DIAGNOSTIC: ICD-10-PCS | Performed by: INTERNAL MEDICINE

## 2022-10-10 PROCEDURE — 3609012400 HC EGD TRANSORAL BIOPSY SINGLE/MULTIPLE: Performed by: INTERNAL MEDICINE

## 2022-10-10 PROCEDURE — 7100000001 HC PACU RECOVERY - ADDTL 15 MIN: Performed by: INTERNAL MEDICINE

## 2022-10-10 PROCEDURE — 85027 COMPLETE CBC AUTOMATED: CPT

## 2022-10-10 PROCEDURE — 0DH63UZ INSERTION OF FEEDING DEVICE INTO STOMACH, PERCUTANEOUS APPROACH: ICD-10-PCS | Performed by: INTERNAL MEDICINE

## 2022-10-10 PROCEDURE — 71045 X-RAY EXAM CHEST 1 VIEW: CPT

## 2022-10-10 PROCEDURE — 0DP68UZ REMOVAL OF FEEDING DEVICE FROM STOMACH, VIA NATURAL OR ARTIFICIAL OPENING ENDOSCOPIC: ICD-10-PCS | Performed by: INTERNAL MEDICINE

## 2022-10-10 PROCEDURE — 1210000000 HC MED SURG R&B

## 2022-10-10 PROCEDURE — 3700000000 HC ANESTHESIA ATTENDED CARE: Performed by: INTERNAL MEDICINE

## 2022-10-10 PROCEDURE — 43246 EGD PLACE GASTROSTOMY TUBE: CPT | Performed by: INTERNAL MEDICINE

## 2022-10-10 PROCEDURE — 6360000002 HC RX W HCPCS

## 2022-10-10 PROCEDURE — 84145 PROCALCITONIN (PCT): CPT

## 2022-10-10 PROCEDURE — 05HM33Z INSERTION OF INFUSION DEVICE INTO RIGHT INTERNAL JUGULAR VEIN, PERCUTANEOUS APPROACH: ICD-10-PCS | Performed by: HOSPITALIST

## 2022-10-10 PROCEDURE — 94640 AIRWAY INHALATION TREATMENT: CPT

## 2022-10-10 PROCEDURE — 2709999900 HC NON-CHARGEABLE SUPPLY: Performed by: INTERNAL MEDICINE

## 2022-10-10 PROCEDURE — 88305 TISSUE EXAM BY PATHOLOGIST: CPT

## 2022-10-10 PROCEDURE — 80053 COMPREHEN METABOLIC PANEL: CPT

## 2022-10-10 PROCEDURE — 3700000001 HC ADD 15 MINUTES (ANESTHESIA): Performed by: INTERNAL MEDICINE

## 2022-10-10 PROCEDURE — 7100000000 HC PACU RECOVERY - FIRST 15 MIN: Performed by: INTERNAL MEDICINE

## 2022-10-10 PROCEDURE — 2580000003 HC RX 258: Performed by: STUDENT IN AN ORGANIZED HEALTH CARE EDUCATION/TRAINING PROGRAM

## 2022-10-10 PROCEDURE — 2580000003 HC RX 258

## 2022-10-10 PROCEDURE — 36556 INSERT NON-TUNNEL CV CATH: CPT

## 2022-10-10 RX ORDER — SODIUM CHLORIDE, SODIUM LACTATE, POTASSIUM CHLORIDE, CALCIUM CHLORIDE 600; 310; 30; 20 MG/100ML; MG/100ML; MG/100ML; MG/100ML
INJECTION, SOLUTION INTRAVENOUS CONTINUOUS
Status: DISCONTINUED | OUTPATIENT
Start: 2022-10-10 | End: 2022-10-11 | Stop reason: HOSPADM

## 2022-10-10 RX ORDER — ONDANSETRON 2 MG/ML
4 INJECTION INTRAMUSCULAR; INTRAVENOUS
Status: CANCELLED | OUTPATIENT
Start: 2022-10-10 | End: 2022-10-11

## 2022-10-10 RX ORDER — PROPOFOL 10 MG/ML
INJECTION, EMULSION INTRAVENOUS PRN
Status: DISCONTINUED | OUTPATIENT
Start: 2022-10-10 | End: 2022-10-10 | Stop reason: SDUPTHER

## 2022-10-10 RX ORDER — HYDROMORPHONE HYDROCHLORIDE 1 MG/ML
0.5 INJECTION, SOLUTION INTRAMUSCULAR; INTRAVENOUS; SUBCUTANEOUS EVERY 5 MIN PRN
Status: CANCELLED | OUTPATIENT
Start: 2022-10-10

## 2022-10-10 RX ORDER — FAMOTIDINE 20 MG/1
20 TABLET, FILM COATED ORAL 2 TIMES DAILY
Status: DISCONTINUED | OUTPATIENT
Start: 2022-10-10 | End: 2022-10-11 | Stop reason: HOSPADM

## 2022-10-10 RX ORDER — LIDOCAINE HYDROCHLORIDE 10 MG/ML
INJECTION, SOLUTION EPIDURAL; INFILTRATION; INTRACAUDAL; PERINEURAL PRN
Status: DISCONTINUED | OUTPATIENT
Start: 2022-10-10 | End: 2022-10-10 | Stop reason: SDUPTHER

## 2022-10-10 RX ORDER — HYDROMORPHONE HYDROCHLORIDE 1 MG/ML
0.25 INJECTION, SOLUTION INTRAMUSCULAR; INTRAVENOUS; SUBCUTANEOUS EVERY 5 MIN PRN
Status: CANCELLED | OUTPATIENT
Start: 2022-10-10

## 2022-10-10 RX ORDER — LEVETIRACETAM 10 MG/ML
1000 INJECTION INTRAVASCULAR ONCE
Status: COMPLETED | OUTPATIENT
Start: 2022-10-10 | End: 2022-10-10

## 2022-10-10 RX ADMIN — IPRATROPIUM BROMIDE AND ALBUTEROL SULFATE 1 AMPULE: 2.5; .5 SOLUTION RESPIRATORY (INHALATION) at 14:45

## 2022-10-10 RX ADMIN — FAMOTIDINE 20 MG: 20 TABLET ORAL at 20:53

## 2022-10-10 RX ADMIN — HYDROMORPHONE HYDROCHLORIDE 0.25 MG: 1 INJECTION, SOLUTION INTRAMUSCULAR; INTRAVENOUS; SUBCUTANEOUS at 17:36

## 2022-10-10 RX ADMIN — ACETYLCYSTEINE 400 MG: 200 SOLUTION ORAL; RESPIRATORY (INHALATION) at 11:05

## 2022-10-10 RX ADMIN — ACETYLCYSTEINE 400 MG: 200 SOLUTION ORAL; RESPIRATORY (INHALATION) at 19:45

## 2022-10-10 RX ADMIN — PROPOFOL 150 MG: 10 INJECTION, EMULSION INTRAVENOUS at 13:02

## 2022-10-10 RX ADMIN — LEVETIRACETAM 500 MG: 5 INJECTION INTRAVENOUS at 20:55

## 2022-10-10 RX ADMIN — IPRATROPIUM BROMIDE AND ALBUTEROL SULFATE 1 AMPULE: 2.5; .5 SOLUTION RESPIRATORY (INHALATION) at 06:45

## 2022-10-10 RX ADMIN — ACETYLCYSTEINE 400 MG: 200 SOLUTION ORAL; RESPIRATORY (INHALATION) at 06:45

## 2022-10-10 RX ADMIN — SODIUM CHLORIDE, PRESERVATIVE FREE 10 ML: 5 INJECTION INTRAVENOUS at 09:00

## 2022-10-10 RX ADMIN — LEVETIRACETAM 1000 MG: 10 INJECTION INTRAVENOUS at 10:13

## 2022-10-10 RX ADMIN — IPRATROPIUM BROMIDE AND ALBUTEROL SULFATE 1 AMPULE: 2.5; .5 SOLUTION RESPIRATORY (INHALATION) at 11:05

## 2022-10-10 RX ADMIN — LIDOCAINE HYDROCHLORIDE 30 MG: 10 INJECTION, SOLUTION EPIDURAL; INFILTRATION; INTRACAUDAL; PERINEURAL at 13:02

## 2022-10-10 RX ADMIN — ACETYLCYSTEINE 400 MG: 200 SOLUTION ORAL; RESPIRATORY (INHALATION) at 14:45

## 2022-10-10 RX ADMIN — SODIUM CHLORIDE, POTASSIUM CHLORIDE, SODIUM LACTATE AND CALCIUM CHLORIDE: 600; 310; 30; 20 INJECTION, SOLUTION INTRAVENOUS at 10:12

## 2022-10-10 RX ADMIN — IPRATROPIUM BROMIDE AND ALBUTEROL SULFATE 1 AMPULE: 2.5; .5 SOLUTION RESPIRATORY (INHALATION) at 19:44

## 2022-10-10 ASSESSMENT — PAIN SCALES - GENERAL
PAINLEVEL_OUTOF10: 0
PAINLEVEL_OUTOF10: 0

## 2022-10-10 ASSESSMENT — PAIN SCALES - WONG BAKER: WONGBAKER_NUMERICALRESPONSE: 0

## 2022-10-10 NOTE — PROCEDURES
Consulted for or Contacted for need for a CVC  US Guided Vascular Access    Patient informed consent obtained prior to procedure in all non life-threatening or limb-threatening emergencies. Patient was examined preprocedurally with US to determine the best site for CVC insertion. The patient was positioned. Sterile precautions were taken including but not necessarily limited to: cap, mask, sterile gown, gloves, probe-cover, and draping. The patient received local anesthetic with 2 cc of the lidocaine included in the CVC procedural tray if the patient was not under sedation and/or already being treated with systemic pain medications. The target vessel was entered under direct US guidance with aspiration of dark venous blood, there was a slow dribble from the finder needle after the syringe was detatched from the needle. The guidewire was inserted through the finder needle. The hollow bore needle was removed while the guidewire was held in place. The guidewire was then traced again with the US from where it entered the skin down to the level of the  trach collar. Upon committing to this placement the distal tip of the US was placed with great care (to not have any sterile glove touch the patient or their side of the barrier) through a hole in the sterile barrier adjacent to and below the trach collar and the guidewire was traced to the clavicle with the US to ensure placement of the CVC in to the correct target vessel prior to dilation. The probe was then placed near the foot of the bed as it was no longer sterile. The scalpel was used to make a small nick along the side of the guidewire so as to allow passage of the dilator over the guidewire. The dilator was then passed over the guidewire, folllowed by placement of the CVC over the guidewire. The CVC was then held in place while the guidwire was removed. Port was attached to the middle lumen of the CVC.   All ports were then aspirated with return of blood. They were then all then flushed easily. The biopatch was then applied. The CVC was then sutured on to place. The sterile dressing was then applied by me. Total Attempts: 1  Estimated Aspirated Blood Loss: 4.5cc of blood was aspirated with the finder needle, 0.5cc more with one of the other syringes when aspirating from the catheter heads  Estimated Total Blood Loss: 6cc of blood loss (includes the 5cc aspirated - as listed above)    There will be a CXR portable obtained as the line was placed to the IJ vein. These procedures are not included  as a portion of any other charge, nor was their time included in any other measure of robert used for billing, and they will accordingly be billed seperately.

## 2022-10-10 NOTE — CARE COORDINATION
Patient is current with [address] [address] for SN, PT, OT, ST, and MSW Services. Will follow. Please advise when patient discharges. WILL NEED RESUMPTION OF CARE ORDERS TO RESUME HH SERVICES WHEN PATIENT DISCHARGES. Thank you. [address] [place] [address] [phone]. [phone].     Liang Cabrera RN, Home Care Liaison  [phone] P  Electronically signed by Johnson Van RN on 10/10/2022 at 8:43 AM

## 2022-10-10 NOTE — PROCEDURES
Endoscopic Procedure Note    Patient: Gris Renteria : 1985  Med Rec#: [id number] Acc#: [id number]     Primary Care Provider Darío Rod MD  Referring Provider: Tobias Patel MD     Endoscopist: Jade Gomez DO    Date of Procedure:  10/10/2022    Procedure:   1. EGD with PEG tube placement in previous PEG site,  Gastric biopsies for H. Pylori and photos    Indications:   1. TBA with inability to aliment      Anesthesia:  Sedation was administered by anesthesia who monitored the patient during the procedure. Estimated Blood Loss: minimal ( less than 1 cc whole blood)    Procedure:   After reviewing the patient's chart and obtaining informed consent, the patient was placed in the left lateral decubitus position. A forward-viewing Olympus endoscope was lubricated and inserted through the mouth into the oropharynx. Under direct visualization, the upper esophagus was intubated. There was esophagitis, slightly erosive distally. The scope was advanced to the level of the third portion of duodenum . There were multiple duodenal ulcerations in the first and second portion of the duodenum. Smitha Quiver Scope was slowly withdrawn with careful inspection of the mucosal surfaces. The scope was retroflexed for inspection of the gastric fundus and incisura. Biopsies were taken for H. Pylori. A Peg site was determined from palpating the abdomen over the initial site. This seemed appropriate. The skin was anesthetized after sterile procedure which was done prior to the endoscopy. The site was incised with # 11 Scalpel and trochar with catheter passed across the site into the stomach. The trochar was removed and a poly vinly guidewire was inserted into the catheter and the catheter was grasped with a snare and pulled through the oropharynx and mouth. The [place] Pull through PEG was attached to the guidewire and subsequently pulled through the mouth and seated against the gastric mucosa at 2.5 cm.  The nursing staff instilled several cc water though the tube indicating no leaks or malfunction around the tube. The endoscope was removed from the patient. The  Findings and maneuvers are listed in impression below. The patient tolerated the procedure well. The scope was removed. There were no immediate complications. Findings:   Esophagus: slightly erosive esophagitis  There is no  hiatal hernia present from this view     Stomach:   mucosal changes suggestive of  mild antral gastritis noted -    Gastric biopsies were taken from the antrum and body/incisura  to rule out Helicobacter pylori infection. Duodenum: Multiple small ulcerations       IMPRESSION:  1. Adequately placed PEG tube  2. Antral gastritis   3. Multiple duodenal ulcerations        RECOMMENDATIONS:    1. Await path results, the patient will be contacted in 7-10 days with biopsy results. 2.  H2 blocker therapy or PPI  3. Treat for H. Pylori if positive  4. Dietitian to see patient  5. Start feedings in 24 hours    The results were discussed with the patient and family. A copy of the images obtained were given to the patient.     DO JANET Oliveira  10/10/2022  1:25 PM

## 2022-10-10 NOTE — PROGRESS NOTES
Attempted to place IV twice with assistance from staff and was unsuccessful. MD is aware he does not have access at this time, but will notify supervisor to try and get IV access.

## 2022-10-10 NOTE — PLAN OF CARE
Keppra 500mg IV Q12h will be continued. Keppra 1g loading dose to be given stat. CXR was seen on the portable XR machine's screen. The line was seen to apparently terminate in the proximal SVC. RN team instructed to use the CVC as needed. Redressing of CVC barrier requested as needed as patient moving neck & shoulder around on bed.

## 2022-10-10 NOTE — PLAN OF CARE
Problem: Discharge Planning  Goal: Discharge to home or other facility with appropriate resources  10/10/2022 1157 by Bright Finn RN  Outcome: Progressing  10/10/2022 0206 by Denisse Martínez RN  Outcome: Progressing  Flowsheets (Taken 10/9/2022 2154)  Discharge to home or other facility with appropriate resources:   Arrange for needed discharge resources and transportation as appropriate   Identify barriers to discharge with patient and caregiver   Identify discharge learning needs (meds, wound care, etc)   Arrange for interpreters to assist at discharge as needed   Refer to discharge planning if patient needs post-hospital services based on physician order or complex needs related to functional status, cognitive ability or social support system     Problem: Safety - Adult  Goal: Free from fall injury  10/10/2022 1157 by Bright Finn RN  Outcome: Progressing  10/10/2022 0206 by Denisse Martínez RN  Outcome: Progressing     Problem: ABCDS Injury Assessment  Goal: Absence of physical injury  10/10/2022 1157 by Bright Finn RN  Outcome: Progressing  10/10/2022 0206 by Denisse Martínez RN  Outcome: Progressing     Problem: Confusion  Goal: Confusion, delirium, dementia, or psychosis is improved or at baseline  Description: INTERVENTIONS:  1. Assess for possible contributors to thought disturbance, including medications, impaired vision or hearing, underlying metabolic abnormalities, dehydration, psychiatric diagnoses, and notify attending LIP  2. [place] high risk fall precautions, as indicated  3. Provide frequent short contacts to provide reality reorientation, refocusing and direction  4. Decrease environmental stimuli, including noise as appropriate  5. Monitor and intervene to maintain adequate nutrition, hydration, elimination, sleep and activity  6. If unable to ensure safety without constant attention obtain sitter and review sitter guidelines with assigned personnel  7.  Initiate Psychosocial CNS and Spiritual Care consult, as indicated  10/10/2022 1157 by Rich Aguiar RN  Outcome: Progressing  10/10/2022 0206 by Karly Chatterjee RN  Outcome: Progressing     Problem: Pain  Goal: Verbalizes/displays adequate comfort level or baseline comfort level  10/10/2022 1157 by Rich Aguiar RN  Outcome: Progressing  10/10/2022 0206 by Karly Chatterjee RN  Outcome: Progressing     Problem: Skin/Tissue Integrity  Goal: Absence of new skin breakdown  Description: 1. Monitor for areas of redness and/or skin breakdown  2. Assess vascular access sites hourly  3. Every 4-6 hours minimum:  Change oxygen saturation probe site  4. Every 4-6 hours:  If on nasal continuous positive airway pressure, respiratory therapy assess nares and determine need for appliance change or resting period.   10/10/2022 1157 by Rich Aguiar RN  Outcome: Progressing  10/10/2022 0206 by Karly Chatterjee RN  Outcome: Progressing

## 2022-10-10 NOTE — ANESTHESIA POSTPROCEDURE EVALUATION
Department of Anesthesiology  Postprocedure Note    Patient: Mikey Cutler  MRN: [id number]  YOB: 1985  Date of evaluation: 10/10/2022      Procedure Summary     Date: 10/10/22 Room / Location: [address]    Anesthesia Start: 1253 Anesthesia Stop: 1934    Procedures:       EGD PEG TUBE PLACEMENT      EGD BIOPSY Diagnosis:       Anemia, unspecified type      (Anemia)    Surgeons: Sharmin Booker DO Responsible Provider: MIGUEL Cardenas CRNA    Anesthesia Type: general ASA Status: 3 - Emergent          Anesthesia Type: No value filed.     Марина Phase I:      Марина Phase II:        Anesthesia Post Evaluation    Patient location during evaluation: PACU  Patient participation: waiting for patient participation  Level of consciousness: obtunded/minimal responses and responsive to noxious stimuli  Pain score: 0  Airway patency: patent  Nausea & Vomiting: no nausea and no vomiting  Complications: no  Cardiovascular status: blood pressure returned to baseline  Respiratory status: acceptable, airway suctioned and spontaneous ventilation (Trachmask)  Hydration status: euvolemic
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
## 2022-10-10 NOTE — PLAN OF CARE
Problem: Discharge Planning  Goal: Discharge to home or other facility with appropriate resources  10/10/2022 0206 by Mahsa Milan RN  Outcome: Progressing  Flowsheets (Taken 10/9/2022 2154)  Discharge to home or other facility with appropriate resources:   Arrange for needed discharge resources and transportation as appropriate   Identify barriers to discharge with patient and caregiver   Identify discharge learning needs (meds, wound care, etc)   Arrange for interpreters to assist at discharge as needed   Refer to discharge planning if patient needs post-hospital services based on physician order or complex needs related to functional status, cognitive ability or social support system  10/9/2022 1625 by Juani Flores RN  Outcome: Progressing  Flowsheets (Taken 10/9/2022 0730)  Discharge to home or other facility with appropriate resources:   Identify barriers to discharge with patient and caregiver   Arrange for needed discharge resources and transportation as appropriate   Refer to discharge planning if patient needs post-hospital services based on physician order or complex needs related to functional status, cognitive ability or social support system   Identify discharge learning needs (meds, wound care, etc)     Problem: Safety - Adult  Goal: Free from fall injury  10/10/2022 0206 by Mahsa Milan RN  Outcome: Progressing  10/9/2022 1625 by Juani Flores RN  Outcome: Progressing     Problem: ABCDS Injury Assessment  Goal: Absence of physical injury  10/10/2022 0206 by Mahsa Milan RN  Outcome: Progressing  10/9/2022 1625 by Juani Flores RN  Outcome: Progressing     Problem: Confusion  Goal: Confusion, delirium, dementia, or psychosis is improved or at baseline  Description: INTERVENTIONS:  1.  Assess for possible contributors to thought disturbance, including medications, impaired vision or hearing, underlying metabolic abnormalities, dehydration, psychiatric diagnoses, and notify attending LIP  2. [place] high risk fall precautions, as indicated  3. Provide frequent short contacts to provide reality reorientation, refocusing and direction  4. Decrease environmental stimuli, including noise as appropriate  5. Monitor and intervene to maintain adequate nutrition, hydration, elimination, sleep and activity  6. If unable to ensure safety without constant attention obtain sitter and review sitter guidelines with assigned personnel  7. Initiate Psychosocial CNS and Spiritual Care consult, as indicated  10/10/2022 0206 by Jules Gilmore RN  Outcome: Progressing  10/9/2022 1625 by Ac Olivo RN  Outcome: Progressing     Problem: Pain  Goal: Verbalizes/displays adequate comfort level or baseline comfort level  10/10/2022 0206 by Jules Gilmore RN  Outcome: Progressing  10/9/2022 1625 by Ac Olivo RN  Outcome: Progressing  Flowsheets (Taken 10/9/2022 1600)  Verbalizes/displays adequate comfort level or baseline comfort level: Administer analgesics based on type and severity of pain and evaluate response     Problem: Skin/Tissue Integrity  Goal: Absence of new skin breakdown  Description: 1. Monitor for areas of redness and/or skin breakdown  2. Assess vascular access sites hourly  3. Every 4-6 hours minimum:  Change oxygen saturation probe site  4. Every 4-6 hours:  If on nasal continuous positive airway pressure, respiratory therapy assess nares and determine need for appliance change or resting period.   10/10/2022 0206 by Jules Gilmore RN  Outcome: Progressing  10/9/2022 1625 by Ac Olivo RN  Outcome: Progressing

## 2022-10-10 NOTE — PROGRESS NOTES
Comprehensive Nutrition Assessment    Type and Reason for Visit:  Initial, Positive Nutrition Screen    Nutrition Recommendations/Plan:   Follow for starting EN     Malnutrition Assessment:  Malnutrition Status:  Severe malnutrition (10/10/22 1527)    Context:  Chronic Illness     Findings of the 6 clinical characteristics of malnutrition:  Energy Intake:  Mild decrease in energy intake (Comment)  Weight Loss:  Greater than 7.5% over 3 months     Body Fat Loss:  Severe body fat loss Triceps, Orbital   Muscle Mass Loss:  Severe muscle mass loss Clavicles (pectoralis & deltoids), Temples (temporalis)  Fluid Accumulation:  No significant fluid accumulation Extremities   Strength:  Not Performed    Nutrition Assessment:    +NS for home TF/TPN. At present time pt is NPO. Aware for PEG replacement--had pulled it out. Nutrition Related Findings:              Current Nutrition Intake & Therapies:    Average Meal Intake: NPO  Average Supplements Intake: NPO  Diet NPO Exceptions are: Sips of Water with Meds    Anthropometric Measures:  Height: 6' 2\" (188 cm)  Ideal Body Weight (IBW): 190 lbs (86 kg)    Admission Body Weight: 135 lb (61.2 kg)  Current Body Weight: 135 lb (61.2 kg), 71.1 % IBW.     Current BMI (kg/m2): 17.3  Usual Body Weight: 149 lb 14.6 oz (68 kg)  % Weight Change (Calculated): -9.9    BMI Categories: Underweight (BMI less than 18.5)    Estimated Daily Nutrient Needs:  Energy Requirements Based On: Kcal/kg  Weight Used for Energy Requirements: Current  Energy (kcal/day): 9325-1891 kcal (30-35 kcals/kg)  Weight Used for Protein Requirements: Current  Protein (g/day): 91  Method Used for Fluid Requirements: 1 ml/kcal  Fluid (ml/day): 5838-0986 ml    Nutrition Diagnosis:   Inadequate oral intake related to altered GI function, swallowing difficulty as evidenced by NPO or clear liquid status due to medical condition, swallow study results, weight loss 7.5% in 3 months    Nutrition Interventions:   Food and/or Nutrient Delivery: Continue NPO  Nutrition Education/Counseling: No recommendation at this time  Coordination of Nutrition Care: Continue to monitor while inpatient       Goals:     Goals: Meet at least 75% of estimated needs       Nutrition Monitoring and Evaluation:   Behavioral-Environmental Outcomes: None Identified  Food/Nutrient Intake Outcomes: Enteral Nutrition Intake/Tolerance  Physical Signs/Symptoms Outcomes: Biochemical Data, Fluid Status or Edema, Weight, Skin    Discharge Planning:     Too soon to determine     Maxine Alves MS, RD, LD  Contact: [phone]
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
## 2022-10-11 VITALS
RESPIRATION RATE: 20 BRPM | HEART RATE: 95 BPM | OXYGEN SATURATION: 95 % | DIASTOLIC BLOOD PRESSURE: 84 MMHG | BODY MASS INDEX: 17.32 KG/M2 | TEMPERATURE: 97.3 F | WEIGHT: 135 LBS | SYSTOLIC BLOOD PRESSURE: 141 MMHG | HEIGHT: 74 IN

## 2022-10-11 LAB
ALBUMIN SERPL-MCNC: 3.3 G/DL (ref 3.5–5.2)
ALP BLD-CCNC: 58 U/L (ref 40–130)
ALT SERPL-CCNC: 64 U/L (ref 5–41)
ANION GAP SERPL CALCULATED.3IONS-SCNC: 14 MMOL/L (ref 7–19)
AST SERPL-CCNC: 42 U/L (ref 5–40)
BILIRUB SERPL-MCNC: 0.4 MG/DL (ref 0.2–1.2)
BUN BLDV-MCNC: 13 MG/DL (ref 6–20)
CALCIUM SERPL-MCNC: 9.4 MG/DL (ref 8.6–10)
CHLORIDE BLD-SCNC: 107 MMOL/L (ref 98–111)
CO2: 23 MMOL/L (ref 22–29)
CREAT SERPL-MCNC: 1.2 MG/DL (ref 0.5–1.2)
GFR AFRICAN AMERICAN: >59
GFR NON-AFRICAN AMERICAN: >60
GLUCOSE BLD-MCNC: 72 MG/DL (ref 70–99)
GLUCOSE BLD-MCNC: 74 MG/DL (ref 74–109)
GLUCOSE BLD-MCNC: 86 MG/DL (ref 70–99)
HCT VFR BLD CALC: 28 % (ref 42–52)
HEMOGLOBIN: 8.6 G/DL (ref 14–18)
MCH RBC QN AUTO: 26.9 PG (ref 27–31)
MCHC RBC AUTO-ENTMCNC: 30.7 G/DL (ref 33–37)
MCV RBC AUTO: 87.5 FL (ref 80–94)
PDW BLD-RTO: 17.2 % (ref 11.5–14.5)
PERFORMED ON: NORMAL
PERFORMED ON: NORMAL
PLATELET # BLD: 323 K/UL (ref 130–400)
PMV BLD AUTO: 11.9 FL (ref 9.4–12.4)
POTASSIUM REFLEX MAGNESIUM: 4.4 MMOL/L (ref 3.5–5)
RBC # BLD: 3.2 M/UL (ref 4.7–6.1)
SODIUM BLD-SCNC: 144 MMOL/L (ref 136–145)
TOTAL PROTEIN: 6.7 G/DL (ref 6.6–8.7)
WBC # BLD: 9.9 K/UL (ref 4.8–10.8)

## 2022-10-11 PROCEDURE — 6370000000 HC RX 637 (ALT 250 FOR IP): Performed by: INTERNAL MEDICINE

## 2022-10-11 PROCEDURE — 94640 AIRWAY INHALATION TREATMENT: CPT

## 2022-10-11 PROCEDURE — 82947 ASSAY GLUCOSE BLOOD QUANT: CPT

## 2022-10-11 PROCEDURE — 85027 COMPLETE CBC AUTOMATED: CPT

## 2022-10-11 PROCEDURE — 6360000002 HC RX W HCPCS: Performed by: INTERNAL MEDICINE

## 2022-10-11 PROCEDURE — 80053 COMPREHEN METABOLIC PANEL: CPT

## 2022-10-11 PROCEDURE — 36415 COLL VENOUS BLD VENIPUNCTURE: CPT

## 2022-10-11 PROCEDURE — 2580000003 HC RX 258: Performed by: INTERNAL MEDICINE

## 2022-10-11 RX ADMIN — LEVETIRACETAM 500 MG: 5 INJECTION INTRAVENOUS at 07:58

## 2022-10-11 RX ADMIN — IPRATROPIUM BROMIDE AND ALBUTEROL SULFATE 1 AMPULE: 2.5; .5 SOLUTION RESPIRATORY (INHALATION) at 11:42

## 2022-10-11 RX ADMIN — ACETYLCYSTEINE 400 MG: 200 SOLUTION ORAL; RESPIRATORY (INHALATION) at 11:42

## 2022-10-11 RX ADMIN — ACETYLCYSTEINE 400 MG: 200 SOLUTION ORAL; RESPIRATORY (INHALATION) at 06:21

## 2022-10-11 RX ADMIN — IPRATROPIUM BROMIDE AND ALBUTEROL SULFATE 1 AMPULE: 2.5; .5 SOLUTION RESPIRATORY (INHALATION) at 06:21

## 2022-10-11 RX ADMIN — HYDROMORPHONE HYDROCHLORIDE 0.25 MG: 1 INJECTION, SOLUTION INTRAMUSCULAR; INTRAVENOUS; SUBCUTANEOUS at 12:14

## 2022-10-11 RX ADMIN — FAMOTIDINE 20 MG: 20 TABLET ORAL at 08:53

## 2022-10-11 RX ADMIN — SODIUM CHLORIDE, POTASSIUM CHLORIDE, SODIUM LACTATE AND CALCIUM CHLORIDE: 600; 310; 30; 20 INJECTION, SOLUTION INTRAVENOUS at 08:12

## 2022-10-11 ASSESSMENT — PAIN SCALES - GENERAL
PAINLEVEL_OUTOF10: 0
PAINLEVEL_OUTOF10: 5

## 2022-10-11 ASSESSMENT — PAIN DESCRIPTION - DESCRIPTORS: DESCRIPTORS: ACHING

## 2022-10-11 ASSESSMENT — PAIN - FUNCTIONAL ASSESSMENT: PAIN_FUNCTIONAL_ASSESSMENT: ACTIVITIES ARE NOT PREVENTED

## 2022-10-11 ASSESSMENT — PAIN DESCRIPTION - LOCATION: LOCATION: GENERALIZED

## 2022-10-11 NOTE — CARE COORDINATION
This patient is current with [facility]. Resumption of care orders received. Will notify [facility]. Please fax discharge summary, AVS, and any new orders to [facility] upon patient discharge. [facility]:   PHONE: [phone]: [phone]    Thank you  Electronically signed by Kelsea Yin RN on 10/11/2022 at 10:57 AM

## 2022-10-11 NOTE — PROGRESS NOTES
Attempted to notify mother that patient is to be discharged home today but message mailbox was full so I was unable to leave a message. Will continue to attempt to notify.

## 2022-10-11 NOTE — CONSULTS
Comprehensive Nutrition Assessment    Type and Reason for Visit:  Consult    Nutrition Recommendations/Plan:   Start bolus feedings using pump here--240ml over 30min 6x a day with 60ml free water flush before and after feeding     Malnutrition Assessment:  Malnutrition Status:  Severe malnutrition (10/11/22 1133)    Context:  Chronic Illness     Findings of the 6 clinical characteristics of malnutrition:  Energy Intake:  Mild decrease in energy intake (Comment)  Weight Loss:  Greater than 7.5% over 3 months     Body Fat Loss:  Severe body fat loss Triceps, Orbital   Muscle Mass Loss:  Severe muscle mass loss Clavicles (pectoralis & deltoids), Temples (temporalis)  Fluid Accumulation:  No significant fluid accumulation Extremities   Strength:  Not Performed    Nutrition Assessment:    PEG has been replaced and aware of possible discharge home. Pt has been receiving Jevity 1.5 at home. Will restart that as bolus feeding. Suggest starting back at 240ml over 30minutes 6x a day with 60ml of water to flush the tube before and after feeding. After 2 days, advancing feeding to 360ml 4x a day again with 60ml of water before and after feedings. Nutrition Related Findings:      Wound Type: None       Current Nutrition Intake & Therapies:    Average Meal Intake: NPO  Average Supplements Intake: NPO  Current Tube Feeding (TF) Orders:  Feeding Route: PEG  Formula: Standard with Fiber  Schedule: Bolus  Feeding Regimen: Jevity 1.5 240ml over 30 minutes 6x a day. Work toward 360ml 4x a day  Additives/Modulars: None  Water Flushes: 60ml water before and after feedings  Current TF & Flush Orders Provides: 0  Goal TF & Flush Orders Provides: Jevity 1.5 240ml 6x/day with 60ml free water before and after feeding = 2160 kcals with 9g protein, 310g CHO and 1094ml free ater from formula.   720ml free water from flush    Anthropometric Measures:  Height: 6' 2\" (188 cm)  Ideal Body Weight (IBW): 190 lbs (86 kg)    Admission Body Weight: 135 lb (61.2 kg)  Current Body Weight: 135 lb (61.2 kg), 71.1 % IBW. Current BMI (kg/m2): 17.3  Usual Body Weight: 149 lb 14.6 oz (68 kg)  % Weight Change (Calculated): -9.9    BMI Categories: Underweight (BMI less than 18.5)    Estimated Daily Nutrient Needs:  Energy Requirements Based On: Kcal/kg  Weight Used for Energy Requirements: Current  Energy (kcal/day): 3403-6891 kcal (30-35 kcals/kg)  Weight Used for Protein Requirements: Current  Protein (g/day): 91  Method Used for Fluid Requirements: 1 ml/kcal  Fluid (ml/day): 5701-4423 ml    Nutrition Diagnosis:   Inadequate oral intake related to swallowing difficulty as evidenced by NPO or clear liquid status due to medical condition, nutrition support - enteral nutrition, weight loss 7.5% in 3 months    Nutrition Interventions:   Food and/or Nutrient Delivery: Continue NPO, Start Tube Feeding  Nutrition Education/Counseling: No recommendation at this time  Coordination of Nutrition Care: Continue to monitor while inpatient  Plan of Care discussed with: nursing    Goals:  Previous Goal Met: Progressing toward Goal(s)  Goals: Meet at least 75% of estimated needs       Nutrition Monitoring and Evaluation:   Behavioral-Environmental Outcomes: None Identified  Food/Nutrient Intake Outcomes: Enteral Nutrition Intake/Tolerance  Physical Signs/Symptoms Outcomes: Biochemical Data, Fluid Status or Edema, Weight, Skin    Discharge Planning:    Enteral Nutrition     Spoke with pt's Mom about what the feeding at home should be, how long it should take to give it and how much free water to give before and after feeding to flush tube. Mom very knowledgeable. Had been giving 6 feedings or a feeding every 4 hours of Jevity 1.5 but had also been giving 220ml free water with every feeding. Aware to continue 5-6 feedings of Jevity 1.5 but decrease free water to 30-60ml of water before and after feeding. Understood and verbalized back.     Kofi Walters, MS, RD, [facility]  Contact: [phone]
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
## 2022-10-11 NOTE — PROGRESS NOTES
PEG site inspected  No drainage noted  Dressing needs to be changed and abdominal binder not applied  Plan:  Start feedings per dietitian  Abdominal binder as ordered yesterday    Dr. Cara Velazquez  10/11/2022

## 2022-10-20 PROBLEM — N39.0 UTI (URINARY TRACT INFECTION): Status: RESOLVED | Noted: 2022-09-20 | Resolved: 2022-10-20

## 2022-10-21 ENCOUNTER — HOSPITAL ENCOUNTER (EMERGENCY)
Age: 37
Discharge: HOME | End: 2022-10-21
Attending: EMERGENCY MEDICINE
Payer: MEDICARE

## 2022-10-21 VITALS
SYSTOLIC BLOOD PRESSURE: 125 MMHG | HEART RATE: 85 BPM | OXYGEN SATURATION: 93 % | DIASTOLIC BLOOD PRESSURE: 81 MMHG | TEMPERATURE: 98 F | RESPIRATION RATE: 16 BRPM

## 2022-10-21 DIAGNOSIS — J95.03 TRACHEOSTOMY MALFUNCTION (HCC): Primary | ICD-10-CM

## 2022-10-21 PROCEDURE — 99283 EMERGENCY DEPT VISIT LOW MDM: CPT | Performed by: EMERGENCY MEDICINE

## 2022-10-21 PROCEDURE — 31502 CHANGE OF WINDPIPE AIRWAY: CPT

## 2022-10-21 NOTE — ED NOTES
Unable to replace trach with same size that pt;s mother brought from home.        Annemarie Charlton, RN  10/21/22 6917

## 2022-10-21 NOTE — ED NOTES
Central supply called and able to obtain a 5.0 trach.   Dr Jeyson Mcfarlane able to replace      Angelica Rubio RN  10/21/22 6038

## 2022-10-21 NOTE — ED PROVIDER NOTES
esophagitis, antral gastritis    GASTROSTOMY TUBE PLACEMENT N/A 10/10/2022     Chris Pain, no h pylori    TRACHEOSTOMY      UPPER GASTROINTESTINAL ENDOSCOPY N/A 09/22/2022    Dr Hui Shoulder distal reflux esophagitis causing stricture of distal esophagus, unable to advance the scope into the stomach, distal esophagus severely inflamed, repeat in 2 months    UPPER GASTROINTESTINAL ENDOSCOPY  10/10/2022    Dr Sandoval-w/PEG placement-Gastritis, no h pylori         CURRENT MEDICATIONS       Previous Medications    ACETAMINOPHEN (TYLENOL) 325 MG TABLET    Take 650 mg by mouth every 4 hours as needed for Pain    ACETYLCYSTEINE (MUCOMYST) 10 % NEBULIZER SOLUTION    Inhale 4 mLs into the lungs every 4 hours    ARIPIPRAZOLE (ABILIFY) 20 MG TABLET    Take 20 mg by mouth daily    BISACODYL (DULCOLAX) 5 MG EC TABLET    Take 5 mg by mouth daily as needed for Constipation    CITALOPRAM (CELEXA) 20 MG TABLET    Take 20 mg by mouth daily    ERGOCALCIFEROL (VITAMIN D) 19744 UNITS CAPS    Take 50,000 Units by mouth once a week for 5 days    FAMOTIDINE (PEPCID) 20 MG TABLET    Take 20 mg by mouth 2 times daily as needed    GUAIFENESIN (ROBITUSSIN) 100 MG/5ML SYRUP    Take 200 mg by mouth 3 times daily as needed for Cough    LACTOBACILLUS (CULTURELLE) CAPS CAPSULE    Take 1 capsule by mouth daily    LANSOPRAZOLE 3 MG/ML SUSP    Take 10 mLs by mouth 2 times daily (before meals)    LEVETIRACETAM (KEPPRA) 100 MG/ML SOLUTION    Take 500 mg by mouth 2 times daily    OXYCODONE (ROXICODONE) 5 MG IMMEDIATE RELEASE TABLET    Take 5 mg by mouth every 8 hours as needed for Pain. PROPRANOLOL (INDERAL) 20 MG/5ML SOLUTION    Take 20 mg by mouth 4 times daily as needed       ALLERGIES     Latex, Levofloxacin, Magnesium oxide, Pantoprazole sodium, and Levetiracetam    FAMILY HISTORY     No family history on file.        SOCIAL HISTORY       Social History     Socioeconomic History    Marital status: Single   Vaping Use    Vaping Use: Unknown Substance and Sexual Activity    Sexual activity: Not Currently       SCREENINGS    Beth Coma Scale  Eye Opening: Spontaneous  Best Verbal Response: Oriented (pt looking around)  Best Motor Response: Obeys commands  Beth Coma Scale Score: 15        PHYSICAL EXAM    (up to 7 for level 4, 8 or more for level 5)     ED Triage Vitals   BP Temp Temp Source Heart Rate Resp SpO2 Height Weight   10/21/22 1517 10/21/22 1538 10/21/22 1538 10/21/22 1517 10/21/22 1517 10/21/22 1517 -- --   125/81 98 °F (36.7 °C) Axillary 85 16 92 %         Physical Exam  Constitutional:       General: He is not in acute distress. Appearance: He is well-developed. He is not diaphoretic. Comments: Chronically ill appearing   HENT:      Head: Normocephalic and atraumatic. Eyes:      Pupils: Pupils are equal, round, and reactive to light. Neck:      Comments: Trach site with minimal bleeding  Cardiovascular:      Rate and Rhythm: Normal rate and regular rhythm. Heart sounds: Normal heart sounds. Pulmonary:      Effort: Pulmonary effort is normal. No respiratory distress. Breath sounds: Normal breath sounds. Abdominal:      General: Bowel sounds are normal. There is no distension. Palpations: Abdomen is soft. Tenderness: There is no abdominal tenderness. Musculoskeletal:         General: Normal range of motion. Skin:     General: Skin is warm and dry. Findings: No rash. Neurological:      Cranial Nerves: No cranial nerve deficit. Motor: No abnormal muscle tone.       Coordination: Coordination normal.      Comments: contractures   Psychiatric:         Behavior: Behavior normal.       DIAGNOSTIC RESULTS     EKG: All EKG's are interpreted by the Emergency Department Physician who either signs or Co-signs this chart in the absence of a cardiologist.        RADIOLOGY:   Non-plain film images such as CT, Ultrasound and MRI are read by the radiologist. Plainradiographic images are visualized and preliminarily interpreted by the emergency physician with the below findings:        Interpretation per the Radiologist below, if available at the time of this note:    No orders to display         ED BEDSIDE ULTRASOUND:   Performed by ED Physician - none    LABS:  Labs Reviewed - No data to display    All other labs were within normal range or not returned as of this dictation. EMERGENCY DEPARTMENT COURSE and DIFFERENTIALDIAGNOSIS/MDM:   Vitals:    Vitals:    10/21/22 1517 10/21/22 1538 10/21/22 1540   BP: 125/81     Pulse: 85     Resp: 16     Temp:  98 °F (36.7 °C)    TempSrc:  Axillary    SpO2: 92%  93%       MDM    Unable to replace the size 6 trach the he previously had in place, so placed a size 5. CONSULTS:  None    PROCEDURES:  Unless otherwise notedbelow, none     Change Tracheostomy    Date/Time: 10/21/2022 3:57 PM  Performed by: Flavia Harmon MD  Authorized by: Flavia Harmon MD   Consent: Verbal consent obtained. Consent given by: parent  Patient identity confirmed: verbally with patient  Indications: became dislodged  Local anesthesia used: no    Anesthesia:  Local anesthesia used: no    Sedation:  Patient sedated: no    Tube cuff: single cuff  Tube size: 5.0 mm  Cuff inflation: deflated  Cuff type: air  Patient tolerance: patient tolerated the procedure well with no immediate complications        FINAL IMPRESSION     1.  Tracheostomy malfunction [facility])          DISPOSITION/PLAN   DISPOSITION Decision To Discharge 10/21/2022 03:51:51 PM      PATIENT REFERRED TO:  @FUP@    DISCHARGE MEDICATIONS:  New Prescriptions    No medications on file          (Please note that portions of this note were completed with a voice recognition program.  Efforts were made to edit the dictations butoccasionally words are mis-transcribed.)    Flavia Harmon MD (electronically signed)  AttendingEmergency Physician         Flavia Harmon MD  10/21/22 6686

## 2022-10-24 ENCOUNTER — HOSPITAL ENCOUNTER (EMERGENCY)
Age: 37
Discharge: HOME | End: 2022-10-24
Attending: PEDIATRICS
Payer: MEDICARE

## 2022-10-24 VITALS
OXYGEN SATURATION: 94 % | HEART RATE: 68 BPM | TEMPERATURE: 97.7 F | SYSTOLIC BLOOD PRESSURE: 110 MMHG | RESPIRATION RATE: 17 BRPM | DIASTOLIC BLOOD PRESSURE: 82 MMHG

## 2022-10-24 DIAGNOSIS — Z43.0 ENCOUNTER FOR TRACHEOSTOMY TUBE CHANGE (HCC): Primary | ICD-10-CM

## 2022-10-24 PROCEDURE — 99283 EMERGENCY DEPT VISIT LOW MDM: CPT | Performed by: PEDIATRICS

## 2022-10-24 NOTE — ED NOTES
Pt trach replaced at this time by Dr. Marian Sutton at bedside using sterile technique. Pt tolerated without evidence of difficulty or distress.       Nick Grewal RN  10/24/22 5130

## 2022-10-28 ENCOUNTER — HOSPITAL ENCOUNTER (EMERGENCY)
Age: 37
Discharge: HOME | End: 2022-10-28
Attending: EMERGENCY MEDICINE
Payer: MEDICARE

## 2022-10-28 VITALS
OXYGEN SATURATION: 97 % | RESPIRATION RATE: 15 BRPM | SYSTOLIC BLOOD PRESSURE: 110 MMHG | TEMPERATURE: 98.2 F | HEART RATE: 62 BPM | DIASTOLIC BLOOD PRESSURE: 80 MMHG

## 2022-10-28 DIAGNOSIS — Z43.0 ENCOUNTER FOR TRACHEOSTOMY TUBE CHANGE (HCC): Primary | ICD-10-CM

## 2022-10-28 PROCEDURE — 99283 EMERGENCY DEPT VISIT LOW MDM: CPT

## 2022-10-28 PROCEDURE — 6370000000 HC RX 637 (ALT 250 FOR IP): Performed by: EMERGENCY MEDICINE

## 2022-10-28 RX ORDER — LORAZEPAM 1 MG/1
1 TABLET ORAL ONCE
Status: COMPLETED | OUTPATIENT
Start: 2022-10-28 | End: 2022-10-28

## 2022-10-28 RX ADMIN — LORAZEPAM 1 MG: 1 TABLET ORAL at 05:50

## 2022-10-28 SDOH — ECONOMIC STABILITY: FOOD INSECURITY: WITHIN THE PAST 12 MONTHS, THE FOOD YOU BOUGHT JUST DIDN'T LAST AND YOU DIDN'T HAVE MONEY TO GET MORE.: NOT ASKED

## 2022-10-28 ASSESSMENT — PAIN - FUNCTIONAL ASSESSMENT: PAIN_FUNCTIONAL_ASSESSMENT: FACE, LEGS, ACTIVITY, CRY, AND CONSOLABILITY (FLACC)

## 2022-10-28 ASSESSMENT — LIFESTYLE VARIABLES: HOW OFTEN DO YOU HAVE A DRINK CONTAINING ALCOHOL: NEVER

## 2022-10-28 NOTE — ED NOTES
#4 cuffed shiley trach placed by Dr Gigi Mckay. Pt tolerated well. O2 sat 98% after placement. No resp distress noted. Pt with productive cough. Suctioned. Pt's mom and caregiver at [facility].      Werner Carlos RN  10/28/22 5416

## 2022-10-28 NOTE — ED NOTES
Pt's mother states pt is becoming agitated and attempting to pull out his trach. Mom requesting pain medication for pt. Dr Za Lee notified.      Bethany Elias RN  10/28/22 5390
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
## 2022-10-28 NOTE — ED NOTES
Pt arrives from home via EMS after pt pulled out his #5 cuffed shiley trach. Pt arrives ORA at this time. Tracheostomy site open. No resp distress noted. Pt with history of TBI.        Danni Green RN  10/28/22 8947

## 2022-11-01 NOTE — ED PROVIDER NOTES
[facility] EMERGENCY DEPT  eMERGENCY dEPARTMENT eNCOUnter      Pt Name: Gala Fox  MRN: [id number]  [place] 1985  Date of evaluation: 10/24/2022  Provider: Esthela Salcedo MD    CHIEF COMPLAINT       Chief Complaint   Patient presents with    Tracheostomy Tube Change     Pt pulled out trach          HISTORY OF PRESENT ILLNESS   (Location/Symptom, Timing/Onset,Context/Setting, Quality, Duration, Modifying Factors, Severity)  Note limiting factors. Gala Fox is a 30 y.o. male who presents to the emergency department after tracheostomy tube came out. Patient is nonverbal and is unable to give hx. Patient has pulled his trach tube out in the past.  Patient  has a history of TBI, and respiratory failure following trauma. HPI    NursingNotes were reviewed.     REVIEW OF SYSTEMS    (2-9 systems for level 4, 10 or more for level 5)     Review of Systems   Unable to perform ROS: Patient nonverbal   Respiratory:          Trach dislodged          PAST MEDICALHISTORY     Past Medical History:   Diagnosis Date    Acute pulmonary embolism ([address] 4/29/2022    Formatting of this note might be different from the original. Added automatically from request for surgery [id number]    Bipolar disorder ([address] 9/20/2022    Cardiac arrest ([address] 4/22/2022    Hydrocephalus ([address] 4/29/2022    Formatting of this note might be different from the original. Added automatically from request for surgery [id number]    Nonverbal     Palliative care patient 09/23/2022    Respiratory failure following trauma ([address] 2/11/2022    Subarachnoid hemorrhage following injury 2/11/2022    Traumatic brain injury with loss of consciousness ([address] 2/11/2022    Formatting of this note might be different from the original. Added automatically from request for surgery [id number]         SURGICAL HISTORY       Past Surgical History:   Procedure Laterality Date    GASTROSTOMY TUBE PLACEMENT  10/10/2022    Dr Moya/PEG tube placement in previous PEG site-Slightly erosive esophagitis, antral gastritis    GASTROSTOMY TUBE PLACEMENT N/A 10/10/2022    Dr Juany Kaplan, no h pylori    TRACHEOSTOMY      UPPER GASTROINTESTINAL ENDOSCOPY N/A 09/22/2022    Dr Suzanne Tavarez distal reflux esophagitis causing stricture of distal esophagus, unable to advance the scope into the stomach, distal esophagus severely inflamed, repeat in 2 months    UPPER GASTROINTESTINAL ENDOSCOPY  10/10/2022    Dr Sandoval-w/PEG placement-Gastritis, no h pylori         CURRENT MEDICATIONS     Discharge Medication List as of 10/24/2022  6:10 AM        CONTINUE these medications which have NOT CHANGED    Details   lactobacillus (CULTURELLE) CAPS capsule Take 1 capsule by mouth daily, Disp-30 capsule, R-0Normal      lansoprazole 3 MG/ML SUSP Take 10 mLs by mouth 2 times daily (before meals), Disp-300 mL, R-0Normal      Ergocalciferol (VITAMIN D) 90414 units CAPS Take 50,000 Units by mouth once a week for 5 days, Disp-5 capsule, R-0Normal      levETIRAcetam (KEPPRA) 100 MG/ML solution Take 500 mg by mouth 2 times dailyHistorical Med      oxyCODONE (ROXICODONE) 5 MG immediate release tablet Take 5 mg by mouth every 8 hours as needed for Pain. Historical Med      famotidine (PEPCID) 20 MG tablet Take 20 mg by mouth 2 times daily as neededHistorical Med      acetaminophen (TYLENOL) 325 MG tablet Take 650 mg by mouth every 4 hours as needed for PainHistorical Med      guaiFENesin (ROBITUSSIN) 100 MG/5ML syrup Take 200 mg by mouth 3 times daily as needed for CoughHistorical Med      bisacodyl (DULCOLAX) 5 MG EC tablet Take 5 mg by mouth daily as needed for ConstipationHistorical Med      propranolol (INDERAL) 20 MG/5ML solution Take 20 mg by mouth 4 times daily as neededHistorical Med      acetylcysteine (MUCOMYST) 10 % nebulizer solution Inhale 4 mLs into the lungs every 4 hours, Disp-30 mL, R-0Normal      citalopram (CELEXA) 20 MG tablet Take 20 mg by mouth dailyHistorical Med      ARIPiprazole respiratory distress. Breath sounds: Normal breath sounds. No stridor. No wheezing, rhonchi or rales. Abdominal:      General: Bowel sounds are normal. There is no distension. Palpations: Abdomen is soft. Tenderness: There is no abdominal tenderness. There is no guarding or rebound. Musculoskeletal:         General: No tenderness or deformity. Cervical back: Neck supple. No rigidity. Skin:     General: Skin is warm and dry. Capillary Refill: Capillary refill takes less than 2 seconds. Coloration: Skin is not jaundiced. Neurological:      Mental Status: He is alert. Mental status is at baseline. Motor: Weakness present. Psychiatric:         Cognition and Memory: Cognition is impaired. DIAGNOSTIC RESULTS     RADIOLOGY:  Non-plain film images such as CT, Ultrasound and MRI are read by the radiologist. Plain radiographic images are visualized and preliminarily interpreted bythe emergency physician with the below findings:          No orders to display           LABS:  Labs Reviewed - No data to display    All other labs were within normal range or not returned as of this dictation. EMERGENCY DEPARTMENT COURSE and DIFFERENTIAL DIAGNOSIS/MDM:   Vitals:    Vitals:    10/24/22 0506 10/24/22 0515 10/24/22 0610   BP: 113/83  110/82   Pulse: 70  68   Resp: 18  17   Temp:  97.7 °F (36.5 °C)    SpO2: 90%  94%       MDM  31yo male presents with dislodged tracheostomy. New trach obtained and inserted without difficulty. Patient will be discharged to home to follow up with his PCP. Patient will return difficulty breathing, productive cough, fever or other concerns. CONSULTS:  None    PROCEDURES:  Unless otherwise noted below, none     Procedures    FINAL IMPRESSION      1.  Encounter for tracheostomy tube change [facility])          DISPOSITION/PLAN   DISPOSITION Decision To Discharge 10/24/2022 05:43:44 AM      PATIENT REFERRED TO:  Nigel Velasquez MD  [address] [place] [address] [place] [address]  [phone]    Schedule an appointment as soon as possible for a visit         DISCHARGE MEDICATIONS:  Discharge Medication List as of 10/24/2022  6:10 AM             (Please note that portions of this note were completed with a voice recognition program.  Efforts were made to edit thedictations but occasionally words are mis-transcribed.)    Jusdon Hodges MD (electronically signed)  Attending Emergency Physician          Judson Hodges MD  10/31/22 0260

## 2022-11-04 ENCOUNTER — APPOINTMENT (OUTPATIENT)
Dept: CT IMAGING | Age: 37
DRG: 393 | End: 2022-11-04
Payer: MEDICARE

## 2022-11-04 ENCOUNTER — APPOINTMENT (OUTPATIENT)
Dept: GENERAL RADIOLOGY | Age: 37
DRG: 393 | End: 2022-11-04
Payer: MEDICARE

## 2022-11-04 ENCOUNTER — HOSPITAL ENCOUNTER (INPATIENT)
Age: 37
LOS: 6 days | Discharge: HOME HEALTH | DRG: 393 | End: 2022-11-10
Attending: PEDIATRICS | Admitting: HOSPITALIST
Payer: MEDICARE

## 2022-11-04 DIAGNOSIS — Z43.1 ENCOUNTER FOR PEG (PERCUTANEOUS ENDOSCOPIC GASTROSTOMY) (HCC): ICD-10-CM

## 2022-11-04 DIAGNOSIS — T85.528A DISLODGED GASTROSTOMY TUBE: Primary | ICD-10-CM

## 2022-11-04 DIAGNOSIS — R63.30 FEEDING DIFFICULTIES: ICD-10-CM

## 2022-11-04 PROBLEM — K94.29 GASTRIC TUBE GRANULATION TISSUE (HCC): Status: ACTIVE | Noted: 2022-11-04

## 2022-11-04 LAB
ALBUMIN SERPL-MCNC: 3.8 G/DL (ref 3.5–5.2)
ALP BLD-CCNC: 69 U/L (ref 40–130)
ALT SERPL-CCNC: 46 U/L (ref 5–41)
ANION GAP SERPL CALCULATED.3IONS-SCNC: 13 MMOL/L (ref 7–19)
APTT: 32.9 SEC (ref 26–36.2)
AST SERPL-CCNC: 30 U/L (ref 5–40)
BASOPHILS ABSOLUTE: 0.1 K/UL (ref 0–0.2)
BASOPHILS RELATIVE PERCENT: 0.4 % (ref 0–1)
BILIRUB SERPL-MCNC: 0.5 MG/DL (ref 0.2–1.2)
BUN BLDV-MCNC: 8 MG/DL (ref 6–20)
CALCIUM SERPL-MCNC: 9.8 MG/DL (ref 8.6–10)
CHLORIDE BLD-SCNC: 98 MMOL/L (ref 98–111)
CO2: 22 MMOL/L (ref 22–29)
CREAT SERPL-MCNC: 0.8 MG/DL (ref 0.5–1.2)
EOSINOPHILS ABSOLUTE: 0.2 K/UL (ref 0–0.6)
EOSINOPHILS RELATIVE PERCENT: 1 % (ref 0–5)
FERRITIN: 254.3 NG/ML (ref 30–400)
FOLATE: 14.1 NG/ML (ref 4.5–32.2)
GFR SERPL CREATININE-BSD FRML MDRD: >60 ML/MIN/{1.73_M2}
GLUCOSE BLD-MCNC: 107 MG/DL (ref 74–109)
HCT VFR BLD CALC: 38.7 % (ref 42–52)
HEMOGLOBIN: 12.6 G/DL (ref 14–18)
IMMATURE GRANULOCYTES #: 0.1 K/UL
INR BLD: 1.15 (ref 0.88–1.18)
IRON SATURATION: 23 % (ref 14–50)
IRON: 61 UG/DL (ref 59–158)
LYMPHOCYTES ABSOLUTE: 1.7 K/UL (ref 1.1–4.5)
LYMPHOCYTES RELATIVE PERCENT: 9.4 % (ref 20–40)
MAGNESIUM: 1.8 MG/DL (ref 1.6–2.6)
MCH RBC QN AUTO: 27.5 PG (ref 27–31)
MCHC RBC AUTO-ENTMCNC: 32.6 G/DL (ref 33–37)
MCV RBC AUTO: 84.5 FL (ref 80–94)
MONOCYTES ABSOLUTE: 1.3 K/UL (ref 0–0.9)
MONOCYTES RELATIVE PERCENT: 7.3 % (ref 0–10)
NEUTROPHILS ABSOLUTE: 14.3 K/UL (ref 1.5–7.5)
NEUTROPHILS RELATIVE PERCENT: 81.5 % (ref 50–65)
PDW BLD-RTO: 15.2 % (ref 11.5–14.5)
PHOSPHORUS: 3.8 MG/DL (ref 2.5–4.5)
PLATELET # BLD: 407 K/UL (ref 130–400)
PMV BLD AUTO: 11 FL (ref 9.4–12.4)
POTASSIUM SERPL-SCNC: 4.3 MMOL/L (ref 3.5–5)
PROTHROMBIN TIME: 14.6 SEC (ref 12–14.6)
RBC # BLD: 4.58 M/UL (ref 4.7–6.1)
SARS-COV-2, NAAT: NOT DETECTED
SODIUM BLD-SCNC: 133 MMOL/L (ref 136–145)
TOTAL IRON BINDING CAPACITY: 266 UG/DL (ref 250–400)
TOTAL PROTEIN: 7.6 G/DL (ref 6.6–8.7)
VITAMIN B-12: 1732 PG/ML (ref 211–946)
WBC # BLD: 17.6 K/UL (ref 4.8–10.8)

## 2022-11-04 PROCEDURE — 84100 ASSAY OF PHOSPHORUS: CPT

## 2022-11-04 PROCEDURE — 82607 VITAMIN B-12: CPT

## 2022-11-04 PROCEDURE — 82746 ASSAY OF FOLIC ACID SERUM: CPT

## 2022-11-04 PROCEDURE — 6360000002 HC RX W HCPCS: Performed by: HOSPITALIST

## 2022-11-04 PROCEDURE — 80053 COMPREHEN METABOLIC PANEL: CPT

## 2022-11-04 PROCEDURE — 6370000000 HC RX 637 (ALT 250 FOR IP): Performed by: HOSPITALIST

## 2022-11-04 PROCEDURE — 6360000004 HC RX CONTRAST MEDICATION: Performed by: PEDIATRICS

## 2022-11-04 PROCEDURE — 1210000000 HC MED SURG R&B

## 2022-11-04 PROCEDURE — 6370000000 HC RX 637 (ALT 250 FOR IP): Performed by: PEDIATRICS

## 2022-11-04 PROCEDURE — 6360000002 HC RX W HCPCS

## 2022-11-04 PROCEDURE — 85025 COMPLETE CBC W/AUTO DIFF WBC: CPT

## 2022-11-04 PROCEDURE — 87635 SARS-COV-2 COVID-19 AMP PRB: CPT

## 2022-11-04 PROCEDURE — 85730 THROMBOPLASTIN TIME PARTIAL: CPT

## 2022-11-04 PROCEDURE — 83550 IRON BINDING TEST: CPT

## 2022-11-04 PROCEDURE — 94640 AIRWAY INHALATION TREATMENT: CPT

## 2022-11-04 PROCEDURE — 83540 ASSAY OF IRON: CPT

## 2022-11-04 PROCEDURE — 83735 ASSAY OF MAGNESIUM: CPT

## 2022-11-04 PROCEDURE — 99231 SBSQ HOSP IP/OBS SF/LOW 25: CPT | Performed by: SPECIALIST

## 2022-11-04 PROCEDURE — 99285 EMERGENCY DEPT VISIT HI MDM: CPT

## 2022-11-04 PROCEDURE — 74019 RADEX ABDOMEN 2 VIEWS: CPT

## 2022-11-04 PROCEDURE — 85610 PROTHROMBIN TIME: CPT

## 2022-11-04 PROCEDURE — 71250 CT THORAX DX C-: CPT

## 2022-11-04 PROCEDURE — 2580000003 HC RX 258: Performed by: HOSPITALIST

## 2022-11-04 PROCEDURE — 74176 CT ABD & PELVIS W/O CONTRAST: CPT

## 2022-11-04 PROCEDURE — 82728 ASSAY OF FERRITIN: CPT

## 2022-11-04 PROCEDURE — 36415 COLL VENOUS BLD VENIPUNCTURE: CPT

## 2022-11-04 PROCEDURE — 99221 1ST HOSP IP/OBS SF/LOW 40: CPT | Performed by: SURGERY

## 2022-11-04 RX ORDER — ARIPIPRAZOLE 5 MG/1
20 TABLET ORAL DAILY
Status: DISCONTINUED | OUTPATIENT
Start: 2022-11-04 | End: 2022-11-10 | Stop reason: HOSPADM

## 2022-11-04 RX ORDER — CALCIUM CARBONATE 200(500)MG
500 TABLET,CHEWABLE ORAL 3 TIMES DAILY PRN
Status: DISCONTINUED | OUTPATIENT
Start: 2022-11-04 | End: 2022-11-10 | Stop reason: HOSPADM

## 2022-11-04 RX ORDER — SODIUM CHLORIDE 9 MG/ML
INJECTION, SOLUTION INTRAVENOUS PRN
Status: DISCONTINUED | OUTPATIENT
Start: 2022-11-04 | End: 2022-11-10 | Stop reason: HOSPADM

## 2022-11-04 RX ORDER — LANSOPRAZOLE
30 KIT
Status: DISCONTINUED | OUTPATIENT
Start: 2022-11-04 | End: 2022-11-10 | Stop reason: HOSPADM

## 2022-11-04 RX ORDER — ACETAMINOPHEN 650 MG/1
650 SUPPOSITORY RECTAL EVERY 4 HOURS PRN
Status: DISCONTINUED | OUTPATIENT
Start: 2022-11-04 | End: 2022-11-10 | Stop reason: HOSPADM

## 2022-11-04 RX ORDER — ZIPRASIDONE MESYLATE 20 MG/ML
10 INJECTION, POWDER, LYOPHILIZED, FOR SOLUTION INTRAMUSCULAR EVERY 12 HOURS PRN
Status: DISCONTINUED | OUTPATIENT
Start: 2022-11-04 | End: 2022-11-04

## 2022-11-04 RX ORDER — ACETYLCYSTEINE 200 MG/ML
2 SOLUTION ORAL; RESPIRATORY (INHALATION) EVERY 4 HOURS
Status: DISCONTINUED | OUTPATIENT
Start: 2022-11-04 | End: 2022-11-07

## 2022-11-04 RX ORDER — SODIUM CHLORIDE 0.9 % (FLUSH) 0.9 %
5-40 SYRINGE (ML) INJECTION PRN
Status: DISCONTINUED | OUTPATIENT
Start: 2022-11-04 | End: 2022-11-10 | Stop reason: HOSPADM

## 2022-11-04 RX ORDER — LORAZEPAM 1 MG/1
1 TABLET ORAL ONCE
Status: COMPLETED | OUTPATIENT
Start: 2022-11-04 | End: 2022-11-04

## 2022-11-04 RX ORDER — PROPRANOLOL HYDROCHLORIDE 20 MG/1
20 TABLET ORAL 4 TIMES DAILY PRN
Status: DISCONTINUED | OUTPATIENT
Start: 2022-11-04 | End: 2022-11-05

## 2022-11-04 RX ORDER — LEVETIRACETAM 10 MG/ML
1000 INJECTION INTRAVASCULAR ONCE
Status: COMPLETED | OUTPATIENT
Start: 2022-11-04 | End: 2022-11-04

## 2022-11-04 RX ORDER — LORAZEPAM 2 MG/ML
1 INJECTION INTRAMUSCULAR ONCE
Status: COMPLETED | OUTPATIENT
Start: 2022-11-04 | End: 2022-11-04

## 2022-11-04 RX ORDER — LORAZEPAM 2 MG/ML
INJECTION INTRAMUSCULAR
Status: COMPLETED
Start: 2022-11-04 | End: 2022-11-04

## 2022-11-04 RX ORDER — LACTOBACILLUS RHAMNOSUS GG 10B CELL
1 CAPSULE ORAL DAILY
Status: DISCONTINUED | OUTPATIENT
Start: 2022-11-04 | End: 2022-11-10 | Stop reason: HOSPADM

## 2022-11-04 RX ORDER — LEVETIRACETAM 5 MG/ML
500 INJECTION INTRAVASCULAR EVERY 12 HOURS
Status: DISCONTINUED | OUTPATIENT
Start: 2022-11-04 | End: 2022-11-10 | Stop reason: HOSPADM

## 2022-11-04 RX ORDER — ACETAMINOPHEN 325 MG/1
650 TABLET ORAL EVERY 4 HOURS PRN
Status: DISCONTINUED | OUTPATIENT
Start: 2022-11-04 | End: 2022-11-10 | Stop reason: HOSPADM

## 2022-11-04 RX ORDER — CITALOPRAM 20 MG/1
20 TABLET ORAL DAILY
Status: DISCONTINUED | OUTPATIENT
Start: 2022-11-04 | End: 2022-11-10 | Stop reason: HOSPADM

## 2022-11-04 RX ORDER — GUAIFENESIN 100 MG/5ML
200 SYRUP ORAL 3 TIMES DAILY PRN
Status: DISCONTINUED | OUTPATIENT
Start: 2022-11-04 | End: 2022-11-10 | Stop reason: HOSPADM

## 2022-11-04 RX ORDER — MECOBALAMIN 5000 MCG
5 TABLET,DISINTEGRATING ORAL NIGHTLY PRN
Status: DISCONTINUED | OUTPATIENT
Start: 2022-11-04 | End: 2022-11-10 | Stop reason: HOSPADM

## 2022-11-04 RX ORDER — FAMOTIDINE 20 MG/1
20 TABLET, FILM COATED ORAL 2 TIMES DAILY PRN
Status: DISCONTINUED | OUTPATIENT
Start: 2022-11-04 | End: 2022-11-08

## 2022-11-04 RX ORDER — SODIUM CHLORIDE 9 MG/ML
INJECTION, SOLUTION INTRAVENOUS CONTINUOUS
Status: DISCONTINUED | OUTPATIENT
Start: 2022-11-04 | End: 2022-11-08

## 2022-11-04 RX ORDER — ONDANSETRON 2 MG/ML
4 INJECTION INTRAMUSCULAR; INTRAVENOUS EVERY 6 HOURS PRN
Status: DISCONTINUED | OUTPATIENT
Start: 2022-11-04 | End: 2022-11-10 | Stop reason: HOSPADM

## 2022-11-04 RX ORDER — NALOXONE HYDROCHLORIDE 0.4 MG/ML
0.4 INJECTION, SOLUTION INTRAMUSCULAR; INTRAVENOUS; SUBCUTANEOUS PRN
Status: DISCONTINUED | OUTPATIENT
Start: 2022-11-04 | End: 2022-11-10 | Stop reason: HOSPADM

## 2022-11-04 RX ORDER — POTASSIUM CHLORIDE 7.45 MG/ML
10 INJECTION INTRAVENOUS PRN
Status: DISCONTINUED | OUTPATIENT
Start: 2022-11-04 | End: 2022-11-10 | Stop reason: HOSPADM

## 2022-11-04 RX ORDER — MAGNESIUM SULFATE 1 G/100ML
1000 INJECTION INTRAVENOUS PRN
Status: DISCONTINUED | OUTPATIENT
Start: 2022-11-04 | End: 2022-11-10 | Stop reason: HOSPADM

## 2022-11-04 RX ORDER — ONDANSETRON 4 MG/1
4 TABLET, ORALLY DISINTEGRATING ORAL EVERY 8 HOURS PRN
Status: DISCONTINUED | OUTPATIENT
Start: 2022-11-04 | End: 2022-11-10 | Stop reason: HOSPADM

## 2022-11-04 RX ORDER — ALBUTEROL SULFATE 2.5 MG/3ML
2.5 SOLUTION RESPIRATORY (INHALATION) EVERY 4 HOURS PRN
Status: DISCONTINUED | OUTPATIENT
Start: 2022-11-04 | End: 2022-11-10 | Stop reason: HOSPADM

## 2022-11-04 RX ORDER — SODIUM CHLORIDE 0.9 % (FLUSH) 0.9 %
5-40 SYRINGE (ML) INJECTION EVERY 12 HOURS SCHEDULED
Status: DISCONTINUED | OUTPATIENT
Start: 2022-11-04 | End: 2022-11-10 | Stop reason: HOSPADM

## 2022-11-04 RX ORDER — POLYETHYLENE GLYCOL 3350 17 G/17G
17 POWDER, FOR SOLUTION ORAL DAILY PRN
Status: DISCONTINUED | OUTPATIENT
Start: 2022-11-04 | End: 2022-11-10 | Stop reason: HOSPADM

## 2022-11-04 RX ADMIN — ACETYLCYSTEINE 400 MG: 200 SOLUTION ORAL; RESPIRATORY (INHALATION) at 18:55

## 2022-11-04 RX ADMIN — DIATRIZOATE MEGLUMINE AND DIATRIZOATE SODIUM 5 ML: 660; 100 LIQUID ORAL; RECTAL at 03:59

## 2022-11-04 RX ADMIN — ALBUTEROL SULFATE 2.5 MG: 2.5 SOLUTION RESPIRATORY (INHALATION) at 22:23

## 2022-11-04 RX ADMIN — ACETYLCYSTEINE 400 MG: 200 SOLUTION ORAL; RESPIRATORY (INHALATION) at 15:00

## 2022-11-04 RX ADMIN — LEVETIRACETAM 500 MG: 5 INJECTION INTRAVENOUS at 19:12

## 2022-11-04 RX ADMIN — LORAZEPAM 1 MG: 1 TABLET ORAL at 04:09

## 2022-11-04 RX ADMIN — ACETYLCYSTEINE 400 MG: 200 SOLUTION ORAL; RESPIRATORY (INHALATION) at 22:23

## 2022-11-04 RX ADMIN — LORAZEPAM 1 MG: 2 INJECTION INTRAMUSCULAR at 06:55

## 2022-11-04 RX ADMIN — ALBUTEROL SULFATE 2.5 MG: 2.5 SOLUTION RESPIRATORY (INHALATION) at 11:14

## 2022-11-04 RX ADMIN — LORAZEPAM 1 MG: 2 INJECTION INTRAMUSCULAR; INTRAVENOUS at 06:55

## 2022-11-04 RX ADMIN — LEVETIRACETAM 1000 MG: 10 INJECTION INTRAVENOUS at 08:19

## 2022-11-04 RX ADMIN — ACETAMINOPHEN 650 MG: 650 SUPPOSITORY RECTAL at 21:11

## 2022-11-04 RX ADMIN — SODIUM CHLORIDE: 9 INJECTION, SOLUTION INTRAVENOUS at 19:11

## 2022-11-04 RX ADMIN — ALBUTEROL SULFATE 2.5 MG: 2.5 SOLUTION RESPIRATORY (INHALATION) at 15:00

## 2022-11-04 RX ADMIN — SODIUM CHLORIDE, PRESERVATIVE FREE 10 ML: 5 INJECTION INTRAVENOUS at 20:59

## 2022-11-04 RX ADMIN — ALBUTEROL SULFATE 2.5 MG: 2.5 SOLUTION RESPIRATORY (INHALATION) at 18:55

## 2022-11-04 RX ADMIN — SODIUM CHLORIDE: 9 INJECTION, SOLUTION INTRAVENOUS at 09:57

## 2022-11-04 RX ADMIN — SODIUM CHLORIDE, PRESERVATIVE FREE 10 ML: 5 INJECTION INTRAVENOUS at 09:53

## 2022-11-04 RX ADMIN — ZIPRASIDONE MESYLATE 10 MG: 20 INJECTION, POWDER, LYOPHILIZED, FOR SOLUTION INTRAMUSCULAR at 15:38

## 2022-11-04 RX ADMIN — ACETYLCYSTEINE 400 MG: 200 SOLUTION ORAL; RESPIRATORY (INHALATION) at 11:14

## 2022-11-04 SDOH — HEALTH STABILITY: PHYSICAL HEALTH: ON AVERAGE, HOW MANY MINUTES DO YOU ENGAGE IN EXERCISE AT THIS LEVEL?: 0 MIN

## 2022-11-04 SDOH — HEALTH STABILITY: PHYSICAL HEALTH: ON AVERAGE, HOW MANY DAYS PER WEEK DO YOU ENGAGE IN MODERATE TO STRENUOUS EXERCISE (LIKE A BRISK WALK)?: 0 DAYS

## 2022-11-04 ASSESSMENT — PAIN - FUNCTIONAL ASSESSMENT: PAIN_FUNCTIONAL_ASSESSMENT: NONE - DENIES PAIN

## 2022-11-04 ASSESSMENT — LIFESTYLE VARIABLES
HOW MANY STANDARD DRINKS CONTAINING ALCOHOL DO YOU HAVE ON A TYPICAL DAY: PATIENT DOES NOT DRINK
HOW OFTEN DO YOU HAVE A DRINK CONTAINING ALCOHOL: NEVER

## 2022-11-04 ASSESSMENT — PAIN SCALES - WONG BAKER: WONGBAKER_NUMERICALRESPONSE: 0

## 2022-11-04 ASSESSMENT — PAIN SCALES - GENERAL: PAINLEVEL_OUTOF10: 0

## 2022-11-04 NOTE — DISCHARGE INSTRUCTIONS
Return with redness, heat, drainage or other concerns.  Keep abdominal binder over PEG tube to prevent dislogment

## 2022-11-04 NOTE — PROGRESS NOTES
Maggy Paniagua arrived to room # 330. Presented with: gastric tube granulation tissue  Mental Status: Patient is alert and nonverbal/NIXON . Vitals:    11/04/22 0745   BP: 125/89   Pulse: (!) 102   Resp: 20   Temp: 98.1 °F (36.7 °C)   SpO2: 95%     Patient safety contract and falls prevention contract reviewed with patient Yes. Oriented Patient to room. Call light within reach. Yes.   Needs, issues or concerns expressed at this time: no.      Electronically signed by La Aguiar RN on 11/4/2022 at 9:25 AM

## 2022-11-04 NOTE — CONSULTS
TRACHEOSTOMY      UPPER GASTROINTESTINAL ENDOSCOPY N/A 09/22/2022    Dr Young Rivera distal reflux esophagitis causing stricture of distal esophagus, unable to advance the scope into the stomach, distal esophagus severely inflamed, repeat in 2 months    UPPER GASTROINTESTINAL ENDOSCOPY  10/10/2022    Dr Sandoval-w/PEG placement-Gastritis, no h pylori     Current Facility-Administered Medications   Medication Dose Route Frequency Provider Last Rate Last Admin    diatrizoate meglumine-sodium (GASTROGRAFIN) 66-10 % solution 5 mL  5 mL Oral ONCE PRN Teja Benson MD        acetylcysteine (MUCOMYST) 20 % solution 400 mg  2 mL Inhalation Q4H Senthil Morelos MD   400 mg at 11/04/22 1500    albuterol (PROVENTIL) nebulizer solution 2.5 mg  2.5 mg Nebulization Q4H PRN Senthil Morelos MD   2.5 mg at 11/04/22 1500    ARIPiprazole (ABILIFY) tablet 20 mg  20 mg Oral Daily Senthil Morelos MD        bisacodyl (DULCOLAX) EC tablet 5 mg  5 mg Oral Daily PRN Senthil Morelos MD        citalopram (CELEXA) tablet 20 mg  20 mg Oral Daily Senthil Morelos MD        famotidine (PEPCID) tablet 20 mg  20 mg Oral BID PRN Senthil Morelos MD        guaiFENesin (ROBITUSSIN) 100 MG/5ML liquid 200 mg  200 mg Oral TID PRN Senthil Morelos MD        lactobacillus (CULTURELLE) capsule 1 capsule  1 capsule Oral Daily Senthil Morelos MD        lansoprazole suspension SUSP 30 mg  30 mg Oral BID AC Senthil Morelos MD        propranolol (INDERAL) tablet 20 mg  20 mg Oral 4x Daily PRN Senthil Morelos MD        levETIRAcetam (KEPPRA) 500 mg/100 mL IVPB  500 mg IntraVENous Q12H Senthil Morelos MD        naloxone [facility]) injection 0.4 mg  0.4 mg IntraVENous PRN Senthil Morelos MD        sodium chloride flush 0.9 % injection 5-40 mL  5-40 mL IntraVENous 2 times per day Senthil Morelos MD   10 mL at 11/04/22 0953    sodium chloride flush 0.9 % injection 5-40 mL  5-40 mL IntraVENous PRN Senthil Morelos MD        0.9 % sodium chloride infusion   IntraVENous PRN Marleen Mtz Suad Dill MD        ondansetron (ZOFRAN-ODT) disintegrating tablet 4 mg  4 mg Oral Q8H PRN Hans Cummings MD        Or    ondansetron [facility]) injection 4 mg  4 mg IntraVENous Q6H PRN Hans Cummings MD        acetaminophen (TYLENOL) tablet 650 mg  650 mg Oral Q4H PRN Hans Cummings MD        Or    acetaminophen (TYLENOL) suppository 650 mg  650 mg Rectal Q4H PRN Hans Cummings MD        0.9 % sodium chloride infusion   IntraVENous Continuous Hans Cummings  mL/hr at 11/04/22 0957 New Bag at 11/04/22 0957    potassium chloride 10 mEq/100 mL IVPB (Peripheral Line)  10 mEq IntraVENous PRN Hans Cummings MD        magnesium sulfate 1000 mg in dextrose 5% 100 mL IVPB  1,000 mg IntraVENous PRN Hans Cummings MD        sodium phosphate 10.68 mmol in sodium chloride 0.9 % 250 mL IVPB  0.16 mmol/kg IntraVENous PRN Hans Cummings MD        Or    sodium phosphate 21.33 mmol in sodium chloride 0.9 % 250 mL IVPB  0.32 mmol/kg IntraVENous PRN Hans Cummings MD        polyethylene glycol (GLYCOLAX) packet 17 g  17 g Oral Daily PRN Hans Cummings MD        melatonin disintegrating tablet 5 mg  5 mg Oral Nightly PRN Hans Cummings MD        calcium carbonate (TUMS) chewable tablet 500 mg  500 mg Oral TID PRN Hans Cummings MD        ziprasidone (GEODON) 10 mg in sterile water 0.5 mL injection  10 mg IntraMUSCular Q12H PRN Linnette Ames MD   10 mg at 11/04/22 1538     Allergies: Latex, Levofloxacin, Magnesium oxide, Pantoprazole sodium, Baclofen, and Levetiracetam    History reviewed. No pertinent family history. Social History     Tobacco Use    Smoking status: Former     Packs/day: 0.25     Types: Cigarettes     Passive exposure: Never    Smokeless tobacco: Never   Substance Use Topics    Alcohol use: Not Currently       Review of Systems   Unable to perform ROS: Patient nonverbal     Physical Exam  Vitals reviewed. Constitutional:       General: He is sleeping. HENT:      Head: Normocephalic.       Nose: Nose normal. Eyes:      General: No scleral icterus. Neck:      Comments: Scar from previous trach  Cardiovascular:      Rate and Rhythm: Normal rate and regular rhythm. Pulmonary:      Effort: Pulmonary effort is normal. No respiratory distress. Abdominal:      General: There is no distension. Palpations: Abdomen is soft. Tenderness: There is no abdominal tenderness. There is no guarding (no grimacing or ttensing during palpation). Musculoskeletal:         General: No swelling. Cervical back: No rigidity. Skin:     General: Skin is warm and dry. Neurological:      Mental Status: Mental status is at baseline. CT images reviewed and as above        Assessment and plan:  30year old male with TBI s/p pulled out gastrostomy tube  The patient's mother is apparently deciding if she wants to replace the tube or try to have him take oral. If the patient does not take enough orally, or is not safe, then he needs a feeding tube. I would still recommend endoscopic placement. The gastrografin and air in the abdomen is as expected after attempt at replacement. The gastrografin is specifically used because it is water soluble. There is nothing about a surgical gastrostomy tube that keep the tube in place better to keep it from being pulled. No indication for surgical intervention at this time. Dr. Lauren Naranjo will see only if needed over the weekend.     Perla Avina MD  11/4/2022  4:48 PM
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
## 2022-11-04 NOTE — PROGRESS NOTES
Patient and mother at bedside. The patient nurse also at bedside. Discussed options in detail with the patient mother:    Let the patient follow and take medication by mouth and see how he does    Replace G-tube and risk complication EGD, PEG tube placement and risk of patient pulling the G-tube and complication pulling the G-tube. Patient's mother is thinking about options. She thinks she might try to let her son eat and take medications by mouth and not have the G-tube replaced. We will wait for decision. In the meantime we would like to monitor the patient clinically to make sure he does not develop peritonitis and monitor him for fever and leukocytosis. Discussed  with Dr. Janis English, surgery. He feels free air in the abdomen is due to injecting air at the time of attempted replacement of G-tube in the ER.
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
## 2022-11-04 NOTE — ED NOTES
Pt pulled out IV. New IV placed and Dr. Amalia Dalal requested [address] given down here. Sitting with patient for the 15 minutes the Keppra goes in to make sure pt does not pull out IV.       Maged Lyon RN  11/04/22 2054

## 2022-11-04 NOTE — CONSULTS
Pt Name: Sedrick Pineda  MRN: [id number]  [id number]  YOB: 1985  Admit Date: 11/4/2022  1:28 AM  Date of evaluation: 11/4/2022  Primary Care Physician: Gianfranco Galloway MD   [id number]       Requesting Provider: Hospitalist service. GI Consult    Misplaced G-tube      Assessment:    On abdominal films the tip of the G-tube is not in the stomach as per review of the abdominal film Dr. Neomia Romberg, radiologist.  There is opacification of small bowel on abdominal films. The question has been raised by radiologist if this is a jejunostomy tube. I have discussed with Dr. Cathy Ornelas, surgeon and Dr. Rommel Humphries, surgeon. They feel that gastrostomy tube needs to be pulled out. They think being a Gastrografin contrast, patient is at low risk for peritonitis. However, patient also has leukocytosis. Patient has a history of dislodged G-tube last month. It was replaced last month around 10/10/2022. Patient has leukocytosis. No peritoneal signs. Patient has a history of C. difficile PCR positive in the past.  However, no history of recent diarrhea available. Infection needs to be ruled out. Plan:    Discussed plan with Dr. Rommel Humphries, surgeon and Dr. Cathy Ornelas, surgeon. They recommended to remove the G-tube. G-tube already has been removed. Discussed option of surgically placed G-tube with Dr. Rommel Humphries. She does not think surgically G-tube is at low risk of being pulled out. Both Dr. Rommel Humphries and Dr. Cathy Ornelas think Gastrografin is at low risk of causing peritonitis. CT chest and abdomen. Left message with patient's nurse to call me when patient's mother and be contacted or when she comes to the bedside. I would like to talk with her in person. EGD and PEG tube placement depending upon CT scan chest and abdomen results and depending upon patient clinical course. Reason : Misplaced G-tube. GI History:    The patient is a 30 y.o. male. Patient has a history of traumatic brain injury.   He had G-tube placed apparently in February 2022. Patient has a history of G-tube dislodgment last month. Patient was seen by GI Dr. Cheri Keating and surgery by Dr. Wenceslao Trammell last month. Patient apparently had replacement of G-tube. G-tube was replaced on 10/10/2022 by Dr. Emery Castro, gastroenterologist.  He came this time to the ER with displacement of G-tube. In the ER apparently Gastrografin was injected. The Gastrografin went in the peritoneum. I reviewed abdominal films with Dr. Vanessa Hook, radiologist.  The tip of the G-tube is not in the stomach. Is a deeper to the rectus muscle. Gastrografin goes into the peritoneum. Dr. Chiquis Torres, surgery was apparently consulted last night about the misplaced G-tube. Dr. Chiquis Torres advised to remove the G-tube. Plan admission history and physical examination by Dr. Serenity Vega, Dr. Chiquis Torres, surgery was agreeable to place the G-tube surgically. I consulted Dr. Ada Vick, surgery today. He advises to remove the G-tube. I talked to Dr. Chiquis Torres. He had not made the statement that she would put gastric tube surgically since it is equally likely to be pulled out by the patient. Dr. Chiquis Torres does not think that a surgical placed jejunostomy tube is any better idea. Patient is sedated. He is nonverbal.  He is moving about in the bed. 2 nurses are at bedside. There is no G-tube in the stomach. There is no leakage or bleeding from gastrostomy site. Abdomen is not distended. Soft and nontender. Last EGD: 10/10/2022. EGD and PEG tube placement. Per report there was slightly erosive esophagitis. No hiatal hernia. Mucosal changes suggestive of mild antral gastritis. Gastric biopsies taken from antrum, body and incisura. Last Colonoscopy: Unable to find any previous colonoscopy report. 11/4/2022: Sodium 133. ALT 46.  Other LFTs normal.  WBC 17.6, hemoglobin 12.6, platelet count 002,385. MCV 84. INR 1.15.    9/22/2022: GI panel for C. difficile positive.     10/8/2022: CT abdomen pelvis without contrast:  G-tube deep to left rectus muscle with contrast into the peritoneum. It does not traverse the stomach. Moderate residual feces. Reviewed the images of the CT scan with Dr. Breezy Prado, radiologist.    11/4/2022: Anedrs Drain abdomen 2 views: Reviewed with Dr. Breezy Prado, radiologist:  Per patient report by radiologist: Contrast noted within small bowel loops-question if the enteric tube is a jejunostomy tube? .    Earlier the same day. Findings gastrostomy tube balloon is in the left upper quadrant anteriorly. Contrast is seen around the balloon. Contrast appears to be within small bowel loops. Contrast is not definitely seen within the stomach-areas noted within the gastric antrum on the AP view and in the gastric fundus on the lateral view, but without layering contrast.  The exact location of the enteric tube balloon-if this jejunostomy tube? .  Impression: Contrast noted within the small bowel loops-question enteric tube is a jejunostomy tube? .        Diagnosis Date    Acute pulmonary embolism ([address] 4/29/2022    Formatting of this note might be different from the original. Added automatically from request for surgery [id number]    Bipolar disorder ([address] 9/20/2022    Cardiac arrest ([address] 4/22/2022    Hydrocephalus ([address] 4/29/2022    Formatting of this note might be different from the original. Added automatically from request for surgery [id number]    Nonverbal     Palliative care patient 09/23/2022    Respiratory failure following trauma ([address] 2/11/2022    Subarachnoid hemorrhage following injury 2/11/2022    Traumatic brain injury with loss of consciousness ([address] 2/11/2022    Formatting of this note might be different from the original. Added automatically from request for surgery [id number]       Past Surgical History:            Procedure Laterality Date    GASTROSTOMY TUBE PLACEMENT  10/10/2022    Dr Sandoval-alfredo/PEG tube placement in previous PEG site-Slightly erosive esophagitis, antral gastritis    GASTROSTOMY TUBE PLACEMENT N/A 10/10/2022    Dr Alpa Bowden, no h pylori    TRACHEOSTOMY      UPPER GASTROINTESTINAL ENDOSCOPY N/A 09/22/2022    Dr Saida Mello distal reflux esophagitis causing stricture of distal esophagus, unable to advance the scope into the stomach, distal esophagus severely inflamed, repeat in 2 months    UPPER GASTROINTESTINAL ENDOSCOPY  10/10/2022    Dr Sandoval-w/PEG placement-Gastritis, no h pylori         Allergies:  Latex, Levofloxacin, Magnesium oxide, Pantoprazole sodium, and Levetiracetam      Prior to Admission medications    Medication Sig Start Date End Date Taking? Authorizing Provider   lactobacillus (CULTURELLE) CAPS capsule Take 1 capsule by mouth daily 9/29/22   Davis Alvarado MD   lansoprazole 3 MG/ML SUSP Take 10 mLs by mouth 2 times daily (before meals) 9/29/22   Davis Alvarado MD   Ergocalciferol (VITAMIN D) 08522 units CAPS Take 50,000 Units by mouth once a week for 5 days 10/1/22 10/6/22  Davis Alvarado MD   levETIRAcetam (KEPPRA) 100 MG/ML solution Take 500 mg by mouth 2 times daily    Historical Provider, MD   oxyCODONE (ROXICODONE) 5 MG immediate release tablet Take 5 mg by mouth every 8 hours as needed for Pain.     Historical Provider, MD   famotidine (PEPCID) 20 MG tablet Take 20 mg by mouth 2 times daily as needed    Historical Provider, MD   acetaminophen (TYLENOL) 325 MG tablet Take 650 mg by mouth every 4 hours as needed for Pain    Historical Provider, MD   guaiFENesin (ROBITUSSIN) 100 MG/5ML syrup Take 200 mg by mouth 3 times daily as needed for Cough    Historical Provider, MD   bisacodyl (DULCOLAX) 5 MG EC tablet Take 5 mg by mouth daily as needed for Constipation    Historical Provider, MD   propranolol (INDERAL) 20 MG/5ML solution Take 20 mg by mouth 4 times daily as needed    Historical Provider, MD   acetylcysteine (MUCOMYST) 10 % nebulizer solution Inhale 4 mLs into the lungs every 4 hours 8/15/22   Sheryle Piles, MD   citalopram (CELEXA) 20 MG tablet Take 20 mg by mouth daily    Historical Provider, MD   ARIPiprazole (ABILIFY) 20 MG tablet Take 20 mg by mouth daily    Historical Provider, MD          Current Meds:           acetylcysteine  2 mL Inhalation Q4H    ARIPiprazole  20 mg Oral Daily    citalopram  20 mg Oral Daily    lactobacillus  1 capsule Oral Daily    lansoprazole  30 mg Oral BID AC    levETIRAcetam  500 mg IntraVENous Q12H    sodium chloride flush  5-40 mL IntraVENous 2 times per day          sodium chloride      sodium chloride 100 mL/hr at 11/04/22 0957         PRN Meds:      diatrizoate meglumine-sodium, albuterol, bisacodyl, famotidine, guaiFENesin, propranolol, naloxone, sodium chloride flush, sodium chloride, ondansetron **OR** ondansetron, acetaminophen **OR** acetaminophen, potassium chloride, magnesium sulfate, sodium phosphate IVPB **OR** sodium phosphate IVPB, polyethylene glycol, melatonin, calcium carbonate      Social History     Socioeconomic History    Marital status: Single     Spouse name: Not on file    Number of children: Not on file    Years of education: Not on file    Highest education level: Not on file   Occupational History    Not on file   Tobacco Use    Smoking status: Unknown     Passive exposure: Never    Smokeless tobacco: Not on file   Vaping Use    Vaping Use: Unknown   Substance and Sexual Activity    Alcohol use: Not Currently    Drug use: Not on file    Sexual activity: Not Currently   Other Topics Concern    Not on file   Social History Narrative    Not on file     Social Determinants of Health     Financial Resource Strain: Not on file   Food Insecurity: Not on file   Transportation Needs: Not on file   Physical Activity: Not on file   Stress: Not on file   Social Connections: Not on file   Intimate Partner Violence: Not on file   Housing Stability: Not on file         History reviewed. No pertinent family history. ROS:    A pertinent review of all body system was done.   Review of systems is as per HPI. Rest of the review of systems either negative, not pertinent or patient was unable to provide review of systems. Physical Exam:      Vitals:    11/04/22 0131 11/04/22 0134 11/04/22 0745   BP:  129/87 125/89   Pulse:  81 (!) 102   Resp:  22 20   Temp:  98 °F (36.7 °C) 98.1 °F (36.7 °C)   SpO2:  97% 95%   Weight: 147 lb (66.7 kg)     Height: 6' 2\" (1.88 m)           General appearance: Sedated. Nonverbal.  Moving about in the bed. 2 nurses at bedside positioning the patient in the bed. Head: normocephalic. .      Neck: Supple. Abdomen: Not distended. No G-tube. Gastrostomy site looks healthy. No leakage. Abdomen not distended. Abdomen is soft. No gross organomegaly. No masses. No peritoneal signs. Extremities: No leg edema. Skin: No jaundice. Neurologic: Unable to do complete neuro examination. Labs:       Recent Labs     11/04/22  0742   WBC 17.6*   RBC 4.58*   HGB 12.6*   HCT 38.7*   MCV 84.5   MCH 27.5   MCHC 32.6*   *       @LABRCNT(NA:3,K:3,ANIONGAP:3,CL:3,CO2:3,BUN:3,CREATININE:3,GLUCOSE:3,    GFR:3,CALCIUM:3)@    Recent Labs     11/04/22  0742   MG 1.8   PHOS 3.8       Recent Labs     11/04/22  0742   AST 30   ALT 46*   BILITOT 0.5   ALKPHOS 69       HgBA1c:  No components found for: HGBA1C    FLP:  No results found for: TRIG, HDL, LDLCALC, LDLDIRECT, LABVLDL    TSH:    Lab Results   Component Value Date/Time    TSH 1.150 09/21/2022 09:51 AM       Troponin T: No results for input(s): TROPONINI in the last 72 hours. INR:   Recent Labs     11/04/22  0742   INR 1.15       No results for input(s): LIPASE, AMYLASE in the last 72 hours. Radiology:    Reviewed available results of the pertinent imaging studies.            Disclaimer speech recognition software       (Please note that portions of this note were completed with a voice recognition program. Efforts were made to edit the dictations but occasionally words are mis-transcribed.)      Thank you for the consultation and allowing me to participate in this patient's care alongside with you!     Jhoana Anand MD    11/4/2022

## 2022-11-04 NOTE — PROGRESS NOTES
4 Eyes Skin Assessment    Makenzie Diaz is being assessed upon: Admission    I agree that Becca Reis RN, along with Kris Ortiz, RN (either 2 RN's or 1 LPN and 1 RN) have performed a thorough Head to Toe Skin Assessment on the patient. ALL assessment sites listed below have been assessed. Areas assessed by both nurses:     [x]   Head, Face, and Ears   [x]   Shoulders, Back, and Chest  [x]   Arms, Elbows, and Hands   [x]   Coccyx, Sacrum, and Ischium  [x]   Legs, Feet, and Heels    Does the Patient have Skin Breakdown? Yes, wound(s) noted upon assessment. It is the responsibility of the Primary Nurse to assure that the following documentation, preventions, orders, and consults are complete on the above noted wound(s): Wound LDA initiated. LDA Flowsheet Documentation includes the Katherine-wound, Wound Assessment, Measurements, Dressing Treatment, Drainage, and Color.     Julio Prevention initiated: Yes  Wound Care Orders initiated: Yes    [id number]  nurse consulted for Pressure Injury (Stage 3,4, Unstageable, DTI, NWPT, and Complex wounds) and New or Established Ostomies: No        Primary Nurse eSignature: Nayan Fagan RN on 11/4/2022 at 9:28 AM      Co-Signer eSignature: Electronically signed by Kris Ortiz RN on 11/4/22 at 5:00 PM CDT

## 2022-11-04 NOTE — CARE COORDINATION
Will return home with family and HH. Once gtube replaced. 11/04/22 0912   Readmission Assessment   Previous Disposition Home with Home Health   Who is being Andreea Hernandez   (chart)   What was the patient's/caregiver's perception as to why they think they needed to return back to the hospital? Other (Comment)  (pulled his gtube out)   Did you visit your Primary Care Physician after you left the hospital, before you returned this time? No   Did you see a specialist, such as Cardiac, Pulmonary, Orthopedic Physician, etc. after you left the hospital? No   Who advised the patient to return to the hospital? Caregiver   Does the patient report anything that got in the way of taking their medications? No   In our efforts to provide the best possible care to you and others like you, can you think of anything that we could have done to help you after you left the hospital the first time, so that you might not have needed to return so soon?  Teaching during hospitalization regarding your illness   Electronically signed by Rohan Caba RN on 11/4/2022 at 9:14 AM

## 2022-11-04 NOTE — PLAN OF CARE
Problem: Safety - Medical Restraint  Goal: Remains free of injury from restraints (Restraint for Interference with Medical Device)  Description: INTERVENTIONS:  1. Determine that other, less restrictive measures have been tried or would not be effective before applying the restraint  2. Evaluate the patient's condition at the time of restraint application  3. Inform patient/family regarding the reason for restraint  4. Q2H: Monitor safety, psychosocial status, comfort, nutrition and hydration  Outcome: Progressing     Problem: Discharge Planning  Goal: Discharge to home or other facility with appropriate resources  Outcome: Progressing  Flowsheets (Taken 11/4/2022 3418)  Discharge to home or other facility with appropriate resources:   Identify barriers to discharge with patient and caregiver   Identify discharge learning needs (meds, wound care, etc)   Refer to discharge planning if patient needs post-hospital services based on physician order or complex needs related to functional status, cognitive ability or social support system     Problem: Pain  Goal: Verbalizes/displays adequate comfort level or baseline comfort level  Outcome: Progressing     Problem: Safety - Adult  Goal: Free from fall injury  Outcome: Progressing     Problem: ABCDS Injury Assessment  Goal: Absence of physical injury  Outcome: Progressing  Flowsheets (Taken 11/4/2022 1428)  Absence of Physical Injury: Implement safety measures based on patient assessment     Problem: Skin/Tissue Integrity  Goal: Absence of new skin breakdown  Description: 1. Monitor for areas of redness and/or skin breakdown  2. Assess vascular access sites hourly  3. Every 4-6 hours minimum:  Change oxygen saturation probe site  4. Every 4-6 hours:  If on nasal continuous positive airway pressure, respiratory therapy assess nares and determine need for appliance change or resting period.   Outcome: Progressing

## 2022-11-05 LAB
ANION GAP SERPL CALCULATED.3IONS-SCNC: 10 MMOL/L (ref 7–19)
BASOPHILS ABSOLUTE: 0.1 K/UL (ref 0–0.2)
BASOPHILS RELATIVE PERCENT: 0.2 % (ref 0–1)
BUN BLDV-MCNC: 13 MG/DL (ref 6–20)
CALCIUM SERPL-MCNC: 9.3 MG/DL (ref 8.6–10)
CHLORIDE BLD-SCNC: 101 MMOL/L (ref 98–111)
CO2: 24 MMOL/L (ref 22–29)
CREAT SERPL-MCNC: 0.7 MG/DL (ref 0.5–1.2)
EOSINOPHILS ABSOLUTE: 0 K/UL (ref 0–0.6)
EOSINOPHILS RELATIVE PERCENT: 0 % (ref 0–5)
GFR SERPL CREATININE-BSD FRML MDRD: >60 ML/MIN/{1.73_M2}
GLUCOSE BLD-MCNC: 127 MG/DL (ref 74–109)
HCT VFR BLD CALC: 41.8 % (ref 42–52)
HEMOGLOBIN: 13.2 G/DL (ref 14–18)
IMMATURE GRANULOCYTES #: 0.1 K/UL
LYMPHOCYTES ABSOLUTE: 0.9 K/UL (ref 1.1–4.5)
LYMPHOCYTES RELATIVE PERCENT: 3.7 % (ref 20–40)
MCH RBC QN AUTO: 27.5 PG (ref 27–31)
MCHC RBC AUTO-ENTMCNC: 31.6 G/DL (ref 33–37)
MCV RBC AUTO: 87.1 FL (ref 80–94)
MONOCYTES ABSOLUTE: 1.3 K/UL (ref 0–0.9)
MONOCYTES RELATIVE PERCENT: 5.5 % (ref 0–10)
NEUTROPHILS ABSOLUTE: 21.6 K/UL (ref 1.5–7.5)
NEUTROPHILS RELATIVE PERCENT: 90.1 % (ref 50–65)
PDW BLD-RTO: 15.4 % (ref 11.5–14.5)
PLATELET # BLD: 292 K/UL (ref 130–400)
PMV BLD AUTO: 11.2 FL (ref 9.4–12.4)
POTASSIUM REFLEX MAGNESIUM: 3.9 MMOL/L (ref 3.5–5)
PROCALCITONIN: 0.53 NG/ML (ref 0–0.09)
RBC # BLD: 4.8 M/UL (ref 4.7–6.1)
SODIUM BLD-SCNC: 135 MMOL/L (ref 136–145)
WBC # BLD: 24 K/UL (ref 4.8–10.8)

## 2022-11-05 PROCEDURE — 2580000003 HC RX 258: Performed by: HOSPITALIST

## 2022-11-05 PROCEDURE — 2500000003 HC RX 250 WO HCPCS: Performed by: HOSPITALIST

## 2022-11-05 PROCEDURE — 1210000000 HC MED SURG R&B

## 2022-11-05 PROCEDURE — 84145 PROCALCITONIN (PCT): CPT

## 2022-11-05 PROCEDURE — 94640 AIRWAY INHALATION TREATMENT: CPT

## 2022-11-05 PROCEDURE — 6360000002 HC RX W HCPCS: Performed by: HOSPITALIST

## 2022-11-05 PROCEDURE — 36415 COLL VENOUS BLD VENIPUNCTURE: CPT

## 2022-11-05 PROCEDURE — 99232 SBSQ HOSP IP/OBS MODERATE 35: CPT | Performed by: SPECIALIST

## 2022-11-05 PROCEDURE — 85025 COMPLETE CBC W/AUTO DIFF WBC: CPT

## 2022-11-05 PROCEDURE — 92610 EVALUATE SWALLOWING FUNCTION: CPT

## 2022-11-05 PROCEDURE — 80048 BASIC METABOLIC PNL TOTAL CA: CPT

## 2022-11-05 RX ORDER — METOPROLOL TARTRATE 5 MG/5ML
5 INJECTION INTRAVENOUS EVERY 8 HOURS PRN
Status: DISCONTINUED | OUTPATIENT
Start: 2022-11-05 | End: 2022-11-07

## 2022-11-05 RX ADMIN — ALBUTEROL SULFATE 2.5 MG: 2.5 SOLUTION RESPIRATORY (INHALATION) at 02:14

## 2022-11-05 RX ADMIN — PIPERACILLIN AND TAZOBACTAM 4500 MG: 4; .5 INJECTION, POWDER, FOR SOLUTION INTRAVENOUS at 16:56

## 2022-11-05 RX ADMIN — SODIUM CHLORIDE, PRESERVATIVE FREE 10 ML: 5 INJECTION INTRAVENOUS at 23:08

## 2022-11-05 RX ADMIN — ACETYLCYSTEINE 400 MG: 200 SOLUTION ORAL; RESPIRATORY (INHALATION) at 11:42

## 2022-11-05 RX ADMIN — ALBUTEROL SULFATE 2.5 MG: 2.5 SOLUTION RESPIRATORY (INHALATION) at 11:41

## 2022-11-05 RX ADMIN — METOPROLOL TARTRATE 5 MG: 5 INJECTION INTRAVENOUS at 18:39

## 2022-11-05 RX ADMIN — ZIPRASIDONE MESYLATE 10 MG: 20 INJECTION, POWDER, LYOPHILIZED, FOR SOLUTION INTRAMUSCULAR at 18:15

## 2022-11-05 RX ADMIN — ZIPRASIDONE MESYLATE 10 MG: 20 INJECTION, POWDER, LYOPHILIZED, FOR SOLUTION INTRAMUSCULAR at 03:58

## 2022-11-05 RX ADMIN — ALBUTEROL SULFATE 2.5 MG: 2.5 SOLUTION RESPIRATORY (INHALATION) at 07:24

## 2022-11-05 RX ADMIN — PIPERACILLIN AND TAZOBACTAM 3375 MG: 3; .375 INJECTION, POWDER, FOR SOLUTION INTRAVENOUS at 23:05

## 2022-11-05 RX ADMIN — ALBUTEROL SULFATE 2.5 MG: 2.5 SOLUTION RESPIRATORY (INHALATION) at 18:10

## 2022-11-05 RX ADMIN — SODIUM CHLORIDE: 9 INJECTION, SOLUTION INTRAVENOUS at 15:47

## 2022-11-05 RX ADMIN — ACETYLCYSTEINE 400 MG: 200 SOLUTION ORAL; RESPIRATORY (INHALATION) at 07:25

## 2022-11-05 RX ADMIN — ACETYLCYSTEINE 400 MG: 200 SOLUTION ORAL; RESPIRATORY (INHALATION) at 18:10

## 2022-11-05 RX ADMIN — ACETYLCYSTEINE 400 MG: 200 SOLUTION ORAL; RESPIRATORY (INHALATION) at 21:47

## 2022-11-05 RX ADMIN — ACETYLCYSTEINE 400 MG: 200 SOLUTION ORAL; RESPIRATORY (INHALATION) at 14:58

## 2022-11-05 RX ADMIN — LEVETIRACETAM 500 MG: 5 INJECTION INTRAVENOUS at 05:21

## 2022-11-05 RX ADMIN — ALBUTEROL SULFATE 2.5 MG: 2.5 SOLUTION RESPIRATORY (INHALATION) at 21:47

## 2022-11-05 RX ADMIN — ACETYLCYSTEINE 400 MG: 200 SOLUTION ORAL; RESPIRATORY (INHALATION) at 02:14

## 2022-11-05 RX ADMIN — LEVETIRACETAM 500 MG: 5 INJECTION INTRAVENOUS at 18:01

## 2022-11-05 RX ADMIN — ALBUTEROL SULFATE 2.5 MG: 2.5 SOLUTION RESPIRATORY (INHALATION) at 14:58

## 2022-11-05 ASSESSMENT — PAIN SCALES - GENERAL: PAINLEVEL_OUTOF10: 0

## 2022-11-05 ASSESSMENT — ENCOUNTER SYMPTOMS: VOMITING: 1

## 2022-11-05 NOTE — PROGRESS NOTES
Speech Language Pathology  Facility/Department: [facility] GINA/VAS/MED   CLINICAL BEDSIDE SWALLOW EVALUATION    NAME: Joelle Shaw  : 1985  MRN: [id number]    ADMISSION DATE: 2022  ADMITTING DIAGNOSIS: has Aspiration pneumonia of both lungs due to vomit, unspecified part of lung ([address] Nonverbal; Sepsis due to pneumonia ([address] Schizophrenia ([address] Bipolar disorder ([address] Endotracheal tube present; Lactic acidosis; Severe malnutrition ([address] Other tracheostomy complication ([address] Nausea and vomiting; Palliative care patient; Traumatic brain injury with loss of consciousness ([address] Subarachnoid hemorrhage following injury; Cardiac arrest ([address] Respiratory failure following trauma ([address] Mucus plugging of bronchi; Hydrocephalus ([address] Acute pulmonary embolism ([address] History of traumatic brain injury; Sepsis with acute renal failure ([address] Gastroesophageal reflux disease with esophagitis without hemorrhage; Transfer dysphagia; Complication of feeding tube ([address] CATY (acute kidney injury) ([address] Anemia; Gastric tube granulation tissue ([address] and Dislodged gastrostomy tube on their problem list.    Date of Eval: 2022  Evaluating Therapist: MAICOL Bender    Current Diet level:  NPO    Reason for Referral  Joelle Shaw was referred for a bedside swallow evaluation to assess the efficiency of his swallow function, identify signs and symptoms of aspiration and make recommendations regarding safe dietary consistencies, effective compensatory strategies, and safe eating environment. Impression  Assessed patient's swallowing function. Patient exhibited decreased management of secretions at rest; patient with copious drooling upon entry. Patient exhibited decreased oral transit of even blended food consistency (SLP manually removed puree consistency residue from the mouth).  Patient also exhibited slow oral transit and sluggish, mild-moderately decreased laryngeal elevation for swallow airway protection. While no outward S/S penetration/aspiration was noted with any 1/2 teaspoon trial thin H2O or puree consistency trial, do question residue stacking in the throat post swallows. At this time, would recommend continuation NPO status. If patient receives mouth care prior to intake, okay for ice chips and swabs thin H2O for comfort. Will continue to follow. Thank you for this consult. Treatment Plan  Requires SLP Intervention: Yes     Recommended Diet and Intervention  Diet Solids Recommendation: NPO  Liquid Consistency Recommendation: NPO  Therapeutic Interventions: Patient/Family education;Oral Care; Therapeutic PO trials with SLP    Treatment/Goals  Timeframe for Short-term Goals: 1-2x/day for 3 days   Goal 1: Patient NPO. Goal 2: Patient staff will follow swallow safety recommendations. Goal 3: Patient will receive daily oral care, via staff, to decrease bacteria from the oral cavity. Goal 4: Re-assessment of swallow function for potential PO intake. General  Chart Reviewed: Yes  Behavior/Cognition: Alert  O2 Device: None (Room air)  Communication Observation: (No vocalizations were noted.)  Follows Directions: Patient did not follow or model 1 step oral motor commands. Dentition: Some missing dentition  Patient Positioning: Upright in bed  Consistencies Administered: Thin - spoon;Dysphagia Pureed (Dysphagia I)     Assessed patient's swallowing function with the following observations noted:     Oral Phase  Suspected Premature Bolus Loss: Thin - spoon;Puree (Patient exhibited slow oral transit of 1/2 teaspoon trials thin H2O and puree consistency trials presented by SLP.)  Decreased Oral Transit: Puree (Significant oral cavity residue was noted post swallows; residue did not clear from the mouth with additional swallows. Residue was manually removed by SLP.)  Oral Phase - Comment: Upon entry, patient exhibited copious drooling of secretions. Completed oral care prior to PO trials. Pharyngeal Phase  Suspected Swallow Delay: Thin - spoon;Puree (Suspect secondary to oral transit times.)  Laryngeal Elevation: (Patient exhibited sluggish, mildly-moderately decreased laryngeal elevation for swallow airway protection.)  Pharyngeal Phase - Comment: While no outward S/S penetration/aspiration was noted with any 1/2 teaspoon trial thin H2O or puree consistency trial, do question residue stacking in the throat post swallows. At this time, would recommend continuation NPO status. If patient receives mouth care prior to intake, okay for ice chips and swabs thin H2O for comfort. Will continue to follow.      Electronically signed by MAICOL Simms on 11/5/2022 at 2:38 PM

## 2022-11-05 NOTE — PROGRESS NOTES
Progress Note  Date:2022       Room:0330/330-01  Patient Name:Brandon Godinez     YOB: 1985     Age:37 y.o. Subjective    Subjective:  Diet:  He reports  vomiting. Pain:  He reports no pain. appreciate surgical input from Dr. Lawanda Jang. Patient's white cell count is going up. Today white cell count 24,000 and yesterday was 17.6. ALT is 46. Heart rate is between . He had emesis per patient's nurse. No hematemesis or melena. Patient received Geodon. He is calmer. He is still moving about in his bed. 2 nurses at bedside. Review of Systems   Gastrointestinal:  Positive for vomiting.  low-grade fever and emesis. Objective         Vitals Last 24 Hours:  TEMPERATURE:  Temp  Av.6 °F (37 °C)  Min: 97.8 °F (36.6 °C)  Max: 100 °F (37.8 °C)  RESPIRATIONS RANGE: Resp  Av  Min: 14  Max: 18  PULSE OXIMETRY RANGE: SpO2  Av.8 %  Min: 92 %  Max: 98 %  PULSE RANGE: Pulse  Av  Min: 98  Max: 125  BLOOD PRESSURE RANGE: Systolic (91IVY), DVH:790 , Min:112 , TED:978   ; Diastolic (10KRW), TANNA:63, Min:65, Max:102    I/O (24 Hr): Intake/Output Summary (Last 24 hours) at 2022 0945  Last data filed at 2022 0600  Gross per 24 hour   Intake 0 ml   Output --   Net 0 ml     Objective awake. Confused. Moving about in the bed. Normocephalic. Abdomen not distended. Gastrostomy tube site looks healthy with some granulation tissue. No leakage. No bleeding. Abdomen is soft and non tender. No gross organomegaly. No leg edema. Skin no jaundice.   Labs/Imaging/Diagnostics    Labs:  CBC:  Recent Labs     22  0742 22  0731   WBC 17.6* 24.0*   RBC 4.58* 4.80   HGB 12.6* 13.2*   HCT 38.7* 41.8*   MCV 84.5 87.1   RDW 15.2* 15.4*   * 292     CHEMISTRIES:  Recent Labs     22  0742 22  0731   * 135*   K 4.3 3.9   CL 98 101   CO2 22 24   BUN 8 13   CREATININE 0.8 0.7   GLUCOSE 107 127*   PHOS 3.8  --    MG 1.8  -- PT/INR:  Recent Labs     11/04/22 0742   PROTIME 14.6   INR 1.15     APTT:  Recent Labs     11/04/22 0742   APTT 32.9     LIVER PROFILE:  Recent Labs     11/04/22 0742   AST 30   ALT 46*   BILITOT 0.5   ALKPHOS 69       Imaging Last 24 Hours:  CT ABDOMEN PELVIS WO CONTRAST Additional Contrast? None    Result Date: 11/4/2022  NO PRIOR REPORT AVAILABLE Exam: CT OF THE ABDOMEN/PELVIS WITHOUT CONTRAST Clinical data: Status post dislodged G-tube. Status post attempted G-tube replacement. Status post G-tube removal. Technique: Direct contiguous axial CT images were acquired through the abdomen and pelvis without contrast using soft tissue and bone algorithms. Reformatted/MPR images were performed. Radiation dose: CTDIvol =13.92 mGy, DLP =1046 mGy x cm. Limitations: Lack of intravenous contrast limits evaluation of solid viscera. Lack of oral contrast limits evaluation of the bowel loops. Prior Studies: Radiograph of the abdomen dated 10/09/2022. CT scan of the abdomen and pelvis dated 10/08/2022. Renal ultrasound dated 09/24/2022. FINDINGS: The heart is normal in size. There is a left pleural effusion with compressive atelectatic change of the lung parenchyma.  all shunt identified in the right hemiabdomen. The liver is normal in size and contour and demonstrates no focal abnormality. The spleen is normal in size and contour and demonstrates no focal abnormality. The gallbladder is distended without stones or pericholecystic fluid. There is no intra or extrahepatic biliary ductal dilatation. The adrenal glands are normal. The pancreas is normal in attenuation without evidence of peripancreatic inflammatory change or significant ductal dilatation. The kidneys are anatomically located. There is no hydroureteronephrosis or stone. There are no significant perinephric inflammatory changes. There is free intraperitoneal air along with contrast extravasation within the left abdomen anterior wall.   The There is no evidence of acute appendicitis. There is no abdominal or pelvic ascites. There is an IVC filter. The retroperitoneum is normal without significant adenopathy. Within the pelvis, the urinary bladder is normal. There is no pathologic mass. The osseous structures appear intact. Free intraperitoneal air with contrast in the left anterior abdominal wall. These findings appear compatible with the clinically described PEG tube removal.  Clinical correlation is essential.  shunt. IVC filter. The Recommendation: Follow up as clinically indicated. All CT scans at this facility utilize dose modulation, iterative reconstruction, and/or weight based dosing when appropriate to reduce radiation dose to as low as reasonably achievable. Electronically Signed by Joyce Childers MD at 04-Nov-2022 01:41:17 PM EST             CT CHEST WO CONTRAST    Result Date: 11/4/2022  NO PRIOR REPORT AVAILABLE Exam: CT OF THE CHEST WITHOUT CONTRAST Clinical data: Status post dislodgment of G-tube. Status post attempted replacement of G-tube. Now the G-tube is out. Technique: Axial CT images through the lungs were acquired without contrast and imaged using soft tissue and lung algorithms. Reformatted/MPR images were performed. Radiation Dose: CTDIvol =13.92 mGy, DLP =1046 mGy x cm. Limitations: Lack of intravenous contrast limits evaluation of the soft tissues and vascularity. Prior studies: Radiograph of the chest dated 10/10/2022. Findings: Catheter tubing within the right chest wall compatible with  shunt. The thyroid is symmetric. The trachea is midline . There is a small left pleural effusion with dependent atelectasis of the left lung parenchyma. The pulmonary artery is normal in size and there is no significant filling defect recognized within the main pulmonary arterial vasculature. The aorta is normal in size without evidence of aneurysm or dissection. The heart size is normal without pericardial effusion.  No significant Contrast noted within small bowel loops-question if the enteric tube is a jejunostomy tube? Before meals above discussion. Electronically signed by Kostas Cui MD on 11-04-22 at 0767    XR ABDOMEN (2 VIEWS)    Result Date: 11/4/2022  ADDENDUM:11/04/22 05:35 Call Doctor Regarding Misplaced tube or line, life-threatening, called  Dr. Margo Blizzard on 11/04 05:35 (-05:00) Patient: Sylvia Martinez  Time Out: 05:30Exam(s): FILM ABDOMEN EXAM:  XR Abdomen, 2 ViewsCLINICAL HISTORY:   Reason for exam: Gtube placement. TECHNIQUE:  Frontal view of the abdomen/pelvis with upright view of the abdomen. COMPARISON:  No relevant prior studies available. FINDINGS:  A gastrostomy tube balloon is seen in the left upper quadrant. However, it is surrounded by  contrast which appears to be extraluminal.  Contrast is not seen within the stomach or proximal small bowel. The balloon/catheter appears to be within the peritoneal space. NG tube appears to be within the peritoneal space, not within the lumen of the stomach. Communications:11/04/22 05:35 Call Doctor Regarding Misplaced tube or line, life-threatening, called  Dr. Margo Blizzard on 11/04 05:35 (-05:00) Electronically  signed by Kostas Cui MD on 11-04-22 at 0508    11/5/2022: Sodium 135. Assessment//Plan           Hospital Problems             Last Modified POA    * (Principal) Gastric tube granulation tissue ([address] 11/4/2022 Yes    Dislodged gastrostomy tube 11/4/2022 Yes     Assessment & Plan    Tachycardia, low-grade fever and leukocytosis are concerning. Patient has been seen by surgery. Abdomen is benign and no signs of peritonitis. However abdominal examination is suboptimal because patient is moving about and uncooperative with examination. We may have to do repeat CT scan of the abdomen pelvis. Mildly elevated ALT of 46. Plan:    Monitor white cell count, fever and heart rate. Watch for peritonitis. Repeat CT scan of the abdomen and pelvis on Monday.     Think decision by mother whether she would like G-tube to be replaced or she would like her son to attempt eating. Discussed plan with patient's nurse.       Electronically signed by Alana Pfeiffer MD on 11/5/22 at 9:45 AM CDT

## 2022-11-05 NOTE — PLAN OF CARE
Problem: Nutrition Deficit:  Goal: Optimize nutritional status  Flowsheets (Taken 11/5/2022 1103)  Nutrient intake appropriate for improving, restoring, or maintaining nutritional needs: Recommend, monitor, and adjust tube feedings and TPN/PPN based on assessed needs  Note: Nutrition Problem #1: Inadequate oral intake  Intervention: Food and/or Nutrient Delivery: Continue NPO

## 2022-11-05 NOTE — PLAN OF CARE
Problem: Safety - Medical Restraint  Goal: Remains free of injury from restraints (Restraint for Interference with Medical Device)  Description: INTERVENTIONS:  1. Determine that other, less restrictive measures have been tried or would not be effective before applying the restraint  2. Evaluate the patient's condition at the time of restraint application  3. Inform patient/family regarding the reason for restraint  4. Q2H: Monitor safety, psychosocial status, comfort, nutrition and hydration  Outcome: Progressing     Problem: Discharge Planning  Goal: Discharge to home or other facility with appropriate resources  Outcome: Progressing  Flowsheets (Taken 11/5/2022 3100)  Discharge to home or other facility with appropriate resources: Identify barriers to discharge with patient and caregiver     Problem: Pain  Goal: Verbalizes/displays adequate comfort level or baseline comfort level  Outcome: Progressing     Problem: Safety - Adult  Goal: Free from fall injury  Outcome: Progressing     Problem: ABCDS Injury Assessment  Goal: Absence of physical injury  Outcome: Progressing     Problem: Skin/Tissue Integrity  Goal: Absence of new skin breakdown  Description: 1. Monitor for areas of redness and/or skin breakdown  2. Assess vascular access sites hourly  3. Every 4-6 hours minimum:  Change oxygen saturation probe site  4. Every 4-6 hours:  If on nasal continuous positive airway pressure, respiratory therapy assess nares and determine need for appliance change or resting period.   Outcome: Progressing
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
## 2022-11-05 NOTE — PROGRESS NOTES
Comprehensive Nutrition Assessment    Type and Reason for Visit:  Initial    Nutrition Recommendations/Plan:   When able to restart TF, Jevity 1.5 @ goal rate 60 mL/hr with 40 mL/hr flush, or may bolus as at home. Malnutrition Assessment:  Malnutrition Status: At risk for malnutrition (Comment) (11/05/22 1051)    Context:  Chronic Illness     Findings of the 6 clinical characteristics of malnutrition:  Energy Intake:  Mild decrease in energy intake (Comment)  Weight Loss:  No significant weight loss     Body Fat Loss:  Unable to assess     Muscle Mass Loss:  Unable to assess    Fluid Accumulation:  No significant fluid accumulation     Strength:  Not Performed    Nutrition Assessment:    Pt is nutritionally compromised AEB dependence on EN for nutritional needs, pt is now NPO for PEG replacement. Will follow for restart of TF. Nutrition Related Findings:      Wound Type: Pressure Injury       Current Nutrition Intake & Therapies:    Average Meal Intake: NPO     Diet NPO    Anthropometric Measures:  Height: 6' 2\" (188 cm)  Ideal Body Weight (IBW): 190 lbs (86 kg)    Admission Body Weight: 138 lb (62.6 kg)  Current Body Weight: 138 lb (62.6 kg),   IBW.     Current BMI (kg/m2): 17.7  Usual Body Weight: 138 lb (62.6 kg)  % Weight Change (Calculated): 0                    BMI Categories: Underweight (BMI less than 18.5)    Estimated Daily Nutrient Needs:  Energy Requirements Based On: Kcal/kg  Weight Used for Energy Requirements: Admission  Energy (kcal/day): 6881-3841  Weight Used for Protein Requirements: Admission  Protein (g/day):   Method Used for Fluid Requirements: 1 ml/kcal  Fluid (ml/day): 1217-8822    Nutrition Diagnosis:   Inadequate oral intake related to swallowing difficulty as evidenced by nutrition support - enteral nutrition    Nutrition Interventions:   Food and/or Nutrient Delivery: Continue NPO  Nutrition Education/Counseling: No recommendation at this time  Coordination of Nutrition Care: Continue to monitor while inpatient       Goals:     Goals: Meet at least 75% of estimated needs       Nutrition Monitoring and Evaluation:   Behavioral-Environmental Outcomes: None Identified  Food/Nutrient Intake Outcomes: Diet Advancement/Tolerance  Physical Signs/Symptoms Outcomes: Biochemical Data, Skin, Weight, Nutrition Focused Physical Findings    Discharge Planning:    Enteral Nutrition     Kate Mary MS, RD, LD  Contact: [phone]

## 2022-11-06 LAB
BASOPHILS ABSOLUTE: 0 K/UL (ref 0–0.2)
BASOPHILS RELATIVE PERCENT: 0.2 % (ref 0–1)
EOSINOPHILS ABSOLUTE: 0 K/UL (ref 0–0.6)
EOSINOPHILS RELATIVE PERCENT: 0.2 % (ref 0–5)
HCT VFR BLD CALC: 38.3 % (ref 42–52)
HEMOGLOBIN: 11.5 G/DL (ref 14–18)
IMMATURE GRANULOCYTES #: 0.1 K/UL
LYMPHOCYTES ABSOLUTE: 1.3 K/UL (ref 1.1–4.5)
LYMPHOCYTES RELATIVE PERCENT: 6.8 % (ref 20–40)
MCH RBC QN AUTO: 27.3 PG (ref 27–31)
MCHC RBC AUTO-ENTMCNC: 30 G/DL (ref 33–37)
MCV RBC AUTO: 91 FL (ref 80–94)
MONOCYTES ABSOLUTE: 1 K/UL (ref 0–0.9)
MONOCYTES RELATIVE PERCENT: 5.4 % (ref 0–10)
NEUTROPHILS ABSOLUTE: 16.2 K/UL (ref 1.5–7.5)
NEUTROPHILS RELATIVE PERCENT: 86.9 % (ref 50–65)
PDW BLD-RTO: 15.6 % (ref 11.5–14.5)
PLATELET # BLD: 280 K/UL (ref 130–400)
PMV BLD AUTO: 11.5 FL (ref 9.4–12.4)
RBC # BLD: 4.21 M/UL (ref 4.7–6.1)
WBC # BLD: 18.6 K/UL (ref 4.8–10.8)

## 2022-11-06 PROCEDURE — 2580000003 HC RX 258: Performed by: HOSPITALIST

## 2022-11-06 PROCEDURE — 85025 COMPLETE CBC W/AUTO DIFF WBC: CPT

## 2022-11-06 PROCEDURE — 1210000000 HC MED SURG R&B

## 2022-11-06 PROCEDURE — 6360000002 HC RX W HCPCS: Performed by: NURSE PRACTITIONER

## 2022-11-06 PROCEDURE — 6360000002 HC RX W HCPCS: Performed by: HOSPITALIST

## 2022-11-06 PROCEDURE — 94640 AIRWAY INHALATION TREATMENT: CPT

## 2022-11-06 PROCEDURE — 36415 COLL VENOUS BLD VENIPUNCTURE: CPT

## 2022-11-06 PROCEDURE — 99232 SBSQ HOSP IP/OBS MODERATE 35: CPT | Performed by: SPECIALIST

## 2022-11-06 RX ORDER — ENOXAPARIN SODIUM 100 MG/ML
1 INJECTION SUBCUTANEOUS 2 TIMES DAILY
Status: DISCONTINUED | OUTPATIENT
Start: 2022-11-06 | End: 2022-11-10 | Stop reason: HOSPADM

## 2022-11-06 RX ADMIN — ALBUTEROL SULFATE 2.5 MG: 2.5 SOLUTION RESPIRATORY (INHALATION) at 10:38

## 2022-11-06 RX ADMIN — LEVETIRACETAM 500 MG: 5 INJECTION INTRAVENOUS at 17:58

## 2022-11-06 RX ADMIN — ALBUTEROL SULFATE 2.5 MG: 2.5 SOLUTION RESPIRATORY (INHALATION) at 01:37

## 2022-11-06 RX ADMIN — ALBUTEROL SULFATE 2.5 MG: 2.5 SOLUTION RESPIRATORY (INHALATION) at 15:05

## 2022-11-06 RX ADMIN — SODIUM CHLORIDE, PRESERVATIVE FREE 10 ML: 5 INJECTION INTRAVENOUS at 09:51

## 2022-11-06 RX ADMIN — ALBUTEROL SULFATE 2.5 MG: 2.5 SOLUTION RESPIRATORY (INHALATION) at 07:37

## 2022-11-06 RX ADMIN — ALBUTEROL SULFATE 2.5 MG: 2.5 SOLUTION RESPIRATORY (INHALATION) at 17:51

## 2022-11-06 RX ADMIN — LEVETIRACETAM 500 MG: 5 INJECTION INTRAVENOUS at 04:43

## 2022-11-06 RX ADMIN — ACETYLCYSTEINE 400 MG: 200 SOLUTION ORAL; RESPIRATORY (INHALATION) at 17:51

## 2022-11-06 RX ADMIN — SODIUM CHLORIDE, PRESERVATIVE FREE 10 ML: 5 INJECTION INTRAVENOUS at 20:44

## 2022-11-06 RX ADMIN — PIPERACILLIN AND TAZOBACTAM 3375 MG: 3; .375 INJECTION, POWDER, FOR SOLUTION INTRAVENOUS at 23:40

## 2022-11-06 RX ADMIN — ACETYLCYSTEINE 400 MG: 200 SOLUTION ORAL; RESPIRATORY (INHALATION) at 10:38

## 2022-11-06 RX ADMIN — ALBUTEROL SULFATE 2.5 MG: 2.5 SOLUTION RESPIRATORY (INHALATION) at 22:01

## 2022-11-06 RX ADMIN — ENOXAPARIN SODIUM 60 MG: 100 INJECTION SUBCUTANEOUS at 20:44

## 2022-11-06 RX ADMIN — ACETYLCYSTEINE 400 MG: 200 SOLUTION ORAL; RESPIRATORY (INHALATION) at 15:05

## 2022-11-06 RX ADMIN — PIPERACILLIN AND TAZOBACTAM 3375 MG: 3; .375 INJECTION, POWDER, FOR SOLUTION INTRAVENOUS at 05:21

## 2022-11-06 RX ADMIN — ACETYLCYSTEINE 400 MG: 200 SOLUTION ORAL; RESPIRATORY (INHALATION) at 22:01

## 2022-11-06 RX ADMIN — PIPERACILLIN AND TAZOBACTAM 3375 MG: 3; .375 INJECTION, POWDER, FOR SOLUTION INTRAVENOUS at 13:56

## 2022-11-06 RX ADMIN — ACETYLCYSTEINE 400 MG: 200 SOLUTION ORAL; RESPIRATORY (INHALATION) at 01:37

## 2022-11-06 RX ADMIN — ENOXAPARIN SODIUM 60 MG: 100 INJECTION SUBCUTANEOUS at 13:56

## 2022-11-06 RX ADMIN — ACETYLCYSTEINE 400 MG: 200 SOLUTION ORAL; RESPIRATORY (INHALATION) at 07:37
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
## 2022-11-06 NOTE — PLAN OF CARE
Problem: Safety - Medical Restraint  Goal: Remains free of injury from restraints (Restraint for Interference with Medical Device)  Description: INTERVENTIONS:  1. Determine that other, less restrictive measures have been tried or would not be effective before applying the restraint  2. Evaluate the patient's condition at the time of restraint application  3. Inform patient/family regarding the reason for restraint  4.  Q2H: Monitor safety, psychosocial status, comfort, nutrition and hydration  11/6/2022 1112 by Clary Martin RN  Outcome: Progressing  11/6/2022 0319 by Renee Estrella RN  Outcome: Progressing  Flowsheets (Taken 11/5/2022 2000)  Remains free of injury from restraints (restraint for interference with medical device):   Determine that other, less restrictive measures have been tried or would not be effective before applying the restraint   Evaluate the patient's condition at the time of restraint application   Inform patient/family regarding the reason for restraint   Every 2 hours: Monitor safety, psychosocial status, comfort, nutrition and hydration     Problem: Discharge Planning  Goal: Discharge to home or other facility with appropriate resources  11/6/2022 1112 by Clary Martin RN  Outcome: Progressing  Flowsheets (Taken 11/6/2022 0345 by Renee Estrella RN)  Discharge to home or other facility with appropriate resources:   Identify barriers to discharge with patient and caregiver   Arrange for needed discharge resources and transportation as appropriate   Identify discharge learning needs (meds, wound care, etc)   Arrange for interpreters to assist at discharge as needed   Refer to discharge planning if patient needs post-hospital services based on physician order or complex needs related to functional status, cognitive ability or social support system  11/6/2022 0319 by Renee Estrella RN  Outcome: Progressing     Problem: Pain  Goal: Verbalizes/displays adequate comfort level or baseline comfort level  11/6/2022 1112 by Jamison Valentin RN  Outcome: Progressing  11/6/2022 0319 by Erica Tapia RN  Outcome: Progressing     Problem: Safety - Adult  Goal: Free from fall injury  11/6/2022 1112 by Jamison Valentin RN  Outcome: Progressing  11/6/2022 0319 by Erica Tapia RN  Outcome: Progressing     Problem: ABCDS Injury Assessment  Goal: Absence of physical injury  11/6/2022 1112 by Jamison Valentin RN  Outcome: Progressing  11/6/2022 0319 by Erica Tapia RN  Outcome: Progressing     Problem: Skin/Tissue Integrity  Goal: Absence of new skin breakdown  Description: 1. Monitor for areas of redness and/or skin breakdown  2. Assess vascular access sites hourly  3. Every 4-6 hours minimum:  Change oxygen saturation probe site  4. Every 4-6 hours:  If on nasal continuous positive airway pressure, respiratory therapy assess nares and determine need for appliance change or resting period. 11/6/2022 1112 by Jamison Valentin RN  Outcome: Progressing  11/6/2022 0319 by Erica Tapia RN  Outcome: Progressing     Problem: Nutrition Deficit:  Goal: Optimize nutritional status  11/6/2022 1112 by Jamison Valentin RN  Outcome: Progressing  11/6/2022 0319 by Erica Tapia RN  Outcome: Progressing     Problem: Neurosensory - Adult  Goal: Achieves stable or improved neurological status  11/6/2022 1112 by Jamison Valentin RN  Outcome: Progressing  Flowsheets (Taken 11/6/2022 0345 by Erica Tapia RN)  Achieves stable or improved neurological status:   Assess for and report changes in neurological status   Initiate measures to prevent increased intracranial pressure   Maintain blood pressure and fluid volume within ordered parameters to optimize cerebral perfusion and minimize risk of hemorrhage   Monitor temperature, glucose, and sodium.  Initiate appropriate interventions as ordered  11/6/2022 0319 by Erica Tapia RN  Outcome: Progressing  Goal: Absence of seizures  11/6/2022 1112 by Jamison Valentin RN  Outcome: Progressing  Flowsheets (Taken 11/6/2022 0345 by Curry Wilson RN)  Absence of seizures:   Monitor for seizure activity.   If seizure occurs, document type and location of movements and any associated apnea   If seizure occurs, turn head to side and suction secretions as needed   Administer anticonvulsants as ordered   Support airway/breathing, administer oxygen as needed   Diagnostic studies as ordered  11/6/2022 0319 by Curry Wilson RN  Outcome: Progressing  Goal: Remains free of injury related to seizures activity  11/6/2022 1112 by Antolin Cerna RN  Outcome: Progressing  Flowsheets (Taken 11/6/2022 0345 by Curry Wilson RN)  Remains free of injury related to seizure activity:   Maintain airway, patient safety  and administer oxygen as ordered   Monitor patient for seizure activity, document and report duration and description of seizure to Licensed Independent Practitioner   If seizure occurs, turn patient to side and suction secretions as needed   Reorient patient post seizure   Seizure pads on all 4 side rails   Instruct patient/family to call for assistance with activity based on assessment   Instruct patient/family to notify RN of any seizure activity  11/6/2022 0319 by Curry Wilson RN  Outcome: Progressing  Goal: Achieves maximal functionality and self care  11/6/2022 1112 by Antolin Cerna RN  Outcome: Progressing  Flowsheets (Taken 11/6/2022 0345 by Curry Wilson RN)  Achieves maximal functionality and self care:   Monitor swallowing and airway patency with patient fatigue and changes in neurological status   Encourage visually impaired, hearing impaired and aphasic patients to use assistive/communication devices   Encourage and assist patient to increase activity and self care with guidance from physical therapy/occupational therapy  11/6/2022 0319 by Curry Wilson RN  Outcome: Progressing     Problem: Respiratory - Adult  Goal: Achieves optimal ventilation and oxygenation  11/6/2022 1112 by Jim Poole RN  Outcome: Progressing  Flowsheets (Taken 11/6/2022 0345 by Suzy Umanzor RN)  Achieves optimal ventilation and oxygenation:   Assess for changes in respiratory status   Assess for changes in mentation and behavior   Position to facilitate oxygenation and minimize respiratory effort   Oxygen supplementation based on oxygen saturation or arterial blood gases   Initiate smoking cessation protocol as indicated   Encourage broncho-pulmonary hygiene including cough, deep breathe, incentive spirometry   Assess the need for suctioning and aspirate as needed   Assess and instruct to report shortness of breath or any respiratory difficulty   Respiratory therapy support as indicated  11/6/2022 0319 by Suzy Umanzor RN  Outcome: Progressing     Problem: Cardiovascular - Adult  Goal: Maintains optimal cardiac output and hemodynamic stability  11/6/2022 1112 by Jim Poole RN  Outcome: Progressing  Flowsheets (Taken 11/6/2022 0345 by Suzy Umanzor RN)  Maintains optimal cardiac output and hemodynamic stability:   Monitor blood pressure and heart rate   Monitor urine output and notify Licensed Independent Practitioner for values outside of normal range   Assess for signs of decreased cardiac output   Administer fluid and/or volume expanders as ordered   Administer vasoactive medications as ordered   For PPHN infants, administer sedation as ordered and minimize all controllable stressors.   11/6/2022 0319 by Suzy Umanzor RN  Outcome: Progressing  Goal: Absence of cardiac dysrhythmias or at baseline  11/6/2022 1112 by Jim Poole RN  Outcome: Progressing  Flowsheets (Taken 11/6/2022 0345 by Suzy Umanzor RN)  Absence of cardiac dysrhythmias or at baseline:   Monitor cardiac rate and rhythm   Assess for signs of decreased cardiac output   Administer antiarrhythmia medication and electrolyte replacement as ordered  11/6/2022 0319 by Suzy Umanzor RN  Outcome: Progressing     Problem: Skin/Tissue Integrity - Adult  Goal: Skin integrity remains intact  11/6/2022 1112 by Tanvir Hernandez RN  Outcome: Progressing  Flowsheets (Taken 11/6/2022 0345 by Jessica De Paz RN)  Skin Integrity Remains Intact:   Monitor for areas of redness and/or skin breakdown   Assess vascular access sites hourly   Every 4-6 hours minimum: Change oxygen saturation probe site   Every 4-6 hours: If on nasal continuous positive airway pressure, respiratory therapy assesses nares and determine need for appliance change or resting period  11/6/2022 0319 by Jessica De Paz RN  Outcome: Progressing  Goal: Incisions, wounds, or drain sites healing without S/S of infection  11/6/2022 1112 by Tanvir Hernandez RN  Outcome: Progressing  Flowsheets (Taken 11/6/2022 0345 by Jessica De Paz RN)  Incisions, Wounds, or Drain Sites Healing Without Sign and Symptoms of Infection:   ADMISSION and DAILY: Assess and document risk factors for pressure ulcer development   TWICE DAILY: Assess and document skin integrity   TWICE DAILY: Assess and document dressing/incision, wound bed, drain sites and surrounding tissue   Implement wound care per orders   Initiate isolation precautions as appropriate   Initiate pressure ulcer prevention bundle as indicated  11/6/2022 0319 by Jessica De Paz RN  Outcome: Progressing  Goal: Oral mucous membranes remain intact  11/6/2022 1112 by Tanvir Hernandez RN  Outcome: Progressing  Flowsheets (Taken 11/6/2022 0345 by Jessica De Paz RN)  Oral Mucous Membranes Remain Intact:   Assess oral mucosa and hygiene practices   Implement preventative oral hygiene regimen   Implement oral medicated treatments as ordered  11/6/2022 0319 by Jessica De Paz RN  Outcome: Progressing     Problem: Musculoskeletal - Adult  Goal: Return mobility to safest level of function  11/6/2022 1112 by Tanvir Hernandez RN  Outcome: Progressing  Flowsheets (Taken 11/6/2022 0345 by Jessica De Paz RN)  Return Mobility to Safest Level of Function:   Assess patient stability and activity tolerance for standing, transferring and ambulating with or without assistive devices   Assist with transfers and ambulation using safe patient handling equipment as needed   Ensure adequate protection for wounds/incisions during mobilization   Obtain physical therapy/occupational therapy consults as needed   Apply continuous passive motion per provider or physical therapy orders to increase flexion toward goal   Instruct patient/family in ordered activity level  11/6/2022 0319 by Cleveland Galvan RN  Outcome: Progressing  Goal: Maintain proper alignment of affected body part  11/6/2022 1112 by Darby Patel RN  Outcome: Progressing  Flowsheets (Taken 11/6/2022 0345 by Cleveland Galvan RN)  Maintain proper alignment of affected body part:   Support and protect limb and body alignment per provider's orders   Instruct and reinforce with patient and family use of appropriate assistive device and precautions (e.g. spinal or hip dislocation precautions)  11/6/2022 0319 by Cleveland Galvan RN  Outcome: Progressing  Goal: Return ADL status to a safe level of function  11/6/2022 1112 by Darby Patel RN  Outcome: Progressing  Flowsheets (Taken 11/6/2022 0345 by Cleveland Galvan RN)  Return ADL Status to a Safe Level of Function:   Administer medication as ordered   Assess activities of daily living deficits and provide assistive devices as needed   Obtain physical therapy/occupational therapy consults as needed   Assist and instruct patient to increase activity and self care as tolerated  11/6/2022 0319 by Cleveland Galvan RN  Outcome: Progressing     Problem: Gastrointestinal - Adult  Goal: Minimal or absence of nausea and vomiting  11/6/2022 1112 by Darby Patel RN  Outcome: Progressing  Flowsheets (Taken 11/6/2022 0345 by Cleveland Galvan RN)  Minimal or absence of nausea and vomiting:   Administer IV fluids as ordered to ensure adequate hydration   Maintain NPO status until nausea and vomiting are resolved Nasogastric tube to low intermittent suction as ordered   Administer ordered antiemetic medications as needed   Provide nonpharmacologic comfort measures as appropriate   Advance diet as tolerated, if ordered   Nutrition consult to assist patient with adequate nutrition and appropriate food choices  11/6/2022 0319 by Gilbert Charlton RN  Outcome: Progressing  Goal: Maintains or returns to baseline bowel function  11/6/2022 1112 by Louie Essex, RN  Outcome: Progressing  Flowsheets (Taken 11/6/2022 0345 by Gilbert Charlton RN)  Maintains or returns to baseline bowel function:   Assess bowel function   Encourage oral fluids to ensure adequate hydration   Administer IV fluids as ordered to ensure adequate hydration   Administer ordered medications as needed   Encourage mobilization and activity   Nutrition consult to assist patient with appropriate food choices  11/6/2022 0319 by Gilbert Charlton RN  Outcome: Progressing  Goal: Maintains adequate nutritional intake  11/6/2022 1112 by Louie Essex, RN  Outcome: Progressing  Flowsheets (Taken 11/6/2022 0345 by Gilbert Charlton RN)  Maintains adequate nutritional intake:   Monitor percentage of each meal consumed   Identify factors contributing to decreased intake, treat as appropriate   Assist with meals as needed   Monitor intake and output, weight and lab values   Obtain nutritional consult as needed  11/6/2022 0319 by Gilbert Charlton RN  Outcome: Progressing  Goal: Establish and maintain optimal ostomy function  11/6/2022 1112 by Louie Essex, RN  Outcome: Progressing  Flowsheets (Taken 11/6/2022 0345 by Gilbert Charlton RN)  Establish and maintain optimal ostomy function:   Monitor output from ostomies   Administer IV fluids and TPN as ordered   Introduce and advance enteral feedings as ordered   Nutrition consult   Gastric suctioning as ordered   Infuse IV Fluids/TPN as ordered  11/6/2022 0319 by Gilbert Charlton RN  Outcome: Progressing     Problem: Genitourinary - during hospitalization  11/6/2022 1112 by Antolin Cerna RN  Outcome: Progressing  Flowsheets (Taken 11/6/2022 0345 by Curry Wilson RN)  Absence of infection during hospitalization:   Assess and monitor for signs and symptoms of infection   Monitor lab/diagnostic results   Monitor all insertion sites i.e., indwelling lines, tubes and drains   Monitor endotracheal (as able) and nasal secretions for changes in amount and color   [place] appropriate cooling/warming therapies per order   Administer medications as ordered   Identify and instruct in appropriate isolation precautions for identified infection/condition   Instruct and encourage patient and family to use good hand hygiene technique  11/6/2022 0319 by Curry Wilson RN  Outcome: Progressing  Goal: Absence of fever/infection during anticipated neutropenic period  11/6/2022 1112 by Antolin Cerna RN  Outcome: Progressing  Flowsheets (Taken 11/6/2022 0345 by Curry Wilson RN)  Absence of fever/infection during anticipated neutropenic period:   Implement neutropenic guidelines   Administer growth factors as ordered   Monitor white blood cell count  11/6/2022 0319 by Curry Wilson RN  Outcome: Progressing     Problem: Metabolic/Fluid and Electrolytes - Adult  Goal: Electrolytes maintained within normal limits  11/6/2022 1112 by Antolin Cerna RN  Outcome: Progressing  Flowsheets (Taken 11/6/2022 0345 by Curry Wilson RN)  Electrolytes maintained within normal limits:   Monitor labs and assess patient for signs and symptoms of electrolyte imbalances   Administer electrolyte replacement as ordered   Monitor response to electrolyte replacements, including repeat lab results as appropriate   Fluid restriction as ordered   Instruct patient on fluid and nutrition restrictions as appropriate  11/6/2022 0319 by Curry Wilson RN  Outcome: Progressing  Goal: Hemodynamic stability and optimal renal function maintained  11/6/2022 1112 by Antolin Cerna RN  Outcome: Progressing  Flowsheets (Taken 11/6/2022 0345 by Cristy Parmar RN)  Hemodynamic stability and optimal renal function maintained:   Monitor labs and assess for signs and symptoms of volume excess or deficit   Monitor intake, output and patient weight   Monitor urine specific gravity, serum osmolarity and serum sodium as indicated or ordered   Monitor response to interventions for patient's volume status, including labs, urine output, blood pressure (other measures as available)   Encourage oral intake as appropriate   Instruct patient on fluid and nutrition restrictions as appropriate  11/6/2022 0319 by Cristy Parmar RN  Outcome: Progressing  Goal: Glucose maintained within prescribed range  11/6/2022 1112 by Angela Stewart RN  Outcome: Progressing  Flowsheets (Taken 11/6/2022 0345 by Cristy Parmar RN)  Glucose maintained within prescribed range:   Monitor blood glucose as ordered   Assess for signs and symptoms of hyperglycemia and hypoglycemia   Administer ordered medications to maintain glucose within target range   Assess barriers to adequate nutritional intake and initiate nutrition consult as needed   Instruct patient on self management of diabetes and initiate consult as needed  11/6/2022 0319 by Cristy Parmar RN  Outcome: Progressing     Problem: Hematologic - Adult  Goal: Maintains hematologic stability  11/6/2022 1112 by Angela Stewart RN  Outcome: Progressing  Flowsheets (Taken 11/6/2022 0345 by Cristy Parmar RN)  Maintains hematologic stability:   Assess for signs and symptoms of bleeding or hemorrhage   Monitor labs for bleeding or clotting disorders   Administer blood products/factors as ordered  11/6/2022 0319 by Cristy Parmar RN  Outcome: Progressing     Problem: Chronic Conditions and Co-morbidities  Goal: Patient's chronic conditions and co-morbidity symptoms are monitored and maintained or improved  11/6/2022 1112 by Angela Stewart RN  Outcome: Progressing  Flowsheets (Taken 11/6/2022 0345 by Brittni [place], RN)  Care Plan - Patient's Chronic Conditions and Co-Morbidity Symptoms are Monitored and Maintained or Improved:   Monitor and assess patient's chronic conditions and comorbid symptoms for stability, deterioration, or improvement   Collaborate with multidisciplinary team to address chronic and comorbid conditions and prevent exacerbation or deterioration   Update acute care plan with appropriate goals if chronic or comorbid symptoms are exacerbated and prevent overall improvement and discharge  11/6/2022 0319 by Brittni Bosch RN  Outcome: Progressing

## 2022-11-06 NOTE — PLAN OF CARE
Problem: Safety - Medical Restraint  Goal: Remains free of injury from restraints (Restraint for Interference with Medical Device)  Description: INTERVENTIONS:  1. Determine that other, less restrictive measures have been tried or would not be effective before applying the restraint  2. Evaluate the patient's condition at the time of restraint application  3. Inform patient/family regarding the reason for restraint  4. Q2H: Monitor safety, psychosocial status, comfort, nutrition and hydration  Outcome: Progressing  Flowsheets (Taken 11/5/2022 2000)  Remains free of injury from restraints (restraint for interference with medical device):   Determine that other, less restrictive measures have been tried or would not be effective before applying the restraint   Evaluate the patient's condition at the time of restraint application   Inform patient/family regarding the reason for restraint   Every 2 hours: Monitor safety, psychosocial status, comfort, nutrition and hydration     Problem: Discharge Planning  Goal: Discharge to home or other facility with appropriate resources  Outcome: Progressing     Problem: Pain  Goal: Verbalizes/displays adequate comfort level or baseline comfort level  Outcome: Progressing     Problem: Safety - Adult  Goal: Free from fall injury  Outcome: Progressing     Problem: ABCDS Injury Assessment  Goal: Absence of physical injury  Outcome: Progressing     Problem: Skin/Tissue Integrity  Goal: Absence of new skin breakdown  Description: 1. Monitor for areas of redness and/or skin breakdown  2. Assess vascular access sites hourly  3. Every 4-6 hours minimum:  Change oxygen saturation probe site  4. Every 4-6 hours:  If on nasal continuous positive airway pressure, respiratory therapy assess nares and determine need for appliance change or resting period.   Outcome: Progressing     Problem: Nutrition Deficit:  Goal: Optimize nutritional status  Outcome: Progressing     Problem: Neurosensory - Adult  Goal: Achieves stable or improved neurological status  Outcome: Progressing  Goal: Absence of seizures  Outcome: Progressing  Goal: Remains free of injury related to seizures activity  Outcome: Progressing  Goal: Achieves maximal functionality and self care  Outcome: Progressing     Problem: Respiratory - Adult  Goal: Achieves optimal ventilation and oxygenation  Outcome: Progressing     Problem: Cardiovascular - Adult  Goal: Maintains optimal cardiac output and hemodynamic stability  Outcome: Progressing  Goal: Absence of cardiac dysrhythmias or at baseline  Outcome: Progressing     Problem: Skin/Tissue Integrity - Adult  Goal: Skin integrity remains intact  Outcome: Progressing  Goal: Incisions, wounds, or drain sites healing without S/S of infection  Outcome: Progressing  Goal: Oral mucous membranes remain intact  Outcome: Progressing     Problem: Musculoskeletal - Adult  Goal: Return mobility to safest level of function  Outcome: Progressing  Goal: Maintain proper alignment of affected body part  Outcome: Progressing  Goal: Return ADL status to a safe level of function  Outcome: Progressing     Problem: Gastrointestinal - Adult  Goal: Minimal or absence of nausea and vomiting  Outcome: Progressing  Goal: Maintains or returns to baseline bowel function  Outcome: Progressing  Goal: Maintains adequate nutritional intake  Outcome: Progressing  Goal: Establish and maintain optimal ostomy function  Outcome: Progressing     Problem: Genitourinary - Adult  Goal: Absence of urinary retention  Outcome: Progressing  Goal: Urinary catheter remains patent  Outcome: Progressing     Problem: Infection - Adult  Goal: Absence of infection at discharge  Outcome: Progressing  Goal: Absence of infection during hospitalization  Outcome: Progressing  Goal: Absence of fever/infection during anticipated neutropenic period  Outcome: Progressing     Problem: Metabolic/Fluid and Electrolytes - Adult  Goal: Electrolytes maintained within normal limits  Outcome: Progressing  Goal: Hemodynamic stability and optimal renal function maintained  Outcome: Progressing  Goal: Glucose maintained within prescribed range  Outcome: Progressing     Problem: Hematologic - Adult  Goal: Maintains hematologic stability  Outcome: Progressing     Problem: Chronic Conditions and Co-morbidities  Goal: Patient's chronic conditions and co-morbidity symptoms are monitored and maintained or improved  Outcome: Progressing

## 2022-11-06 NOTE — PROGRESS NOTES
Progress Note  Date:2022       Room:0330/330-01  Patient Name:Brandon Godinez     YOB: 1985     Age:37 y.o. Subjective    Patient was started on Zosyn yesterday. White cell count has come down to 18,600. Sodium today 135. Glucose 127. Patient came with dislodged G-tube. Had attempted replacement of G-tube in the ER and was unsuccessful. Imaging showed free air in the abdomen. Patient was seen by surgery and was felt to be at low risk for peritonitis. Patient's nurse is at bedside. Patient had emesis x1 yesterday. Patient not sedated but is sleeping. 2022: WBC count 18.6, hemoglobin 11.5, MCV 91, platelet count 354,755. Gastric biopsies last month were negative for H. pylori. Objective         Vitals Last 24 Hours:  TEMPERATURE:  Temp  Av.7 °F (37.1 °C)  Min: 98.4 °F (36.9 °C)  Max: 99.4 °F (37.4 °C)  RESPIRATIONS RANGE: Resp  Av.7  Min: 16  Max: 18  PULSE OXIMETRY RANGE: SpO2  Av %  Min: 95 %  Max: 99 %  PULSE RANGE: Pulse  Av.3  Min: 63  Max: 125  BLOOD PRESSURE RANGE: Systolic (11UFG), RCM:007 , Min:137 , CNI:814   ; Diastolic (82ZBK), WRN:350, Min:88, Max:112    Pulse . Oxygen saturation normal.  T-max 99.4 °F. I/O (24 Hr): Intake/Output Summary (Last 24 hours) at 2022 1056  Last data filed at 2022 0511  Gross per 24 hour   Intake 638 ml   Output 300 ml   Net 338 ml       Objective:  Vital signs: (most recent): Blood pressure (!) 137/98, pulse (!) 113, temperature 98.4 °F (36.9 °C), temperature source Temporal, resp. rate 16, height 6' 2\" (1.88 m), weight 135 lb 9.6 oz (61.5 kg), SpO2 97 %. Normocephalic. Abdomen not distended. Gastrostomy tube site looks healthy with some granulation tissue. No leakage. No bleeding. Abdomen is soft and non tender. No gross organomegaly. No leg edema. Skin no jaundice.   Labs/Imaging/Diagnostics    Labs:  CBC:  Recent Labs     22  0742 22  0731 11/06/22  0452   WBC 17.6* 24.0* 18.6*   RBC 4.58* 4.80 4.21*   HGB 12.6* 13.2* 11.5*   HCT 38.7* 41.8* 38.3*   MCV 84.5 87.1 91.0   RDW 15.2* 15.4* 15.6*   * 292 280       CHEMISTRIES:  Recent Labs     11/04/22 0742 11/05/22  0731   * 135*   K 4.3 3.9   CL 98 101   CO2 22 24   BUN 8 13   CREATININE 0.8 0.7   GLUCOSE 107 127*   PHOS 3.8  --    MG 1.8  --        PT/INR:  Recent Labs     11/04/22 0742   PROTIME 14.6   INR 1.15       APTT:  Recent Labs     11/04/22 0742   APTT 32.9       LIVER PROFILE:  Recent Labs     11/04/22 0742   AST 30   ALT 46*   BILITOT 0.5   ALKPHOS 69         Imaging Last 24 Hours:  CT ABDOMEN PELVIS WO CONTRAST Additional Contrast? None    Result Date: 11/4/2022  NO PRIOR REPORT AVAILABLE Exam: CT OF THE ABDOMEN/PELVIS WITHOUT CONTRAST Clinical data: Status post dislodged G-tube. Status post attempted G-tube replacement. Status post G-tube removal. Technique: Direct contiguous axial CT images were acquired through the abdomen and pelvis without contrast using soft tissue and bone algorithms. Reformatted/MPR images were performed. Radiation dose: CTDIvol =13.92 mGy, DLP =1046 mGy x cm. Limitations: Lack of intravenous contrast limits evaluation of solid viscera. Lack of oral contrast limits evaluation of the bowel loops. Prior Studies: Radiograph of the abdomen dated 10/09/2022. CT scan of the abdomen and pelvis dated 10/08/2022. Renal ultrasound dated 09/24/2022. FINDINGS: The heart is normal in size. There is a left pleural effusion with compressive atelectatic change of the lung parenchyma.  all shunt identified in the right hemiabdomen. The liver is normal in size and contour and demonstrates no focal abnormality. The spleen is normal in size and contour and demonstrates no focal abnormality. The gallbladder is distended without stones or pericholecystic fluid. There is no intra or extrahepatic biliary ductal dilatation.  The adrenal glands are normal. The pancreas is normal in attenuation without evidence of peripancreatic inflammatory change or significant ductal dilatation. The kidneys are anatomically located. There is no hydroureteronephrosis or stone. There are no significant perinephric inflammatory changes. There is free intraperitoneal air along with contrast extravasation within the left abdomen anterior wall. The There is no evidence of acute appendicitis. There is no abdominal or pelvic ascites. There is an IVC filter. The retroperitoneum is normal without significant adenopathy. Within the pelvis, the urinary bladder is normal. There is no pathologic mass. The osseous structures appear intact. Free intraperitoneal air with contrast in the left anterior abdominal wall. These findings appear compatible with the clinically described PEG tube removal.  Clinical correlation is essential.  shunt. IVC filter. The Recommendation: Follow up as clinically indicated. All CT scans at this facility utilize dose modulation, iterative reconstruction, and/or weight based dosing when appropriate to reduce radiation dose to as low as reasonably achievable. Electronically Signed by Micheline Gee MD at 04-Nov-2022 01:41:17 PM EST             CT CHEST WO CONTRAST    Result Date: 11/4/2022  NO PRIOR REPORT AVAILABLE Exam: CT OF THE CHEST WITHOUT CONTRAST Clinical data: Status post dislodgment of G-tube. Status post attempted replacement of G-tube. Now the G-tube is out. Technique: Axial CT images through the lungs were acquired without contrast and imaged using soft tissue and lung algorithms. Reformatted/MPR images were performed. Radiation Dose: CTDIvol =13.92 mGy, DLP =1046 mGy x cm. Limitations: Lack of intravenous contrast limits evaluation of the soft tissues and vascularity. Prior studies: Radiograph of the chest dated 10/10/2022. Findings: Catheter tubing within the right chest wall compatible with  shunt. The thyroid is symmetric. The trachea is midline .   There is a small left pleural effusion with dependent atelectasis of the left lung parenchyma. The pulmonary artery is normal in size and there is no significant filling defect recognized within the main pulmonary arterial vasculature. The aorta is normal in size without evidence of aneurysm or dissection. The heart size is normal without pericardial effusion. No significant mediastinal or hilar adenopathy is identified. The visualized ribs and thoracic vertebral bodies are intact. The visualized portions of the upper abdomen demonstrates hyper density in the renal collecting systems bilaterally suggestive of excreted contrast.  There is an IVC filter. There is free intraperitoneal air and there is extravasated contrast along the left hemiabdomen    Impression: Left pleural effusion with compressive atelectatic change of the dependent lung parenchyma.  shunt. IVC filter. Free intraperitoneal air. Please refer to CT scan abdomen and pelvis performed at the same time for further evaluation. Contrast within the renal collecting systems. Recommendation: Follow up as clinically indicated. All CT scans at this facility utilize dose modulation, iterative reconstruction, and/or weight based dosing when appropriate to reduce radiation dose to as low as reasonably achievable. Electronically Signed by Vishal Salazar MD at 04-Nov-2022 01:36:33 PM EST             XR ABDOMEN (2 VIEWS)    Result Date: 11/4/2022  ADDENDUM:Patient: Leonardo Dimas  Time Out: 07:34Exam(s): FILM ABDOMEN  Added by Josie Martinez MD on 11/4/2022 7:33 AM (-07:00)    Contrast noted within small bowel loops-question if the enteric tube is a jejunostomy tube? PLEASE SEE above discussion. EXAM:  XR Abdomen, 2 ViewsCLINICAL HISTORY:   Reason for exam: G tube replacement. TECHNIQUE:  Frontal view of the abdomen/pelvis with upright view of the abdomen. COMPARISON:  Earlier the same day. FINDINGS:  The gastrostomy tube balloon is in the left upper quadrant anteriorly. Contrast is seen around the balloon. Contrast appears to be within small bowel loops. Contrast is not definitely seen within the stomach-air is noted within the gastric antrum on the AP view and in the gastric fundus on the lateral view, but without layering contrast.? The exact location of this enteric tube balloon-is this a jejunostomy  tube? IMPRESSION:     Contrast noted within small bowel loops-question if the enteric tube is a jejunostomy tube? Before meals above discussion. Electronically signed by Yoly Diaz MD on 11-04-22 at 0734    XR ABDOMEN (2 VIEWS)    Result Date: 11/4/2022  ADDENDUM:11/04/22 05:35 Call Doctor Regarding Misplaced tube or line, life-threatening, called  Dr. Joseph Frausto on 11/04 05:35 (-05:00) Patient: Rosio Islas  Time Out: 05:30Exam(s): FILM ABDOMEN EXAM:  XR Abdomen, 2 ViewsCLINICAL HISTORY:   Reason for exam: Gtube placement. TECHNIQUE:  Frontal view of the abdomen/pelvis with upright view of the abdomen. COMPARISON:  No relevant prior studies available. FINDINGS:  A gastrostomy tube balloon is seen in the left upper quadrant. However, it is surrounded by  contrast which appears to be extraluminal.  Contrast is not seen within the stomach or proximal small bowel. The balloon/catheter appears to be within the peritoneal space. NG tube appears to be within the peritoneal space, not within the lumen of the stomach. Communications:11/04/22 05:35 Call Doctor Regarding Misplaced tube or line, life-threatening, called  Dr. Jsoeph Frausto on 11/04 05:35 (-05:00) Electronically  signed by Yoly Diaz MD on 11-04-22 at 0530    11/5/2022: Sodium 135. Assessment//Plan           Hospital Problems             Last Modified POA    * (Principal) Gastric tube granulation tissue ([address] 11/4/2022 Yes    Dislodged gastrostomy tube 11/4/2022 Yes   Assessment & Plan    WBC count has come down to 18,600 on Zosyn. Abdomen soft, nontender and no signs of peritonitis.   Gastrostomy site looks healthy. No cellulitis. No leakage. Mildly elevated ALT of 46. Plan:    Monitor white cell count, fever and heart rate. Watch for peritonitis. Repeat CT scan of the abdomen and pelvis on Monday. Awaiting decision by mother whether she would like G-tube to be replaced or she would like her son to attempt eating. Left message with patient's nurse to call me when patient's mother is at bedside. Discussed plan with patient's nurse.       Electronically signed by Candi Mota MD on 11/5/22 at 9:45 AM CDT

## 2022-11-07 ENCOUNTER — APPOINTMENT (OUTPATIENT)
Dept: CT IMAGING | Age: 37
DRG: 393 | End: 2022-11-07
Payer: MEDICARE

## 2022-11-07 PROBLEM — D72.829 LEUKOCYTOSIS: Status: ACTIVE | Noted: 2022-11-07

## 2022-11-07 PROBLEM — R11.10 VOMITING: Status: ACTIVE | Noted: 2022-09-19

## 2022-11-07 PROBLEM — R74.01 ELEVATED ALT MEASUREMENT: Status: ACTIVE | Noted: 2022-11-07

## 2022-11-07 LAB
ANION GAP SERPL CALCULATED.3IONS-SCNC: 15 MMOL/L (ref 7–19)
BASOPHILS ABSOLUTE: 0 K/UL (ref 0–0.2)
BASOPHILS RELATIVE PERCENT: 0.3 % (ref 0–1)
BUN BLDV-MCNC: 7 MG/DL (ref 6–20)
CALCIUM SERPL-MCNC: 8.9 MG/DL (ref 8.6–10)
CHLORIDE BLD-SCNC: 111 MMOL/L (ref 98–111)
CO2: 21 MMOL/L (ref 22–29)
CREAT SERPL-MCNC: 0.6 MG/DL (ref 0.5–1.2)
EOSINOPHILS ABSOLUTE: 0.1 K/UL (ref 0–0.6)
EOSINOPHILS RELATIVE PERCENT: 0.5 % (ref 0–5)
GFR SERPL CREATININE-BSD FRML MDRD: >60 ML/MIN/{1.73_M2}
GLUCOSE BLD-MCNC: 87 MG/DL (ref 74–109)
HCT VFR BLD CALC: 31.7 % (ref 42–52)
HEMOGLOBIN: 10 G/DL (ref 14–18)
IMMATURE GRANULOCYTES #: 0 K/UL
LYMPHOCYTES ABSOLUTE: 1.2 K/UL (ref 1.1–4.5)
LYMPHOCYTES RELATIVE PERCENT: 10 % (ref 20–40)
MCH RBC QN AUTO: 28.3 PG (ref 27–31)
MCHC RBC AUTO-ENTMCNC: 31.5 G/DL (ref 33–37)
MCV RBC AUTO: 89.8 FL (ref 80–94)
MONOCYTES ABSOLUTE: 0.6 K/UL (ref 0–0.9)
MONOCYTES RELATIVE PERCENT: 5.1 % (ref 0–10)
NEUTROPHILS ABSOLUTE: 9.9 K/UL (ref 1.5–7.5)
NEUTROPHILS RELATIVE PERCENT: 83.8 % (ref 50–65)
PDW BLD-RTO: 15.5 % (ref 11.5–14.5)
PLATELET # BLD: 267 K/UL (ref 130–400)
PMV BLD AUTO: 11.5 FL (ref 9.4–12.4)
POTASSIUM SERPL-SCNC: 4 MMOL/L (ref 3.5–5)
RBC # BLD: 3.53 M/UL (ref 4.7–6.1)
SODIUM BLD-SCNC: 147 MMOL/L (ref 136–145)
WBC # BLD: 11.8 K/UL (ref 4.8–10.8)

## 2022-11-07 PROCEDURE — 6360000002 HC RX W HCPCS: Performed by: HOSPITALIST

## 2022-11-07 PROCEDURE — 85025 COMPLETE CBC W/AUTO DIFF WBC: CPT

## 2022-11-07 PROCEDURE — 99232 SBSQ HOSP IP/OBS MODERATE 35: CPT | Performed by: SURGERY

## 2022-11-07 PROCEDURE — 2580000003 HC RX 258: Performed by: HOSPITALIST

## 2022-11-07 PROCEDURE — 1210000000 HC MED SURG R&B

## 2022-11-07 PROCEDURE — 80048 BASIC METABOLIC PNL TOTAL CA: CPT

## 2022-11-07 PROCEDURE — 36415 COLL VENOUS BLD VENIPUNCTURE: CPT

## 2022-11-07 PROCEDURE — 99232 SBSQ HOSP IP/OBS MODERATE 35: CPT | Performed by: SPECIALIST

## 2022-11-07 PROCEDURE — 94640 AIRWAY INHALATION TREATMENT: CPT

## 2022-11-07 PROCEDURE — 6360000002 HC RX W HCPCS: Performed by: NURSE PRACTITIONER

## 2022-11-07 RX ORDER — METOPROLOL TARTRATE 5 MG/5ML
5 INJECTION INTRAVENOUS DAILY
Status: DISCONTINUED | OUTPATIENT
Start: 2022-11-07 | End: 2022-11-10 | Stop reason: HOSPADM

## 2022-11-07 RX ORDER — ACETYLCYSTEINE 200 MG/ML
2 SOLUTION ORAL; RESPIRATORY (INHALATION) EVERY 4 HOURS PRN
Status: DISCONTINUED | OUTPATIENT
Start: 2022-11-07 | End: 2022-11-10 | Stop reason: HOSPADM

## 2022-11-07 RX ADMIN — ACETYLCYSTEINE 400 MG: 200 SOLUTION ORAL; RESPIRATORY (INHALATION) at 01:54

## 2022-11-07 RX ADMIN — ENOXAPARIN SODIUM 60 MG: 100 INJECTION SUBCUTANEOUS at 10:05

## 2022-11-07 RX ADMIN — ALBUTEROL SULFATE 2.5 MG: 2.5 SOLUTION RESPIRATORY (INHALATION) at 11:09

## 2022-11-07 RX ADMIN — LEVETIRACETAM 500 MG: 5 INJECTION INTRAVENOUS at 05:39

## 2022-11-07 RX ADMIN — PIPERACILLIN AND TAZOBACTAM 3375 MG: 3; .375 INJECTION, POWDER, FOR SOLUTION INTRAVENOUS at 05:58

## 2022-11-07 RX ADMIN — ALBUTEROL SULFATE 2.5 MG: 2.5 SOLUTION RESPIRATORY (INHALATION) at 07:09

## 2022-11-07 RX ADMIN — ALBUTEROL SULFATE 2.5 MG: 2.5 SOLUTION RESPIRATORY (INHALATION) at 01:54

## 2022-11-07 ASSESSMENT — PAIN SCALES - GENERAL: PAINLEVEL_OUTOF10: 0

## 2022-11-07 ASSESSMENT — PAIN SCALES - WONG BAKER
WONGBAKER_NUMERICALRESPONSE: 0
WONGBAKER_NUMERICALRESPONSE: 0

## 2022-11-07 NOTE — PROGRESS NOTES
Awaiting patient's mother's decision regarding replacing his feeding tube or allowing him to receive oral intake. The patient has been upset this morning and nursing has asked for SLP not to disturb him this morning. SLP can return tomorrow to attempt again.     Electronically Signed By:  Wally Schultz M.S., CCC-SLP  11/7/2022,9:54 AM.
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
## 2022-11-07 NOTE — PROGRESS NOTES
Subjective:  No acute events or changes. Patient is non-verbal and does not indicate any complaints. Discussed with nursing, no new issues. Objective:  BP (!) 127/93   Pulse 84   Temp 98.1 °F (36.7 °C) (Oral)   Resp 16   Ht 6' 2\" (1.88 m)   Wt 135 lb 9.6 oz (61.5 kg)   SpO2 95%   BMI 17.41 kg/m²   General: NAD, resting comfortably in bed, gets a little anxious when approached  Chest: Regular, non-labored  Abdomen: Soft, ND, no grimacing with palpation    CBC:   Lab Results   Component Value Date/Time    WBC 11.8 11/07/2022 04:38 AM    RBC 3.53 11/07/2022 04:38 AM    HGB 10.0 11/07/2022 04:38 AM    HCT 31.7 11/07/2022 04:38 AM    MCV 89.8 11/07/2022 04:38 AM    MCH 28.3 11/07/2022 04:38 AM    MCHC 31.5 11/07/2022 04:38 AM    RDW 15.5 11/07/2022 04:38 AM     11/07/2022 04:38 AM    MPV 11.5 11/07/2022 04:38 AM     BMP:    Lab Results   Component Value Date/Time     11/07/2022 04:38 AM    K 4.0 11/07/2022 04:38 AM    K 3.9 11/05/2022 07:31 AM     11/07/2022 04:38 AM    CO2 21 11/07/2022 04:38 AM    BUN 7 11/07/2022 04:38 AM    LABALBU 3.8 11/04/2022 07:42 AM    CREATININE 0.6 11/07/2022 04:38 AM    CALCIUM 8.9 11/07/2022 04:38 AM    GFRAA >59 10/11/2022 02:18 AM    LABGLOM >60 11/07/2022 04:38 AM    GLUCOSE 87 11/07/2022 04:38 AM     Assessment and plan:  30year old male with TBI who pulled PEG  Patient with no evidence of peritonitis on exam. Fluid on Ct from last week expected based on instillation of gastrograffin to check positioning of tube attempted to be placed by ER doc. Patient did have elevated WBC, but he also has pleural effusion and atelectasis. He is afebrile. He is in no acute distress. He has failed swallow evaluation and was made NPO by SLP. Patient is in need of gastrostomy tube. Recommend placement of PEG. If GI isn't going to do this, then general surgery can place on endoscopically tomorrow.

## 2022-11-07 NOTE — PROGRESS NOTES
Nutrition Assessment     Type and Reason for Visit: Reassess         Malnutrition Assessment:  Malnutrition Status: At risk for malnutrition (Comment)    Nutrition Assessment:  Pt declining from a nutritional standpoint AEB no source of nutrition. Pt recommended to remain NPO by SLP. Pending decision on replacing PEG. Pt to have CT of abdomen today. Will follow for POC.     Estimated Daily Nutrient Needs:  Energy (kcal):  5732-3528 Weight Used for Energy Requirements: Admission     Protein (g):   Weight Used for Protein Requirements: Admission        Fluid (ml/day):  4937-8630 Method Used for Fluid Requirements: 1 ml/kcal    Nutrition Related Findings:     Wound Type: Pressure Injury    Current Nutrition Therapies:    Diet NPO    Anthropometric Measures:  Height: 6' 2\" (188 cm)  Current Body Wt: 135 lb 9.6 oz (61.5 kg)   BMI: 17.4    Nutrition Diagnosis:   Inadequate oral intake related to swallowing difficulty as evidenced by nutrition support - enteral nutrition    Nutrition Interventions:   Food and/or Nutrient Delivery: Continue NPO  Nutrition Education/Counseling: No recommendation at this time  Coordination of Nutrition Care: Continue to monitor while inpatient       Goals:  Previous Goal Met: No Progress toward Goal(s)  Goals: Meet at least 75% of estimated needs       Nutrition Monitoring and Evaluation:   Behavioral-Environmental Outcomes: None Identified  Food/Nutrient Intake Outcomes: Diet Advancement/Tolerance, Enteral Nutrition Intake/Tolerance  Physical Signs/Symptoms Outcomes: Biochemical Data, Skin, Weight, Nutrition Focused Physical Findings    Discharge Planning:    Enteral Nutrition     Bruno Pascual RD, LD  Contact: [phone]

## 2022-11-08 ENCOUNTER — ANESTHESIA EVENT (OUTPATIENT)
Dept: ENDOSCOPY | Age: 37
DRG: 393 | End: 2022-11-08
Payer: MEDICARE

## 2022-11-08 ENCOUNTER — APPOINTMENT (OUTPATIENT)
Dept: CT IMAGING | Age: 37
DRG: 393 | End: 2022-11-08
Payer: MEDICARE

## 2022-11-08 ENCOUNTER — ANESTHESIA (OUTPATIENT)
Dept: ENDOSCOPY | Age: 37
DRG: 393 | End: 2022-11-08
Payer: MEDICARE

## 2022-11-08 PROBLEM — Z43.1 ENCOUNTER FOR PEG (PERCUTANEOUS ENDOSCOPIC GASTROSTOMY) (HCC): Status: ACTIVE | Noted: 2022-11-04

## 2022-11-08 LAB
ANION GAP SERPL CALCULATED.3IONS-SCNC: 19 MMOL/L (ref 7–19)
BASOPHILS ABSOLUTE: 0 K/UL (ref 0–0.2)
BASOPHILS RELATIVE PERCENT: 0.3 % (ref 0–1)
BUN BLDV-MCNC: 7 MG/DL (ref 6–20)
CALCIUM SERPL-MCNC: 8.7 MG/DL (ref 8.6–10)
CHLORIDE BLD-SCNC: 110 MMOL/L (ref 98–111)
CO2: 17 MMOL/L (ref 22–29)
CREAT SERPL-MCNC: 0.6 MG/DL (ref 0.5–1.2)
EOSINOPHILS ABSOLUTE: 0.1 K/UL (ref 0–0.6)
EOSINOPHILS RELATIVE PERCENT: 1 % (ref 0–5)
GFR SERPL CREATININE-BSD FRML MDRD: >60 ML/MIN/{1.73_M2}
GLUCOSE BLD-MCNC: 120 MG/DL (ref 70–99)
GLUCOSE BLD-MCNC: 193 MG/DL (ref 70–99)
GLUCOSE BLD-MCNC: 52 MG/DL (ref 70–99)
GLUCOSE BLD-MCNC: 65 MG/DL (ref 70–99)
GLUCOSE BLD-MCNC: 68 MG/DL (ref 74–109)
GLUCOSE BLD-MCNC: 82 MG/DL (ref 70–99)
GLUCOSE BLD-MCNC: 92 MG/DL (ref 70–99)
HCT VFR BLD CALC: 30.3 % (ref 42–52)
HEMOGLOBIN: 9.7 G/DL (ref 14–18)
IMMATURE GRANULOCYTES #: 0 K/UL
LYMPHOCYTES ABSOLUTE: 1.2 K/UL (ref 1.1–4.5)
LYMPHOCYTES RELATIVE PERCENT: 13.5 % (ref 20–40)
MCH RBC QN AUTO: 28.2 PG (ref 27–31)
MCHC RBC AUTO-ENTMCNC: 32 G/DL (ref 33–37)
MCV RBC AUTO: 88.1 FL (ref 80–94)
MONOCYTES ABSOLUTE: 0.5 K/UL (ref 0–0.9)
MONOCYTES RELATIVE PERCENT: 5.9 % (ref 0–10)
NEUTROPHILS ABSOLUTE: 7.1 K/UL (ref 1.5–7.5)
NEUTROPHILS RELATIVE PERCENT: 79 % (ref 50–65)
PDW BLD-RTO: 14.9 % (ref 11.5–14.5)
PERFORMED ON: ABNORMAL
PERFORMED ON: NORMAL
PERFORMED ON: NORMAL
PLATELET # BLD: 281 K/UL (ref 130–400)
PMV BLD AUTO: 11.3 FL (ref 9.4–12.4)
POTASSIUM SERPL-SCNC: 3.6 MMOL/L (ref 3.5–5)
RBC # BLD: 3.44 M/UL (ref 4.7–6.1)
SODIUM BLD-SCNC: 146 MMOL/L (ref 136–145)
WBC # BLD: 9 K/UL (ref 4.8–10.8)

## 2022-11-08 PROCEDURE — 36410 VNPNXR 3YR/> PHY/QHP DX/THER: CPT

## 2022-11-08 PROCEDURE — 36415 COLL VENOUS BLD VENIPUNCTURE: CPT

## 2022-11-08 PROCEDURE — 6370000000 HC RX 637 (ALT 250 FOR IP): Performed by: SPECIALIST

## 2022-11-08 PROCEDURE — 05HA33Z INSERTION OF INFUSION DEVICE INTO LEFT BRACHIAL VEIN, PERCUTANEOUS APPROACH: ICD-10-PCS | Performed by: HOSPITALIST

## 2022-11-08 PROCEDURE — 2500000003 HC RX 250 WO HCPCS: Performed by: HOSPITALIST

## 2022-11-08 PROCEDURE — 2580000003 HC RX 258: Performed by: HOSPITALIST

## 2022-11-08 PROCEDURE — 85025 COMPLETE CBC W/AUTO DIFF WBC: CPT

## 2022-11-08 PROCEDURE — 0DJ08ZZ INSPECTION OF UPPER INTESTINAL TRACT, VIA NATURAL OR ARTIFICIAL OPENING ENDOSCOPIC: ICD-10-PCS | Performed by: SPECIALIST

## 2022-11-08 PROCEDURE — 2580000003 HC RX 258: Performed by: SPECIALIST

## 2022-11-08 PROCEDURE — 99232 SBSQ HOSP IP/OBS MODERATE 35: CPT | Performed by: SPECIALIST

## 2022-11-08 PROCEDURE — 1210000000 HC MED SURG R&B

## 2022-11-08 PROCEDURE — 74176 CT ABD & PELVIS W/O CONTRAST: CPT

## 2022-11-08 PROCEDURE — 6360000002 HC RX W HCPCS: Performed by: NURSE ANESTHETIST, CERTIFIED REGISTERED

## 2022-11-08 PROCEDURE — 6360000002 HC RX W HCPCS: Performed by: HOSPITALIST

## 2022-11-08 PROCEDURE — 2500000003 HC RX 250 WO HCPCS: Performed by: SPECIALIST

## 2022-11-08 PROCEDURE — 2709999900 HC NON-CHARGEABLE SUPPLY: Performed by: SPECIALIST

## 2022-11-08 PROCEDURE — 2580000003 HC RX 258: Performed by: NURSE PRACTITIONER

## 2022-11-08 PROCEDURE — 2700000000 HC OXYGEN THERAPY PER DAY

## 2022-11-08 PROCEDURE — 7100000001 HC PACU RECOVERY - ADDTL 15 MIN: Performed by: SPECIALIST

## 2022-11-08 PROCEDURE — 3700000001 HC ADD 15 MINUTES (ANESTHESIA): Performed by: SPECIALIST

## 2022-11-08 PROCEDURE — C1751 CATH, INF, PER/CENT/MIDLINE: HCPCS

## 2022-11-08 PROCEDURE — 7100000000 HC PACU RECOVERY - FIRST 15 MIN: Performed by: SPECIALIST

## 2022-11-08 PROCEDURE — 3700000000 HC ANESTHESIA ATTENDED CARE: Performed by: SPECIALIST

## 2022-11-08 PROCEDURE — 76937 US GUIDE VASCULAR ACCESS: CPT

## 2022-11-08 PROCEDURE — 82947 ASSAY GLUCOSE BLOOD QUANT: CPT

## 2022-11-08 PROCEDURE — 43235 EGD DIAGNOSTIC BRUSH WASH: CPT | Performed by: SPECIALIST

## 2022-11-08 PROCEDURE — 2580000003 HC RX 258: Performed by: NURSE ANESTHETIST, CERTIFIED REGISTERED

## 2022-11-08 PROCEDURE — 2500000003 HC RX 250 WO HCPCS: Performed by: NURSE ANESTHETIST, CERTIFIED REGISTERED

## 2022-11-08 PROCEDURE — 80048 BASIC METABOLIC PNL TOTAL CA: CPT

## 2022-11-08 PROCEDURE — 6360000002 HC RX W HCPCS: Performed by: SPECIALIST

## 2022-11-08 PROCEDURE — 3609013300 HC EGD TUBE PLACEMENT: Performed by: SPECIALIST

## 2022-11-08 RX ORDER — SODIUM CHLORIDE, SODIUM LACTATE, POTASSIUM CHLORIDE, CALCIUM CHLORIDE 600; 310; 30; 20 MG/100ML; MG/100ML; MG/100ML; MG/100ML
INJECTION, SOLUTION INTRAVENOUS CONTINUOUS PRN
Status: DISCONTINUED | OUTPATIENT
Start: 2022-11-08 | End: 2022-11-08 | Stop reason: SDUPTHER

## 2022-11-08 RX ORDER — DEXTROSE MONOHYDRATE 100 MG/ML
INJECTION, SOLUTION INTRAVENOUS CONTINUOUS PRN
Status: DISCONTINUED | OUTPATIENT
Start: 2022-11-08 | End: 2022-11-10 | Stop reason: HOSPADM

## 2022-11-08 RX ORDER — HYDROMORPHONE HYDROCHLORIDE 1 MG/ML
0.5 INJECTION, SOLUTION INTRAMUSCULAR; INTRAVENOUS; SUBCUTANEOUS EVERY 5 MIN PRN
Status: DISCONTINUED | OUTPATIENT
Start: 2022-11-08 | End: 2022-11-08

## 2022-11-08 RX ORDER — HYDROMORPHONE HYDROCHLORIDE 1 MG/ML
0.25 INJECTION, SOLUTION INTRAMUSCULAR; INTRAVENOUS; SUBCUTANEOUS EVERY 5 MIN PRN
Status: DISCONTINUED | OUTPATIENT
Start: 2022-11-08 | End: 2022-11-08

## 2022-11-08 RX ORDER — LIDOCAINE HYDROCHLORIDE 10 MG/ML
INJECTION, SOLUTION EPIDURAL; INFILTRATION; INTRACAUDAL; PERINEURAL PRN
Status: DISCONTINUED | OUTPATIENT
Start: 2022-11-08 | End: 2022-11-08 | Stop reason: SDUPTHER

## 2022-11-08 RX ORDER — ONDANSETRON 2 MG/ML
4 INJECTION INTRAMUSCULAR; INTRAVENOUS
Status: DISCONTINUED | OUTPATIENT
Start: 2022-11-08 | End: 2022-11-08

## 2022-11-08 RX ORDER — PROPOFOL 10 MG/ML
INJECTION, EMULSION INTRAVENOUS PRN
Status: DISCONTINUED | OUTPATIENT
Start: 2022-11-08 | End: 2022-11-08 | Stop reason: SDUPTHER

## 2022-11-08 RX ORDER — CEFAZOLIN SODIUM 1 G/50ML
INJECTION, SOLUTION INTRAVENOUS PRN
Status: DISCONTINUED | OUTPATIENT
Start: 2022-11-08 | End: 2022-11-08 | Stop reason: SDUPTHER

## 2022-11-08 RX ADMIN — ACETAMINOPHEN 650 MG: 650 SUPPOSITORY RECTAL at 23:51

## 2022-11-08 RX ADMIN — CEFAZOLIN SODIUM 1 G: 1 INJECTION, SOLUTION INTRAVENOUS at 14:36

## 2022-11-08 RX ADMIN — SODIUM CHLORIDE, PRESERVATIVE FREE 20 MG: 5 INJECTION INTRAVENOUS at 23:44

## 2022-11-08 RX ADMIN — PROPOFOL 450 MG: 10 INJECTION, EMULSION INTRAVENOUS at 14:36

## 2022-11-08 RX ADMIN — DEXTROSE MONOHYDRATE 125 ML: 10 INJECTION, SOLUTION INTRAVENOUS at 16:59

## 2022-11-08 RX ADMIN — LIDOCAINE HYDROCHLORIDE 50 MG: 10 INJECTION, SOLUTION EPIDURAL; INFILTRATION; INTRACAUDAL; PERINEURAL at 14:36

## 2022-11-08 RX ADMIN — SODIUM CHLORIDE, SODIUM LACTATE, POTASSIUM CHLORIDE, AND CALCIUM CHLORIDE: 600; 310; 30; 20 INJECTION, SOLUTION INTRAVENOUS at 14:23

## 2022-11-08 RX ADMIN — METOPROLOL TARTRATE 5 MG: 1 INJECTION, SOLUTION INTRAVENOUS at 09:50

## 2022-11-08 RX ADMIN — PIPERACILLIN AND TAZOBACTAM 3375 MG: 3; .375 INJECTION, POWDER, FOR SOLUTION INTRAVENOUS at 23:43

## 2022-11-08 RX ADMIN — SODIUM BICARBONATE: 84 INJECTION, SOLUTION INTRAVENOUS at 11:05

## 2022-11-08 RX ADMIN — PIPERACILLIN AND TAZOBACTAM 3375 MG: 3; .375 INJECTION, POWDER, FOR SOLUTION INTRAVENOUS at 16:58

## 2022-11-08 RX ADMIN — LEVETIRACETAM 500 MG: 5 INJECTION INTRAVENOUS at 17:01

## 2022-11-08 RX ADMIN — LEVETIRACETAM 500 MG: 5 INJECTION INTRAVENOUS at 09:47

## 2022-11-08 RX ADMIN — DEXTROSE MONOHYDRATE 250 ML: 100 INJECTION, SOLUTION INTRAVENOUS at 08:42

## 2022-11-08 RX ADMIN — PIPERACILLIN AND TAZOBACTAM 3375 MG: 3; .375 INJECTION, POWDER, FOR SOLUTION INTRAVENOUS at 11:06

## 2022-11-08 RX ADMIN — SODIUM CHLORIDE, PRESERVATIVE FREE 10 ML: 5 INJECTION INTRAVENOUS at 08:44

## 2022-11-08 ASSESSMENT — PAIN SCALES - GENERAL: PAINLEVEL_OUTOF10: 4

## 2022-11-08 ASSESSMENT — PAIN SCALES - WONG BAKER: WONGBAKER_NUMERICALRESPONSE: 0

## 2022-11-08 NOTE — PROGRESS NOTES
Reviewed CT scan of the abdomen from today. There is a consolidation at lung bases. Air around the stomach is unchanged. WBC count is normalized. Patient on Zosyn. Management of pneumonia as per hospitalist.  Discussed plan with patient's nurse Pop Diana. Speech therapy to evaluate swallowing. Patient mother had questions why patient cannot swallow at home and not here.   Unable to explain this discrepancy about being able to swallow at home and not at the hospital.
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
## 2022-11-08 NOTE — PROGRESS NOTES
Clinical house notified that patient has no IV access at this time. According to night shift, IV team was unable to obtain access yesterday. Harpal Lopez, [address] notified.

## 2022-11-08 NOTE — PROGRESS NOTES
Patient's mother called checking on patients condition. This nurse made patient's mother aware patient was getting a CT scan this AM. Patient's mother wants to have an image of the patients throat, to see why patient cannot swallow applesauce, like he was at home.  Will notify next shift to bring up to MD.

## 2022-11-08 NOTE — CONSULTS
Pt known to palliative care team.  Pt presents to ED d/t pulled out his G-tube. Pt was previously here in Oct 2022 for same. Pt has been in ED multiple times with either need for trach tube change and/or malfunction. At present time pt is resting comfortably. No s/s of distress or pain. Pt RN, Nani Garnica, is in the room and updates this nurse on plan. Pt will have g-tube placed tomorrow, per mothers request wants this done endoscopically. GI MD, Mary Toussaint, will perform procedure. Pt is bed bound and cared for at home by his mother. Pt is non verbal.  Palliatve will follow for supportive care, goc discussion prn. Pt is a full code.     Electronically signed by Pedro Del Cid RN on 11/8/2022 at 12:09 PM

## 2022-11-08 NOTE — PROCEDURES
[facility] Vascular Access Team:  MidLine Insertion Procedure Note      Patient: Makenzie Diaz  YOB: 1985   MRN: [id number]  Room: [facility] Endo Pool/NONE     Attending Physician - Jory Gonzalez MD  Ordering Physician - Jory Gonzalez MD    Diagnosis:   Dislodged gastrostomy tube [R34.320C]  Gastric tube granulation tissue ([address] [K94.29]       Procedure(s): Insertion of 4.5 [place] Single Lumen Power Midline    Indication(s):   Poor Venous Access, Frequent Lab Draws, and Physician/Patient Preference    Date of Procedure: 11/8/2022   Start Time: 1500  End Time: 7404    Proceduralist: Edgar Oliov RN    Anesthesia: Local Injection <=5 mL 1% Lidocaine without Epinephrine    Estimated Blood Loss (mL): Minimal    Complications: None    Midline Single Lumen 11/08/22 Left Brachial (Active)   Criteria for Appropriate Use Limited/no vessel suitable for conventional peripheral access 11/08/22 1537   Site Assessment Clean, dry & intact 11/08/22 1537   Phlebitis Assessment No symptoms 11/08/22 1537   Infiltration Assessment 0 11/08/22 1537   External Catheter Length (cm) 0 cm 11/08/22 1537   Lumen Color/Status Pink;Flushed;Normal saline locked; Blood return noted 11/08/22 1537   Alcohol Cap Used Yes 11/08/22 1537   Date of Last Dressing Change 11/08/22 11/08/22 1537   Dressing Type Transparent; Antimicrobial;Securing device 11/08/22 1537   Dressing Status New dressing applied;Clean, dry & intact 11/08/22 1537   Dressing Intervention New 11/08/22 1537         Findings:   1. Successful insertion of Midline. 2. Midline is ready to be used. Please change tubing prior to using the new Midline. Make sure to label, date and use alcohol caps on new tubing and alcohol caps on unused ports. Detailed Description of Procedure:   Informed consent was obtained for the procedure, including sedation. Risks of hemorrhage and adverse drug reaction were discussed.      The Left Brachial vein was visualized using the ultrasound and deemed a suitable vessel. The area was prepped with Chlorhexidine and draped in sterile fashion using full max barrier draping. The area was anesthetized with 3 cc's of 1% Lidocaine. The vein was accessed using US guidance. The guidewire was advanced through the needle to secure the vein. The needle was removed and a peel-a-way Sheath was placed over the guidewire. Guidewire was removed with tip intact. The 4.5 Spanish Single Lumen Power Midline was left untrimmed at 15 cm and inserted into the Sheath. The peel-a-way sheath was removed. Approximately 0 cm exposed. All lumens had brisk blood return and was flushed with 10 cc of NS. The Midline was secured using a securement device and a Biopatch was placed over the insertion site. A Tegaderm was placed over the securement device and insertion site. Dressing was dated and initialed with external measurement marked. Patient did tolerate procedure well.         Electronically signed by Tiffanie Mzt RN on 11/8/2022 at 3:39 PM

## 2022-11-08 NOTE — PLAN OF CARE
Problem: Safety - Medical Restraint  Goal: Remains free of injury from restraints (Restraint for Interference with Medical Device)  Outcome: Not Progressing     Problem: Discharge Planning  Goal: Discharge to home or other facility with appropriate resources  Outcome: Not Progressing     Problem: Pain  Goal: Verbalizes/displays adequate comfort level or baseline comfort level  Outcome: Not Progressing     Problem: Safety - Adult  Goal: Free from fall injury  Outcome: Not Progressing  Flowsheets (Taken 11/8/2022 1215)  Free From Fall Injury:   Instruct family/caregiver on patient safety   Based on caregiver fall risk screen, instruct family/caregiver to ask for assistance with transferring infant if caregiver noted to have fall risk factors     Problem: ABCDS Injury Assessment  Goal: Absence of physical injury  Outcome: Not Progressing  Flowsheets (Taken 11/8/2022 1215)  Absence of Physical Injury: Implement safety measures based on patient assessment     Problem: Skin/Tissue Integrity  Goal: Absence of new skin breakdown  Outcome: Not Progressing     Problem: Nutrition Deficit:  Goal: Optimize nutritional status  Outcome: Not Progressing     Problem: Neurosensory - Adult  Goal: Achieves stable or improved neurological status  Outcome: Not Progressing  Goal: Absence of seizures  Outcome: Not Progressing  Goal: Remains free of injury related to seizures activity  Outcome: Not Progressing  Goal: Achieves maximal functionality and self care  Outcome: Not Progressing     Problem: Respiratory - Adult  Goal: Achieves optimal ventilation and oxygenation  Outcome: Not Progressing  Flowsheets (Taken 11/8/2022 0905)  Achieves optimal ventilation and oxygenation:   Assess for changes in respiratory status   Position to facilitate oxygenation and minimize respiratory effort   Assess for changes in mentation and behavior   Assess the need for suctioning and aspirate as needed   Initiate smoking cessation protocol as indicated Oxygen supplementation based on oxygen saturation or arterial blood gases   Encourage broncho-pulmonary hygiene including cough, deep breathe, incentive spirometry   Assess and instruct to report shortness of breath or any respiratory difficulty   Respiratory therapy support as indicated     Problem: Cardiovascular - Adult  Goal: Maintains optimal cardiac output and hemodynamic stability  Outcome: Not Progressing  Flowsheets (Taken 11/8/2022 0905)  Maintains optimal cardiac output and hemodynamic stability:   Monitor blood pressure and heart rate   Monitor urine output and notify Licensed Independent Practitioner for values outside of normal range   Assess for signs of decreased cardiac output   Administer fluid and/or volume expanders as ordered  Goal: Absence of cardiac dysrhythmias or at baseline  Outcome: Not Progressing     Problem: Skin/Tissue Integrity - Adult  Goal: Skin integrity remains intact  Outcome: Not Progressing  Flowsheets (Taken 11/8/2022 0905)  Skin Integrity Remains Intact:   Monitor for areas of redness and/or skin breakdown   Assess vascular access sites hourly   Every 4-6 hours minimum: Change oxygen saturation probe site   Every 4-6 hours: If on nasal continuous positive airway pressure, respiratory therapy assesses nares and determine need for appliance change or resting period  Goal: Incisions, wounds, or drain sites healing without S/S of infection  Outcome: Not Progressing  Flowsheets (Taken 11/8/2022 0905)  Incisions, Wounds, or Drain Sites Healing Without Sign and Symptoms of Infection:   TWICE DAILY: Assess and document skin integrity   ADMISSION and DAILY: Assess and document risk factors for pressure ulcer development   TWICE DAILY: Assess and document dressing/incision, wound bed, drain sites and surrounding tissue   Initiate isolation precautions as appropriate   Initiate pressure ulcer prevention bundle as indicated  Goal: Oral mucous membranes remain intact  Outcome: Not Progressing  Flowsheets (Taken 11/8/2022 0905)  Oral Mucous Membranes Remain Intact:   Assess oral mucosa and hygiene practices   Implement preventative oral hygiene regimen   Implement oral medicated treatments as ordered     Problem: Musculoskeletal - Adult  Goal: Return mobility to safest level of function  Outcome: Not Progressing  Goal: Maintain proper alignment of affected body part  Outcome: Not Progressing  Goal: Return ADL status to a safe level of function  Outcome: Not Progressing     Problem: Gastrointestinal - Adult  Goal: Minimal or absence of nausea and vomiting  Outcome: Not Progressing  Flowsheets (Taken 11/8/2022 0905)  Minimal or absence of nausea and vomiting:   Administer IV fluids as ordered to ensure adequate hydration   Maintain NPO status until nausea and vomiting are resolved   Nasogastric tube to low intermittent suction as ordered   Administer ordered antiemetic medications as needed   Provide nonpharmacologic comfort measures as appropriate   Nutrition consult to assist patient with adequate nutrition and appropriate food choices   Advance diet as tolerated, if ordered  Goal: Maintains or returns to baseline bowel function  Outcome: Not Progressing  Flowsheets (Taken 11/8/2022 0905)  Maintains or returns to baseline bowel function:   Assess bowel function   Encourage oral fluids to ensure adequate hydration   Administer ordered medications as needed   Administer IV fluids as ordered to ensure adequate hydration   Encourage mobilization and activity  Goal: Maintains adequate nutritional intake  Outcome: Not Progressing  Flowsheets (Taken 11/8/2022 0905)  Maintains adequate nutritional intake:   Monitor percentage of each meal consumed   Identify factors contributing to decreased intake, treat as appropriate   Monitor intake and output, weight and lab values   Assist with meals as needed   Obtain nutritional consult as needed  Goal: Establish and maintain optimal ostomy function  Outcome: Not Progressing  Flowsheets (Taken 11/8/2022 0905)  Establish and maintain optimal ostomy function:   Monitor output from ostomies   Administer IV fluids and TPN as ordered   Introduce and advance enteral feedings as ordered   Nutrition consult     Problem: Genitourinary - Adult  Goal: Absence of urinary retention  Outcome: Not Progressing  Flowsheets (Taken 11/8/2022 0905)  Absence of urinary retention:   Monitor intake/output and perform bladder scan as needed   Place urinary catheter per Licensed Independent Practitioner order if needed   Assess patients ability to void and empty bladder  Goal: Urinary catheter remains patent  Outcome: Not Progressing  Flowsheets (Taken 11/8/2022 0905)  Urinary catheter remains patent:   Assess patency of urinary catheter   Irrigate catheter per Licensed Independent Practitioner order if indicated and notify Licensed Independent Practitioner if unable to irrigate     Problem: Infection - Adult  Goal: Absence of infection at discharge  Outcome: Not Progressing  Goal: Absence of infection during hospitalization  Outcome: Not Progressing  Goal: Absence of fever/infection during anticipated neutropenic period  Outcome: Not Progressing     Problem: Metabolic/Fluid and Electrolytes - Adult  Goal: Electrolytes maintained within normal limits  Outcome: Not Progressing  Goal: Hemodynamic stability and optimal renal function maintained  Outcome: Not Progressing  Goal: Glucose maintained within prescribed range  Outcome: Not Progressing     Problem: Hematologic - Adult  Goal: Maintains hematologic stability  Outcome: Not Progressing     Problem: Chronic Conditions and Co-morbidities  Goal: Patient's chronic conditions and co-morbidity symptoms are monitored and maintained or improved  Outcome: Not Progressing
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
## 2022-11-08 NOTE — PLAN OF CARE
Problem: Safety - Medical Restraint  Goal: Remains free of injury from restraints (Restraint for Interference with Medical Device)  Description: INTERVENTIONS:  1. Determine that other, less restrictive measures have been tried or would not be effective before applying the restraint  2. Evaluate the patient's condition at the time of restraint application  3. Inform patient/family regarding the reason for restraint  4. Q2H: Monitor safety, psychosocial status, comfort, nutrition and hydration  Outcome: Progressing     Problem: Discharge Planning  Goal: Discharge to home or other facility with appropriate resources  Outcome: Progressing  Flowsheets (Taken 11/7/2022 2206)  Discharge to home or other facility with appropriate resources: Identify barriers to discharge with patient and caregiver     Problem: Pain  Goal: Verbalizes/displays adequate comfort level or baseline comfort level  Outcome: Progressing     Problem: Safety - Adult  Goal: Free from fall injury  Outcome: Progressing     Problem: ABCDS Injury Assessment  Goal: Absence of physical injury  Outcome: Progressing     Problem: Skin/Tissue Integrity  Goal: Absence of new skin breakdown  Description: 1. Monitor for areas of redness and/or skin breakdown  2. Assess vascular access sites hourly  3. Every 4-6 hours minimum:  Change oxygen saturation probe site  4. Every 4-6 hours:  If on nasal continuous positive airway pressure, respiratory therapy assess nares and determine need for appliance change or resting period.   Outcome: Progressing     Problem: Nutrition Deficit:  Goal: Optimize nutritional status  Outcome: Progressing     Problem: Neurosensory - Adult  Goal: Achieves stable or improved neurological status  Outcome: Progressing  Flowsheets (Taken 11/7/2022 2206)  Achieves stable or improved neurological status: Assess for and report changes in neurological status  Goal: Absence of seizures  Outcome: Progressing  Flowsheets (Taken 11/7/2022 2206)  Absence of seizures: Monitor for seizure activity.   If seizure occurs, document type and location of movements and any associated apnea  Goal: Remains free of injury related to seizures activity  Outcome: Progressing  Flowsheets (Taken 11/7/2022 2206)  Remains free of injury related to seizure activity: Maintain airway, patient safety  and administer oxygen as ordered  Goal: Achieves maximal functionality and self care  Outcome: Progressing  Flowsheets (Taken 11/7/2022 2206)  Achieves maximal functionality and self care: Monitor swallowing and airway patency with patient fatigue and changes in neurological status     Problem: Respiratory - Adult  Goal: Achieves optimal ventilation and oxygenation  Outcome: Progressing  Flowsheets (Taken 11/7/2022 2206)  Achieves optimal ventilation and oxygenation: Assess for changes in respiratory status     Problem: Cardiovascular - Adult  Goal: Maintains optimal cardiac output and hemodynamic stability  Outcome: Progressing  Flowsheets (Taken 11/7/2022 2206)  Maintains optimal cardiac output and hemodynamic stability: Monitor blood pressure and heart rate  Goal: Absence of cardiac dysrhythmias or at baseline  Outcome: Progressing  Flowsheets (Taken 11/7/2022 2206)  Absence of cardiac dysrhythmias or at baseline: Monitor cardiac rate and rhythm     Problem: Skin/Tissue Integrity - Adult  Goal: Skin integrity remains intact  Outcome: Progressing  Flowsheets (Taken 11/7/2022 2206)  Skin Integrity Remains Intact: Monitor for areas of redness and/or skin breakdown  Goal: Incisions, wounds, or drain sites healing without S/S of infection  Outcome: Progressing  Flowsheets (Taken 11/7/2022 2206)  Incisions, Wounds, or Drain Sites Healing Without Sign and Symptoms of Infection: ADMISSION and DAILY: Assess and document risk factors for pressure ulcer development  Goal: Oral mucous membranes remain intact  Outcome: Progressing  Flowsheets (Taken 11/7/2022 2206)  Oral Mucous Membranes Remain Intact: Assess oral mucosa and hygiene practices     Problem: Musculoskeletal - Adult  Goal: Return mobility to safest level of function  Outcome: Progressing  Flowsheets (Taken 11/7/2022 2206)  Return Mobility to Safest Level of Function: Assess patient stability and activity tolerance for standing, transferring and ambulating with or without assistive devices  Goal: Maintain proper alignment of affected body part  Outcome: Progressing  Flowsheets (Taken 11/7/2022 2206)  Maintain proper alignment of affected body part: Support and protect limb and body alignment per provider's orders  Goal: Return ADL status to a safe level of function  Outcome: Progressing  Flowsheets (Taken 11/7/2022 2206)  Return ADL Status to a Safe Level of Function: Administer medication as ordered     Problem: Gastrointestinal - Adult  Goal: Minimal or absence of nausea and vomiting  Outcome: Progressing  Flowsheets (Taken 11/7/2022 2206)  Minimal or absence of nausea and vomiting: Maintain NPO status until nausea and vomiting are resolved  Goal: Maintains or returns to baseline bowel function  Outcome: Progressing  Flowsheets (Taken 11/7/2022 2206)  Maintains or returns to baseline bowel function: Assess bowel function  Goal: Maintains adequate nutritional intake  Outcome: Progressing  Flowsheets (Taken 11/7/2022 2206)  Maintains adequate nutritional intake: Monitor percentage of each meal consumed  Goal: Establish and maintain optimal ostomy function  Outcome: Progressing  Flowsheets (Taken 11/7/2022 2206)  Establish and maintain optimal ostomy function: Monitor output from ostomies     Problem: Genitourinary - Adult  Goal: Absence of urinary retention  Outcome: Progressing  Flowsheets (Taken 11/7/2022 2206)  Absence of urinary retention: Assess patients ability to void and empty bladder  Goal: Urinary catheter remains patent  Outcome: Progressing  Flowsheets (Taken 11/7/2022 2206)  Urinary catheter remains patent: Assess patency of urinary catheter     Problem: Infection - Adult  Goal: Absence of infection at discharge  Outcome: Progressing  Flowsheets (Taken 11/7/2022 2206)  Absence of infection at discharge: Assess and monitor for signs and symptoms of infection  Goal: Absence of infection during hospitalization  Outcome: Progressing  Flowsheets (Taken 11/7/2022 2206)  Absence of infection during hospitalization: Assess and monitor for signs and symptoms of infection  Goal: Absence of fever/infection during anticipated neutropenic period  Outcome: Progressing  Flowsheets (Taken 11/7/2022 2206)  Absence of fever/infection during anticipated neutropenic period: Monitor white blood cell count     Problem: Metabolic/Fluid and Electrolytes - Adult  Goal: Electrolytes maintained within normal limits  Outcome: Progressing  Flowsheets (Taken 11/7/2022 2206)  Electrolytes maintained within normal limits: Monitor labs and assess patient for signs and symptoms of electrolyte imbalances  Goal: Hemodynamic stability and optimal renal function maintained  Outcome: Progressing  Flowsheets (Taken 11/7/2022 2206)  Hemodynamic stability and optimal renal function maintained: Monitor labs and assess for signs and symptoms of volume excess or deficit  Goal: Glucose maintained within prescribed range  Outcome: Progressing  Flowsheets (Taken 11/7/2022 2206)  Glucose maintained within prescribed range: Monitor blood glucose as ordered     Problem: Hematologic - Adult  Goal: Maintains hematologic stability  Outcome: Progressing  Flowsheets (Taken 11/7/2022 2206)  Maintains hematologic stability: Assess for signs and symptoms of bleeding or hemorrhage     Problem: Chronic Conditions and Co-morbidities  Goal: Patient's chronic conditions and co-morbidity symptoms are monitored and maintained or improved  Outcome: Progressing  Flowsheets (Taken 11/7/2022 2206)  Care Plan - Patient's Chronic Conditions and Co-Morbidity Symptoms are Monitored and Maintained or Improved: Monitor and assess patient's chronic conditions and comorbid symptoms for stability, deterioration, or improvement

## 2022-11-08 NOTE — ANESTHESIA POSTPROCEDURE EVALUATION
Department of Anesthesiology  Postprocedure Note    Patient: Darlis Cowden  MRN: [id number]  YOB: 1985  Date of evaluation: 11/8/2022      Procedure Summary     Date: 11/08/22 Room / Location: [address]    Anesthesia Start: 7270 Anesthesia Stop: 1510    Procedures:       EGD PEG TUBE PLACEMENT      PROCEDURE CANCELED Diagnosis:       Encounter for PEG (percutaneous endoscopic gastrostomy) ([facility] [address]      (PEG tube placement)    Surgeons: Carol Gonzales MD Responsible Provider: MIGUEL Montero CRNA    Anesthesia Type: general ASA Status: 3 - Emergent          Anesthesia Type: No value filed.     Марина Phase I: Марина Score: 8    Марина Phase II:        Anesthesia Post Evaluation    Patient location during evaluation: bedside  Patient participation: complete - patient participated  Level of consciousness: sleepy but conscious  Pain score: 0  Airway patency: patent  Nausea & Vomiting: no nausea and no vomiting  Complications: no  Cardiovascular status: hemodynamically stable  Respiratory status: acceptable  Hydration status: euvolemic

## 2022-11-08 NOTE — PROGRESS NOTES
Nutrition Assessment     Type and Reason for Visit: Reassess    Nutrition Recommendations/Plan:   Await PEG replacement. Malnutrition Assessment:  Malnutrition Status: At risk for malnutrition (Comment)    Nutrition Assessment:  Pt remains NPO day 4 with no source of nutrition support. CT of abdomen not completed yesterday and is to be completed this morning. Will follow for replacement PEG tube and make EN recommendations when needed.     Estimated Daily Nutrient Needs:  Energy (kcal):  7922-3773 Weight Used for Energy Requirements: Admission     Protein (g):   Weight Used for Protein Requirements: Admission        Fluid (ml/day):  1435-2338 Method Used for Fluid Requirements: 1 ml/kcal    Nutrition Related Findings:     Wound Type: Pressure Injury    Current Nutrition Therapies:    Diet NPO    Anthropometric Measures:  Height: 6' 2\" (188 cm)  Current Body Wt: 135 lb 9.6 oz (61.5 kg)   BMI: 17.4    Nutrition Diagnosis:   Inadequate oral intake related to swallowing difficulty as evidenced by nutrition support - enteral nutrition    Nutrition Interventions:   Food and/or Nutrient Delivery: Continue NPO  Nutrition Education/Counseling: No recommendation at this time  Coordination of Nutrition Care: Continue to monitor while inpatient       Goals:  Previous Goal Met: No Progress toward Goal(s)  Goals: Meet at least 75% of estimated needs       Nutrition Monitoring and Evaluation:   Behavioral-Environmental Outcomes: None Identified  Food/Nutrient Intake Outcomes: Diet Advancement/Tolerance, Enteral Nutrition Intake/Tolerance  Physical Signs/Symptoms Outcomes: Biochemical Data, Skin, Weight, Nutrition Focused Physical Findings    Discharge Planning:    Enteral Nutrition     Bruno Pascual RD, FELICITA  Contact: [phone]

## 2022-11-08 NOTE — PROGRESS NOTES
Progress Note  Date:2022       Room:0330/330-01  Patient Name:Brandon Godinez     YOB: 1985     Age:37 y.o. Subjective    Gastric biopsies last month were negative for H. pylori. Reviewed abdominal CT scan from 2022 with Dr. Juwan Tanner radiologist.  There was air anterior to the stomach and deep to the rectus muscle felt to be from attempted G-tube insertion. Patient seen by surgery. Patient WBC count has come down to 11.8 thousand. Patient is afebrile. Objective         Vitals Last 24 Hours:  TEMPERATURE:  Temp  Av.2 °F (36.8 °C)  Min: 98.1 °F (36.7 °C)  Max: 98.2 °F (36.8 °C)  RESPIRATIONS RANGE: Resp  Av.5  Min: 16  Max: 17  PULSE OXIMETRY RANGE: SpO2  Av.3 %  Min: 90 %  Max: 97 %  PULSE RANGE: Pulse  Av  Min: 55  Max: 107  BLOOD PRESSURE RANGE: Systolic (54RUO), MZU:520 , Min:127 , NUC:760   ; Diastolic (97GVG), FJW:88, Min:86, Max:125    Pulse . Oxygen saturation normal.  T-max 99.4 °F. I/O (24 Hr): No intake or output data in the 24 hours ending 22    Objective:  Vital signs: (most recent): Blood pressure (!) 147/125, pulse 55, temperature 98.1 °F (36.7 °C), temperature source Oral, resp. rate 16, height 6' 2\" (1.88 m), weight 135 lb 9.6 oz (61.5 kg), SpO2 90 %. Normocephalic. Abdomen not distended. Gastrostomy tube site looks healthy with some granulation tissue. No leakage. No bleeding. Abdomen is soft and non tender. No gross organomegaly. No leg edema. Skin no jaundice.   Labs/Imaging/Diagnostics    Labs:  CBC:  Recent Labs     22  0731 22  0452 22  0438   WBC 24.0* 18.6* 11.8*   RBC 4.80 4.21* 3.53*   HGB 13.2* 11.5* 10.0*   HCT 41.8* 38.3* 31.7*   MCV 87.1 91.0 89.8   RDW 15.4* 15.6* 15.5*    280 267       CHEMISTRIES:  Recent Labs     22  0731 22  0438   * 147*   K 3.9 4.0    111   CO2 24 21*   BUN 13 7   CREATININE 0.7 0.6   GLUCOSE 127* 87       PT/INR:  No results for input(s): PROTIME, INR in the last 72 hours. APTT:  No results for input(s): APTT in the last 72 hours. LIVER PROFILE:  No results for input(s): AST, ALT, BILIDIR, BILITOT, ALKPHOS in the last 72 hours. Imaging Last 24 Hours:  CT ABDOMEN PELVIS WO CONTRAST Additional Contrast? None    Result Date: 11/4/2022  NO PRIOR REPORT AVAILABLE Exam: CT OF THE ABDOMEN/PELVIS WITHOUT CONTRAST Clinical data: Status post dislodged G-tube. Status post attempted G-tube replacement. Status post G-tube removal. Technique: Direct contiguous axial CT images were acquired through the abdomen and pelvis without contrast using soft tissue and bone algorithms. Reformatted/MPR images were performed. Radiation dose: CTDIvol =13.92 mGy, DLP =1046 mGy x cm. Limitations: Lack of intravenous contrast limits evaluation of solid viscera. Lack of oral contrast limits evaluation of the bowel loops. Prior Studies: Radiograph of the abdomen dated 10/09/2022. CT scan of the abdomen and pelvis dated 10/08/2022. Renal ultrasound dated 09/24/2022. FINDINGS: The heart is normal in size. There is a left pleural effusion with compressive atelectatic change of the lung parenchyma.  all shunt identified in the right hemiabdomen. The liver is normal in size and contour and demonstrates no focal abnormality. The spleen is normal in size and contour and demonstrates no focal abnormality. The gallbladder is distended without stones or pericholecystic fluid. There is no intra or extrahepatic biliary ductal dilatation. The adrenal glands are normal. The pancreas is normal in attenuation without evidence of peripancreatic inflammatory change or significant ductal dilatation. The kidneys are anatomically located. There is no hydroureteronephrosis or stone. There are no significant perinephric inflammatory changes. There is free intraperitoneal air along with contrast extravasation within the left abdomen anterior wall.   The There is no evidence of acute appendicitis. There is no abdominal or pelvic ascites. There is an IVC filter. The retroperitoneum is normal without significant adenopathy. Within the pelvis, the urinary bladder is normal. There is no pathologic mass. The osseous structures appear intact. Free intraperitoneal air with contrast in the left anterior abdominal wall. These findings appear compatible with the clinically described PEG tube removal.  Clinical correlation is essential.  shunt. IVC filter. The Recommendation: Follow up as clinically indicated. All CT scans at this facility utilize dose modulation, iterative reconstruction, and/or weight based dosing when appropriate to reduce radiation dose to as low as reasonably achievable. Electronically Signed by Gurwinder Yoon MD at 04-Nov-2022 01:41:17 PM EST             CT CHEST WO CONTRAST    Result Date: 11/4/2022  NO PRIOR REPORT AVAILABLE Exam: CT OF THE CHEST WITHOUT CONTRAST Clinical data: Status post dislodgment of G-tube. Status post attempted replacement of G-tube. Now the G-tube is out. Technique: Axial CT images through the lungs were acquired without contrast and imaged using soft tissue and lung algorithms. Reformatted/MPR images were performed. Radiation Dose: CTDIvol =13.92 mGy, DLP =1046 mGy x cm. Limitations: Lack of intravenous contrast limits evaluation of the soft tissues and vascularity. Prior studies: Radiograph of the chest dated 10/10/2022. Findings: Catheter tubing within the right chest wall compatible with  shunt. The thyroid is symmetric. The trachea is midline . There is a small left pleural effusion with dependent atelectasis of the left lung parenchyma. The pulmonary artery is normal in size and there is no significant filling defect recognized within the main pulmonary arterial vasculature. The aorta is normal in size without evidence of aneurysm or dissection. The heart size is normal without pericardial effusion.  No significant mediastinal or hilar adenopathy is identified. The visualized ribs and thoracic vertebral bodies are intact. The visualized portions of the upper abdomen demonstrates hyper density in the renal collecting systems bilaterally suggestive of excreted contrast.  There is an IVC filter. There is free intraperitoneal air and there is extravasated contrast along the left hemiabdomen    Impression: Left pleural effusion with compressive atelectatic change of the dependent lung parenchyma.  shunt. IVC filter. Free intraperitoneal air. Please refer to CT scan abdomen and pelvis performed at the same time for further evaluation. Contrast within the renal collecting systems. Recommendation: Follow up as clinically indicated. All CT scans at this facility utilize dose modulation, iterative reconstruction, and/or weight based dosing when appropriate to reduce radiation dose to as low as reasonably achievable. Electronically Signed by Antoinette Curiel MD at 04-Nov-2022 01:36:33 PM EST             XR ABDOMEN (2 VIEWS)    Result Date: 11/4/2022  ADDENDUM:Patient: Deretha Cranker  Time Out: 07:34Exam(s): FILM ABDOMEN  Added by Rianna Castro MD on 11/4/2022 7:33 AM (-07:00)    Contrast noted within small bowel loops-question if the enteric tube is a jejunostomy tube? PLEASE SEE above discussion. EXAM:  XR Abdomen, 2 ViewsCLINICAL HISTORY:   Reason for exam: G tube replacement. TECHNIQUE:  Frontal view of the abdomen/pelvis with upright view of the abdomen. COMPARISON:  Earlier the same day. FINDINGS:  The gastrostomy tube balloon is in the left upper quadrant anteriorly. Contrast is seen around the balloon. Contrast appears to be within small bowel loops. Contrast is not definitely seen within the stomach-air is noted within the gastric antrum on the AP view and in the gastric fundus on the lateral view, but without layering contrast.? The exact location of this enteric tube balloon-is this a jejunostomy  tube? IMPRESSION: Contrast noted within small bowel loops-question if the enteric tube is a jejunostomy tube? Before meals above discussion. Electronically signed by Michael Sun MD on 11-04-22 at 0734    XR ABDOMEN (2 VIEWS)    Result Date: 11/4/2022  ADDENDUM:11/04/22 05:35 Call Doctor Regarding Misplaced tube or line, life-threatening, called  Dr. Fatoumata Mooney on 11/04 05:35 (-05:00) Patient: Jose E Umanzor  Time Out: 05:30Exam(s): FILM ABDOMEN EXAM:  XR Abdomen, 2 ViewsCLINICAL HISTORY:   Reason for exam: Gtube placement. TECHNIQUE:  Frontal view of the abdomen/pelvis with upright view of the abdomen. COMPARISON:  No relevant prior studies available. FINDINGS:  A gastrostomy tube balloon is seen in the left upper quadrant. However, it is surrounded by  contrast which appears to be extraluminal.  Contrast is not seen within the stomach or proximal small bowel. The balloon/catheter appears to be within the peritoneal space. NG tube appears to be within the peritoneal space, not within the lumen of the stomach. Communications:11/04/22 05:35 Call Doctor Regarding Misplaced tube or line, life-threatening, called  Dr. Fatoumata Mooney on 11/04 05:35 (-05:00) Electronically  signed by Michael uSn MD on 11-04-22 at 0575    11/5/2022: Sodium 135. Assessment//Plan           Hospital Problems             Last Modified POA    * (Principal) Gastric tube granulation tissue ([address] 11/4/2022 Yes    Dislodged gastrostomy tube 11/4/2022 Yes   Assessment & Plan    WBC count has come down to 11,800. Patient on Zosyn. Abdomen soft, nontender and no signs of peritonitis. Gastrostomy site looks healthy. No cellulitis. No leakage. I got a message from patient's mother that she would like G-tube be placed. I talked with surgery. Surgery also does not think patient has peritonitis and G-tube can probably be placed. Mildly elevated ALT of 46. Plan:    Monitor white cell count, fever and heart rate. Watch for peritonitis.     Repeat CT scan of the abdomen and pelvis today results not available. We want to make sure that patient is not developing an abscess or fluid collection in the abdomen. An order was written for CT scan to be done today. However it was not done. Nurse will be calling the CAT scan for them to do CT scan of the abdomen and pelvis in the morning. Discussed plan with patient's nurse and surgery. Will discuss with patient's mother.       Electronically signed by Yany Daniel MD on 11/5/22 at 9:45 AM CDT
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
## 2022-11-08 NOTE — PROGRESS NOTES
Spoke with radiology regarding PEG tube placement via interventional radiologist. According to tech, there is no interventional radiologist in house until next week. Dr. Cecily Nogueira notified.

## 2022-11-08 NOTE — PROGRESS NOTES
Progress Note  Date:2022       Room:0330/330-01  Patient Name:Brandon Godinez     YOB: 1985     Age:37 y.o. Subjective    Gastric biopsies last month were negative for H. pylori. Reviewed abdominal CT scan images from 2022 and 2022 with Dr. Vanessa Hook and Dr. Karl Box radiologists. There was air anterior to the stomach and deeper to the rectus muscle felt to be from attempted G-tube insertion. Quantity of air is unchanged. Location is unchanged. There is no free intraperitoneal air coming from perforation of a definite hollow viscus. Objective         Vitals Last 24 Hours:  TEMPERATURE:  Temp  Av °F (36.7 °C)  Min: 97.9 °F (36.6 °C)  Max: 98.1 °F (36.7 °C)  RESPIRATIONS RANGE: Resp  Av.7  Min: 16  Max: 17  PULSE OXIMETRY RANGE: SpO2  Av.3 %  Min: 90 %  Max: 98 %  PULSE RANGE: Pulse  Av.7  Min: 55  Max: 91  BLOOD PRESSURE RANGE: Systolic (32HYC), TDI:556 , Min:136 , VRQ:992   ; Diastolic (96GET), KCE:42, Min:78, Max:125    Pulse . Oxygen saturation normal.  T-max 99.4 °F. I/O (24 Hr): No intake or output data in the 24 hours ending 22 0942    Objective:  Vital signs: (most recent): Blood pressure 136/78, pulse 87, temperature 97.9 °F (36.6 °C), resp. rate 17, height 6' 2\" (1.88 m), weight 135 lb 9.6 oz (61.5 kg), SpO2 98 %. Normocephalic. More cooperative. Abdomen not distended. Gastrostomy tube site looks healthy with some granulation tissue. No leakage. No bleeding. Abdomen is soft and non tender. No gross organomegaly. No leg edema. Skin no jaundice.   Labs/Imaging/Diagnostics    Labs:  CBC:  Recent Labs     22  0452 22  0438 22  0224   WBC 18.6* 11.8* 9.0   RBC 4.21* 3.53* 3.44*   HGB 11.5* 10.0* 9.7*   HCT 38.3* 31.7* 30.3*   MCV 91.0 89.8 88.1   RDW 15.6* 15.5* 14.9*    267 281       CHEMISTRIES:  Recent Labs     22  0438 22  0224   * 146*   K 4.0 3.6    110   CO2 21* 17*   BUN 7 7   CREATININE 0.6 0.6   GLUCOSE 87 68*       PT/INR:  No results for input(s): PROTIME, INR in the last 72 hours. APTT:  No results for input(s): APTT in the last 72 hours. LIVER PROFILE:  No results for input(s): AST, ALT, BILIDIR, BILITOT, ALKPHOS in the last 72 hours. Imaging Last 24 Hours:  CT ABDOMEN PELVIS WO CONTRAST Additional Contrast? None    Result Date: 11/4/2022  NO PRIOR REPORT AVAILABLE Exam: CT OF THE ABDOMEN/PELVIS WITHOUT CONTRAST Clinical data: Status post dislodged G-tube. Status post attempted G-tube replacement. Status post G-tube removal. Technique: Direct contiguous axial CT images were acquired through the abdomen and pelvis without contrast using soft tissue and bone algorithms. Reformatted/MPR images were performed. Radiation dose: CTDIvol =13.92 mGy, DLP =1046 mGy x cm. Limitations: Lack of intravenous contrast limits evaluation of solid viscera. Lack of oral contrast limits evaluation of the bowel loops. Prior Studies: Radiograph of the abdomen dated 10/09/2022. CT scan of the abdomen and pelvis dated 10/08/2022. Renal ultrasound dated 09/24/2022. FINDINGS: The heart is normal in size. There is a left pleural effusion with compressive atelectatic change of the lung parenchyma.  all shunt identified in the right hemiabdomen. The liver is normal in size and contour and demonstrates no focal abnormality. The spleen is normal in size and contour and demonstrates no focal abnormality. The gallbladder is distended without stones or pericholecystic fluid. There is no intra or extrahepatic biliary ductal dilatation. The adrenal glands are normal. The pancreas is normal in attenuation without evidence of peripancreatic inflammatory change or significant ductal dilatation. The kidneys are anatomically located. There is no hydroureteronephrosis or stone. There are no significant perinephric inflammatory changes.  There is free intraperitoneal air along with contrast extravasation within the left abdomen anterior wall. The There is no evidence of acute appendicitis. There is no abdominal or pelvic ascites. There is an IVC filter. The retroperitoneum is normal without significant adenopathy. Within the pelvis, the urinary bladder is normal. There is no pathologic mass. The osseous structures appear intact. Free intraperitoneal air with contrast in the left anterior abdominal wall. These findings appear compatible with the clinically described PEG tube removal.  Clinical correlation is essential.  shunt. IVC filter. The Recommendation: Follow up as clinically indicated. All CT scans at this facility utilize dose modulation, iterative reconstruction, and/or weight based dosing when appropriate to reduce radiation dose to as low as reasonably achievable. Electronically Signed by Jim Kaplan MD at 04-Nov-2022 01:41:17 PM EST             CT CHEST WO CONTRAST    Result Date: 11/4/2022  NO PRIOR REPORT AVAILABLE Exam: CT OF THE CHEST WITHOUT CONTRAST Clinical data: Status post dislodgment of G-tube. Status post attempted replacement of G-tube. Now the G-tube is out. Technique: Axial CT images through the lungs were acquired without contrast and imaged using soft tissue and lung algorithms. Reformatted/MPR images were performed. Radiation Dose: CTDIvol =13.92 mGy, DLP =1046 mGy x cm. Limitations: Lack of intravenous contrast limits evaluation of the soft tissues and vascularity. Prior studies: Radiograph of the chest dated 10/10/2022. Findings: Catheter tubing within the right chest wall compatible with  shunt. The thyroid is symmetric. The trachea is midline . There is a small left pleural effusion with dependent atelectasis of the left lung parenchyma. The pulmonary artery is normal in size and there is no significant filling defect recognized within the main pulmonary arterial vasculature. The aorta is normal in size without evidence of aneurysm or dissection.  The heart size is normal without pericardial effusion. No significant mediastinal or hilar adenopathy is identified. The visualized ribs and thoracic vertebral bodies are intact. The visualized portions of the upper abdomen demonstrates hyper density in the renal collecting systems bilaterally suggestive of excreted contrast.  There is an IVC filter. There is free intraperitoneal air and there is extravasated contrast along the left hemiabdomen    Impression: Left pleural effusion with compressive atelectatic change of the dependent lung parenchyma.  shunt. IVC filter. Free intraperitoneal air. Please refer to CT scan abdomen and pelvis performed at the same time for further evaluation. Contrast within the renal collecting systems. Recommendation: Follow up as clinically indicated. All CT scans at this facility utilize dose modulation, iterative reconstruction, and/or weight based dosing when appropriate to reduce radiation dose to as low as reasonably achievable. Electronically Signed by Arlene Doe MD at 04-Nov-2022 01:36:33 PM EST             XR ABDOMEN (2 VIEWS)    Result Date: 11/4/2022  ADDENDUM:Patient: Delbert Loyd  Time Out: 07:34Exam(s): FILM ABDOMEN  Added by Eloy Hassan MD on 11/4/2022 7:33 AM (-07:00)    Contrast noted within small bowel loops-question if the enteric tube is a jejunostomy tube? PLEASE SEE above discussion. EXAM:  XR Abdomen, 2 ViewsCLINICAL HISTORY:   Reason for exam: G tube replacement. TECHNIQUE:  Frontal view of the abdomen/pelvis with upright view of the abdomen. COMPARISON:  Earlier the same day. FINDINGS:  The gastrostomy tube balloon is in the left upper quadrant anteriorly. Contrast is seen around the balloon. Contrast appears to be within small bowel loops. Contrast is not definitely seen within the stomach-air is noted within the gastric antrum on the AP view and in the gastric fundus on the lateral view, but without layering contrast.?   The exact location of this enteric tube balloon-is this a jejunostomy  tube? IMPRESSION:     Contrast noted within small bowel loops-question if the enteric tube is a jejunostomy tube? Before meals above discussion. Electronically signed by Samuel Lawler MD on 11-04-22 at 0734    XR ABDOMEN (2 VIEWS)    Result Date: 11/4/2022  ADDENDUM:11/04/22 05:35 Call Doctor Regarding Misplaced tube or line, life-threatening, called  Dr. Yoanna Arroyo on 11/04 05:35 (-05:00) Patient: Dawit Truong  Time Out: 05:30Exam(s): FILM ABDOMEN EXAM:  XR Abdomen, 2 ViewsCLINICAL HISTORY:   Reason for exam: Gtube placement. TECHNIQUE:  Frontal view of the abdomen/pelvis with upright view of the abdomen. COMPARISON:  No relevant prior studies available. FINDINGS:  A gastrostomy tube balloon is seen in the left upper quadrant. However, it is surrounded by  contrast which appears to be extraluminal.  Contrast is not seen within the stomach or proximal small bowel. The balloon/catheter appears to be within the peritoneal space. NG tube appears to be within the peritoneal space, not within the lumen of the stomach. Communications:11/04/22 05:35 Call Doctor Regarding Misplaced tube or line, life-threatening, called  Dr. Yoanna Arroyo on 11/04 05:35 (-05:00) Electronically  signed by Samuel Lawler MD on 11-04-22 at 0530    11/5/2022: Sodium 135. Assessment//Plan           Hospital Problems             Last Modified POA    * (Principal) Gastric tube granulation tissue ([address] 11/4/2022 Yes    Vomiting 11/7/2022 Yes    Overview Signed 9/21/2022 12:32 PM by Remigio Robles MD     Added automatically from request for surgery [id number]         Dislodged gastrostomy tube 11/4/2022 Yes    Elevated ALT measurement 11/7/2022 Yes    Leukocytosis 11/7/2022 Yes   Assessment & Plan    WBC count has come down to 9. Patient on Zosyn. Abdomen soft, nontender and no signs of peritonitis. Gastrostomy site looks healthy. No cellulitis. No leakage. No signs of peritonitis today.   I had talked with surgery. Surgery also does not think patient has peritonitis and G-tube can probably be placed. The air anterior to the stomach is unchanged. Patient is afebrile. White cell count is normal.    Mildly elevated ALT of 46. Plan:    Discussed with Dr. Dartha Dakins radiologist and Dr. Melani Hircsh radiologist.  They can place gastric tube radiologically and anchor the tube in the stomach with a T device. Chalino Gallo would like NG tube to be placed prior to placement of G-tube so that radiologist can distend the stomach with air using NG tube. Discussed plan with patient's nurse. Unable to contact patient's mother over the phone to see if she would like the G-tube replaced by the radiologist.        Electronically signed by Reginaldo De La Garza MD on 11/5/22 at 9:45 AM CDT    Addendum 11/8/2022: 10:02 AM: Talked with patient's mother over the telephone. Lizy Meza nurse was witness. Patient's mother would like GI to be placed endoscopically and not by the radiologist.    Patient mother does not wish to try feeding the patient by mouth because she does not think he can take enough calories and she thinks her son still needs G-tube. Discussed the need, benefits, risks, alternatives limitations of G-tube placement and EGD. Discussed special risk of infection, leakage, injury to other organs, patient pulling the tube out accidentally or willingly. Patient's mother understands. PEG consent / Shared decision making:    Consent obtained from: Parent over the telephone. She would like G-tube replaced endoscopically. Witness (s): Lizy Meza RN    Obtained consent in layperson's language, previously and immediately prior to.procedure. Explained the need, benefits, risks, alternatives and limitations of the procedure. Need and benefits: Make diagnosis, provide therapy and plan treatment including feeding into the stomach.     Risks: Bleeding, perforation of esophagus, stomach, duodenum, splenic injury / laceration, liver injury / laceration, aspiration pneumonia, respiratory arrest, cardiac arrhythmia, cardiac arrest, death. Specific complications of infection at G-tube site, infection and peritoneum, infection in blood, leakage around the G-tube, G-tube going through the stomach, small intestine, liver, spleen and creation of gastrocolic fistula, creation of gastroenteric fistula, creation of gastrocutaneous fistula, creation of gastrohepatic fistula, creation of gastrosplenic fistula. Patient may accidentally, knowingly or unknowingly may pull out the G-tube out resulting in complications. Tumor seeding: In case of oral, laryngeal, esophageal malignancy and gastric malignancy: The tumor may seed into esophagus, stomach, gastrostomy site in the stomach and gastrostomy site on the skin    Alternatives: NG tube feeding, Dobbhoff tube feeding, IV fluids, TPN, oral feeding, no feeding, barium swallow, upper GI, SBFT, CT chest, CT abdomen, no testing, wait and watch, defer PEG tube insertion to a later time, obtain second opinion. Missed diagnoses and limitations: Missed diagnosis, including but not limited to diagnosis of  varices, gastric ulcer, duodenal ulcer, cancer. Also inability to complete the procedure due to various reasons-technical including equipment failure and non-technical.    Complications of sedation/analgesia: Including aspiration pneumonia, respiratory arrest, cardiac arrhythmia, cardiac arrest, hypotension, hypertension, paralysis. Opportunity was given for questions. The questions that were asked, were answered. The healthcare client verbalized understanding.     Jes Rider MD  11/8/2022  10:05 AM        Disclaimer speech recognition software       (Please note that portions of this note were completed with a voice recognition program. Efforts were made to edit the dictations but occasionally words are mis-transcribed.)

## 2022-11-09 ENCOUNTER — ANESTHESIA EVENT (OUTPATIENT)
Dept: ENDOSCOPY | Age: 37
DRG: 393 | End: 2022-11-09
Payer: MEDICARE

## 2022-11-09 ENCOUNTER — ANESTHESIA (OUTPATIENT)
Dept: ENDOSCOPY | Age: 37
DRG: 393 | End: 2022-11-09
Payer: MEDICARE

## 2022-11-09 PROBLEM — R63.30 FEEDING DIFFICULTIES: Status: ACTIVE | Noted: 2022-11-04

## 2022-11-09 PROBLEM — E43 SEVERE MALNUTRITION (HCC): Chronic | Status: ACTIVE | Noted: 2022-09-20

## 2022-11-09 LAB
ANION GAP SERPL CALCULATED.3IONS-SCNC: 9 MMOL/L (ref 7–19)
BASOPHILS ABSOLUTE: 0 K/UL (ref 0–0.2)
BASOPHILS RELATIVE PERCENT: 0.3 % (ref 0–1)
BUN BLDV-MCNC: 5 MG/DL (ref 6–20)
CALCIUM SERPL-MCNC: 8.2 MG/DL (ref 8.6–10)
CHLORIDE BLD-SCNC: 105 MMOL/L (ref 98–111)
CO2: 25 MMOL/L (ref 22–29)
CREAT SERPL-MCNC: 0.5 MG/DL (ref 0.5–1.2)
EOSINOPHILS ABSOLUTE: 0.2 K/UL (ref 0–0.6)
EOSINOPHILS RELATIVE PERCENT: 1.9 % (ref 0–5)
GFR SERPL CREATININE-BSD FRML MDRD: >60 ML/MIN/{1.73_M2}
GLUCOSE BLD-MCNC: 100 MG/DL (ref 70–99)
GLUCOSE BLD-MCNC: 113 MG/DL (ref 70–99)
GLUCOSE BLD-MCNC: 131 MG/DL (ref 74–109)
HCT VFR BLD CALC: 27.1 % (ref 42–52)
HEMOGLOBIN: 8.7 G/DL (ref 14–18)
IMMATURE GRANULOCYTES #: 0 K/UL
LYMPHOCYTES ABSOLUTE: 1.3 K/UL (ref 1.1–4.5)
LYMPHOCYTES RELATIVE PERCENT: 13.7 % (ref 20–40)
MCH RBC QN AUTO: 27.5 PG (ref 27–31)
MCHC RBC AUTO-ENTMCNC: 32.1 G/DL (ref 33–37)
MCV RBC AUTO: 85.8 FL (ref 80–94)
MONOCYTES ABSOLUTE: 0.5 K/UL (ref 0–0.9)
MONOCYTES RELATIVE PERCENT: 5.3 % (ref 0–10)
NEUTROPHILS ABSOLUTE: 7.5 K/UL (ref 1.5–7.5)
NEUTROPHILS RELATIVE PERCENT: 78.5 % (ref 50–65)
PDW BLD-RTO: 14.6 % (ref 11.5–14.5)
PERFORMED ON: ABNORMAL
PERFORMED ON: ABNORMAL
PLATELET # BLD: 276 K/UL (ref 130–400)
PMV BLD AUTO: 10.6 FL (ref 9.4–12.4)
POTASSIUM SERPL-SCNC: 3 MMOL/L (ref 3.5–5)
RBC # BLD: 3.16 M/UL (ref 4.7–6.1)
SODIUM BLD-SCNC: 139 MMOL/L (ref 136–145)
WBC # BLD: 9.6 K/UL (ref 4.8–10.8)

## 2022-11-09 PROCEDURE — 80048 BASIC METABOLIC PNL TOTAL CA: CPT

## 2022-11-09 PROCEDURE — 6360000002 HC RX W HCPCS: Performed by: INTERNAL MEDICINE

## 2022-11-09 PROCEDURE — 7100000001 HC PACU RECOVERY - ADDTL 15 MIN: Performed by: INTERNAL MEDICINE

## 2022-11-09 PROCEDURE — 1210000000 HC MED SURG R&B

## 2022-11-09 PROCEDURE — 2500000003 HC RX 250 WO HCPCS: Performed by: INTERNAL MEDICINE

## 2022-11-09 PROCEDURE — 2580000003 HC RX 258: Performed by: ANESTHESIOLOGY

## 2022-11-09 PROCEDURE — 6360000002 HC RX W HCPCS: Performed by: NURSE ANESTHETIST, CERTIFIED REGISTERED

## 2022-11-09 PROCEDURE — 43762 RPLC GTUBE NO REVJ TRC: CPT

## 2022-11-09 PROCEDURE — 0DH63UZ INSERTION OF FEEDING DEVICE INTO STOMACH, PERCUTANEOUS APPROACH: ICD-10-PCS | Performed by: INTERNAL MEDICINE

## 2022-11-09 PROCEDURE — 2709999900 HC NON-CHARGEABLE SUPPLY: Performed by: INTERNAL MEDICINE

## 2022-11-09 PROCEDURE — 6370000000 HC RX 637 (ALT 250 FOR IP): Performed by: NURSE PRACTITIONER

## 2022-11-09 PROCEDURE — 6370000000 HC RX 637 (ALT 250 FOR IP): Performed by: INTERNAL MEDICINE

## 2022-11-09 PROCEDURE — 3E0G76Z INTRODUCTION OF NUTRITIONAL SUBSTANCE INTO UPPER GI, VIA NATURAL OR ARTIFICIAL OPENING: ICD-10-PCS | Performed by: INTERNAL MEDICINE

## 2022-11-09 PROCEDURE — 2580000003 HC RX 258: Performed by: INTERNAL MEDICINE

## 2022-11-09 PROCEDURE — 85025 COMPLETE CBC W/AUTO DIFF WBC: CPT

## 2022-11-09 PROCEDURE — 2500000003 HC RX 250 WO HCPCS: Performed by: SPECIALIST

## 2022-11-09 PROCEDURE — 2500000003 HC RX 250 WO HCPCS: Performed by: NURSE ANESTHETIST, CERTIFIED REGISTERED

## 2022-11-09 PROCEDURE — 2580000003 HC RX 258: Performed by: SPECIALIST

## 2022-11-09 PROCEDURE — 36415 COLL VENOUS BLD VENIPUNCTURE: CPT

## 2022-11-09 PROCEDURE — 6360000002 HC RX W HCPCS: Performed by: SPECIALIST

## 2022-11-09 PROCEDURE — 82947 ASSAY GLUCOSE BLOOD QUANT: CPT

## 2022-11-09 PROCEDURE — 3700000000 HC ANESTHESIA ATTENDED CARE: Performed by: INTERNAL MEDICINE

## 2022-11-09 PROCEDURE — 43246 EGD PLACE GASTROSTOMY TUBE: CPT | Performed by: INTERNAL MEDICINE

## 2022-11-09 PROCEDURE — 3700000001 HC ADD 15 MINUTES (ANESTHESIA): Performed by: INTERNAL MEDICINE

## 2022-11-09 PROCEDURE — 7100000000 HC PACU RECOVERY - FIRST 15 MIN: Performed by: INTERNAL MEDICINE

## 2022-11-09 PROCEDURE — 3609013300 HC EGD TUBE PLACEMENT: Performed by: INTERNAL MEDICINE

## 2022-11-09 RX ORDER — PROPOFOL 10 MG/ML
INJECTION, EMULSION INTRAVENOUS PRN
Status: DISCONTINUED | OUTPATIENT
Start: 2022-11-09 | End: 2022-11-09 | Stop reason: SDUPTHER

## 2022-11-09 RX ORDER — GLYCOPYRROLATE 0.2 MG/ML
INJECTION INTRAMUSCULAR; INTRAVENOUS PRN
Status: DISCONTINUED | OUTPATIENT
Start: 2022-11-09 | End: 2022-11-09 | Stop reason: SDUPTHER

## 2022-11-09 RX ORDER — PROCHLORPERAZINE EDISYLATE 5 MG/ML
5 INJECTION INTRAMUSCULAR; INTRAVENOUS EVERY 6 HOURS PRN
Status: DISCONTINUED | OUTPATIENT
Start: 2022-11-09 | End: 2022-11-10 | Stop reason: HOSPADM

## 2022-11-09 RX ORDER — SODIUM CHLORIDE, SODIUM LACTATE, POTASSIUM CHLORIDE, CALCIUM CHLORIDE 600; 310; 30; 20 MG/100ML; MG/100ML; MG/100ML; MG/100ML
INJECTION, SOLUTION INTRAVENOUS CONTINUOUS PRN
Status: DISCONTINUED | OUTPATIENT
Start: 2022-11-09 | End: 2022-11-09 | Stop reason: SDUPTHER

## 2022-11-09 RX ORDER — LIDOCAINE HYDROCHLORIDE 10 MG/ML
INJECTION, SOLUTION EPIDURAL; INFILTRATION; INTRACAUDAL; PERINEURAL PRN
Status: DISCONTINUED | OUTPATIENT
Start: 2022-11-09 | End: 2022-11-09 | Stop reason: SDUPTHER

## 2022-11-09 RX ADMIN — SODIUM CHLORIDE, SODIUM LACTATE, POTASSIUM CHLORIDE, AND CALCIUM CHLORIDE: 600; 310; 30; 20 INJECTION, SOLUTION INTRAVENOUS at 11:52

## 2022-11-09 RX ADMIN — POTASSIUM CHLORIDE 10 MEQ: 7.46 INJECTION, SOLUTION INTRAVENOUS at 05:42

## 2022-11-09 RX ADMIN — ONDANSETRON 4 MG: 2 INJECTION INTRAMUSCULAR; INTRAVENOUS at 14:37

## 2022-11-09 RX ADMIN — ACETAMINOPHEN 650 MG: 325 TABLET ORAL at 14:38

## 2022-11-09 RX ADMIN — PIPERACILLIN AND TAZOBACTAM 3375 MG: 3; .375 INJECTION, POWDER, FOR SOLUTION INTRAVENOUS at 17:18

## 2022-11-09 RX ADMIN — POTASSIUM BICARBONATE 40 MEQ: 782 TABLET, EFFERVESCENT ORAL at 14:38

## 2022-11-09 RX ADMIN — PIPERACILLIN AND TAZOBACTAM 3375 MG: 3; .375 INJECTION, POWDER, FOR SOLUTION INTRAVENOUS at 09:20

## 2022-11-09 RX ADMIN — LIDOCAINE HYDROCHLORIDE 40 MG: 10 INJECTION, SOLUTION EPIDURAL; INFILTRATION; INTRACAUDAL; PERINEURAL at 12:03

## 2022-11-09 RX ADMIN — POTASSIUM CHLORIDE 10 MEQ: 7.46 INJECTION, SOLUTION INTRAVENOUS at 06:47

## 2022-11-09 RX ADMIN — METOPROLOL TARTRATE 5 MG: 1 INJECTION, SOLUTION INTRAVENOUS at 09:23

## 2022-11-09 RX ADMIN — SODIUM CHLORIDE, PRESERVATIVE FREE 10 ML: 5 INJECTION INTRAVENOUS at 21:28

## 2022-11-09 RX ADMIN — SODIUM BICARBONATE: 84 INJECTION, SOLUTION INTRAVENOUS at 18:31

## 2022-11-09 RX ADMIN — LEVETIRACETAM 500 MG: 5 INJECTION INTRAVENOUS at 17:18

## 2022-11-09 RX ADMIN — PROPOFOL 350 MG: 10 INJECTION, EMULSION INTRAVENOUS at 12:03

## 2022-11-09 RX ADMIN — Medication 30 MG: at 17:19

## 2022-11-09 RX ADMIN — SODIUM BICARBONATE: 84 INJECTION, SOLUTION INTRAVENOUS at 01:52

## 2022-11-09 RX ADMIN — GLYCOPYRROLATE 0.2 MG: 0.2 INJECTION, SOLUTION INTRAMUSCULAR; INTRAVENOUS at 12:00

## 2022-11-09 RX ADMIN — SODIUM CHLORIDE, PRESERVATIVE FREE 20 MG: 5 INJECTION INTRAVENOUS at 09:23

## 2022-11-09 RX ADMIN — SODIUM CHLORIDE, PRESERVATIVE FREE 20 MG: 5 INJECTION INTRAVENOUS at 21:28

## 2022-11-09 RX ADMIN — POTASSIUM CHLORIDE 10 MEQ: 7.46 INJECTION, SOLUTION INTRAVENOUS at 04:22

## 2022-11-09 RX ADMIN — LEVETIRACETAM 500 MG: 5 INJECTION INTRAVENOUS at 05:15

## 2022-11-09 ASSESSMENT — PAIN SCALES - WONG BAKER
WONGBAKER_NUMERICALRESPONSE: 0
WONGBAKER_NUMERICALRESPONSE: 0

## 2022-11-09 ASSESSMENT — PAIN SCALES - GENERAL
PAINLEVEL_OUTOF10: 0

## 2022-11-09 NOTE — PLAN OF CARE
Problem: Safety - Medical Restraint  Goal: Remains free of injury from restraints (Restraint for Interference with Medical Device)  Description: INTERVENTIONS:  1. Determine that other, less restrictive measures have been tried or would not be effective before applying the restraint  2. Evaluate the patient's condition at the time of restraint application  3. Inform patient/family regarding the reason for restraint  4. Q2H: Monitor safety, psychosocial status, comfort, nutrition and hydration  11/9/2022 0305 by Dalia Mulligan RN  Outcome: Not Progressing     Problem: Discharge Planning  Goal: Discharge to home or other facility with appropriate resources  11/9/2022 0305 by Dalia Mulligan RN  Outcome: Not Progressing  Flowsheets (Taken 11/8/2022 2355)  Discharge to home or other facility with appropriate resources: Identify barriers to discharge with patient and caregiver     Problem: Pain  Goal: Verbalizes/displays adequate comfort level or baseline comfort level  11/9/2022 0305 by Dalia Mulligan RN  Outcome: Not Progressing     Problem: Safety - Adult  Goal: Free from fall injury  11/9/2022 0305 by Dalia Mulligan RN  Outcome: Not Progressing  Flowsheets (Taken 11/9/2022 0302)  Free From Fall Injury: Instruct family/caregiver on patient safety     Problem: ABCDS Injury Assessment  Goal: Absence of physical injury  11/9/2022 0305 by Dalia Mulligan RN  Outcome: Not Progressing     Problem: Skin/Tissue Integrity  Goal: Absence of new skin breakdown  Description: 1. Monitor for areas of redness and/or skin breakdown  2. Assess vascular access sites hourly  3. Every 4-6 hours minimum:  Change oxygen saturation probe site  4. Every 4-6 hours:  If on nasal continuous positive airway pressure, respiratory therapy assess nares and determine need for appliance change or resting period.   11/9/2022 0305 by Dalia Mulligan RN  Outcome: Not Progressing     Problem: Nutrition Deficit:  Goal: Optimize nutritional status  11/9/2022 1347 by Sakina Holland, MS, RD, LD  Outcome: Progressing  Flowsheets (Taken 11/9/2022 1326)  Nutrient intake appropriate for improving, restoring, or maintaining nutritional needs:   Assess nutritional status and recommend course of action   Recommend, monitor, and adjust tube feedings and TPN/PPN based on assessed needs  11/9/2022 0305 by Leonarda Colon RN  Outcome: Not Progressing     Problem: Neurosensory - Adult  Goal: Achieves stable or improved neurological status  11/9/2022 0305 by Leonarda Colon RN  Outcome: Not Progressing  Flowsheets (Taken 11/8/2022 2355)  Achieves stable or improved neurological status: Assess for and report changes in neurological status  Goal: Absence of seizures  11/9/2022 0305 by Leonarda Colon RN  Outcome: Not Progressing  Flowsheets (Taken 11/8/2022 2355)  Absence of seizures: Monitor for seizure activity.   If seizure occurs, document type and location of movements and any associated apnea  Goal: Remains free of injury related to seizures activity  11/9/2022 0305 by Leonarda Colon RN  Outcome: Not Progressing  Flowsheets (Taken 11/8/2022 2355)  Remains free of injury related to seizure activity: Maintain airway, patient safety  and administer oxygen as ordered  Goal: Achieves maximal functionality and self care  11/9/2022 0305 by Leonarda Colon RN  Outcome: Not Progressing  Flowsheets (Taken 11/8/2022 2355)  Achieves maximal functionality and self care: Monitor swallowing and airway patency with patient fatigue and changes in neurological status     Problem: Respiratory - Adult  Goal: Achieves optimal ventilation and oxygenation  11/9/2022 0305 by Leonarda Colon RN  Outcome: Not Progressing  Flowsheets (Taken 11/8/2022 2355)  Achieves optimal ventilation and oxygenation: Assess for changes in respiratory status     Problem: Cardiovascular - Adult  Goal: Maintains optimal cardiac output and hemodynamic stability  11/9/2022 0305 by Leonarda Colon RN  Outcome: Not Progressing  Goal: Absence of cardiac dysrhythmias or at baseline  11/9/2022 0305 by Gregg Jones RN  Outcome: Not Progressing  Flowsheets (Taken 11/8/2022 2355)  Absence of cardiac dysrhythmias or at baseline: Monitor cardiac rate and rhythm     Problem: Skin/Tissue Integrity - Adult  Goal: Skin integrity remains intact  11/9/2022 0305 by Gregg Jones RN  Outcome: Not Progressing  Flowsheets (Taken 11/9/2022 0302)  Skin Integrity Remains Intact: Monitor for areas of redness and/or skin breakdown  Goal: Incisions, wounds, or drain sites healing without S/S of infection  11/9/2022 0305 by Gregg Jones RN  Outcome: Not Progressing  Flowsheets (Taken 11/9/2022 0302)  Incisions, Wounds, or Drain Sites Healing Without Sign and Symptoms of Infection: ADMISSION and DAILY: Assess and document risk factors for pressure ulcer development  Goal: Oral mucous membranes remain intact  11/9/2022 0305 by Gregg Jones RN  Outcome: Not Progressing     Problem: Musculoskeletal - Adult  Goal: Return mobility to safest level of function  11/9/2022 0305 by Gregg Jones RN  Outcome: Not Progressing  Flowsheets (Taken 11/8/2022 2355)  Return Mobility to Safest Level of Function: Assess patient stability and activity tolerance for standing, transferring and ambulating with or without assistive devices  Goal: Maintain proper alignment of affected body part  11/9/2022 0305 by Gregg Jones RN  Outcome: Not Progressing  Goal: Return ADL status to a safe level of function  11/9/2022 0305 by Gregg Jones RN  Outcome: Not Progressing     Problem: Gastrointestinal - Adult  Goal: Minimal or absence of nausea and vomiting  11/9/2022 0305 by Gregg Jones RN  Outcome: Not Progressing  Flowsheets (Taken 11/8/2022 2355)  Minimal or absence of nausea and vomiting: Administer IV fluids as ordered to ensure adequate hydration  Goal: Maintains or returns to baseline bowel function  11/9/2022 0305 by Gregg Jones RN  Outcome: Not Progressing  Flowsheets (Taken 11/8/2022 2355)  Maintains or returns to baseline bowel function: Assess bowel function  Goal: Maintains adequate nutritional intake  11/9/2022 0305 by Mode Hoffman RN  Outcome: Not Progressing  Goal: Establish and maintain optimal ostomy function  11/9/2022 0305 by Mode Hoffman RN  Outcome: Not Progressing     Problem: Genitourinary - Adult  Goal: Absence of urinary retention  11/9/2022 0305 by Mode Hoffman RN  Outcome: Not Progressing  Flowsheets (Taken 11/8/2022 2355)  Absence of urinary retention: Monitor intake/output and perform bladder scan as needed  Goal: Urinary catheter remains patent  11/9/2022 0305 by Mode Hoffman RN  Outcome: Not Progressing  Flowsheets (Taken 11/8/2022 2355)  Urinary catheter remains patent: Assess patency of urinary catheter     Problem: Infection - Adult  Goal: Absence of infection at discharge  11/9/2022 0305 by Mode Hoffman RN  Outcome: Not Progressing  Flowsheets (Taken 11/8/2022 2355)  Absence of infection at discharge:   Assess and monitor for signs and symptoms of infection   Monitor lab/diagnostic results  Goal: Absence of infection during hospitalization  11/9/2022 0305 by Mode Hoffman RN  Outcome: Not Progressing  Flowsheets (Taken 11/8/2022 2355)  Absence of infection during hospitalization:   Assess and monitor for signs and symptoms of infection   Monitor lab/diagnostic results  Goal: Absence of fever/infection during anticipated neutropenic period  11/9/2022 0305 by Mode Hoffman RN  Outcome: Not Progressing  Flowsheets (Taken 11/8/2022 2355)  Absence of fever/infection during anticipated neutropenic period: Monitor white blood cell count     Problem: Metabolic/Fluid and Electrolytes - Adult  Goal: Electrolytes maintained within normal limits  11/9/2022 0305 by Mode Hoffman RN  Outcome: Not Progressing  Flowsheets (Taken 11/8/2022 2355)  Electrolytes maintained within normal limits: Monitor labs and assess patient for signs and symptoms of electrolyte imbalances  Goal: Hemodynamic stability and optimal renal function maintained  11/9/2022 0305 by Nicole Bates RN  Outcome: Not Progressing  Flowsheets (Taken 11/8/2022 2355)  Hemodynamic stability and optimal renal function maintained: Monitor labs and assess for signs and symptoms of volume excess or deficit  Goal: Glucose maintained within prescribed range  11/9/2022 0305 by Nicole Bates RN  Outcome: Not Progressing  Flowsheets (Taken 11/8/2022 2355)  Glucose maintained within prescribed range: Monitor blood glucose as ordered     Problem: Hematologic - Adult  Goal: Maintains hematologic stability  11/9/2022 0305 by Nicole Bates RN  Outcome: Not Progressing  Flowsheets (Taken 11/8/2022 2355)  Maintains hematologic stability: Assess for signs and symptoms of bleeding or hemorrhage     Problem: Chronic Conditions and Co-morbidities  Goal: Patient's chronic conditions and co-morbidity symptoms are monitored and maintained or improved  11/9/2022 0305 by Nicole Bates RN  Outcome: Not Progressing  Flowsheets (Taken 11/8/2022 2355)  Care Plan - Patient's Chronic Conditions and Co-Morbidity Symptoms are Monitored and Maintained or Improved: Monitor and assess patient's chronic conditions and comorbid symptoms for stability, deterioration, or improvement

## 2022-11-09 NOTE — OP NOTE
Endoscopic Procedure Note    Patient: Jalil Frankel : 1985  Med Rec#: [id number] Acc#: [id number]     Primary Care Provider Sally Anaya MD  Referring Provider: Darrian Yang MD    Endoscopist: Olivia Jones MD    Date of Procedure:  2022    Procedure:   1. EGD with PEG tube placement     Indications:   1. TBI, inability to eat  2. Previous PEG placed 1 month ago      Anesthesia:  Sedation was administered by anesthesia who monitored the patient during the procedure. Estimated Blood Loss: minimal    Procedure:   After reviewing the patient's chart and obtaining informed consent, the patient was placed in the left lateral decubitus position. A forward-viewing Olympus endoscope was lubricated and inserted through the mouth into the oropharynx. Under direct visualization, the upper esophagus was intubated. The scope was advanced to the level of the third portion of duodenum. Scope was slowly withdrawn with careful inspection of the mucosal surfaces. The scope was retroflexed for inspection of the gastric fundus and incisura. Findings and maneuvers are listed in impression below. The patient tolerated the procedure well. The scope was removed. There were no immediate complications. Findings:   Esophagus: moderately severe esophagitis noted in the distal esophagus. There is no hiatal hernia present. Stomach:  abnormal: there is scar present on the anterior wall c/w previous PEG site. No obstruction or other lesion noted      Duodenum: normal    PEG Placement:    The scope was then withdrawn back in the stomach where the stomach was maximally insufflated. An area of 1:1 displacement was chosen with the index finger at a maximum area of transillumination at the same site as previous PEG placement 1 month ago. This area was prepped and draped in a sterile fashion. Local anesthesia was achieved with 1% Xylocaine. A small incision was made, and the trocar was passed through the incision. Through the trocar, a guidewire was passed and  grasped with the endoscopic polypectomy snare and removed via the mouth. Over the proximal end of the guidewire, a 20-Malagasy BS feeding tube was advanced, and through a pull method, the internal bolster of the feeding tube was  approximated against the anterior gastric wall. Subsequently, the external  bolster was applied at 4cm. The scope was reintroduced. The internal bolster was visualized, and external bolster tensioning was guided endoscopically, leaving  space between the skin and the external bolster. This was dressed. The patient tolerated the procedure well. IMPRESSION:  1. Successful placement of PEG tube through the existing PEG site     RECOMMENDATIONS:      - Okay for tube feeds and meds today  - Flush with water 3-4 times day  - Please keep abdominal binder in place so patient does not pull PEG. The results were discussed with the patient and family. A copy of the images obtained were given to the patient.      Julio Hair MD  11/9/2022  12:24 PM

## 2022-11-09 NOTE — PROGRESS NOTES
Comprehensive Nutrition Assessment    Type and Reason for Visit:  Reassess    Nutrition Recommendations/Plan:   Dx Severe Malnutrition. If PEG unable to be replaced today, recommend short-term PN to prevent further nutritional decline while awaiting PEG. Malnutrition Assessment:  Malnutrition Status:  Severe malnutrition (11/09/22 0521)    Context:  Acute Illness     Findings of the 6 clinical characteristics of malnutrition:  Energy Intake:  50% or less of estimated energy requirements for 5 or more days  Weight Loss:  No significant weight loss     Body Fat Loss: Moderate body fat loss Orbital, Buccal region   Muscle Mass Loss:  No significant muscle mass loss    Fluid Accumulation:  No significant fluid accumulation     Strength:  Not Performed    Nutrition Assessment:    Pt continues to decline from a nutritional standpoint. NPO day 5. Aware PEG unable to be replaced by GI and considering awaiting interventional radiology to place. If PEG unable to be replaced today, would recommend PN to prevent further nutritional compromise. Pt does have IV access. Will follow. Nutrition Related Findings:      Wound Type: Pressure Injury       Current Nutrition Intake & Therapies:    Average Meal Intake: NPO     Diet NPO    Anthropometric Measures:  Height: 6' 2\" (188 cm)  Ideal Body Weight (IBW): 190 lbs (86 kg)    Admission Body Weight: 138 lb (62.6 kg)  Current Body Weight: 135 lb 9.6 oz (61.5 kg),   IBW.     Current BMI (kg/m2): 17.4  Usual Body Weight: 138 lb (62.6 kg)  % Weight Change (Calculated): 0   BMI Categories: Underweight (BMI less than 18.5)    Estimated Daily Nutrient Needs:  Energy Requirements Based On: Kcal/kg  Weight Used for Energy Requirements: Admission  Energy (kcal/day): 0439-6126  Weight Used for Protein Requirements: Admission  Protein (g/day):   Method Used for Fluid Requirements: 1 ml/kcal  Fluid (ml/day): 2220-6737    Nutrition Diagnosis:   Inadequate oral intake related to swallowing difficulty as evidenced by nutrition support - enteral nutrition    Nutrition Interventions:   Food and/or Nutrient Delivery: Continue NPO, Start Parenteral Nutrition  Nutrition Education/Counseling: No recommendation at this time  Coordination of Nutrition Care: Continue to monitor while inpatient       Goals:  Previous Goal Met: No Progress toward Goal(s)  Goals: Meet at least 75% of estimated needs       Nutrition Monitoring and Evaluation:   Behavioral-Environmental Outcomes: None Identified  Food/Nutrient Intake Outcomes: Diet Advancement/Tolerance, Enteral Nutrition Intake/Tolerance  Physical Signs/Symptoms Outcomes: Biochemical Data, Skin, Weight, Nutrition Focused Physical Findings    Discharge Planning:    Enteral Nutrition     Audie Wayne RD, LD  Contact: [phone]
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
## 2022-11-09 NOTE — PLAN OF CARE
Problem: Safety - Medical Restraint  Goal: Remains free of injury from restraints (Restraint for Interference with Medical Device)  Description: INTERVENTIONS:  1. Determine that other, less restrictive measures have been tried or would not be effective before applying the restraint  2. Evaluate the patient's condition at the time of restraint application  3. Inform patient/family regarding the reason for restraint  4.  Q2H: Monitor safety, psychosocial status, comfort, nutrition and hydration  11/9/2022 0305 by Angeli Clark RN  Outcome: Not Progressing  11/8/2022 1703 by Eileen Byrd RN  Outcome: Not Progressing     Problem: Discharge Planning  Goal: Discharge to home or other facility with appropriate resources  11/9/2022 0305 by Angeli Clark RN  Outcome: Not Progressing  Flowsheets (Taken 11/8/2022 2355)  Discharge to home or other facility with appropriate resources: Identify barriers to discharge with patient and caregiver  11/8/2022 1703 by Eileen Byrd RN  Outcome: Not Progressing     Problem: Pain  Goal: Verbalizes/displays adequate comfort level or baseline comfort level  11/9/2022 0305 by Angeli Clark RN  Outcome: Not Progressing  11/8/2022 1703 by Eileen Byrd RN  Outcome: Not Progressing     Problem: Safety - Adult  Goal: Free from fall injury  11/9/2022 0305 by Angeli Clark RN  Outcome: Not Progressing  Flowsheets (Taken 11/9/2022 0302)  Free From Fall Injury: Instruct family/caregiver on patient safety  11/8/2022 1703 by Eileen Byrd RN  Outcome: Not Progressing  Flowsheets (Taken 11/8/2022 1215)  Free From Fall Injury:   Instruct family/caregiver on patient safety   Based on caregiver fall risk screen, instruct family/caregiver to ask for assistance with transferring infant if caregiver noted to have fall risk factors     Problem: ABCDS Injury Assessment  Goal: Absence of physical injury  11/9/2022 0305 by Angeli Clark RN  Outcome: Not Progressing  11/8/2022 1703 by Ellis Mckinnon RN  Outcome: Not Progressing  Flowsheets (Taken 11/8/2022 1215)  Absence of Physical Injury: Implement safety measures based on patient assessment     Problem: Skin/Tissue Integrity  Goal: Absence of new skin breakdown  Description: 1. Monitor for areas of redness and/or skin breakdown  2. Assess vascular access sites hourly  3. Every 4-6 hours minimum:  Change oxygen saturation probe site  4. Every 4-6 hours:  If on nasal continuous positive airway pressure, respiratory therapy assess nares and determine need for appliance change or resting period. 11/9/2022 0305 by Natalie Marsh RN  Outcome: Not Progressing  11/8/2022 1703 by Ellis Mckinnon RN  Outcome: Not Progressing     Problem: Nutrition Deficit:  Goal: Optimize nutritional status  11/9/2022 0305 by Natalie Marsh RN  Outcome: Not Progressing  11/8/2022 1703 by Ellis Mckinnon RN  Outcome: Not Progressing     Problem: Neurosensory - Adult  Goal: Achieves stable or improved neurological status  11/9/2022 0305 by Natalie Marsh RN  Outcome: Not Progressing  Flowsheets (Taken 11/8/2022 2355)  Achieves stable or improved neurological status: Assess for and report changes in neurological status  11/8/2022 1703 by Ellis Mckinnon RN  Outcome: Not Progressing  Goal: Absence of seizures  11/9/2022 0305 by Natalie Marsh RN  Outcome: Not Progressing  Flowsheets (Taken 11/8/2022 2355)  Absence of seizures: Monitor for seizure activity.   If seizure occurs, document type and location of movements and any associated apnea  11/8/2022 1703 by Ellis Mckinnon RN  Outcome: Not Progressing  Goal: Remains free of injury related to seizures activity  11/9/2022 0305 by Natalie Marsh RN  Outcome: Not Progressing  Flowsheets (Taken 11/8/2022 2355)  Remains free of injury related to seizure activity: Maintain airway, patient safety  and administer oxygen as ordered  11/8/2022 1703 by Ellis Mckinnon RN  Outcome: Not Progressing  Goal: Achieves maximal functionality and self care  11/9/2022 0305 by Xochitl Mortensen RN  Outcome: Not Progressing  Flowsheets (Taken 11/8/2022 2355)  Achieves maximal functionality and self care: Monitor swallowing and airway patency with patient fatigue and changes in neurological status  11/8/2022 1703 by Kerry Mosley RN  Outcome: Not Progressing     Problem: Respiratory - Adult  Goal: Achieves optimal ventilation and oxygenation  11/9/2022 0305 by Xochitl Mortensen RN  Outcome: Not Progressing  Flowsheets (Taken 11/8/2022 2355)  Achieves optimal ventilation and oxygenation: Assess for changes in respiratory status  11/8/2022 1703 by Kerry Mosley RN  Outcome: Not Progressing  Flowsheets (Taken 11/8/2022 0905)  Achieves optimal ventilation and oxygenation:   Assess for changes in respiratory status   Position to facilitate oxygenation and minimize respiratory effort   Assess for changes in mentation and behavior   Assess the need for suctioning and aspirate as needed   Initiate smoking cessation protocol as indicated   Oxygen supplementation based on oxygen saturation or arterial blood gases   Encourage broncho-pulmonary hygiene including cough, deep breathe, incentive spirometry   Assess and instruct to report shortness of breath or any respiratory difficulty   Respiratory therapy support as indicated     Problem: Cardiovascular - Adult  Goal: Maintains optimal cardiac output and hemodynamic stability  11/9/2022 0305 by Xochitl Mortensen RN  Outcome: Not Progressing  11/8/2022 1703 by Kerry Mosley RN  Outcome: Not Progressing  Flowsheets (Taken 11/8/2022 0905)  Maintains optimal cardiac output and hemodynamic stability:   Monitor blood pressure and heart rate   Monitor urine output and notify Licensed Independent Practitioner for values outside of normal range   Assess for signs of decreased cardiac output   Administer fluid and/or volume expanders as ordered  Goal: Absence of cardiac dysrhythmias or at baseline  11/9/2022 0305 by Dalia Mulligan RN  Outcome: Not Progressing  Flowsheets (Taken 11/8/2022 2355)  Absence of cardiac dysrhythmias or at baseline: Monitor cardiac rate and rhythm  11/8/2022 1703 by Phyllis Sanchez RN  Outcome: Not Progressing     Problem: Skin/Tissue Integrity - Adult  Goal: Skin integrity remains intact  11/9/2022 0305 by Dalia Mulligan RN  Outcome: Not Progressing  Flowsheets (Taken 11/9/2022 0302)  Skin Integrity Remains Intact: Monitor for areas of redness and/or skin breakdown  11/8/2022 1703 by Phyllis Sanchez RN  Outcome: Not Progressing  Flowsheets (Taken 11/8/2022 0905)  Skin Integrity Remains Intact:   Monitor for areas of redness and/or skin breakdown   Assess vascular access sites hourly   Every 4-6 hours minimum: Change oxygen saturation probe site   Every 4-6 hours: If on nasal continuous positive airway pressure, respiratory therapy assesses nares and determine need for appliance change or resting period  Goal: Incisions, wounds, or drain sites healing without S/S of infection  11/9/2022 0305 by Dalia Mulligan RN  Outcome: Not Progressing  Flowsheets (Taken 11/9/2022 0302)  Incisions, Wounds, or Drain Sites Healing Without Sign and Symptoms of Infection: ADMISSION and DAILY: Assess and document risk factors for pressure ulcer development  11/8/2022 1703 by Phyllis Sanchez RN  Outcome: Not Progressing  Flowsheets (Taken 11/8/2022 0905)  Incisions, Wounds, or Drain Sites Healing Without Sign and Symptoms of Infection:   TWICE DAILY: Assess and document skin integrity   ADMISSION and DAILY: Assess and document risk factors for pressure ulcer development   TWICE DAILY: Assess and document dressing/incision, wound bed, drain sites and surrounding tissue   Initiate isolation precautions as appropriate   Initiate pressure ulcer prevention bundle as indicated  Goal: Oral mucous membranes remain intact  11/9/2022 0305 by Dalia Mulligan RN  Outcome: Not Progressing  11/8/2022 1703 by Guerrero Sommer RN  Outcome: Not Progressing  Flowsheets (Taken 11/8/2022 0905)  Oral Mucous Membranes Remain Intact:   Assess oral mucosa and hygiene practices   Implement preventative oral hygiene regimen   Implement oral medicated treatments as ordered     Problem: Musculoskeletal - Adult  Goal: Return mobility to safest level of function  11/9/2022 0305 by Crissy Farnsworth RN  Outcome: Not Progressing  Flowsheets (Taken 11/8/2022 2355)  Return Mobility to Safest Level of Function: Assess patient stability and activity tolerance for standing, transferring and ambulating with or without assistive devices  11/8/2022 1703 by Guerrero Sommer RN  Outcome: Not Progressing  Goal: Maintain proper alignment of affected body part  11/9/2022 0305 by Crissy Farnsworth RN  Outcome: Not Progressing  11/8/2022 1703 by Guerrero Sommer RN  Outcome: Not Progressing  Goal: Return ADL status to a safe level of function  11/9/2022 0305 by Crissy Farnsworth RN  Outcome: Not Progressing  11/8/2022 1703 by Guerrero Sommer RN  Outcome: Not Progressing     Problem: Gastrointestinal - Adult  Goal: Minimal or absence of nausea and vomiting  11/9/2022 0305 by Crissy Farnsworth RN  Outcome: Not Progressing  Flowsheets (Taken 11/8/2022 2355)  Minimal or absence of nausea and vomiting: Administer IV fluids as ordered to ensure adequate hydration  11/8/2022 1703 by Guerrero Sommer RN  Outcome: Not Progressing  Flowsheets (Taken 11/8/2022 0905)  Minimal or absence of nausea and vomiting:   Administer IV fluids as ordered to ensure adequate hydration   Maintain NPO status until nausea and vomiting are resolved   Nasogastric tube to low intermittent suction as ordered   Administer ordered antiemetic medications as needed   Provide nonpharmacologic comfort measures as appropriate   Nutrition consult to assist patient with adequate nutrition and appropriate food choices   Advance diet as tolerated, if ordered  Goal: Maintains or returns to baseline bowel function  11/9/2022 0305 by Jimbo Leslie RN  Outcome: Not Progressing  Flowsheets (Taken 11/8/2022 2355)  Maintains or returns to baseline bowel function: Assess bowel function  11/8/2022 1703 by Chapo Sylvester RN  Outcome: Not Progressing  Flowsheets (Taken 11/8/2022 0905)  Maintains or returns to baseline bowel function:   Assess bowel function   Encourage oral fluids to ensure adequate hydration   Administer ordered medications as needed   Administer IV fluids as ordered to ensure adequate hydration   Encourage mobilization and activity  Goal: Maintains adequate nutritional intake  11/9/2022 0305 by Jimbo Leslie RN  Outcome: Not Progressing  11/8/2022 1703 by Chapo Sylvester RN  Outcome: Not Progressing  Flowsheets (Taken 11/8/2022 0905)  Maintains adequate nutritional intake:   Monitor percentage of each meal consumed   Identify factors contributing to decreased intake, treat as appropriate   Monitor intake and output, weight and lab values   Assist with meals as needed   Obtain nutritional consult as needed  Goal: Establish and maintain optimal ostomy function  11/9/2022 0305 by Jimbo Leslie RN  Outcome: Not Progressing  11/8/2022 1703 by Chapo Sylvester RN  Outcome: Not Progressing  Flowsheets (Taken 11/8/2022 0905)  Establish and maintain optimal ostomy function:   Monitor output from ostomies   Administer IV fluids and TPN as ordered   Introduce and advance enteral feedings as ordered   Nutrition consult     Problem: Genitourinary - Adult  Goal: Absence of urinary retention  11/9/2022 0305 by Jimbo Leslie RN  Outcome: Not Progressing  Flowsheets (Taken 11/8/2022 2355)  Absence of urinary retention: Monitor intake/output and perform bladder scan as needed  11/8/2022 1703 by Chapo Sylvester RN  Outcome: Not Progressing  Flowsheets (Taken 11/8/2022 0905)  Absence of urinary retention:   Monitor intake/output and perform bladder scan as needed   Place urinary catheter per Licensed Independent Practitioner order if needed   Assess patients ability to void and empty bladder  Goal: Urinary catheter remains patent  11/9/2022 0305 by Moses Martinez RN  Outcome: Not Progressing  Flowsheets (Taken 11/8/2022 2355)  Urinary catheter remains patent: Assess patency of urinary catheter  11/8/2022 1703 by Catalina Lucia RN  Outcome: Not Progressing  Flowsheets (Taken 11/8/2022 0905)  Urinary catheter remains patent:   Assess patency of urinary catheter   Irrigate catheter per Licensed Independent Practitioner order if indicated and notify Licensed Independent Practitioner if unable to irrigate     Problem: Infection - Adult  Goal: Absence of infection at discharge  11/9/2022 0305 by Moses Martinez RN  Outcome: Not Progressing  Flowsheets (Taken 11/8/2022 2355)  Absence of infection at discharge:   Assess and monitor for signs and symptoms of infection   Monitor lab/diagnostic results  11/8/2022 1703 by Catalina Lucia RN  Outcome: Not Progressing  Goal: Absence of infection during hospitalization  11/9/2022 0305 by Moses Martinez RN  Outcome: Not Progressing  Flowsheets (Taken 11/8/2022 2355)  Absence of infection during hospitalization:   Assess and monitor for signs and symptoms of infection   Monitor lab/diagnostic results  11/8/2022 1703 by Catalina Lucia RN  Outcome: Not Progressing  Goal: Absence of fever/infection during anticipated neutropenic period  11/9/2022 0305 by Moses Martinez RN  Outcome: Not Progressing  Flowsheets (Taken 11/8/2022 2355)  Absence of fever/infection during anticipated neutropenic period: Monitor white blood cell count  11/8/2022 1703 by Catalina Lucia RN  Outcome: Not Progressing     Problem: Metabolic/Fluid and Electrolytes - Adult  Goal: Electrolytes maintained within normal limits  11/9/2022 0305 by Moses Martinez RN  Outcome: Not Progressing  Flowsheets (Taken 11/8/2022 2355)  Electrolytes maintained within normal limits: Monitor labs and assess patient for signs and symptoms of electrolyte imbalances  11/8/2022 1703 by Alberto Hill RN  Outcome: Not Progressing  Goal: Hemodynamic stability and optimal renal function maintained  11/9/2022 0305 by Kayy Goyal RN  Outcome: Not Progressing  Flowsheets (Taken 11/8/2022 2355)  Hemodynamic stability and optimal renal function maintained: Monitor labs and assess for signs and symptoms of volume excess or deficit  11/8/2022 1703 by Alberto Hill RN  Outcome: Not Progressing  Goal: Glucose maintained within prescribed range  11/9/2022 0305 by Kayy Goyal RN  Outcome: Not Progressing  Flowsheets (Taken 11/8/2022 2355)  Glucose maintained within prescribed range: Monitor blood glucose as ordered  11/8/2022 1703 by Alberto Hill RN  Outcome: Not Progressing     Problem: Hematologic - Adult  Goal: Maintains hematologic stability  11/9/2022 0305 by Kayy Goyal RN  Outcome: Not Progressing  Flowsheets (Taken 11/8/2022 2355)  Maintains hematologic stability: Assess for signs and symptoms of bleeding or hemorrhage  11/8/2022 1703 by Alberto Hill RN  Outcome: Not Progressing     Problem: Chronic Conditions and Co-morbidities  Goal: Patient's chronic conditions and co-morbidity symptoms are monitored and maintained or improved  11/9/2022 0305 by Kayy Goyal RN  Outcome: Not Progressing  Flowsheets (Taken 11/8/2022 2355)  Care Plan - Patient's Chronic Conditions and Co-Morbidity Symptoms are Monitored and Maintained or Improved: Monitor and assess patient's chronic conditions and comorbid symptoms for stability, deterioration, or improvement  11/8/2022 1703 by Alberto Hill RN  Outcome: Not Progressing

## 2022-11-09 NOTE — PROGRESS NOTES
Had PEG tube placed by Dr. Maxi Dunn. Have signed off. Please call me if further assistance needed in the management of this patient. Please schedule follow-up after discharge with the patient's PCP and GI doctor on his health plan in 1 to 2 weeks.   Thank you

## 2022-11-09 NOTE — CONSULTS
Comprehensive Nutrition Assessment    Type and Reason for Visit:  Consult    Nutrition Recommendations/Plan:   Resume bolus feedings. Start at [address] at D and 120ml at 10pm.  Advance to 240ml at B & L  10/10 /2022 and 360ml of Jevity 1.5 from then on. Malnutrition Assessment:  Malnutrition Status:  Severe malnutrition (11/09/22 1331)    Context:  Acute Illness     Findings of the 6 clinical characteristics of malnutrition:  Energy Intake:  50% or less of estimated energy requirements for 5 or more days  Weight Loss:  No significant weight loss     Body Fat Loss: Moderate body fat loss Orbital, Buccal region   Muscle Mass Loss:  No significant muscle mass loss    Fluid Accumulation:  No significant fluid accumulation Extremities   Strength:  Not Performed    Nutrition Assessment:    Received consult to restart TF.  PEG has been replaced. Will restart feedings like at home--4 x/day 360ml with 60ml for water before and after feedings. Jevity 1.5. No new wt    Nutrition Related Findings:    PEG replaced Wound Type: Pressure Injury       Current Nutrition Intake & Therapies:    Average Meal Intake: NPO  Average Supplements Intake: NPO  Current Tube Feeding (TF) Orders:  Feeding Route: PEG  Formula: Standard with Fiber  Schedule: Bolus  Feeding Regimen: Jevity 1.5 with 360ml 4x/day with 60ml free water before and after feeding  Additives/Modulars: None  Water Flushes: 60ml before and after feeding  Current TF & Flush Orders Provides: 0  Goal TF & Flush Orders Provides: Jevity 1.5 360ml x 4.day= 1440ml      2160 kcals with 92g protein, 310g CHO and 1094 ml free water from formula and 480ml free water from flush    Anthropometric Measures:  Height: 6' 2\" (188 cm)  Ideal Body Weight (IBW): 190 lbs (86 kg)    Admission Body Weight: 138 lb (62.6 kg)  Current Body Weight: 135 lb 9.6 oz (61.5 kg),   IBW.     Current BMI (kg/m2): 17.4  Usual Body Weight: 138 lb (62.6 kg)  % Weight Change (Calculated): 0    BMI Categories: Underweight (BMI less than 18.5)    Estimated Daily Nutrient Needs:  Energy Requirements Based On: Kcal/kg  Weight Used for Energy Requirements: Admission  Energy (kcal/day): 9419-5841  Weight Used for Protein Requirements: Admission  Protein (g/day):   Method Used for Fluid Requirements: 1 ml/kcal  Fluid (ml/day): 5887-1216    Nutrition Diagnosis:   Inadequate oral intake related to swallowing difficulty as evidenced by nutrition support - enteral nutrition    Nutrition Interventions:   Food and/or Nutrient Delivery: Continue NPO, Start Tube Feeding  Nutrition Education/Counseling: No recommendation at this time  Coordination of Nutrition Care: Continue to monitor while inpatient       Goals:  Previous Goal Met: No Progress toward Goal(s)  Goals: Meet at least 75% of estimated needs, Initiate nutrition support       Nutrition Monitoring and Evaluation:   Behavioral-Environmental Outcomes: None Identified  Food/Nutrient Intake Outcomes: Enteral Nutrition Intake/Tolerance  Physical Signs/Symptoms Outcomes: Biochemical Data, Fluid Status or Edema, Weight, Skin    Discharge Planning:    Enteral Nutrition     Derek Perez, MS, RD, LD  Contact: [phone]

## 2022-11-09 NOTE — ANESTHESIA POSTPROCEDURE EVALUATION
Department of Anesthesiology  Postprocedure Note    Patient: Mirtha Carbajal  MRN: [id number]  YOB: 1985  Date of evaluation: 11/9/2022      Procedure Summary     Date: 11/09/22 Room / Location: [address]    Anesthesia Start: 1157 Anesthesia Stop:     Procedure: EGD PEG TUBE PLACEMENT Diagnosis:       Feeding difficulties      (Feeding difficulties [R63.30])    Surgeons: Josselin Blake MD Responsible Provider:     Anesthesia Type: general ASA Status: 3          Anesthesia Type: No value filed.     Марина Phase I: Марина Score: 9    Марина Phase II:        Anesthesia Post Evaluation    Patient location during evaluation: bedside  Patient participation: complete - patient cannot participate (as pre-op)  Level of consciousness: sleepy but conscious (as preop)  Pain score: 0  Airway patency: patent  Nausea & Vomiting: no nausea and no vomiting  Complications: no  Cardiovascular status: hemodynamically stable and blood pressure returned to baseline  Respiratory status: acceptable and nasal cannula  Hydration status: stable
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
## 2022-11-10 VITALS
TEMPERATURE: 98.7 F | WEIGHT: 142.1 LBS | HEIGHT: 74 IN | SYSTOLIC BLOOD PRESSURE: 112 MMHG | BODY MASS INDEX: 18.24 KG/M2 | DIASTOLIC BLOOD PRESSURE: 85 MMHG | RESPIRATION RATE: 18 BRPM | HEART RATE: 62 BPM | OXYGEN SATURATION: 97 %

## 2022-11-10 LAB
ANION GAP SERPL CALCULATED.3IONS-SCNC: 12 MMOL/L (ref 7–19)
BASOPHILS ABSOLUTE: 0 K/UL (ref 0–0.2)
BASOPHILS RELATIVE PERCENT: 0.4 % (ref 0–1)
BUN BLDV-MCNC: 3 MG/DL (ref 6–20)
CALCIUM SERPL-MCNC: 8.6 MG/DL (ref 8.6–10)
CHLORIDE BLD-SCNC: 101 MMOL/L (ref 98–111)
CO2: 26 MMOL/L (ref 22–29)
CREAT SERPL-MCNC: 0.6 MG/DL (ref 0.5–1.2)
EOSINOPHILS ABSOLUTE: 0.4 K/UL (ref 0–0.6)
EOSINOPHILS RELATIVE PERCENT: 3.9 % (ref 0–5)
GFR SERPL CREATININE-BSD FRML MDRD: >60 ML/MIN/{1.73_M2}
GLUCOSE BLD-MCNC: 111 MG/DL (ref 70–99)
GLUCOSE BLD-MCNC: 72 MG/DL (ref 70–99)
GLUCOSE BLD-MCNC: 90 MG/DL (ref 70–99)
GLUCOSE BLD-MCNC: 99 MG/DL (ref 74–109)
HCT VFR BLD CALC: 32.9 % (ref 42–52)
HEMOGLOBIN: 10.7 G/DL (ref 14–18)
IMMATURE GRANULOCYTES #: 0.1 K/UL
LYMPHOCYTES ABSOLUTE: 1.7 K/UL (ref 1.1–4.5)
LYMPHOCYTES RELATIVE PERCENT: 17.1 % (ref 20–40)
MCH RBC QN AUTO: 28 PG (ref 27–31)
MCHC RBC AUTO-ENTMCNC: 32.5 G/DL (ref 33–37)
MCV RBC AUTO: 86.1 FL (ref 80–94)
MONOCYTES ABSOLUTE: 0.8 K/UL (ref 0–0.9)
MONOCYTES RELATIVE PERCENT: 8.5 % (ref 0–10)
NEUTROPHILS ABSOLUTE: 6.8 K/UL (ref 1.5–7.5)
NEUTROPHILS RELATIVE PERCENT: 69.3 % (ref 50–65)
PDW BLD-RTO: 14.6 % (ref 11.5–14.5)
PERFORMED ON: ABNORMAL
PERFORMED ON: NORMAL
PERFORMED ON: NORMAL
PLATELET # BLD: 327 K/UL (ref 130–400)
PMV BLD AUTO: 10.3 FL (ref 9.4–12.4)
POTASSIUM SERPL-SCNC: 3.8 MMOL/L (ref 3.5–5)
RBC # BLD: 3.82 M/UL (ref 4.7–6.1)
SODIUM BLD-SCNC: 139 MMOL/L (ref 136–145)
WBC # BLD: 9.8 K/UL (ref 4.8–10.8)

## 2022-11-10 PROCEDURE — 6360000002 HC RX W HCPCS: Performed by: INTERNAL MEDICINE

## 2022-11-10 PROCEDURE — 2580000003 HC RX 258: Performed by: INTERNAL MEDICINE

## 2022-11-10 PROCEDURE — 2500000003 HC RX 250 WO HCPCS: Performed by: INTERNAL MEDICINE

## 2022-11-10 PROCEDURE — 80048 BASIC METABOLIC PNL TOTAL CA: CPT

## 2022-11-10 PROCEDURE — 36415 COLL VENOUS BLD VENIPUNCTURE: CPT

## 2022-11-10 PROCEDURE — 82947 ASSAY GLUCOSE BLOOD QUANT: CPT

## 2022-11-10 PROCEDURE — 85025 COMPLETE CBC W/AUTO DIFF WBC: CPT

## 2022-11-10 PROCEDURE — 6370000000 HC RX 637 (ALT 250 FOR IP): Performed by: INTERNAL MEDICINE

## 2022-11-10 RX ORDER — LANSOPRAZOLE
30 KIT
Qty: 300 ML | Refills: 0 | COMMUNITY
Start: 2022-11-10

## 2022-11-10 RX ORDER — ARIPIPRAZOLE 20 MG/1
20 TABLET ORAL DAILY
Qty: 30 TABLET | Refills: 3
Start: 2022-11-10

## 2022-11-10 RX ORDER — LEVETIRACETAM 100 MG/ML
500 SOLUTION ORAL 2 TIMES DAILY
Refills: 3 | COMMUNITY
Start: 2022-11-10

## 2022-11-10 RX ORDER — FAMOTIDINE 20 MG/1
20 TABLET, FILM COATED ORAL 2 TIMES DAILY PRN
Qty: 60 TABLET | Refills: 3 | COMMUNITY
Start: 2022-11-10

## 2022-11-10 RX ORDER — AMOXICILLIN AND CLAVULANATE POTASSIUM 250; 62.5 MG/5ML; MG/5ML
250 POWDER, FOR SUSPENSION ORAL 2 TIMES DAILY
Qty: 30 ML | Refills: 0 | Status: SHIPPED | OUTPATIENT
Start: 2022-11-10 | End: 2022-11-13

## 2022-11-10 RX ORDER — ACETAMINOPHEN 325 MG/1
650 TABLET ORAL EVERY 4 HOURS PRN
Qty: 120 TABLET | Refills: 3 | Status: SHIPPED | OUTPATIENT
Start: 2022-11-10

## 2022-11-10 RX ORDER — LACTOBACILLUS RHAMNOSUS GG 10B CELL
1 CAPSULE ORAL DAILY
Qty: 30 CAPSULE | Refills: 0
Start: 2022-11-10

## 2022-11-10 RX ADMIN — PIPERACILLIN AND TAZOBACTAM 3375 MG: 3; .375 INJECTION, POWDER, FOR SOLUTION INTRAVENOUS at 09:59

## 2022-11-10 RX ADMIN — SODIUM CHLORIDE, PRESERVATIVE FREE 20 MG: 5 INJECTION INTRAVENOUS at 09:55

## 2022-11-10 RX ADMIN — PIPERACILLIN AND TAZOBACTAM 3375 MG: 3; .375 INJECTION, POWDER, FOR SOLUTION INTRAVENOUS at 01:39

## 2022-11-10 RX ADMIN — CITALOPRAM HYDROBROMIDE 20 MG: 20 TABLET ORAL at 09:56

## 2022-11-10 RX ADMIN — ARIPIPRAZOLE 20 MG: 5 TABLET ORAL at 09:56

## 2022-11-10 RX ADMIN — Medication 1 CAPSULE: at 09:56

## 2022-11-10 RX ADMIN — LEVETIRACETAM 500 MG: 5 INJECTION INTRAVENOUS at 06:51

## 2022-11-10 RX ADMIN — Medication 30 MG: at 05:31

## 2022-11-10 RX ADMIN — METOPROLOL TARTRATE 5 MG: 1 INJECTION, SOLUTION INTRAVENOUS at 10:04

## 2022-11-10 RX ADMIN — SODIUM CHLORIDE, PRESERVATIVE FREE 10 ML: 5 INJECTION INTRAVENOUS at 09:55

## 2022-11-10 NOTE — PLAN OF CARE
Problem: Safety - Medical Restraint  Goal: Remains free of injury from restraints (Restraint for Interference with Medical Device)  Description: INTERVENTIONS:  1. Determine that other, less restrictive measures have been tried or would not be effective before applying the restraint  2. Evaluate the patient's condition at the time of restraint application  3. Inform patient/family regarding the reason for restraint  4. Q2H: Monitor safety, psychosocial status, comfort, nutrition and hydration  Outcome: Progressing     Problem: Discharge Planning  Goal: Discharge to home or other facility with appropriate resources  Outcome: Progressing     Problem: Pain  Goal: Verbalizes/displays adequate comfort level or baseline comfort level  Outcome: Progressing     Problem: Safety - Adult  Goal: Free from fall injury  Outcome: Progressing     Problem: ABCDS Injury Assessment  Goal: Absence of physical injury  Outcome: Progressing     Problem: Skin/Tissue Integrity  Goal: Absence of new skin breakdown  Description: 1. Monitor for areas of redness and/or skin breakdown  2. Assess vascular access sites hourly  3. Every 4-6 hours minimum:  Change oxygen saturation probe site  4. Every 4-6 hours:  If on nasal continuous positive airway pressure, respiratory therapy assess nares and determine need for appliance change or resting period.   Outcome: Progressing     Problem: Nutrition Deficit:  Goal: Optimize nutritional status  11/9/2022 2140 by Earlene Marquez RN  Outcome: Progressing  11/9/2022 1347 by Eduarda Montana MS, RD, LD  Outcome: Progressing  Flowsheets (Taken 11/9/2022 1326)  Nutrient intake appropriate for improving, restoring, or maintaining nutritional needs:   Assess nutritional status and recommend course of action   Recommend, monitor, and adjust tube feedings and TPN/PPN based on assessed needs     Problem: Neurosensory - Adult  Goal: Achieves stable or improved neurological status  Outcome: Progressing  Goal: Absence of seizures  Outcome: Progressing  Goal: Remains free of injury related to seizures activity  Outcome: Progressing  Goal: Achieves maximal functionality and self care  Outcome: Progressing     Problem: Respiratory - Adult  Goal: Achieves optimal ventilation and oxygenation  Outcome: Progressing     Problem: Cardiovascular - Adult  Goal: Maintains optimal cardiac output and hemodynamic stability  Outcome: Progressing  Goal: Absence of cardiac dysrhythmias or at baseline  Outcome: Progressing     Problem: Skin/Tissue Integrity - Adult  Goal: Skin integrity remains intact  Outcome: Progressing  Goal: Incisions, wounds, or drain sites healing without S/S of infection  Outcome: Progressing  Goal: Oral mucous membranes remain intact  Outcome: Progressing     Problem: Musculoskeletal - Adult  Goal: Return mobility to safest level of function  Outcome: Progressing  Goal: Maintain proper alignment of affected body part  Outcome: Progressing  Goal: Return ADL status to a safe level of function  Outcome: Progressing     Problem: Gastrointestinal - Adult  Goal: Minimal or absence of nausea and vomiting  Outcome: Progressing  Goal: Maintains or returns to baseline bowel function  Outcome: Progressing  Goal: Maintains adequate nutritional intake  Outcome: Progressing  Goal: Establish and maintain optimal ostomy function  Outcome: Progressing     Problem: Genitourinary - Adult  Goal: Absence of urinary retention  Outcome: Progressing  Goal: Urinary catheter remains patent  Outcome: Progressing     Problem: Infection - Adult  Goal: Absence of infection at discharge  Outcome: Progressing  Goal: Absence of infection during hospitalization  Outcome: Progressing  Goal: Absence of fever/infection during anticipated neutropenic period  Outcome: Progressing     Problem: Metabolic/Fluid and Electrolytes - Adult  Goal: Electrolytes maintained within normal limits  Outcome: Progressing  Goal: Hemodynamic stability and optimal renal function maintained  Outcome: Progressing  Goal: Glucose maintained within prescribed range  Outcome: Progressing     Problem: Hematologic - Adult  Goal: Maintains hematologic stability  Outcome: Progressing     Problem: Chronic Conditions and Co-morbidities  Goal: Patient's chronic conditions and co-morbidity symptoms are monitored and maintained or improved  Outcome: Progressing

## 2022-11-10 NOTE — PLAN OF CARE
Problem: Safety - Medical Restraint  Goal: Remains free of injury from restraints (Restraint for Interference with Medical Device)  Description: INTERVENTIONS:  1. Determine that other, less restrictive measures have been tried or would not be effective before applying the restraint  2. Evaluate the patient's condition at the time of restraint application  3. Inform patient/family regarding the reason for restraint  4. Q2H: Monitor safety, psychosocial status, comfort, nutrition and hydration  11/10/2022 1310 by Radha Li RN  Outcome: Completed  11/10/2022 1308 by Radha Li RN  Outcome: Progressing     Problem: Discharge Planning  Goal: Discharge to home or other facility with appropriate resources  11/10/2022 1310 by Radha Li RN  Outcome: Completed  11/10/2022 1308 by Radha Li RN  Outcome: Progressing     Problem: Pain  Goal: Verbalizes/displays adequate comfort level or baseline comfort level  11/10/2022 1310 by Radha Li RN  Outcome: Completed  11/10/2022 1308 by Radha Li RN  Outcome: Progressing     Problem: Safety - Adult  Goal: Free from fall injury  11/10/2022 1310 by Radha Li RN  Outcome: Completed  11/10/2022 1308 by Radha Li RN  Outcome: Progressing     Problem: ABCDS Injury Assessment  Goal: Absence of physical injury  11/10/2022 1310 by Radha Li RN  Outcome: Completed  11/10/2022 1308 by Radha Li RN  Outcome: Progressing     Problem: Skin/Tissue Integrity  Goal: Absence of new skin breakdown  Description: 1. Monitor for areas of redness and/or skin breakdown  2. Assess vascular access sites hourly  3. Every 4-6 hours minimum:  Change oxygen saturation probe site  4. Every 4-6 hours:  If on nasal continuous positive airway pressure, respiratory therapy assess nares and determine need for appliance change or resting period.   11/10/2022 1310 by Radha Li RN  Outcome: Completed  11/10/2022 1308 by Radha Li RN  Outcome: Progressing     Problem: Nutrition Deficit:  Goal: Optimize nutritional status  11/10/2022 1310 by Francisco Steward, RN  Outcome: Completed  11/10/2022 1308 by Francisco Steward RN  Outcome: Progressing     Problem: Neurosensory - Adult  Goal: Achieves stable or improved neurological status  11/10/2022 1310 by Francisco Steward RN  Outcome: Completed  11/10/2022 1308 by Francisco Steward, RN  Outcome: Progressing  Goal: Absence of seizures  11/10/2022 1310 by Francisco Steward, RN  Outcome: Completed  11/10/2022 1308 by Francisco Steward, RN  Outcome: Progressing  Goal: Remains free of injury related to seizures activity  11/10/2022 1310 by Francisco Steward, RN  Outcome: Completed  11/10/2022 1308 by Francisco Steward RN  Outcome: Progressing  Goal: Achieves maximal functionality and self care  11/10/2022 1310 by Francisco Steward, RN  Outcome: Completed  11/10/2022 1308 by Francisco Steward RN  Outcome: Progressing     Problem: Respiratory - Adult  Goal: Achieves optimal ventilation and oxygenation  11/10/2022 1310 by Francisco Steward, RN  Outcome: Completed  11/10/2022 1308 by Francisco Steward RN  Outcome: Progressing     Problem: Cardiovascular - Adult  Goal: Maintains optimal cardiac output and hemodynamic stability  11/10/2022 1310 by Francisco Steward, RN  Outcome: Completed  11/10/2022 1308 by Francisco Steward RN  Outcome: Progressing  Goal: Absence of cardiac dysrhythmias or at baseline  11/10/2022 1310 by Francisco Steward, RN  Outcome: Completed  11/10/2022 1308 by Francisco Steward RN  Outcome: Progressing     Problem: Skin/Tissue Integrity - Adult  Goal: Skin integrity remains intact  11/10/2022 1310 by Francisco Steward, RN  Outcome: Completed  11/10/2022 1308 by Francisco Steward RN  Outcome: Progressing  Goal: Incisions, wounds, or drain sites healing without S/S of infection  11/10/2022 1310 by Francisco Steward, RN  Outcome: Completed  11/10/2022 1308 by Francisco Steward, RN  Outcome: Progressing  Goal: Oral mucous membranes remain intact  11/10/2022 1310 by Julio Jasso RN  Outcome: Completed  11/10/2022 1308 by Julio Jasso RN  Outcome: Progressing     Problem: Musculoskeletal - Adult  Goal: Return mobility to safest level of function  11/10/2022 1310 by Julio Jasso RN  Outcome: Completed  11/10/2022 1308 by Julio Jasso RN  Outcome: Progressing  Goal: Maintain proper alignment of affected body part  11/10/2022 1310 by Julio Jasso RN  Outcome: Completed  11/10/2022 1308 by Julio Jasso RN  Outcome: Progressing  Goal: Return ADL status to a safe level of function  11/10/2022 1310 by Julio Jasso RN  Outcome: Completed  11/10/2022 1308 by Julio Jasso RN  Outcome: Progressing     Problem: Gastrointestinal - Adult  Goal: Minimal or absence of nausea and vomiting  11/10/2022 1310 by Julio Jasso RN  Outcome: Completed  11/10/2022 1308 by Julio Jasso RN  Outcome: Progressing  Goal: Maintains or returns to baseline bowel function  11/10/2022 1310 by Julio Jasso RN  Outcome: Completed  11/10/2022 1308 by Julio Jasso RN  Outcome: Progressing  Goal: Maintains adequate nutritional intake  11/10/2022 1310 by Julio Jasso RN  Outcome: Completed  11/10/2022 1308 by Julio Jasso RN  Outcome: Progressing  Goal: Establish and maintain optimal ostomy function  11/10/2022 1310 by Julio Jasso RN  Outcome: Completed  11/10/2022 1308 by Julio Jasso RN  Outcome: Progressing     Problem: Genitourinary - Adult  Goal: Absence of urinary retention  11/10/2022 1310 by Julio Jasso RN  Outcome: Completed  11/10/2022 1308 by Julio Jasso RN  Outcome: Progressing  Goal: Urinary catheter remains patent  11/10/2022 1310 by Julio Jasso RN  Outcome: Completed  11/10/2022 1308 by Julio Jasso RN  Outcome: Progressing     Problem: Infection - Adult  Goal: Absence of infection at discharge  11/10/2022 1310 by Julio Jasso RN  Outcome: Completed  11/10/2022 1308 by

## 2022-11-10 NOTE — CARE COORDINATION
11/10/22 1546   IMM Letter   IMM Letter given to Patient/Family/Significant other/Guardian/POA/by: letter explained to pts mother Manisha Bustamante via phone. Ed BARRERA CM   IMM Letter date given: 11/10/22   IMM Letter time given: 0266   Does not want to wait the required 4 hr time frame if ambulance shows up to get pt before then.   Electronically signed by Jaky Clark RN on 11/10/2022 at 3:48 PM

## 2022-11-10 NOTE — PLAN OF CARE
Problem: Safety - Medical Restraint  Goal: Remains free of injury from restraints (Restraint for Interference with Medical Device)  Description: INTERVENTIONS:  1. Determine that other, less restrictive measures have been tried or would not be effective before applying the restraint  2. Evaluate the patient's condition at the time of restraint application  3. Inform patient/family regarding the reason for restraint  4. Q2H: Monitor safety, psychosocial status, comfort, nutrition and hydration  Outcome: Progressing     Problem: Discharge Planning  Goal: Discharge to home or other facility with appropriate resources  Outcome: Progressing     Problem: Pain  Goal: Verbalizes/displays adequate comfort level or baseline comfort level  Outcome: Progressing     Problem: Safety - Adult  Goal: Free from fall injury  Outcome: Progressing     Problem: ABCDS Injury Assessment  Goal: Absence of physical injury  Outcome: Progressing     Problem: Skin/Tissue Integrity  Goal: Absence of new skin breakdown  Description: 1. Monitor for areas of redness and/or skin breakdown  2. Assess vascular access sites hourly  3. Every 4-6 hours minimum:  Change oxygen saturation probe site  4. Every 4-6 hours:  If on nasal continuous positive airway pressure, respiratory therapy assess nares and determine need for appliance change or resting period.   Outcome: Progressing     Problem: Nutrition Deficit:  Goal: Optimize nutritional status  Outcome: Progressing     Problem: Neurosensory - Adult  Goal: Achieves stable or improved neurological status  Outcome: Progressing  Goal: Absence of seizures  Outcome: Progressing  Goal: Remains free of injury related to seizures activity  Outcome: Progressing  Goal: Achieves maximal functionality and self care  Outcome: Progressing     Problem: Respiratory - Adult  Goal: Achieves optimal ventilation and oxygenation  Outcome: Progressing     Problem: Cardiovascular - Adult  Goal: Maintains optimal cardiac output and hemodynamic stability  Outcome: Progressing  Goal: Absence of cardiac dysrhythmias or at baseline  Outcome: Progressing     Problem: Skin/Tissue Integrity - Adult  Goal: Skin integrity remains intact  Outcome: Progressing  Goal: Incisions, wounds, or drain sites healing without S/S of infection  Outcome: Progressing  Goal: Oral mucous membranes remain intact  Outcome: Progressing     Problem: Musculoskeletal - Adult  Goal: Return mobility to safest level of function  Outcome: Progressing  Goal: Maintain proper alignment of affected body part  Outcome: Progressing  Goal: Return ADL status to a safe level of function  Outcome: Progressing     Problem: Gastrointestinal - Adult  Goal: Minimal or absence of nausea and vomiting  Outcome: Progressing  Goal: Maintains or returns to baseline bowel function  Outcome: Progressing  Goal: Maintains adequate nutritional intake  Outcome: Progressing  Goal: Establish and maintain optimal ostomy function  Outcome: Progressing     Problem: Genitourinary - Adult  Goal: Absence of urinary retention  Outcome: Progressing  Goal: Urinary catheter remains patent  Outcome: Progressing     Problem: Infection - Adult  Goal: Absence of infection at discharge  Outcome: Progressing  Goal: Absence of infection during hospitalization  Outcome: Progressing  Goal: Absence of fever/infection during anticipated neutropenic period  Outcome: Progressing     Problem: Metabolic/Fluid and Electrolytes - Adult  Goal: Electrolytes maintained within normal limits  Outcome: Progressing  Goal: Hemodynamic stability and optimal renal function maintained  Outcome: Progressing  Goal: Glucose maintained within prescribed range  Outcome: Progressing     Problem: Hematologic - Adult  Goal: Maintains hematologic stability  Outcome: Progressing     Problem: Chronic Conditions and Co-morbidities  Goal: Patient's chronic conditions and co-morbidity symptoms are monitored and maintained or improved  Outcome: Progressing

## 2022-11-10 NOTE — PROGRESS NOTES
Nutrition Assessment     Type and Reason for Visit: Reassess    Nutrition Recommendations/Plan:   Continue POC. Increase bolus to 240 mL for noon feeding. Goal is 360 mL 4x daily. 60 mL free water flush before and after each feeding. Malnutrition Assessment:  Malnutrition Status: Severe malnutrition    Nutrition Assessment:  Pt improving from a nutritional standpoint with EN initiated. Bolus feeding 120 mL given last night and this AM. Per nursing, pt had 150 mL residual at 10 PM last night but 0 mL this AM. Increase bolus to 240 mL at 12 PM feeding and monitor tolerance. Estimated Daily Nutrient Needs:  Energy (kcal):  2166-1335 Weight Used for Energy Requirements: Admission     Protein (g):   Weight Used for Protein Requirements: Admission        Fluid (ml/day):  2522-5804 Method Used for Fluid Requirements: 1 ml/kcal    Nutrition Related Findings:   PEG replaced Wound Type: Pressure Injury    Current Nutrition Therapies:    Diet NPO  ADULT TUBE FEEDING; PEG; Standard with Fiber; Bolus; 4 Times Daily; 120;  Infusion; 1; 60; Before and after each bolus    Anthropometric Measures:  Height: 6' 2\" (188 cm)  Current Body Wt: 142 lb 1.6 oz (64.5 kg)   BMI: 18.2    Nutrition Diagnosis:   Inadequate oral intake related to swallowing difficulty as evidenced by nutrition support - enteral nutrition    Nutrition Interventions:   Food and/or Nutrient Delivery: Continue Current Tube Feeding  Nutrition Education/Counseling: No recommendation at this time  Coordination of Nutrition Care: Continue to monitor while inpatient       Goals:  Previous Goal Met: Progressing toward Goal(s)  Goals: Meet at least 75% of estimated needs, Initiate nutrition support       Nutrition Monitoring and Evaluation:   Behavioral-Environmental Outcomes: None Identified  Food/Nutrient Intake Outcomes: Enteral Nutrition Intake/Tolerance  Physical Signs/Symptoms Outcomes: Biochemical Data, Fluid Status or Edema, Weight, Skin    Discharge Planning:    Enteral Nutrition     Lawrence Alvarez RD, LD  Contact: [phone]

## 2022-11-10 NOTE — CARE COORDINATION
Set up with [facility]  [phone].    Electronically signed by Claudio Hooper on 11/10/22 at 11:37 AM CST
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
## 2022-11-16 ENCOUNTER — TELEPHONE (OUTPATIENT)
Dept: GASTROENTEROLOGY | Age: 37
End: 2022-11-16

## 2022-11-18 ENCOUNTER — TELEPHONE (OUTPATIENT)
Dept: GASTROENTEROLOGY | Age: 37
End: 2022-11-18

## 2022-11-18 NOTE — TELEPHONE ENCOUNTER
11-18-22 Lorin Barrow called back notified that no lab work is needed at this time. Patient will have an apt and Robet Locket will decide what is needed.      Oked per Parent       11-18-22  Called x 2 Lorin Barrow (Parent) at [phone]  Not available unable to leave message

## 2022-11-18 NOTE — TELEPHONE ENCOUNTER
There is no fee charged for no shows. Elizabeth Mckinney- Please call the patient and let her know there was no fee charged.  The letter states you \"may\" be charged a fee but I don't- AA

## 2022-11-18 NOTE — TELEPHONE ENCOUNTER
Patient's Guardian called to see if labs are needed before pt's scheduled appointment. Please advise.

## 2022-12-04 ENCOUNTER — APPOINTMENT (OUTPATIENT)
Dept: GENERAL RADIOLOGY | Age: 37
End: 2022-12-04
Payer: MEDICARE

## 2022-12-04 ENCOUNTER — HOSPITAL ENCOUNTER (EMERGENCY)
Age: 37
Discharge: HOME | End: 2022-12-04
Attending: EMERGENCY MEDICINE
Payer: MEDICARE

## 2022-12-04 VITALS
BODY MASS INDEX: 18.23 KG/M2 | RESPIRATION RATE: 16 BRPM | DIASTOLIC BLOOD PRESSURE: 79 MMHG | WEIGHT: 142 LBS | OXYGEN SATURATION: 98 % | HEART RATE: 75 BPM | SYSTOLIC BLOOD PRESSURE: 109 MMHG | TEMPERATURE: 97.8 F

## 2022-12-04 DIAGNOSIS — T85.528A DISLODGED GASTROSTOMY TUBE: Primary | ICD-10-CM

## 2022-12-04 PROCEDURE — 96372 THER/PROPH/DIAG INJ SC/IM: CPT

## 2022-12-04 PROCEDURE — 74018 RADEX ABDOMEN 1 VIEW: CPT

## 2022-12-04 PROCEDURE — 43762 RPLC GTUBE NO REVJ TRC: CPT

## 2022-12-04 PROCEDURE — 99284 EMERGENCY DEPT VISIT MOD MDM: CPT

## 2022-12-04 PROCEDURE — 6360000002 HC RX W HCPCS: Performed by: EMERGENCY MEDICINE

## 2022-12-04 RX ORDER — DROPERIDOL 2.5 MG/ML
0.62 INJECTION, SOLUTION INTRAMUSCULAR; INTRAVENOUS ONCE
Status: COMPLETED | OUTPATIENT
Start: 2022-12-04 | End: 2022-12-04

## 2022-12-04 RX ADMIN — DROPERIDOL 0.62 MG: 2.5 INJECTION, SOLUTION INTRAMUSCULAR; INTRAVENOUS at 04:04
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
## 2023-01-06 ENCOUNTER — APPOINTMENT (OUTPATIENT)
Dept: CT IMAGING | Age: 38
DRG: 393 | End: 2023-01-06
Payer: MEDICARE

## 2023-01-06 ENCOUNTER — HOSPITAL ENCOUNTER (INPATIENT)
Age: 38
LOS: 12 days | Discharge: HOME HEALTH | DRG: 393 | End: 2023-01-18
Attending: EMERGENCY MEDICINE | Admitting: STUDENT IN AN ORGANIZED HEALTH CARE EDUCATION/TRAINING PROGRAM
Payer: MEDICARE

## 2023-01-06 ENCOUNTER — APPOINTMENT (OUTPATIENT)
Dept: GENERAL RADIOLOGY | Age: 38
DRG: 393 | End: 2023-01-06
Payer: MEDICARE

## 2023-01-06 DIAGNOSIS — Z93.1 PEG (PERCUTANEOUS ENDOSCOPIC GASTROSTOMY) STATUS (HCC): ICD-10-CM

## 2023-01-06 DIAGNOSIS — Z87.820 HISTORY OF TRAUMATIC BRAIN INJURY: ICD-10-CM

## 2023-01-06 DIAGNOSIS — Z51.5 PALLIATIVE CARE PATIENT: ICD-10-CM

## 2023-01-06 DIAGNOSIS — R19.7 NAUSEA VOMITING AND DIARRHEA: ICD-10-CM

## 2023-01-06 DIAGNOSIS — R63.30 FEEDING DIFFICULTIES: ICD-10-CM

## 2023-01-06 DIAGNOSIS — K85.90 ACUTE PANCREATITIS, UNSPECIFIED COMPLICATION STATUS, UNSPECIFIED PANCREATITIS TYPE: Primary | ICD-10-CM

## 2023-01-06 DIAGNOSIS — R11.2 NAUSEA VOMITING AND DIARRHEA: ICD-10-CM

## 2023-01-06 PROBLEM — Z86.711 HISTORY OF PULMONARY EMBOLUS (PE): Status: ACTIVE | Noted: 2023-01-06

## 2023-01-06 LAB
ALBUMIN SERPL-MCNC: 3.7 G/DL (ref 3.5–5.2)
ALP BLD-CCNC: 54 U/L (ref 40–130)
ALT SERPL-CCNC: 32 U/L (ref 5–41)
ANION GAP SERPL CALCULATED.3IONS-SCNC: 11 MMOL/L (ref 7–19)
AST SERPL-CCNC: 24 U/L (ref 5–40)
BASOPHILS ABSOLUTE: 0 K/UL (ref 0–0.2)
BASOPHILS RELATIVE PERCENT: 0.6 % (ref 0–1)
BILIRUB SERPL-MCNC: <0.2 MG/DL (ref 0.2–1.2)
BUN BLDV-MCNC: 9 MG/DL (ref 6–20)
CALCIUM SERPL-MCNC: 9.4 MG/DL (ref 8.6–10)
CHLORIDE BLD-SCNC: 102 MMOL/L (ref 98–111)
CO2: 25 MMOL/L (ref 22–29)
CREAT SERPL-MCNC: 0.5 MG/DL (ref 0.5–1.2)
EOSINOPHILS ABSOLUTE: 0.2 K/UL (ref 0–0.6)
EOSINOPHILS RELATIVE PERCENT: 2.3 % (ref 0–5)
GFR SERPL CREATININE-BSD FRML MDRD: >60 ML/MIN/{1.73_M2}
GLUCOSE BLD-MCNC: 83 MG/DL (ref 74–109)
HCT VFR BLD CALC: 40.3 % (ref 42–52)
HEMOGLOBIN: 13.3 G/DL (ref 14–18)
IMMATURE GRANULOCYTES #: 0 K/UL
LIPASE: 1594 U/L (ref 13–60)
LYMPHOCYTES ABSOLUTE: 1.7 K/UL (ref 1.1–4.5)
LYMPHOCYTES RELATIVE PERCENT: 24.3 % (ref 20–40)
MCH RBC QN AUTO: 28.5 PG (ref 27–31)
MCHC RBC AUTO-ENTMCNC: 33 G/DL (ref 33–37)
MCV RBC AUTO: 86.3 FL (ref 80–94)
MONOCYTES ABSOLUTE: 0.6 K/UL (ref 0–0.9)
MONOCYTES RELATIVE PERCENT: 8.6 % (ref 0–10)
NEUTROPHILS ABSOLUTE: 4.4 K/UL (ref 1.5–7.5)
NEUTROPHILS RELATIVE PERCENT: 63.8 % (ref 50–65)
PDW BLD-RTO: 13.7 % (ref 11.5–14.5)
PLATELET # BLD: 300 K/UL (ref 130–400)
PMV BLD AUTO: 10.2 FL (ref 9.4–12.4)
POTASSIUM SERPL-SCNC: 4.4 MMOL/L (ref 3.5–5)
RBC # BLD: 4.67 M/UL (ref 4.7–6.1)
SARS-COV-2, NAAT: NOT DETECTED
SODIUM BLD-SCNC: 138 MMOL/L (ref 136–145)
TOTAL PROTEIN: 7.6 G/DL (ref 6.6–8.7)
WBC # BLD: 6.9 K/UL (ref 4.8–10.8)

## 2023-01-06 PROCEDURE — 36415 COLL VENOUS BLD VENIPUNCTURE: CPT

## 2023-01-06 PROCEDURE — 99285 EMERGENCY DEPT VISIT HI MDM: CPT

## 2023-01-06 PROCEDURE — 71045 X-RAY EXAM CHEST 1 VIEW: CPT

## 2023-01-06 PROCEDURE — 71045 X-RAY EXAM CHEST 1 VIEW: CPT | Performed by: RADIOLOGY

## 2023-01-06 PROCEDURE — 1210000000 HC MED SURG R&B

## 2023-01-06 PROCEDURE — 74176 CT ABD & PELVIS W/O CONTRAST: CPT | Performed by: RADIOLOGY

## 2023-01-06 PROCEDURE — 83690 ASSAY OF LIPASE: CPT

## 2023-01-06 PROCEDURE — 6370000000 HC RX 637 (ALT 250 FOR IP): Performed by: NURSE PRACTITIONER

## 2023-01-06 PROCEDURE — 85025 COMPLETE CBC W/AUTO DIFF WBC: CPT

## 2023-01-06 PROCEDURE — 2580000003 HC RX 258: Performed by: EMERGENCY MEDICINE

## 2023-01-06 PROCEDURE — 87635 SARS-COV-2 COVID-19 AMP PRB: CPT

## 2023-01-06 PROCEDURE — 2580000003 HC RX 258: Performed by: NURSE PRACTITIONER

## 2023-01-06 PROCEDURE — 80053 COMPREHEN METABOLIC PANEL: CPT

## 2023-01-06 PROCEDURE — 74176 CT ABD & PELVIS W/O CONTRAST: CPT

## 2023-01-06 RX ORDER — POTASSIUM CHLORIDE 7.45 MG/ML
10 INJECTION INTRAVENOUS PRN
Status: DISCONTINUED | OUTPATIENT
Start: 2023-01-06 | End: 2023-01-18 | Stop reason: HOSPADM

## 2023-01-06 RX ORDER — SODIUM CHLORIDE, SODIUM LACTATE, POTASSIUM CHLORIDE, CALCIUM CHLORIDE 600; 310; 30; 20 MG/100ML; MG/100ML; MG/100ML; MG/100ML
INJECTION, SOLUTION INTRAVENOUS CONTINUOUS
Status: DISCONTINUED | OUTPATIENT
Start: 2023-01-07 | End: 2023-01-08

## 2023-01-06 RX ORDER — METHOCARBAMOL 750 MG/1
750 TABLET, FILM COATED ORAL 2 TIMES DAILY
COMMUNITY

## 2023-01-06 RX ORDER — LANSOPRAZOLE
30 KIT
Status: DISCONTINUED | OUTPATIENT
Start: 2023-01-06 | End: 2023-01-18 | Stop reason: HOSPADM

## 2023-01-06 RX ORDER — SODIUM CHLORIDE 0.9 % (FLUSH) 0.9 %
5-40 SYRINGE (ML) INJECTION EVERY 12 HOURS SCHEDULED
Status: DISCONTINUED | OUTPATIENT
Start: 2023-01-06 | End: 2023-01-18 | Stop reason: HOSPADM

## 2023-01-06 RX ORDER — SODIUM CHLORIDE 0.9 % (FLUSH) 0.9 %
5-40 SYRINGE (ML) INJECTION PRN
Status: DISCONTINUED | OUTPATIENT
Start: 2023-01-06 | End: 2023-01-18 | Stop reason: HOSPADM

## 2023-01-06 RX ORDER — ONDANSETRON 2 MG/ML
4 INJECTION INTRAMUSCULAR; INTRAVENOUS EVERY 6 HOURS PRN
Status: DISCONTINUED | OUTPATIENT
Start: 2023-01-06 | End: 2023-01-18 | Stop reason: HOSPADM

## 2023-01-06 RX ORDER — POTASSIUM CHLORIDE 20 MEQ/1
40 TABLET, EXTENDED RELEASE ORAL PRN
Status: DISCONTINUED | OUTPATIENT
Start: 2023-01-06 | End: 2023-01-18 | Stop reason: HOSPADM

## 2023-01-06 RX ORDER — SODIUM CHLORIDE, SODIUM LACTATE, POTASSIUM CHLORIDE, CALCIUM CHLORIDE 600; 310; 30; 20 MG/100ML; MG/100ML; MG/100ML; MG/100ML
INJECTION, SOLUTION INTRAVENOUS CONTINUOUS
Status: ACTIVE | OUTPATIENT
Start: 2023-01-06 | End: 2023-01-07

## 2023-01-06 RX ORDER — ONDANSETRON 4 MG/1
4 TABLET, ORALLY DISINTEGRATING ORAL EVERY 8 HOURS PRN
Status: DISCONTINUED | OUTPATIENT
Start: 2023-01-06 | End: 2023-01-18 | Stop reason: HOSPADM

## 2023-01-06 RX ORDER — MORPHINE SULFATE 2 MG/ML
2 INJECTION, SOLUTION INTRAMUSCULAR; INTRAVENOUS
Status: DISCONTINUED | OUTPATIENT
Start: 2023-01-06 | End: 2023-01-18 | Stop reason: HOSPADM

## 2023-01-06 RX ORDER — SODIUM CHLORIDE, SODIUM LACTATE, POTASSIUM CHLORIDE, AND CALCIUM CHLORIDE .6; .31; .03; .02 G/100ML; G/100ML; G/100ML; G/100ML
1000 INJECTION, SOLUTION INTRAVENOUS ONCE
Status: COMPLETED | OUTPATIENT
Start: 2023-01-06 | End: 2023-01-06

## 2023-01-06 RX ORDER — SODIUM CHLORIDE 9 MG/ML
INJECTION, SOLUTION INTRAVENOUS PRN
Status: DISCONTINUED | OUTPATIENT
Start: 2023-01-06 | End: 2023-01-18 | Stop reason: HOSPADM

## 2023-01-06 RX ORDER — LEVETIRACETAM 100 MG/ML
500 SOLUTION ORAL 2 TIMES DAILY
Status: DISCONTINUED | OUTPATIENT
Start: 2023-01-06 | End: 2023-01-18 | Stop reason: HOSPADM

## 2023-01-06 RX ORDER — ARIPIPRAZOLE 10 MG/1
20 TABLET ORAL DAILY
Status: DISCONTINUED | OUTPATIENT
Start: 2023-01-06 | End: 2023-01-18 | Stop reason: HOSPADM

## 2023-01-06 RX ADMIN — SODIUM CHLORIDE, PRESERVATIVE FREE 10 ML: 5 INJECTION INTRAVENOUS at 22:00

## 2023-01-06 RX ADMIN — APIXABAN 5 MG: 5 TABLET, FILM COATED ORAL at 22:00

## 2023-01-06 RX ADMIN — Medication 500 MG: at 22:00

## 2023-01-06 RX ADMIN — SODIUM CHLORIDE, POTASSIUM CHLORIDE, SODIUM LACTATE AND CALCIUM CHLORIDE: 600; 310; 30; 20 INJECTION, SOLUTION INTRAVENOUS at 17:23

## 2023-01-06 RX ADMIN — SODIUM CHLORIDE, SODIUM LACTATE, POTASSIUM CHLORIDE, AND CALCIUM CHLORIDE 1000 ML: 600; 310; 30; 20 INJECTION, SOLUTION INTRAVENOUS at 13:46

## 2023-01-06 NOTE — ED NOTES
Report called to [facility] on 5th floor      [place], [address]  01/06/23 3407

## 2023-01-06 NOTE — PROGRESS NOTES
4 Eyes Skin Assessment    Ankur Guerrero is being assessed upon: Admission    I agree that I, Dilan Hudson RN, along with Sabrina Luna RN  have performed a thorough Head to Toe Skin Assessment on the patient. ALL assessment sites listed below have been assessed. Areas assessed by both nurses:     [x]   Head, Face, and Ears   [x]   Shoulders, Back, and Chest  [x]   Arms, Elbows, and Hands   [x]   Coccyx, Sacrum, and Ischium  [x]   Legs, Feet, and Heels    Does the Patient have Skin Breakdown?  No    Julio Prevention initiated: YES  Wound Care Orders initiated: NO    WOC nurse consulted for Pressure Injury (Stage 3,4, Unstageable, DTI, NWPT, and Complex wounds) and New or Established Ostomies: No        Primary Nurse eSignature: Dilan Hudson RN on 1/6/2023 at 5:35 PM      Co-Signer eSignature: {Esignature:[id number]}
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
## 2023-01-07 ENCOUNTER — APPOINTMENT (OUTPATIENT)
Dept: ULTRASOUND IMAGING | Age: 38
DRG: 393 | End: 2023-01-07
Payer: MEDICARE

## 2023-01-07 LAB
ALBUMIN SERPL-MCNC: 3.5 G/DL (ref 3.5–5.2)
ALP BLD-CCNC: 53 U/L (ref 40–130)
ALT SERPL-CCNC: 54 U/L (ref 5–41)
ANION GAP SERPL CALCULATED.3IONS-SCNC: 12 MMOL/L (ref 7–19)
AST SERPL-CCNC: 48 U/L (ref 5–40)
BASOPHILS ABSOLUTE: 0.1 K/UL (ref 0–0.2)
BASOPHILS RELATIVE PERCENT: 0.8 % (ref 0–1)
BILIRUB SERPL-MCNC: 0.3 MG/DL (ref 0.2–1.2)
BUN BLDV-MCNC: 9 MG/DL (ref 6–20)
CALCIUM SERPL-MCNC: 9.1 MG/DL (ref 8.6–10)
CHLORIDE BLD-SCNC: 103 MMOL/L (ref 98–111)
CO2: 24 MMOL/L (ref 22–29)
CREAT SERPL-MCNC: 0.5 MG/DL (ref 0.5–1.2)
EOSINOPHILS ABSOLUTE: 0.3 K/UL (ref 0–0.6)
EOSINOPHILS RELATIVE PERCENT: 4.7 % (ref 0–5)
GFR SERPL CREATININE-BSD FRML MDRD: >60 ML/MIN/{1.73_M2}
GLUCOSE BLD-MCNC: 117 MG/DL (ref 70–99)
GLUCOSE BLD-MCNC: 53 MG/DL (ref 70–99)
GLUCOSE BLD-MCNC: 76 MG/DL (ref 74–109)
GLUCOSE BLD-MCNC: 94 MG/DL (ref 70–99)
HBA1C MFR BLD: 4.9 % (ref 4–6)
HCT VFR BLD CALC: 32.5 % (ref 42–52)
HEMOGLOBIN: 10.7 G/DL (ref 14–18)
IMMATURE GRANULOCYTES #: 0 K/UL
LIPASE: 496 U/L (ref 13–60)
LYMPHOCYTES ABSOLUTE: 2.2 K/UL (ref 1.1–4.5)
LYMPHOCYTES RELATIVE PERCENT: 36.6 % (ref 20–40)
MCH RBC QN AUTO: 28.5 PG (ref 27–31)
MCHC RBC AUTO-ENTMCNC: 32.9 G/DL (ref 33–37)
MCV RBC AUTO: 86.4 FL (ref 80–94)
MONOCYTES ABSOLUTE: 0.6 K/UL (ref 0–0.9)
MONOCYTES RELATIVE PERCENT: 10.4 % (ref 0–10)
NEUTROPHILS ABSOLUTE: 2.8 K/UL (ref 1.5–7.5)
NEUTROPHILS RELATIVE PERCENT: 47.2 % (ref 50–65)
PDW BLD-RTO: 13.5 % (ref 11.5–14.5)
PERFORMED ON: ABNORMAL
PERFORMED ON: ABNORMAL
PERFORMED ON: NORMAL
PLATELET # BLD: 279 K/UL (ref 130–400)
PMV BLD AUTO: 10.6 FL (ref 9.4–12.4)
POTASSIUM REFLEX MAGNESIUM: 4 MMOL/L (ref 3.5–5)
RBC # BLD: 3.76 M/UL (ref 4.7–6.1)
REASON FOR REJECTION: NORMAL
REJECTED TEST: NORMAL
SODIUM BLD-SCNC: 139 MMOL/L (ref 136–145)
TOTAL PROTEIN: 6.6 G/DL (ref 6.6–8.7)
TRIGL SERPL-MCNC: 73 MG/DL (ref 0–149)
WBC # BLD: 6 K/UL (ref 4.8–10.8)

## 2023-01-07 PROCEDURE — 1210000000 HC MED SURG R&B

## 2023-01-07 PROCEDURE — 82947 ASSAY GLUCOSE BLOOD QUANT: CPT

## 2023-01-07 PROCEDURE — 36415 COLL VENOUS BLD VENIPUNCTURE: CPT

## 2023-01-07 PROCEDURE — 2580000003 HC RX 258: Performed by: NURSE PRACTITIONER

## 2023-01-07 PROCEDURE — 84478 ASSAY OF TRIGLYCERIDES: CPT

## 2023-01-07 PROCEDURE — 83036 HEMOGLOBIN GLYCOSYLATED A1C: CPT

## 2023-01-07 PROCEDURE — 83690 ASSAY OF LIPASE: CPT

## 2023-01-07 PROCEDURE — 76705 ECHO EXAM OF ABDOMEN: CPT | Performed by: RADIOLOGY

## 2023-01-07 PROCEDURE — 6370000000 HC RX 637 (ALT 250 FOR IP): Performed by: NURSE PRACTITIONER

## 2023-01-07 PROCEDURE — 80053 COMPREHEN METABOLIC PANEL: CPT

## 2023-01-07 PROCEDURE — 99222 1ST HOSP IP/OBS MODERATE 55: CPT | Performed by: INTERNAL MEDICINE

## 2023-01-07 PROCEDURE — 76705 ECHO EXAM OF ABDOMEN: CPT

## 2023-01-07 PROCEDURE — 85025 COMPLETE CBC W/AUTO DIFF WBC: CPT

## 2023-01-07 RX ORDER — DEXTROSE MONOHYDRATE 100 MG/ML
INJECTION, SOLUTION INTRAVENOUS CONTINUOUS PRN
Status: DISCONTINUED | OUTPATIENT
Start: 2023-01-07 | End: 2023-01-18 | Stop reason: HOSPADM

## 2023-01-07 RX ADMIN — SODIUM CHLORIDE, PRESERVATIVE FREE 10 ML: 5 INJECTION INTRAVENOUS at 20:46

## 2023-01-07 RX ADMIN — DEXTROSE MONOHYDRATE 125 ML: 100 INJECTION, SOLUTION INTRAVENOUS at 17:58

## 2023-01-07 RX ADMIN — ARIPIPRAZOLE 20 MG: 10 TABLET ORAL at 11:03

## 2023-01-07 RX ADMIN — APIXABAN 5 MG: 5 TABLET, FILM COATED ORAL at 11:04

## 2023-01-07 RX ADMIN — APIXABAN 5 MG: 5 TABLET, FILM COATED ORAL at 20:46

## 2023-01-07 RX ADMIN — SODIUM CHLORIDE, PRESERVATIVE FREE 5 ML: 5 INJECTION INTRAVENOUS at 11:03

## 2023-01-07 RX ADMIN — SODIUM CHLORIDE, POTASSIUM CHLORIDE, SODIUM LACTATE AND CALCIUM CHLORIDE: 600; 310; 30; 20 INJECTION, SOLUTION INTRAVENOUS at 05:48

## 2023-01-07 RX ADMIN — Medication 30 MG: at 06:18

## 2023-01-07 RX ADMIN — Medication 500 MG: at 11:03

## 2023-01-07 RX ADMIN — Medication 30 MG: at 15:54

## 2023-01-07 RX ADMIN — SODIUM CHLORIDE, POTASSIUM CHLORIDE, SODIUM LACTATE AND CALCIUM CHLORIDE: 600; 310; 30; 20 INJECTION, SOLUTION INTRAVENOUS at 11:03

## 2023-01-07 RX ADMIN — SODIUM CHLORIDE, POTASSIUM CHLORIDE, SODIUM LACTATE AND CALCIUM CHLORIDE: 600; 310; 30; 20 INJECTION, SOLUTION INTRAVENOUS at 17:34

## 2023-01-07 RX ADMIN — Medication 500 MG: at 20:46

## 2023-01-07 NOTE — PLAN OF CARE
Problem: Discharge Planning  Goal: Discharge to home or other facility with appropriate resources  1/7/2023 1525 by Katharine Hardwick RN  Outcome: Progressing  1/7/2023 1522 by Katharine Hardwick RN  Outcome: Progressing     Problem: Skin/Tissue Integrity  Goal: Absence of new skin breakdown  Description: 1. Monitor for areas of redness and/or skin breakdown  2. Assess vascular access sites hourly  3. Every 4-6 hours minimum:  Change oxygen saturation probe site  4. Every 4-6 hours:  If on nasal continuous positive airway pressure, respiratory therapy assess nares and determine need for appliance change or resting period. 1/7/2023 1525 by Katharine Hardwick RN  Outcome: Progressing  1/7/2023 1522 by Katharine Hardwick RN  Outcome: Progressing     Problem: Safety - Adult  Goal: Free from fall injury  1/7/2023 1525 by Katharine Hardwick RN  Outcome: Progressing  1/7/2023 1522 by Katharine Hardwick RN  Outcome: Progressing  Flowsheets (Taken 1/7/2023 0417 by Smitha Bangura RN)  Free From Fall Injury: Instruct family/caregiver on patient safety     Problem: Safety - Medical Restraint  Goal: Remains free of injury from restraints (Restraint for Interference with Medical Device)  Description: INTERVENTIONS:  1. Determine that other, less restrictive measures have been tried or would not be effective before applying the restraint  2. Evaluate the patient's condition at the time of restraint application  3. Inform patient/family regarding the reason for restraint  4.  Q2H: Monitor safety, psychosocial status, comfort, nutrition and hydration  1/7/2023 1525 by Katharine Hardwick RN  Outcome: Progressing  1/7/2023 1522 by Katharine Hardwick RN  Outcome: Progressing  Flowsheets  Taken 1/7/2023 1400 by Katharine Hardwick RN  Remains free of injury from restraints (restraint for interference with medical device): Determine that other, less restrictive measures have been tried or would not be effective before applying the restraint  Taken 1/7/2023 1200 by Meagan Sorenson RN  Remains free of injury from restraints (restraint for interference with medical device): Determine that other, less restrictive measures have been tried or would not be effective before applying the restraint  Taken 1/7/2023 1000 by Meagan Sorenson RN  Remains free of injury from restraints (restraint for interference with medical device): Determine that other, less restrictive measures have been tried or would not be effective before applying the restraint  Taken 1/7/2023 0800 by Meagan Sorenson RN  Remains free of injury from restraints (restraint for interference with medical device): Determine that other, less restrictive measures have been tried or would not be effective before applying the restraint  Taken 1/7/2023 0714 by Genaro Ford, RN  Remains free of injury from restraints (restraint for interference with medical device): Determine that other, less restrictive measures have been tried or would not be effective before applying the restraint  Taken 1/7/2023 0530 by Genaro Ford RN  Remains free of injury from restraints (restraint for interference with medical device): Determine that other, less restrictive measures have been tried or would not be effective before applying the restraint  Taken 1/7/2023 0330 by Genaro Ford RN  Remains free of injury from restraints (restraint for interference with medical device): Determine that other, less restrictive measures have been tried or would not be effective before applying the restraint  Taken 1/7/2023 0130 by Genaro Ford RN  Remains free of injury from restraints (restraint for interference with medical device): Determine that other, less restrictive measures have been tried or would not be effective before applying the restraint     Problem: Nutrition Deficit:  Goal: Optimize nutritional status  1/7/2023 1525 by Meagan Sorenson RN  Outcome: Progressing  1/7/2023 1522 by Meagan Sorenson RN  Outcome: Progressing  1/7/2023 0918 by Jh Fritz MS, RD, LD  Outcome: Not Progressing     Problem: Nutrition Deficit:  Goal: Optimize nutritional status  1/7/2023 1525 by Meagan Sorenson RN  Outcome: Progressing  1/7/2023 1522 by Meagan Sorenson RN  Outcome: Progressing  1/7/2023 0918 by Jh Fritz MS, RD, LD  Outcome: Not Progressing

## 2023-01-07 NOTE — PROGRESS NOTES
Comprehensive Nutrition Assessment    Type and Reason for Visit:  Initial, Positive Nutrition Screen    Nutrition Recommendations/Plan:   Follow for decreasing Lipase lab values and starting EN     Malnutrition Assessment:  Malnutrition Status: At risk for malnutrition (Comment) (01/07/23 0909)    Context:  Acute Illness     Findings of the 6 clinical characteristics of malnutrition:  Energy Intake:  Mild decrease in energy intake (Comment)  Weight Loss:  No significant weight loss     Body Fat Loss: Moderate body fat loss Orbital, Fat Overlying Ribs   Muscle Mass Loss:  Mild muscle mass loss Temples (temporalis), Clavicles (pectoralis & deltoids)  Fluid Accumulation:  No significant fluid accumulation Extremities   Strength:  Not Performed    Nutrition Assessment:    +NS for home tf. At present time pt is NPO. Aware vomiting x 2 days. Normally pt receives Jevity 1.5 bolus feedings 360ml 4x/day with 60ml free water flush before and after feeding. Current noted weight of 145# is 2# less than weight documented of 147# 10/2022    Nutrition Related Findings:      Wound Type: None (abrasion)       Current Nutrition Intake & Therapies:    Average Meal Intake: NPO  Average Supplements Intake: NPO  Current Tube Feeding (TF) Orders:  Feeding Route: PEG  Formula: Standard with Fiber  Schedule: Bolus  Feeding Regimen: Home TF -- Jevity 1.5 360ml 4x/day with 60ml free water before and after feeding  Additives/Modulars: None  Water Flushes: 60ml before and after feeding  Current TF & Flush Orders Provides: 0  Goal TF & Flush Orders Provides: Jevity 1.5  with 360ml 4x/day = 2160 kcals with 91g protein, 310 g CHO and 1094 ml free water from formula and 480ml free water from flush    Anthropometric Measures:  Height: 6' 2\" (188 cm)  Ideal Body Weight (IBW): 190 lbs (86 kg)    Admission Body Weight: 145 lb (65.8 kg)  Current Body Weight: 145 lb (65.8 kg), 76.3 % IBW.     Current BMI (kg/m2): 18.6  Usual Body Weight: 147 lb (66.7 kg) (10/2022)  % Weight Change (Calculated): -1.4  Weight Adjustment For: Quadriplegia  Total Adjusted Percentage (Calculated): 12.5  Adjusted Ideal Body Weight (lbs) (Calculated): 166.3 lbs  Adjusted Ideal Body Weight (kg) (Calculated): 75.59 kg  Adjusted % Ideal Body Weight (Calculated): 87.2  Adjusted BMI (kg/m2) (Calculated): 20.9  BMI Categories: Normal Weight (BMI 18.5-24. 9)    Estimated Daily Nutrient Needs:  Energy Requirements Based On: Kcal/kg  Weight Used for Energy Requirements: Current  Energy (kcal/day): 6278-9632 kcals (25-30 kcals/kg)  Weight Used for Protein Requirements: Current  Protein (g/day):  g  Method Used for Fluid Requirements: 1 ml/kcal  Fluid (ml/day): 8115-8151 ml    Nutrition Diagnosis:   Inadequate oral intake related to altered GI function, acute injury/trauma, swallowing difficulty, cognitive or neurological impairment as evidenced by NPO or clear liquid status due to medical condition    Nutrition Interventions:   Food and/or Nutrient Delivery: Continue NPO  Nutrition Education/Counseling: No recommendation at this time  Coordination of Nutrition Care: Continue to monitor while inpatient       Goals:     Goals: Meet at least 75% of estimated needs       Nutrition Monitoring and Evaluation:   Behavioral-Environmental Outcomes: None Identified  Food/Nutrient Intake Outcomes: Enteral Nutrition Intake/Tolerance  Physical Signs/Symptoms Outcomes: Biochemical Data, Nausea or Vomiting, Fluid Status or Edema, Weight, Skin    Discharge Planning:    Enteral Nutrition     Miky Guzman MS, RD, LD  Contact: [phone]
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
## 2023-01-07 NOTE — PLAN OF CARE
Problem: Discharge Planning  Goal: Discharge to home or other facility with appropriate resources  Outcome: Progressing     Problem: Skin/Tissue Integrity  Goal: Absence of new skin breakdown  Description: 1. Monitor for areas of redness and/or skin breakdown  2. Assess vascular access sites hourly  3. Every 4-6 hours minimum:  Change oxygen saturation probe site  4. Every 4-6 hours:  If on nasal continuous positive airway pressure, respiratory therapy assess nares and determine need for appliance change or resting period. Outcome: Progressing     Problem: Safety - Adult  Goal: Free from fall injury  Outcome: Progressing  Flowsheets (Taken 1/7/2023 0417 by Rodriguez Woods RN)  Free From Fall Injury: Instruct family/caregiver on patient safety     Problem: Safety - Medical Restraint  Goal: Remains free of injury from restraints (Restraint for Interference with Medical Device)  Description: INTERVENTIONS:  1. Determine that other, less restrictive measures have been tried or would not be effective before applying the restraint  2. Evaluate the patient's condition at the time of restraint application  3. Inform patient/family regarding the reason for restraint  4.  Q2H: Monitor safety, psychosocial status, comfort, nutrition and hydration  Outcome: Progressing  Flowsheets  Taken 1/7/2023 1400 by Noman Sánchez RN  Remains free of injury from restraints (restraint for interference with medical device): Determine that other, less restrictive measures have been tried or would not be effective before applying the restraint  Taken 1/7/2023 1200 by Noman Sánchez RN  Remains free of injury from restraints (restraint for interference with medical device): Determine that other, less restrictive measures have been tried or would not be effective before applying the restraint  Taken 1/7/2023 1000 by Noman Sánchez RN  Remains free of injury from restraints (restraint for interference with medical device): Determine that other, less restrictive measures have been tried or would not be effective before applying the restraint  Taken 1/7/2023 0800 by Ralph Serra RN  Remains free of injury from restraints (restraint for interference with medical device): Determine that other, less restrictive measures have been tried or would not be effective before applying the restraint  Taken 1/7/2023 0714 by Alyssa Ho RN  Remains free of injury from restraints (restraint for interference with medical device): Determine that other, less restrictive measures have been tried or would not be effective before applying the restraint  Taken 1/7/2023 0530 by Alyssa Ho RN  Remains free of injury from restraints (restraint for interference with medical device): Determine that other, less restrictive measures have been tried or would not be effective before applying the restraint  Taken 1/7/2023 0330 by Alyssa Ho RN  Remains free of injury from restraints (restraint for interference with medical device): Determine that other, less restrictive measures have been tried or would not be effective before applying the restraint  Taken 1/7/2023 0130 by Alyssa Ho RN  Remains free of injury from restraints (restraint for interference with medical device): Determine that other, less restrictive measures have been tried or would not be effective before applying the restraint     Problem: Nutrition Deficit:  Goal: Optimize nutritional status  1/7/2023 1522 by Ralph Serra RN  Outcome: Progressing  1/7/2023 0918 by Dorena Canavan, MS, RD, LD  Outcome: Not Progressing     Problem: Nutrition Deficit:  Goal: Optimize nutritional status  1/7/2023 1522 by Ralph Serra RN  Outcome: Progressing  1/7/2023 0918 by Dorena Canavan, MS, RD, LD  Outcome: Not Progressing
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
## 2023-01-08 LAB
ALBUMIN SERPL-MCNC: 3.6 G/DL (ref 3.5–5.2)
ALP BLD-CCNC: 57 U/L (ref 40–130)
ALT SERPL-CCNC: 93 U/L (ref 5–41)
ANION GAP SERPL CALCULATED.3IONS-SCNC: 14 MMOL/L (ref 7–19)
AST SERPL-CCNC: 66 U/L (ref 5–40)
BASOPHILS ABSOLUTE: 0.1 K/UL (ref 0–0.2)
BASOPHILS RELATIVE PERCENT: 0.8 % (ref 0–1)
BILIRUB SERPL-MCNC: 0.5 MG/DL (ref 0.2–1.2)
BUN BLDV-MCNC: 4 MG/DL (ref 6–20)
CALCIUM SERPL-MCNC: 9.5 MG/DL (ref 8.6–10)
CHLORIDE BLD-SCNC: 102 MMOL/L (ref 98–111)
CO2: 22 MMOL/L (ref 22–29)
CREAT SERPL-MCNC: 0.5 MG/DL (ref 0.5–1.2)
EOSINOPHILS ABSOLUTE: 0.2 K/UL (ref 0–0.6)
EOSINOPHILS RELATIVE PERCENT: 3.6 % (ref 0–5)
GFR SERPL CREATININE-BSD FRML MDRD: >60 ML/MIN/{1.73_M2}
GLUCOSE BLD-MCNC: 63 MG/DL (ref 70–99)
GLUCOSE BLD-MCNC: 71 MG/DL (ref 74–109)
GLUCOSE BLD-MCNC: 76 MG/DL (ref 70–99)
GLUCOSE BLD-MCNC: 95 MG/DL (ref 70–99)
GLUCOSE BLD-MCNC: 97 MG/DL (ref 70–99)
GLUCOSE BLD-MCNC: 99 MG/DL (ref 70–99)
HCT VFR BLD CALC: 38.4 % (ref 42–52)
HEMOGLOBIN: 12.7 G/DL (ref 14–18)
IMMATURE GRANULOCYTES #: 0 K/UL
LIPASE: 225 U/L (ref 13–60)
LYMPHOCYTES ABSOLUTE: 1.5 K/UL (ref 1.1–4.5)
LYMPHOCYTES RELATIVE PERCENT: 25.9 % (ref 20–40)
MCH RBC QN AUTO: 28.5 PG (ref 27–31)
MCHC RBC AUTO-ENTMCNC: 33.1 G/DL (ref 33–37)
MCV RBC AUTO: 86.1 FL (ref 80–94)
MONOCYTES ABSOLUTE: 0.4 K/UL (ref 0–0.9)
MONOCYTES RELATIVE PERCENT: 7.4 % (ref 0–10)
NEUTROPHILS ABSOLUTE: 3.7 K/UL (ref 1.5–7.5)
NEUTROPHILS RELATIVE PERCENT: 62 % (ref 50–65)
PDW BLD-RTO: 13.4 % (ref 11.5–14.5)
PERFORMED ON: ABNORMAL
PERFORMED ON: NORMAL
PLATELET # BLD: 336 K/UL (ref 130–400)
PMV BLD AUTO: 10.7 FL (ref 9.4–12.4)
POTASSIUM REFLEX MAGNESIUM: 4.6 MMOL/L (ref 3.5–5)
RBC # BLD: 4.46 M/UL (ref 4.7–6.1)
SODIUM BLD-SCNC: 138 MMOL/L (ref 136–145)
TOTAL PROTEIN: 6.9 G/DL (ref 6.6–8.7)
WBC # BLD: 5.9 K/UL (ref 4.8–10.8)

## 2023-01-08 PROCEDURE — 80053 COMPREHEN METABOLIC PANEL: CPT

## 2023-01-08 PROCEDURE — 83690 ASSAY OF LIPASE: CPT

## 2023-01-08 PROCEDURE — 2580000003 HC RX 258: Performed by: NURSE PRACTITIONER

## 2023-01-08 PROCEDURE — 6360000002 HC RX W HCPCS: Performed by: NURSE PRACTITIONER

## 2023-01-08 PROCEDURE — 36415 COLL VENOUS BLD VENIPUNCTURE: CPT

## 2023-01-08 PROCEDURE — 6370000000 HC RX 637 (ALT 250 FOR IP): Performed by: NURSE PRACTITIONER

## 2023-01-08 PROCEDURE — 82947 ASSAY GLUCOSE BLOOD QUANT: CPT

## 2023-01-08 PROCEDURE — 1210000000 HC MED SURG R&B

## 2023-01-08 PROCEDURE — 85025 COMPLETE CBC W/AUTO DIFF WBC: CPT

## 2023-01-08 RX ORDER — DEXTROSE, SODIUM CHLORIDE, SODIUM LACTATE, POTASSIUM CHLORIDE, AND CALCIUM CHLORIDE 5; .6; .31; .03; .02 G/100ML; G/100ML; G/100ML; G/100ML; G/100ML
INJECTION, SOLUTION INTRAVENOUS CONTINUOUS
Status: DISCONTINUED | OUTPATIENT
Start: 2023-01-08 | End: 2023-01-18 | Stop reason: HOSPADM

## 2023-01-08 RX ADMIN — ARIPIPRAZOLE 20 MG: 10 TABLET ORAL at 10:24

## 2023-01-08 RX ADMIN — Medication 30 MG: at 06:38

## 2023-01-08 RX ADMIN — SODIUM CHLORIDE, SODIUM LACTATE, POTASSIUM CHLORIDE, CALCIUM CHLORIDE AND DEXTROSE MONOHYDRATE: 5; 600; 310; 30; 20 INJECTION, SOLUTION INTRAVENOUS at 08:02

## 2023-01-08 RX ADMIN — Medication 500 MG: at 20:35

## 2023-01-08 RX ADMIN — Medication 30 MG: at 18:10

## 2023-01-08 RX ADMIN — APIXABAN 5 MG: 5 TABLET, FILM COATED ORAL at 10:23

## 2023-01-08 RX ADMIN — MORPHINE SULFATE 2 MG: 2 INJECTION, SOLUTION INTRAMUSCULAR; INTRAVENOUS at 16:22

## 2023-01-08 RX ADMIN — DEXTROSE MONOHYDRATE 125 ML: 100 INJECTION, SOLUTION INTRAVENOUS at 03:41

## 2023-01-08 RX ADMIN — Medication 500 MG: at 10:24

## 2023-01-08 RX ADMIN — APIXABAN 5 MG: 5 TABLET, FILM COATED ORAL at 20:35
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
## 2023-01-09 LAB
ALBUMIN SERPL-MCNC: 3.2 G/DL (ref 3.5–5.2)
ALP BLD-CCNC: 50 U/L (ref 40–130)
ALT SERPL-CCNC: 101 U/L (ref 5–41)
ANION GAP SERPL CALCULATED.3IONS-SCNC: 9 MMOL/L (ref 7–19)
AST SERPL-CCNC: 57 U/L (ref 5–40)
BASOPHILS ABSOLUTE: 0 K/UL (ref 0–0.2)
BASOPHILS RELATIVE PERCENT: 1 % (ref 0–1)
BILIRUB SERPL-MCNC: 0.4 MG/DL (ref 0.2–1.2)
BUN BLDV-MCNC: 2 MG/DL (ref 6–20)
CALCIUM SERPL-MCNC: 9 MG/DL (ref 8.6–10)
CHLORIDE BLD-SCNC: 104 MMOL/L (ref 98–111)
CO2: 24 MMOL/L (ref 22–29)
CREAT SERPL-MCNC: 0.4 MG/DL (ref 0.5–1.2)
EOSINOPHILS ABSOLUTE: 0.2 K/UL (ref 0–0.6)
EOSINOPHILS RELATIVE PERCENT: 5.8 % (ref 0–5)
GFR SERPL CREATININE-BSD FRML MDRD: >60 ML/MIN/{1.73_M2}
GLUCOSE BLD-MCNC: 121 MG/DL (ref 74–109)
GLUCOSE BLD-MCNC: 86 MG/DL (ref 70–99)
GLUCOSE BLD-MCNC: 86 MG/DL (ref 70–99)
GLUCOSE BLD-MCNC: 88 MG/DL (ref 70–99)
GLUCOSE BLD-MCNC: 94 MG/DL (ref 70–99)
HCT VFR BLD CALC: 35.6 % (ref 42–52)
HEMOGLOBIN: 11.6 G/DL (ref 14–18)
IMMATURE GRANULOCYTES #: 0 K/UL
LIPASE: 132 U/L (ref 13–60)
LYMPHOCYTES ABSOLUTE: 1.3 K/UL (ref 1.1–4.5)
LYMPHOCYTES RELATIVE PERCENT: 32.1 % (ref 20–40)
MCH RBC QN AUTO: 28 PG (ref 27–31)
MCHC RBC AUTO-ENTMCNC: 32.6 G/DL (ref 33–37)
MCV RBC AUTO: 85.8 FL (ref 80–94)
MONOCYTES ABSOLUTE: 0.5 K/UL (ref 0–0.9)
MONOCYTES RELATIVE PERCENT: 11.3 % (ref 0–10)
NEUTROPHILS ABSOLUTE: 2 K/UL (ref 1.5–7.5)
NEUTROPHILS RELATIVE PERCENT: 49.5 % (ref 50–65)
PDW BLD-RTO: 13.5 % (ref 11.5–14.5)
PERFORMED ON: NORMAL
PLATELET # BLD: 337 K/UL (ref 130–400)
PMV BLD AUTO: 10.3 FL (ref 9.4–12.4)
POTASSIUM REFLEX MAGNESIUM: 3.9 MMOL/L (ref 3.5–5)
RBC # BLD: 4.15 M/UL (ref 4.7–6.1)
SODIUM BLD-SCNC: 137 MMOL/L (ref 136–145)
TOTAL PROTEIN: 6.1 G/DL (ref 6.6–8.7)
WBC # BLD: 4 K/UL (ref 4.8–10.8)

## 2023-01-09 PROCEDURE — 6370000000 HC RX 637 (ALT 250 FOR IP): Performed by: NURSE PRACTITIONER

## 2023-01-09 PROCEDURE — 97161 PT EVAL LOW COMPLEX 20 MIN: CPT

## 2023-01-09 PROCEDURE — 82947 ASSAY GLUCOSE BLOOD QUANT: CPT

## 2023-01-09 PROCEDURE — 85025 COMPLETE CBC W/AUTO DIFF WBC: CPT

## 2023-01-09 PROCEDURE — 94760 N-INVAS EAR/PLS OXIMETRY 1: CPT

## 2023-01-09 PROCEDURE — 1210000000 HC MED SURG R&B

## 2023-01-09 PROCEDURE — 2580000003 HC RX 258: Performed by: NURSE PRACTITIONER

## 2023-01-09 PROCEDURE — 80053 COMPREHEN METABOLIC PANEL: CPT

## 2023-01-09 PROCEDURE — 97530 THERAPEUTIC ACTIVITIES: CPT

## 2023-01-09 PROCEDURE — 99223 1ST HOSP IP/OBS HIGH 75: CPT | Performed by: PHYSICIAN ASSISTANT

## 2023-01-09 PROCEDURE — 36415 COLL VENOUS BLD VENIPUNCTURE: CPT

## 2023-01-09 PROCEDURE — 83690 ASSAY OF LIPASE: CPT

## 2023-01-09 RX ORDER — METHOCARBAMOL 750 MG/1
750 TABLET, FILM COATED ORAL 2 TIMES DAILY
Status: DISCONTINUED | OUTPATIENT
Start: 2023-01-09 | End: 2023-01-18 | Stop reason: HOSPADM

## 2023-01-09 RX ORDER — OXYCODONE HYDROCHLORIDE 5 MG/1
5 TABLET ORAL EVERY 8 HOURS PRN
Status: DISCONTINUED | OUTPATIENT
Start: 2023-01-09 | End: 2023-01-18 | Stop reason: HOSPADM

## 2023-01-09 RX ADMIN — APIXABAN 5 MG: 5 TABLET, FILM COATED ORAL at 10:44

## 2023-01-09 RX ADMIN — Medication 30 MG: at 05:16

## 2023-01-09 RX ADMIN — SODIUM CHLORIDE, PRESERVATIVE FREE 10 ML: 5 INJECTION INTRAVENOUS at 23:10

## 2023-01-09 RX ADMIN — Medication 500 MG: at 10:44

## 2023-01-09 RX ADMIN — SODIUM CHLORIDE, SODIUM LACTATE, POTASSIUM CHLORIDE, CALCIUM CHLORIDE AND DEXTROSE MONOHYDRATE: 5; 600; 310; 30; 20 INJECTION, SOLUTION INTRAVENOUS at 05:15

## 2023-01-09 RX ADMIN — OXYCODONE 5 MG: 5 TABLET ORAL at 23:10

## 2023-01-09 RX ADMIN — METHOCARBAMOL TABLETS 750 MG: 750 TABLET, COATED ORAL at 23:10

## 2023-01-09 RX ADMIN — APIXABAN 5 MG: 5 TABLET, FILM COATED ORAL at 23:10

## 2023-01-09 RX ADMIN — SODIUM CHLORIDE, SODIUM LACTATE, POTASSIUM CHLORIDE, CALCIUM CHLORIDE AND DEXTROSE MONOHYDRATE: 5; 600; 310; 30; 20 INJECTION, SOLUTION INTRAVENOUS at 23:09

## 2023-01-09 RX ADMIN — ARIPIPRAZOLE 20 MG: 10 TABLET ORAL at 10:44

## 2023-01-09 RX ADMIN — Medication 500 MG: at 23:10

## 2023-01-09 RX ADMIN — SODIUM CHLORIDE, SODIUM LACTATE, POTASSIUM CHLORIDE, CALCIUM CHLORIDE AND DEXTROSE MONOHYDRATE: 5; 600; 310; 30; 20 INJECTION, SOLUTION INTRAVENOUS at 13:06

## 2023-01-09 RX ADMIN — Medication 30 MG: at 10:44

## 2023-01-09 RX ADMIN — METHOCARBAMOL TABLETS 750 MG: 750 TABLET, COATED ORAL at 13:07

## 2023-01-09 RX ADMIN — OXYCODONE 5 MG: 5 TABLET ORAL at 13:06

## 2023-01-09 ASSESSMENT — PAIN SCALES - GENERAL: PAINLEVEL_OUTOF10: 5
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
## 2023-01-09 NOTE — PLAN OF CARE
Problem: Discharge Planning  Goal: Discharge to home or other facility with appropriate resources  Outcome: Progressing     Problem: Skin/Tissue Integrity  Goal: Absence of new skin breakdown  Description: 1. Monitor for areas of redness and/or skin breakdown  2. Assess vascular access sites hourly  3. Every 4-6 hours minimum:  Change oxygen saturation probe site  4. Every 4-6 hours:  If on nasal continuous positive airway pressure, respiratory therapy assess nares and determine need for appliance change or resting period. Outcome: Progressing     Problem: Safety - Adult  Goal: Free from fall injury  Outcome: Progressing     Problem: Safety - Medical Restraint  Goal: Remains free of injury from restraints (Restraint for Interference with Medical Device)  Description: INTERVENTIONS:  1. Determine that other, less restrictive measures have been tried or would not be effective before applying the restraint  2. Evaluate the patient's condition at the time of restraint application  3. Inform patient/family regarding the reason for restraint  4.  Q2H: Monitor safety, psychosocial status, comfort, nutrition and hydration  Outcome: Progressing  Flowsheets  Taken 1/9/2023 0530 by Alec Stuart RN  Remains free of injury from restraints (restraint for interference with medical device): Determine that other, less restrictive measures have been tried or would not be effective before applying the restraint  Taken 1/9/2023 0330 by Alec Stuart RN  Remains free of injury from restraints (restraint for interference with medical device): Determine that other, less restrictive measures have been tried or would not be effective before applying the restraint  Taken 1/9/2023 0130 by Alec Stuart RN  Remains free of injury from restraints (restraint for interference with medical device): Determine that other, less restrictive measures have been tried or would not be effective before applying the restraint     Problem: Nutrition Deficit:  Goal: Optimize nutritional status  Outcome: Progressing

## 2023-01-10 ENCOUNTER — APPOINTMENT (OUTPATIENT)
Dept: GENERAL RADIOLOGY | Age: 38
DRG: 393 | End: 2023-01-10
Payer: MEDICARE

## 2023-01-10 PROBLEM — Z93.1 PEG (PERCUTANEOUS ENDOSCOPIC GASTROSTOMY) STATUS (HCC): Status: ACTIVE | Noted: 2023-01-10

## 2023-01-10 LAB
ALBUMIN SERPL-MCNC: 3.1 G/DL (ref 3.5–5.2)
ALP BLD-CCNC: 47 U/L (ref 40–130)
ALT SERPL-CCNC: 105 U/L (ref 5–41)
ANION GAP SERPL CALCULATED.3IONS-SCNC: 10 MMOL/L (ref 7–19)
AST SERPL-CCNC: 56 U/L (ref 5–40)
BASOPHILS ABSOLUTE: 0 K/UL (ref 0–0.2)
BASOPHILS RELATIVE PERCENT: 1 % (ref 0–1)
BILIRUB SERPL-MCNC: 0.4 MG/DL (ref 0.2–1.2)
BUN BLDV-MCNC: 2 MG/DL (ref 6–20)
CALCIUM SERPL-MCNC: 8.9 MG/DL (ref 8.6–10)
CHLORIDE BLD-SCNC: 107 MMOL/L (ref 98–111)
CO2: 24 MMOL/L (ref 22–29)
CREAT SERPL-MCNC: 0.5 MG/DL (ref 0.5–1.2)
EOSINOPHILS ABSOLUTE: 0.2 K/UL (ref 0–0.6)
EOSINOPHILS RELATIVE PERCENT: 3.9 % (ref 0–5)
GFR SERPL CREATININE-BSD FRML MDRD: >60 ML/MIN/{1.73_M2}
GLUCOSE BLD-MCNC: 110 MG/DL (ref 70–99)
GLUCOSE BLD-MCNC: 60 MG/DL (ref 70–99)
GLUCOSE BLD-MCNC: 64 MG/DL (ref 70–99)
GLUCOSE BLD-MCNC: 69 MG/DL (ref 70–99)
GLUCOSE BLD-MCNC: 79 MG/DL (ref 70–99)
GLUCOSE BLD-MCNC: 92 MG/DL (ref 74–109)
HCT VFR BLD CALC: 32.4 % (ref 42–52)
HEMOGLOBIN: 10.7 G/DL (ref 14–18)
IMMATURE GRANULOCYTES #: 0 K/UL
LYMPHOCYTES ABSOLUTE: 1.7 K/UL (ref 1.1–4.5)
LYMPHOCYTES RELATIVE PERCENT: 43.7 % (ref 20–40)
MCH RBC QN AUTO: 28.2 PG (ref 27–31)
MCHC RBC AUTO-ENTMCNC: 33 G/DL (ref 33–37)
MCV RBC AUTO: 85.3 FL (ref 80–94)
MONOCYTES ABSOLUTE: 0.3 K/UL (ref 0–0.9)
MONOCYTES RELATIVE PERCENT: 8 % (ref 0–10)
NEUTROPHILS ABSOLUTE: 1.7 K/UL (ref 1.5–7.5)
NEUTROPHILS RELATIVE PERCENT: 43.1 % (ref 50–65)
PDW BLD-RTO: 13.6 % (ref 11.5–14.5)
PERFORMED ON: ABNORMAL
PERFORMED ON: NORMAL
PLATELET # BLD: 313 K/UL (ref 130–400)
PMV BLD AUTO: 10.3 FL (ref 9.4–12.4)
POTASSIUM REFLEX MAGNESIUM: 3.9 MMOL/L (ref 3.5–5)
RBC # BLD: 3.8 M/UL (ref 4.7–6.1)
SODIUM BLD-SCNC: 141 MMOL/L (ref 136–145)
TOTAL PROTEIN: 5.7 G/DL (ref 6.6–8.7)
WBC # BLD: 3.9 K/UL (ref 4.8–10.8)

## 2023-01-10 PROCEDURE — 99232 SBSQ HOSP IP/OBS MODERATE 35: CPT | Performed by: PHYSICIAN ASSISTANT

## 2023-01-10 PROCEDURE — 82947 ASSAY GLUCOSE BLOOD QUANT: CPT

## 2023-01-10 PROCEDURE — 6370000000 HC RX 637 (ALT 250 FOR IP): Performed by: NURSE PRACTITIONER

## 2023-01-10 PROCEDURE — 2580000003 HC RX 258: Performed by: NURSE PRACTITIONER

## 2023-01-10 PROCEDURE — 1210000000 HC MED SURG R&B

## 2023-01-10 PROCEDURE — 99232 SBSQ HOSP IP/OBS MODERATE 35: CPT | Performed by: SPECIALIST

## 2023-01-10 PROCEDURE — 74355 X-RAY GUIDE INTESTINAL TUBE: CPT | Performed by: RADIOLOGY

## 2023-01-10 PROCEDURE — 80053 COMPREHEN METABOLIC PANEL: CPT

## 2023-01-10 PROCEDURE — 6360000002 HC RX W HCPCS: Performed by: NURSE PRACTITIONER

## 2023-01-10 PROCEDURE — 36415 COLL VENOUS BLD VENIPUNCTURE: CPT

## 2023-01-10 PROCEDURE — 85025 COMPLETE CBC W/AUTO DIFF WBC: CPT

## 2023-01-10 PROCEDURE — 74355 X-RAY GUIDE INTESTINAL TUBE: CPT

## 2023-01-10 RX ADMIN — SODIUM CHLORIDE, PRESERVATIVE FREE 10 ML: 5 INJECTION INTRAVENOUS at 10:29

## 2023-01-10 RX ADMIN — Medication 500 MG: at 22:22

## 2023-01-10 RX ADMIN — Medication 30 MG: at 06:14

## 2023-01-10 RX ADMIN — MORPHINE SULFATE 2 MG: 2 INJECTION, SOLUTION INTRAMUSCULAR; INTRAVENOUS at 06:14

## 2023-01-10 RX ADMIN — DEXTROSE MONOHYDRATE 250 ML: 100 INJECTION, SOLUTION INTRAVENOUS at 12:02

## 2023-01-10 RX ADMIN — APIXABAN 5 MG: 5 TABLET, FILM COATED ORAL at 22:22

## 2023-01-10 RX ADMIN — METHOCARBAMOL TABLETS 750 MG: 750 TABLET, COATED ORAL at 22:22

## 2023-01-10 RX ADMIN — Medication 500 MG: at 10:30

## 2023-01-10 RX ADMIN — DEXTROSE MONOHYDRATE 125 ML: 100 INJECTION, SOLUTION INTRAVENOUS at 22:17

## 2023-01-10 RX ADMIN — SODIUM CHLORIDE, SODIUM LACTATE, POTASSIUM CHLORIDE, CALCIUM CHLORIDE AND DEXTROSE MONOHYDRATE: 5; 600; 310; 30; 20 INJECTION, SOLUTION INTRAVENOUS at 11:58

## 2023-01-10 RX ADMIN — METHOCARBAMOL TABLETS 750 MG: 750 TABLET, COATED ORAL at 10:30

## 2023-01-10 RX ADMIN — SODIUM CHLORIDE, PRESERVATIVE FREE 10 ML: 5 INJECTION INTRAVENOUS at 22:22

## 2023-01-10 RX ADMIN — OXYCODONE 5 MG: 5 TABLET ORAL at 22:22

## 2023-01-10 RX ADMIN — ARIPIPRAZOLE 20 MG: 10 TABLET ORAL at 10:28

## 2023-01-10 ASSESSMENT — PAIN SCALES - GENERAL
PAINLEVEL_OUTOF10: 7
PAINLEVEL_OUTOF10: 2
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
## 2023-01-10 NOTE — PROGRESS NOTES
Reviewed patient CT scan of abdomen with Dr. Maxene Carrel, radiologist.    The balloon at the tip of the G-tube is in the proximal jejunum. Dr. Maxene Carrel has kindly agreed to reposition the balloon back into the stomach under fluoroscopy. Talked with [facility] in radiology. She will coordinate the timing of the repositioning of the G-tube. Apparently might be able to reposition the G-tube today. Talked with patient's nurse Marsha Covington earlier. Please contact [facility] in radiology if you have any further questions about the timing of G-tube repositioning.
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
## 2023-01-10 NOTE — PROGRESS NOTES
GI  - PROGRESS NOTE    Subjective:   Admit Date: 1/6/2023  PCP: Kathleen Alvarado MD    Patient being seen for: 1/7/23: GI consult: Dr. Freddy Farrar: Acute pancreatitis.  Lipase 1594.  Improved to 496.  CT abdomen showed dilation of the main pancreatic duct.  Questionable positional PEG tube.  Organic brain syndrome.  Medical management, repositioning PEG tube as needed by radiologist under fluoroscopy.  Case will be reviewed by Dr. Dr. Clark to consider further EUS or ERCP.    11/8/2022: EGD.  Unable to place PEG tube because of inability to transilluminate and inability to see the needle entering into the stomach cavity.  There was small sliding-type hiatal hernia.  Severe reflux esophagitis, LA grade D.    11/9/2022: A PEG tube was placed by Dr. Clark.  Schedule EGD with PEG tube placement.  Indication was traumatic brain injury inability to eat.  Previous PEG tube about a month ago.    Esophagus: Moderately severe esophagitis noted in the distal esophagus.  No hiatal hernia.  Stomach: Scar present on the anterior wall consistent with previous PEG tube site.  No obstruction or other lesions found.  Duodenum was normal.    9/22/2022: EGD: Done for nausea, vomiting.  History of aspiration pneumonia.  History of tracheostomy.  History of quadriplegia.  Findings are severe distal reflux esophagitis causing stricture of distal esophagus.  Unable to advance the scope into the stomach.  Distal esophagus severely inflamed.  Mucosa sloughs however easily and mucosa was friable.    9/20/2022: Seen by GI in consultation.  Status post colostomy and aspiration pneumonia.  Chest x-ray clear.  Leukocytosis with WBC count 23,500.  LFTs normal.  Status post G-tube placement.  G-tube site was healthy.  External bolster was at 3 cm level.  Because of nausea vomiting was not clear.    11/4/2022: GI consult: Admits placed G-tube.  Abdominal film the tip of the G-tube was not in the  stomach as per review of the abdominal film with Dr. Merlin Nixon, radiologist.  There was opacification of small bowel on abdominal films. Question was raised by radiologist if this is a jejunostomy tube. I had discussed with Dr. Berto Carmen surgeon and Dr. Corey Duarte surgeon. They felt that the gastrostomy tube needed to be pulled out. They thought doing a Gastrografin contrast, patient at low risk for peritonitis. However the patient also had leukocytosis. Patient has a history of this loss of G-tube months prior. It was replaced on 10/10/2022.    24hr events/Interval History: Nurse at bedside. Patient was seen in consultation by Dr. Amanda Liao. Plan was to have the radiologist reposition patient G-tube. I went to talk to Dr. Merlin Nixon, radiologist.  He is going to be busy for another hour. I will go back to him to review patient CT scan. Patient is NPO. On exam there is a G-tube in place in the epigastric area. However the outer bolster is touching the skin. There is hardly any G-tube length left outside the abdominal wall. It looks like there for the G-tube has moved further down. Patient's nurse, Vicente Abraham. On bedside. Patient is on Eliquis. 1/10/2023: BMP normal.  Calcium 8.9. Total protein 5.7.    1/7/2023: Triglycerides 73.    1/10/2023: Albumin 3.1, alkaline phosphatase 47, , AST 56, total bili 0.4, total protein 5.7    2020: Urine cannabinoid screen positive for cannabinoids. 1/10/2023: WBC 3.9, hemoglobin 10.7, MCV 85, platelet count 031,144.    11/4/2022: Pro time and INR normal.    10/11/2022: Surgical pathology: Gastric biopsy mild chronic nonspecific inflammation. No helical back to pylori.    7/2023: Gallbladder ultrasound: Normal gallbladder upper quadrant ultrasound. A 4 mm. Gallbladder wall 2 mm. Pancreas and right kidney unremarkable. No ascites. 1/6/2023: CT abdomen and pelvis without IV contrast:  No IV contrast.  Bibasilar subsegmental atelectasis.   Liver unremarkable. No focal lesion. Gallbladder unremarkable. Pancreas new prominence of the entire pancreas compared to previous exam.  No previous peripancreatic inflammation. Main pancreatic duct is dilated. It measures 4 mm. Spleen normal.  Gastrostomy tube seen with the distal balloon displaced into the proximal jejunum. There is moderate gastric distention as a result. No evidence of peripancreatic inflammation. Appendix normal.  By the radiologist was: Gastrostomy balloon located within the proximal jejunum. This results in moderate gastric distention. Recommend repositioning. Dilation of the main pancreatic duct is nonspecific. Additionally there is new enlargement of the entire pancreas. No obvious peripancreatic inflammation identified. Correlate with lipase.   Nonobstructing left renal stone  Medications:    Scheduled Meds:   methocarbamol  750 mg Oral BID    apixaban  5 mg Per G Tube BID    ARIPiprazole  20 mg Oral Daily    lansoprazole  30 mg Per G Tube BID AC    levETIRAcetam  500 mg PEG Tube BID    sodium chloride flush  5-40 mL IntraVENous 2 times per day       Continuous Infusions:   dextrose 5% in lactated ringers 100 mL/hr at 01/09/23 2309    dextrose      sodium chloride         PRN Meds:.oxyCODONE, glucose, dextrose bolus **OR** dextrose bolus, glucagon (rDNA), dextrose, iopamidol, sodium chloride flush, sodium chloride, ondansetron **OR** ondansetron, potassium chloride **OR** potassium alternative oral replacement **OR** potassium chloride, morphine      Labs:     Recent Labs     01/08/23  0351 01/09/23  0309 01/10/23  0258   WBC 5.9 4.0* 3.9*   RBC 4.46* 4.15* 3.80*   HGB 12.7* 11.6* 10.7*   HCT 38.4* 35.6* 32.4*   MCV 86.1 85.8 85.3   MCH 28.5 28.0 28.2   MCHC 33.1 32.6* 33.0    337 313       Recent Labs     01/08/23  0351 01/09/23  0309 01/10/23  0258    137 141   K 4.6 3.9 3.9   ANIONGAP 14 9 10    104 107   CO2 22 24 24   BUN 4* 2* 2*   CREATININE 0.5 0.4* 0.5 GLUCOSE 71* 121* 92   CALCIUM 9.5 9.0 8.9       No results for input(s): MG, PHOS in the last 72 hours. Recent Labs     01/08/23 0351 01/09/23  0309 01/10/23  0258   AST 66* 57* 56*   ALT 93* 101* 105*   BILITOT 0.5 0.4 0.4   ALKPHOS 57 50 47       HgBA1c:  No components found for: HGBA1C    FLP:    Lab Results   Component Value Date/Time    TRIG 73 01/07/2023 02:32 AM       TSH:    Lab Results   Component Value Date/Time    TSH 1.150 09/21/2022 09:51 AM       Troponin T: No results for input(s): TROPONINI in the last 72 hours. INR: No results for input(s): INR in the last 72 hours. Recent Labs     01/08/23 0351 01/09/23 0309   LIPASE 225* 132*     -----------------------------------------------------------------      Physical Exam:     Vitals:    01/09/23 0915 01/09/23 1859 01/10/23 0553 01/10/23 0802   BP:  120/82 (!) 139/91 (!) 128/94   Pulse:  58 63 51   Resp:  18 18 18   Temp:  98.8 °F (37.1 °C) 98.4 °F (36.9 °C) 97.7 °F (36.5 °C)   TempSrc:  Temporal Temporal Temporal   SpO2: 94% 100% 97% 100%   Weight:       Height:           24HR INTAKE/OUTPUT:    Intake/Output Summary (Last 24 hours) at 1/10/2023 0931  Last data filed at 1/9/2023 2230  Gross per 24 hour   Intake 0 ml   Output 1600 ml   Net -1600 ml       General appearance: Noncommunicative. Appears stated age. Head: normal cephalic. No jaundice. Neck: No JVD. Abdomen: G-tube in the epigastric area. The outer bolster is attaching to the skin. It looks like the entire G-tube has moved further down into the stomach, duodenum or jejunum and I described by the radiologist.    Extremities: No leg edema. Skin: No jaundice. Neurologic: Noncommunicative. Impression:       It looks like does not G-tube has moved further down and need to be repositioned. I need to review patient's CAT scan of the abdomen and pelvis with the radiologist, Dr. Chano Dash. Dr. Chano Dash is not available another hour.     The cause of acute pancreatitis not clear.  Has: Pancreatitis lipase is elevated and there is inflammation of the pancreas on CT scan. Triglycerides are only 73. Gallbladder ultrasound does not show any stones. On CT scan of the abdomen gastrostomy tube is located within the proximal jejunum. This results in moderate gastric distention. Radiologist recommended repositioning. Patient was seen by Dr. Elda Aceves, gastroenterologist and the plan was to request the radiologist to reposition the G-tube. On CAT scan main pancreatic duct is dilated which is nonspecific. There is enlargement of the entire pancreas. No obvious pancreatic inflammation identified. I do not know if pancreatitis is due to obstruction of the pancreatic duct, bile balloon of the G-tube. Patient also has a history of cannabis. It is possible he may be consuming cannabis. Plan: Will request Dr. Pee Thompson to reposition the G-tube under fluoroscopy. It is best for the patient to do it under fluoroscopy rather than as having to do EGD. Discussed plan with the patient nurse [place]. [place] we will call me when patient's mother is bedside. Hold Eliquis as per hospitalist medicine is because if he had to do EGD, patient is at risk for bleeding during EGD. Also if the radiologist to reposition the G-tube, he would also like to hold Eliquis. Urine screen for cannabinoids.     Johann Ace MD    1/10/2023    9:31 AM        Disclaimer speech recognition software       (Please note that portions of this note were completed with a voice recognition program. Efforts were made to edit the dictations but occasionally words are mis-transcribed.)

## 2023-01-11 ENCOUNTER — APPOINTMENT (OUTPATIENT)
Dept: CT IMAGING | Age: 38
DRG: 393 | End: 2023-01-11
Payer: MEDICARE

## 2023-01-11 LAB
ALBUMIN SERPL-MCNC: 3.4 G/DL (ref 3.5–5.2)
ALP BLD-CCNC: 62 U/L (ref 40–130)
ALT SERPL-CCNC: 140 U/L (ref 5–41)
ANION GAP SERPL CALCULATED.3IONS-SCNC: 10 MMOL/L (ref 7–19)
ANION GAP SERPL CALCULATED.3IONS-SCNC: 12 MMOL/L (ref 7–19)
AST SERPL-CCNC: 70 U/L (ref 5–40)
BASOPHILS ABSOLUTE: 0.1 K/UL (ref 0–0.2)
BASOPHILS RELATIVE PERCENT: 1.5 % (ref 0–1)
BILIRUB SERPL-MCNC: 0.6 MG/DL (ref 0.2–1.2)
BUN BLDV-MCNC: 3 MG/DL (ref 6–20)
BUN BLDV-MCNC: 3 MG/DL (ref 6–20)
CALCIUM SERPL-MCNC: 8.9 MG/DL (ref 8.6–10)
CALCIUM SERPL-MCNC: 9 MG/DL (ref 8.6–10)
CHLORIDE BLD-SCNC: 102 MMOL/L (ref 98–111)
CHLORIDE BLD-SCNC: 103 MMOL/L (ref 98–111)
CO2: 23 MMOL/L (ref 22–29)
CO2: 24 MMOL/L (ref 22–29)
CREAT SERPL-MCNC: 0.5 MG/DL (ref 0.5–1.2)
CREAT SERPL-MCNC: 0.6 MG/DL (ref 0.5–1.2)
EOSINOPHILS ABSOLUTE: 0.2 K/UL (ref 0–0.6)
EOSINOPHILS RELATIVE PERCENT: 5.4 % (ref 0–5)
GFR SERPL CREATININE-BSD FRML MDRD: >60 ML/MIN/{1.73_M2}
GFR SERPL CREATININE-BSD FRML MDRD: >60 ML/MIN/{1.73_M2}
GLUCOSE BLD-MCNC: 118 MG/DL (ref 70–99)
GLUCOSE BLD-MCNC: 72 MG/DL (ref 70–99)
GLUCOSE BLD-MCNC: 74 MG/DL (ref 70–99)
GLUCOSE BLD-MCNC: 74 MG/DL (ref 70–99)
GLUCOSE BLD-MCNC: 77 MG/DL (ref 74–109)
GLUCOSE BLD-MCNC: 79 MG/DL (ref 74–109)
HCT VFR BLD CALC: 36.2 % (ref 42–52)
HEMOGLOBIN: 11.5 G/DL (ref 14–18)
IMMATURE GRANULOCYTES #: 0 K/UL
LYMPHOCYTES ABSOLUTE: 1.4 K/UL (ref 1.1–4.5)
LYMPHOCYTES RELATIVE PERCENT: 34.7 % (ref 20–40)
MCH RBC QN AUTO: 27.8 PG (ref 27–31)
MCHC RBC AUTO-ENTMCNC: 31.8 G/DL (ref 33–37)
MCV RBC AUTO: 87.4 FL (ref 80–94)
MONOCYTES ABSOLUTE: 0.2 K/UL (ref 0–0.9)
MONOCYTES RELATIVE PERCENT: 5.6 % (ref 0–10)
NEUTROPHILS ABSOLUTE: 2.2 K/UL (ref 1.5–7.5)
NEUTROPHILS RELATIVE PERCENT: 52.6 % (ref 50–65)
PDW BLD-RTO: 13.7 % (ref 11.5–14.5)
PERFORMED ON: ABNORMAL
PERFORMED ON: NORMAL
PLATELET # BLD: 383 K/UL (ref 130–400)
PMV BLD AUTO: 9.7 FL (ref 9.4–12.4)
POTASSIUM REFLEX MAGNESIUM: 3.7 MMOL/L (ref 3.5–5)
POTASSIUM REFLEX MAGNESIUM: 4.4 MMOL/L (ref 3.5–5)
RBC # BLD: 4.14 M/UL (ref 4.7–6.1)
SODIUM BLD-SCNC: 136 MMOL/L (ref 136–145)
SODIUM BLD-SCNC: 138 MMOL/L (ref 136–145)
TOTAL PROTEIN: 6.6 G/DL (ref 6.6–8.7)
WBC # BLD: 4.1 K/UL (ref 4.8–10.8)

## 2023-01-11 PROCEDURE — 80053 COMPREHEN METABOLIC PANEL: CPT

## 2023-01-11 PROCEDURE — 74177 CT ABD & PELVIS W/CONTRAST: CPT | Performed by: RADIOLOGY

## 2023-01-11 PROCEDURE — 6360000004 HC RX CONTRAST MEDICATION: Performed by: EMERGENCY MEDICINE

## 2023-01-11 PROCEDURE — 74177 CT ABD & PELVIS W/CONTRAST: CPT

## 2023-01-11 PROCEDURE — 82947 ASSAY GLUCOSE BLOOD QUANT: CPT

## 2023-01-11 PROCEDURE — 82787 IGG 1 2 3 OR 4 EACH: CPT

## 2023-01-11 PROCEDURE — 1210000000 HC MED SURG R&B

## 2023-01-11 PROCEDURE — 2580000003 HC RX 258: Performed by: NURSE PRACTITIONER

## 2023-01-11 PROCEDURE — 36415 COLL VENOUS BLD VENIPUNCTURE: CPT

## 2023-01-11 PROCEDURE — 6370000000 HC RX 637 (ALT 250 FOR IP): Performed by: NURSE PRACTITIONER

## 2023-01-11 PROCEDURE — 85025 COMPLETE CBC W/AUTO DIFF WBC: CPT

## 2023-01-11 PROCEDURE — 99231 SBSQ HOSP IP/OBS SF/LOW 25: CPT | Performed by: PHYSICIAN ASSISTANT

## 2023-01-11 RX ADMIN — Medication 30 MG: at 08:06

## 2023-01-11 RX ADMIN — APIXABAN 5 MG: 5 TABLET, FILM COATED ORAL at 21:43

## 2023-01-11 RX ADMIN — SODIUM CHLORIDE, PRESERVATIVE FREE 10 ML: 5 INJECTION INTRAVENOUS at 08:06

## 2023-01-11 RX ADMIN — Medication 500 MG: at 08:06

## 2023-01-11 RX ADMIN — Medication 30 MG: at 15:47

## 2023-01-11 RX ADMIN — APIXABAN 5 MG: 5 TABLET, FILM COATED ORAL at 08:06

## 2023-01-11 RX ADMIN — ARIPIPRAZOLE 20 MG: 10 TABLET ORAL at 08:06

## 2023-01-11 RX ADMIN — Medication 500 MG: at 21:43

## 2023-01-11 RX ADMIN — METHOCARBAMOL TABLETS 750 MG: 750 TABLET, COATED ORAL at 08:06

## 2023-01-11 RX ADMIN — IOPAMIDOL 70 ML: 755 INJECTION, SOLUTION INTRAVENOUS at 17:58

## 2023-01-11 RX ADMIN — METHOCARBAMOL TABLETS 750 MG: 750 TABLET, COATED ORAL at 21:43

## 2023-01-11 ASSESSMENT — PAIN SCALES - GENERAL: PAINLEVEL_OUTOF10: 2
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
## 2023-01-11 NOTE — PROGRESS NOTES
Comprehensive Nutrition Assessment    Type and Reason for Visit:  Reassess    Nutrition Recommendations/Plan:   Start short-term PN to meet nutritional needs     Malnutrition Assessment:  Malnutrition Status:  Severe malnutrition (01/11/23 0925)    Context:  Acute Illness     Findings of the 6 clinical characteristics of malnutrition:  Energy Intake:  50% or less of estimated energy requirements for 5 or more days  Weight Loss:  No significant weight loss     Body Fat Loss: Moderate body fat loss Orbital, Fat Overlying Ribs   Muscle Mass Loss:  Mild muscle mass loss Temples (temporalis), Clavicles (pectoralis & deltoids)  Fluid Accumulation:  No significant fluid accumulation Extremities   Strength:  Not Performed    Nutrition Assessment:    Pt is Severely Malnourished AEB inadequate nutritional intake and moderate fat loss. Pt is NPO Day 5. Unsure of when PEG will be able to be used. PN should be considered as a short-term means of nutrition. Nutrition Related Findings:      Wound Type: None       Current Nutrition Intake & Therapies:    Average Meal Intake: NPO  Average Supplements Intake: NPO  Diet NPO Exceptions are: Ice Chips    Anthropometric Measures:  Height: 6' 2\" (188 cm)  Ideal Body Weight (IBW): 190 lbs (86 kg)    Admission Body Weight: 145 lb (65.8 kg)  Current Body Weight: 145 lb (65.8 kg), 76.3 % IBW. Current BMI (kg/m2): 18.6  Usual Body Weight: 147 lb (66.7 kg) (10/2022)  % Weight Change (Calculated): -1.4  Weight Adjustment For: Quadriplegia  Total Adjusted Percentage (Calculated): 12.5  Adjusted Ideal Body Weight (lbs) (Calculated): 166.3 lbs  Adjusted Ideal Body Weight (kg) (Calculated): 75.59 kg  Adjusted % Ideal Body Weight (Calculated): 87.2  Adjusted BMI (kg/m2) (Calculated): 20.9  BMI Categories: Normal Weight (BMI 18.5-24. 9)    Estimated Daily Nutrient Needs:  Energy Requirements Based On: Kcal/kg  Weight Used for Energy Requirements: Current  Energy (kcal/day): 2459-7695 kcals/day  Weight Used for Protein Requirements: Current  Protein (g/day):  g/protein/day  Method Used for Fluid Requirements: 1 ml/kcal  Fluid (ml/day): 5008-2986 mL/day    Nutrition Diagnosis:   Severe malnutrition, In context of acute illness or injury related to inadequate protein-energy intake as evidenced by NPO or clear liquid status due to medical condition, moderate loss of subcutaneous fat    Nutrition Interventions:   Food and/or Nutrient Delivery: Continue NPO, Start Parenteral Nutrition  Nutrition Education/Counseling: No recommendation at this time  Coordination of Nutrition Care: Continue to monitor while inpatient    Goals:  Previous Goal Met: No Progress toward Goal(s)  Goals: Initiate nutrition support    Nutrition Monitoring and Evaluation:   Behavioral-Environmental Outcomes: None Identified  Food/Nutrient Intake Outcomes:  Other (Comment) (alternate means of nutrition)  Physical Signs/Symptoms Outcomes: Biochemical Data, Nutrition Focused Physical Findings, Weight    Discharge Planning:    Enteral Nutrition     Shoshana Wray MS, RD, LD  Contact: [phone]

## 2023-01-12 ENCOUNTER — APPOINTMENT (OUTPATIENT)
Dept: GENERAL RADIOLOGY | Age: 38
DRG: 393 | End: 2023-01-12
Payer: MEDICARE

## 2023-01-12 LAB
ALBUMIN SERPL-MCNC: 3.2 G/DL (ref 3.5–5.2)
ALP BLD-CCNC: 60 U/L (ref 40–130)
ALT SERPL-CCNC: 115 U/L (ref 5–41)
ANION GAP SERPL CALCULATED.3IONS-SCNC: 13 MMOL/L (ref 7–19)
AST SERPL-CCNC: 40 U/L (ref 5–40)
BACTERIA: ABNORMAL /HPF
BASOPHILS ABSOLUTE: 0.1 K/UL (ref 0–0.2)
BASOPHILS RELATIVE PERCENT: 0.4 % (ref 0–1)
BILIRUB SERPL-MCNC: 0.5 MG/DL (ref 0.2–1.2)
BILIRUBIN URINE: NEGATIVE
BLOOD, URINE: NEGATIVE
BUN BLDV-MCNC: 8 MG/DL (ref 6–20)
CALCIUM SERPL-MCNC: 9 MG/DL (ref 8.6–10)
CHLORIDE BLD-SCNC: 103 MMOL/L (ref 98–111)
CLARITY: ABNORMAL
CO2: 22 MMOL/L (ref 22–29)
COLOR: YELLOW
CREAT SERPL-MCNC: 0.5 MG/DL (ref 0.5–1.2)
CRYSTALS, UA: ABNORMAL /HPF
EOSINOPHILS ABSOLUTE: 0.1 K/UL (ref 0–0.6)
EOSINOPHILS RELATIVE PERCENT: 0.3 % (ref 0–5)
EPITHELIAL CELLS, UA: 0 /HPF (ref 0–5)
GFR SERPL CREATININE-BSD FRML MDRD: >60 ML/MIN/{1.73_M2}
GLUCOSE BLD-MCNC: 100 MG/DL (ref 70–99)
GLUCOSE BLD-MCNC: 103 MG/DL (ref 70–99)
GLUCOSE BLD-MCNC: 105 MG/DL (ref 70–99)
GLUCOSE BLD-MCNC: 79 MG/DL (ref 74–109)
GLUCOSE BLD-MCNC: 86 MG/DL (ref 70–99)
GLUCOSE URINE: NEGATIVE MG/DL
HCT VFR BLD CALC: 39.4 % (ref 42–52)
HEMOGLOBIN: 12.4 G/DL (ref 14–18)
HYALINE CASTS: 5 /HPF (ref 0–8)
IGG 4: 112 MG/DL (ref 1–123)
IMMATURE GRANULOCYTES #: 0.1 K/UL
KETONES, URINE: NEGATIVE MG/DL
LACTIC ACID: 0.8 MMOL/L (ref 0.5–1.9)
LEUKOCYTE ESTERASE, URINE: ABNORMAL
LIPASE: 92 U/L (ref 13–60)
LYMPHOCYTES ABSOLUTE: 1 K/UL (ref 1.1–4.5)
LYMPHOCYTES RELATIVE PERCENT: 5.5 % (ref 20–40)
MCH RBC QN AUTO: 28.1 PG (ref 27–31)
MCHC RBC AUTO-ENTMCNC: 31.5 G/DL (ref 33–37)
MCV RBC AUTO: 89.1 FL (ref 80–94)
MONOCYTES ABSOLUTE: 0.5 K/UL (ref 0–0.9)
MONOCYTES RELATIVE PERCENT: 2.9 % (ref 0–10)
NEUTROPHILS ABSOLUTE: 16.1 K/UL (ref 1.5–7.5)
NEUTROPHILS RELATIVE PERCENT: 90.6 % (ref 50–65)
NITRITE, URINE: NEGATIVE
PDW BLD-RTO: 13.8 % (ref 11.5–14.5)
PERFORMED ON: ABNORMAL
PERFORMED ON: NORMAL
PH UA: 8.5 (ref 5–8)
PLATELET # BLD: 409 K/UL (ref 130–400)
PMV BLD AUTO: 9.7 FL (ref 9.4–12.4)
POTASSIUM REFLEX MAGNESIUM: 3.8 MMOL/L (ref 3.5–5)
PROTEIN UA: ABNORMAL MG/DL
RBC # BLD: 4.42 M/UL (ref 4.7–6.1)
RBC UA: 1 /HPF (ref 0–4)
SODIUM BLD-SCNC: 138 MMOL/L (ref 136–145)
SPECIFIC GRAVITY UA: 1.04 (ref 1–1.03)
TOTAL PROTEIN: 6.9 G/DL (ref 6.6–8.7)
UROBILINOGEN, URINE: 2 E.U./DL
WBC # BLD: 17.7 K/UL (ref 4.8–10.8)
WBC UA: 41 /HPF (ref 0–5)

## 2023-01-12 PROCEDURE — 83605 ASSAY OF LACTIC ACID: CPT

## 2023-01-12 PROCEDURE — 2500000003 HC RX 250 WO HCPCS: Performed by: HOSPITALIST

## 2023-01-12 PROCEDURE — 81001 URINALYSIS AUTO W/SCOPE: CPT

## 2023-01-12 PROCEDURE — 82947 ASSAY GLUCOSE BLOOD QUANT: CPT

## 2023-01-12 PROCEDURE — 92610 EVALUATE SWALLOWING FUNCTION: CPT

## 2023-01-12 PROCEDURE — 6360000002 HC RX W HCPCS: Performed by: NURSE PRACTITIONER

## 2023-01-12 PROCEDURE — 71045 X-RAY EXAM CHEST 1 VIEW: CPT

## 2023-01-12 PROCEDURE — 6370000000 HC RX 637 (ALT 250 FOR IP): Performed by: NURSE PRACTITIONER

## 2023-01-12 PROCEDURE — 87186 SC STD MICRODIL/AGAR DIL: CPT

## 2023-01-12 PROCEDURE — 6370000000 HC RX 637 (ALT 250 FOR IP): Performed by: HOSPITALIST

## 2023-01-12 PROCEDURE — 87040 BLOOD CULTURE FOR BACTERIA: CPT

## 2023-01-12 PROCEDURE — 6360000002 HC RX W HCPCS: Performed by: HOSPITALIST

## 2023-01-12 PROCEDURE — 87086 URINE CULTURE/COLONY COUNT: CPT

## 2023-01-12 PROCEDURE — 1210000000 HC MED SURG R&B

## 2023-01-12 PROCEDURE — 80053 COMPREHEN METABOLIC PANEL: CPT

## 2023-01-12 PROCEDURE — 85025 COMPLETE CBC W/AUTO DIFF WBC: CPT

## 2023-01-12 PROCEDURE — 36415 COLL VENOUS BLD VENIPUNCTURE: CPT

## 2023-01-12 PROCEDURE — 83690 ASSAY OF LIPASE: CPT

## 2023-01-12 PROCEDURE — 99233 SBSQ HOSP IP/OBS HIGH 50: CPT | Performed by: PHYSICIAN ASSISTANT

## 2023-01-12 PROCEDURE — 71045 X-RAY EXAM CHEST 1 VIEW: CPT | Performed by: RADIOLOGY

## 2023-01-12 PROCEDURE — 92523 SPEECH SOUND LANG COMPREHEN: CPT

## 2023-01-12 PROCEDURE — 94760 N-INVAS EAR/PLS OXIMETRY 1: CPT

## 2023-01-12 RX ORDER — MEROPENEM AND SODIUM CHLORIDE 1 G/50ML
1000 INJECTION, SOLUTION INTRAVENOUS ONCE
Status: COMPLETED | OUTPATIENT
Start: 2023-01-12 | End: 2023-01-12

## 2023-01-12 RX ORDER — PROPRANOLOL HYDROCHLORIDE 20 MG/5ML
20 SOLUTION ORAL 3 TIMES DAILY
Status: DISCONTINUED | OUTPATIENT
Start: 2023-01-12 | End: 2023-01-12 | Stop reason: ALTCHOICE

## 2023-01-12 RX ORDER — CLINDAMYCIN PHOSPHATE 300 MG/50ML
300 INJECTION INTRAVENOUS EVERY 6 HOURS
Status: DISCONTINUED | OUTPATIENT
Start: 2023-01-12 | End: 2023-01-12

## 2023-01-12 RX ORDER — PROPRANOLOL HYDROCHLORIDE 10 MG/1
20 TABLET ORAL 3 TIMES DAILY
Status: DISCONTINUED | OUTPATIENT
Start: 2023-01-12 | End: 2023-01-13

## 2023-01-12 RX ORDER — MEROPENEM AND SODIUM CHLORIDE 1 G/50ML
1000 INJECTION, SOLUTION INTRAVENOUS EVERY 8 HOURS
Status: COMPLETED | OUTPATIENT
Start: 2023-01-13 | End: 2023-01-17

## 2023-01-12 RX ADMIN — ARIPIPRAZOLE 20 MG: 10 TABLET ORAL at 09:54

## 2023-01-12 RX ADMIN — METHOCARBAMOL TABLETS 750 MG: 750 TABLET, COATED ORAL at 09:54

## 2023-01-12 RX ADMIN — OXYCODONE 5 MG: 5 TABLET ORAL at 10:42

## 2023-01-12 RX ADMIN — PROPRANOLOL HYDROCHLORIDE 20 MG: 10 TABLET ORAL at 10:01

## 2023-01-12 RX ADMIN — MEROPENEM AND SODIUM CHLORIDE 1000 MG: 1 INJECTION, SOLUTION INTRAVENOUS at 16:46

## 2023-01-12 RX ADMIN — APIXABAN 5 MG: 5 TABLET, FILM COATED ORAL at 09:54

## 2023-01-12 RX ADMIN — CLINDAMYCIN PHOSPHATE 300 MG: 300 INJECTION, SOLUTION INTRAVENOUS at 10:46

## 2023-01-12 RX ADMIN — PROPRANOLOL HYDROCHLORIDE 20 MG: 10 TABLET ORAL at 15:23

## 2023-01-12 RX ADMIN — Medication 30 MG: at 15:23

## 2023-01-12 RX ADMIN — CLINDAMYCIN PHOSPHATE 300 MG: 300 INJECTION, SOLUTION INTRAVENOUS at 15:27

## 2023-01-12 RX ADMIN — Medication 500 MG: at 09:54

## 2023-01-12 RX ADMIN — Medication 30 MG: at 09:54

## 2023-01-12 RX ADMIN — PROPRANOLOL HYDROCHLORIDE 20 MG: 10 TABLET ORAL at 21:22

## 2023-01-12 RX ADMIN — APIXABAN 5 MG: 5 TABLET, FILM COATED ORAL at 21:22

## 2023-01-12 RX ADMIN — METHOCARBAMOL TABLETS 750 MG: 750 TABLET, COATED ORAL at 21:22

## 2023-01-12 RX ADMIN — ONDANSETRON 4 MG: 2 INJECTION INTRAMUSCULAR; INTRAVENOUS at 13:45

## 2023-01-12 RX ADMIN — MORPHINE SULFATE 2 MG: 2 INJECTION, SOLUTION INTRAMUSCULAR; INTRAVENOUS at 15:24

## 2023-01-12 RX ADMIN — Medication 500 MG: at 21:22
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
## 2023-01-12 NOTE — PROGRESS NOTES
Pharmacy Adjustment per [facility] protocol    Elida Mccoy is a 30 y.o. male. Pharmacy has adjusted medications per [facility] protocol. Recent Labs     01/11/23  0944 01/12/23  0635   BUN 3*  3* 8       Recent Labs     01/11/23  0944 01/12/23  0635   CREATININE 0.6  0.5 0.5       Estimated Creatinine Clearance: 192 mL/min (based on SCr of 0.5 mg/dL).     Height:   Ht Readings from Last 1 Encounters:   01/07/23 6' 2\" (1.88 m)     Weight:  Wt Readings from Last 1 Encounters:   01/06/23 145 lb (65.8 kg)         Plan: Adjust the following medications based on [facility] protocol:  Meropenem to 1000 mg IV once over 30 minutes followed by 1000 mg IV every 8 hours extended infusion over 180 minutes for 5 days    ROLLY NUNO, PHARM D, 1/12/2023, 4:02 PM

## 2023-01-12 NOTE — PROGRESS NOTES
Attempted to see patient this PM. Lights off. Support group member in the room. Support group member reports he may be discharged tomorrow. She also reports he had been given medications. Nursing reports having given medications for nausea. According to support group member, morning may be the best time to see patient. Support group member reports patient does pretty well with applesauce/ice chips, but that he has started \"holding\" in his mouth. According to support group member, patient may be getting a referral for speech at home.      Electronically signed by MAICOL Carrillo on 1/12/2023 at 2:26 PM
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
## 2023-01-12 NOTE — CONSULTS
Comprehensive Nutrition Assessment    Type and Reason for Visit:  Reassess, Consult    Nutrition Recommendations/Plan:   Modify EN: Osmolite 1.2 with a goal rate of 75 mL/hr. Flush tube with 30 mL/H2O every hr.     Malnutrition Assessment:  Malnutrition Status:  Severe malnutrition (01/11/23 0925)    Context:  Acute Illness     Findings of the 6 clinical characteristics of malnutrition:  Energy Intake:  50% or less of estimated energy requirements for 5 or more days  Weight Loss:  No significant weight loss     Body Fat Loss: Moderate body fat loss Orbital, Fat Overlying Ribs   Muscle Mass Loss:  Mild muscle mass loss Temples (temporalis), Clavicles (pectoralis & deltoids)  Fluid Accumulation:  No significant fluid accumulation Extremities   Strength:  Not Performed    Nutrition Assessment:    Pt with emesis this morning. Consult received for TF ordering and management. Due to pt being NPO for 5 days and with acute pancreatitis, will modify EN orders to a formula without fiber at a continuous rate. Orders can be modified prior to D/C for bolus feedings and a high fiber formula, but pt's gut needs to recover. Birder Favre PA-C in room and plans discussed with her. She is going to call and update pt's mother on plan. Nutrition Related Findings:    BMP WNL Wound Type: None       Current Nutrition Intake & Therapies:    Average Meal Intake: NPO  Average Supplements Intake: NPO  Diet NPO Exceptions are: Ice Chips  ADULT TUBE FEEDING; PEG; Other Tube Feeding (specify); Jevity 1.5; Bolus; 4 Times Daily; 360; Infusion; 1; 60; Before and after each bolus    Anthropometric Measures:  Height: 6' 2\" (188 cm)  Ideal Body Weight (IBW): 190 lbs (86 kg)    Admission Body Weight: 145 lb (65.8 kg)  Current Body Weight: 145 lb (65.8 kg), 76.3 % IBW.     Current BMI (kg/m2): 18.6  Usual Body Weight: 147 lb (66.7 kg) (10/2022)  % Weight Change (Calculated): -1.4  Weight Adjustment For: Quadriplegia  Total Adjusted Percentage (Calculated): 12.5  Adjusted Ideal Body Weight (lbs) (Calculated): 166.3 lbs  Adjusted Ideal Body Weight (kg) (Calculated): 75.59 kg  Adjusted % Ideal Body Weight (Calculated): 87.2  Adjusted BMI (kg/m2) (Calculated): 20.9  BMI Categories: Normal Weight (BMI 18.5-24. 9)    Estimated Daily Nutrient Needs:  Energy Requirements Based On: Kcal/kg  Weight Used for Energy Requirements: Current  Energy (kcal/day): 9871-8338 kcals/day  Weight Used for Protein Requirements: Current  Protein (g/day):  g/protein/day  Method Used for Fluid Requirements: 1 ml/kcal  Fluid (ml/day): 8173-4270 mL/day    Nutrition Diagnosis:   Severe malnutrition, In context of acute illness or injury related to inadequate protein-energy intake as evidenced by NPO or clear liquid status due to medical condition, moderate loss of subcutaneous fat    Nutrition Interventions:   Food and/or Nutrient Delivery: Continue NPO, Modify Tube Feeding  Nutrition Education/Counseling: No recommendation at this time  Coordination of Nutrition Care: Continue to monitor while inpatient    Goals:  Previous Goal Met: No Progress toward Goal(s)  Goals:  Tolerate nutrition support at goal rate    Nutrition Monitoring and Evaluation:   Behavioral-Environmental Outcomes: None Identified  Food/Nutrient Intake Outcomes: Enteral Nutrition Intake/Tolerance  Physical Signs/Symptoms Outcomes: Biochemical Data, GI Status, Nausea or Vomiting, Nutrition Focused Physical Findings, Weight    Discharge Planning:    Enteral Nutrition     Carole Page, MS, RD, LD  Contact: [phone]

## 2023-01-13 ENCOUNTER — APPOINTMENT (OUTPATIENT)
Dept: GENERAL RADIOLOGY | Age: 38
DRG: 393 | End: 2023-01-13
Payer: MEDICARE

## 2023-01-13 LAB
ALBUMIN SERPL-MCNC: 3 G/DL (ref 3.5–5.2)
ALP BLD-CCNC: 60 U/L (ref 40–130)
ALT SERPL-CCNC: 79 U/L (ref 5–41)
ANION GAP SERPL CALCULATED.3IONS-SCNC: 10 MMOL/L (ref 7–19)
AST SERPL-CCNC: 21 U/L (ref 5–40)
BASOPHILS ABSOLUTE: 0 K/UL (ref 0–0.2)
BASOPHILS RELATIVE PERCENT: 0.6 % (ref 0–1)
BILIRUB SERPL-MCNC: 0.4 MG/DL (ref 0.2–1.2)
BUN BLDV-MCNC: 9 MG/DL (ref 6–20)
CALCIUM SERPL-MCNC: 8.7 MG/DL (ref 8.6–10)
CHLORIDE BLD-SCNC: 102 MMOL/L (ref 98–111)
CO2: 23 MMOL/L (ref 22–29)
CREAT SERPL-MCNC: 0.5 MG/DL (ref 0.5–1.2)
EOSINOPHILS ABSOLUTE: 0.2 K/UL (ref 0–0.6)
EOSINOPHILS RELATIVE PERCENT: 3.2 % (ref 0–5)
FERRITIN: 155.6 NG/ML (ref 30–400)
FOLATE: 4.2 NG/ML (ref 4.5–32.2)
GFR SERPL CREATININE-BSD FRML MDRD: >60 ML/MIN/{1.73_M2}
GLUCOSE BLD-MCNC: 102 MG/DL (ref 70–99)
GLUCOSE BLD-MCNC: 115 MG/DL (ref 74–109)
GLUCOSE BLD-MCNC: 80 MG/DL (ref 70–99)
GLUCOSE BLD-MCNC: 86 MG/DL (ref 70–99)
GLUCOSE BLD-MCNC: 99 MG/DL (ref 70–99)
HCT VFR BLD CALC: 41.4 % (ref 42–52)
HEMOGLOBIN: 12.5 G/DL (ref 14–18)
IMMATURE GRANULOCYTES #: 0 K/UL
IRON SATURATION: 11 % (ref 14–50)
IRON: 30 UG/DL (ref 59–158)
LYMPHOCYTES ABSOLUTE: 1.5 K/UL (ref 1.1–4.5)
LYMPHOCYTES RELATIVE PERCENT: 21.9 % (ref 20–40)
MAGNESIUM: 2 MG/DL (ref 1.6–2.6)
MCH RBC QN AUTO: 28 PG (ref 27–31)
MCHC RBC AUTO-ENTMCNC: 30.2 G/DL (ref 33–37)
MCV RBC AUTO: 92.8 FL (ref 80–94)
MONOCYTES ABSOLUTE: 0.4 K/UL (ref 0–0.9)
MONOCYTES RELATIVE PERCENT: 5.5 % (ref 0–10)
NEUTROPHILS ABSOLUTE: 4.7 K/UL (ref 1.5–7.5)
NEUTROPHILS RELATIVE PERCENT: 68.7 % (ref 50–65)
PDW BLD-RTO: 14 % (ref 11.5–14.5)
PERFORMED ON: ABNORMAL
PERFORMED ON: NORMAL
PLATELET # BLD: 342 K/UL (ref 130–400)
PMV BLD AUTO: 9.7 FL (ref 9.4–12.4)
POTASSIUM REFLEX MAGNESIUM: 4.4 MMOL/L (ref 3.5–5)
RBC # BLD: 4.46 M/UL (ref 4.7–6.1)
SODIUM BLD-SCNC: 135 MMOL/L (ref 136–145)
TOTAL IRON BINDING CAPACITY: 266 UG/DL (ref 250–400)
TOTAL PROTEIN: 6.8 G/DL (ref 6.6–8.7)
TSH SERPL DL<=0.05 MIU/L-ACNC: 0.71 UIU/ML (ref 0.27–4.2)
VITAMIN B-12: >2000 PG/ML (ref 211–946)
VITAMIN D 25-HYDROXY: 33.7 NG/ML
WBC # BLD: 6.8 K/UL (ref 4.8–10.8)

## 2023-01-13 PROCEDURE — 6370000000 HC RX 637 (ALT 250 FOR IP): Performed by: NURSE PRACTITIONER

## 2023-01-13 PROCEDURE — 84443 ASSAY THYROID STIM HORMONE: CPT

## 2023-01-13 PROCEDURE — 74018 RADEX ABDOMEN 1 VIEW: CPT

## 2023-01-13 PROCEDURE — 82728 ASSAY OF FERRITIN: CPT

## 2023-01-13 PROCEDURE — 82947 ASSAY GLUCOSE BLOOD QUANT: CPT

## 2023-01-13 PROCEDURE — 1210000000 HC MED SURG R&B

## 2023-01-13 PROCEDURE — 83735 ASSAY OF MAGNESIUM: CPT

## 2023-01-13 PROCEDURE — 36415 COLL VENOUS BLD VENIPUNCTURE: CPT

## 2023-01-13 PROCEDURE — 6360000002 HC RX W HCPCS: Performed by: HOSPITALIST

## 2023-01-13 PROCEDURE — 82306 VITAMIN D 25 HYDROXY: CPT

## 2023-01-13 PROCEDURE — 83540 ASSAY OF IRON: CPT

## 2023-01-13 PROCEDURE — 99232 SBSQ HOSP IP/OBS MODERATE 35: CPT | Performed by: SPECIALIST

## 2023-01-13 PROCEDURE — 74018 RADEX ABDOMEN 1 VIEW: CPT | Performed by: RADIOLOGY

## 2023-01-13 PROCEDURE — 83550 IRON BINDING TEST: CPT

## 2023-01-13 PROCEDURE — 80053 COMPREHEN METABOLIC PANEL: CPT

## 2023-01-13 PROCEDURE — 82607 VITAMIN B-12: CPT

## 2023-01-13 PROCEDURE — 82746 ASSAY OF FOLIC ACID SERUM: CPT

## 2023-01-13 PROCEDURE — 6370000000 HC RX 637 (ALT 250 FOR IP): Performed by: HOSPITALIST

## 2023-01-13 PROCEDURE — 85025 COMPLETE CBC W/AUTO DIFF WBC: CPT

## 2023-01-13 RX ORDER — FOLIC ACID 1 MG/1
1 TABLET ORAL DAILY
Status: DISCONTINUED | OUTPATIENT
Start: 2023-01-13 | End: 2023-01-18 | Stop reason: HOSPADM

## 2023-01-13 RX ORDER — PROPRANOLOL HYDROCHLORIDE 10 MG/1
10 TABLET ORAL 3 TIMES DAILY
Status: DISCONTINUED | OUTPATIENT
Start: 2023-01-13 | End: 2023-01-18 | Stop reason: HOSPADM

## 2023-01-13 RX ADMIN — METHOCARBAMOL TABLETS 750 MG: 750 TABLET, COATED ORAL at 09:46

## 2023-01-13 RX ADMIN — MEROPENEM AND SODIUM CHLORIDE 1000 MG: 1 INJECTION, SOLUTION INTRAVENOUS at 16:33

## 2023-01-13 RX ADMIN — APIXABAN 5 MG: 5 TABLET, FILM COATED ORAL at 09:46

## 2023-01-13 RX ADMIN — PROPRANOLOL HYDROCHLORIDE 10 MG: 10 TABLET ORAL at 09:47

## 2023-01-13 RX ADMIN — IRON SUCROSE 200 MG: 20 INJECTION, SOLUTION INTRAVENOUS at 12:41

## 2023-01-13 RX ADMIN — Medication 30 MG: at 09:47

## 2023-01-13 RX ADMIN — METHOCARBAMOL TABLETS 750 MG: 750 TABLET, COATED ORAL at 21:39

## 2023-01-13 RX ADMIN — MEROPENEM AND SODIUM CHLORIDE 1000 MG: 1 INJECTION, SOLUTION INTRAVENOUS at 01:14

## 2023-01-13 RX ADMIN — PROPRANOLOL HYDROCHLORIDE 10 MG: 10 TABLET ORAL at 21:39

## 2023-01-13 RX ADMIN — Medication 500 MG: at 21:38

## 2023-01-13 RX ADMIN — MEROPENEM AND SODIUM CHLORIDE 1000 MG: 1 INJECTION, SOLUTION INTRAVENOUS at 09:46

## 2023-01-13 RX ADMIN — Medication 30 MG: at 16:31

## 2023-01-13 RX ADMIN — Medication 500 MG: at 09:47

## 2023-01-13 RX ADMIN — APIXABAN 5 MG: 5 TABLET, FILM COATED ORAL at 21:38

## 2023-01-13 RX ADMIN — ARIPIPRAZOLE 20 MG: 10 TABLET ORAL at 09:46

## 2023-01-13 RX ADMIN — FOLIC ACID 1 MG: 1 TABLET ORAL at 16:31

## 2023-01-13 ASSESSMENT — PAIN DESCRIPTION - DESCRIPTORS: DESCRIPTORS: ACHING

## 2023-01-13 ASSESSMENT — PAIN DESCRIPTION - LOCATION: LOCATION: BACK

## 2023-01-13 ASSESSMENT — PAIN SCALES - GENERAL: PAINLEVEL_OUTOF10: 3

## 2023-01-13 NOTE — CARE COORDINATION
Spoke with patient regarding MD orders for [place] services. Patient agreeable and has chosen [facility]. Referral Faxed. [address] [place] [address] [phone]. [phone]. Please notify [facility] when patient discharges and fax DC Summary,  DC med list and any new [place] orders. The Patient and/or patient representative  was provided with a choice of provider and agrees   with the discharge plan. [x] Yes [] No    Freedom of choice list was provided with basic dialogue that supports the patient's individualized plan of care/goals, treatment preferences and shares the quality data associated with the providers.  [x] Yes [] No  Electronically signed by Soraya Mcdonald on 1/13/2023 at 9:03 AM

## 2023-01-13 NOTE — PROGRESS NOTES
Comprehensive Nutrition Assessment    Type and Reason for Visit:  Reassess    Nutrition Recommendations/Plan:   Continue POC     Malnutrition Assessment:  Malnutrition Status:  Severe malnutrition (01/11/23 0925)    Context:  Acute Illness     Findings of the 6 clinical characteristics of malnutrition:  Energy Intake:  50% or less of estimated energy requirements for 5 or more days  Weight Loss:  No significant weight loss     Body Fat Loss: Moderate body fat loss Orbital, Fat Overlying Ribs   Muscle Mass Loss:  Mild muscle mass loss Temples (temporalis), Clavicles (pectoralis & deltoids)  Fluid Accumulation:  No significant fluid accumulation Extremities   Strength:  Not Performed    Nutrition Assessment:    TF is running at goal rate of 75 mL/hr. Pt appears to be asleep and comfortable. No visitors in room. Continue current TF regimen at this time. Current Nutrition Intake & Therapies:    Average Meal Intake: NPO  Average Supplements Intake: NPO  Current Tube Feeding (TF) Orders:  Feeding Route: PEG  Formula: Standard without Fiber  Schedule: Continuous  Feeding Regimen: Osmolite 1.2 @ 75 mL/hr  Additives/Modulars: None  Water Flushes: 25 mL/hr = 600 mL/day  Current TF & Flush Orders Provides: 2160 kcals, 100 g/protein, 283 g/CHO, and 2076 mL/fluid daily  Goal TF & Flush Orders Provides: 2160 kcals, 100 g/protein, 283 g/CHO, and 2076 mL/fluid daily    Anthropometric Measures:  Height: 6' 2\" (188 cm)  Ideal Body Weight (IBW): 190 lbs (86 kg)    Admission Body Weight: 145 lb (65.8 kg)  Current Body Weight: 145 lb (65.8 kg), 76.3 % IBW.     Current BMI (kg/m2): 18.6  Usual Body Weight: 147 lb (66.7 kg) (10/2022)  % Weight Change (Calculated): -1.4  Weight Adjustment For: Quadriplegia  Total Adjusted Percentage (Calculated): 12.5  Adjusted Ideal Body Weight (lbs) (Calculated): 166.3 lbs  Adjusted Ideal Body Weight (kg) (Calculated): 75.59 kg  Adjusted % Ideal Body Weight (Calculated): 87.2  Adjusted BMI (kg/m2) (Calculated): 20.9  BMI Categories: Normal Weight (BMI 18.5-24. 9)    Estimated Daily Nutrient Needs:  Energy Requirements Based On: Kcal/kg  Weight Used for Energy Requirements: Current  Energy (kcal/day): 3357-3598 kcals/day  Weight Used for Protein Requirements: Current  Protein (g/day):  g/protein/day  Method Used for Fluid Requirements: 1 ml/kcal  Fluid (ml/day): 3114-1243 mL/day    Nutrition Diagnosis:   Severe malnutrition, In context of acute illness or injury related to inadequate protein-energy intake as evidenced by NPO or clear liquid status due to medical condition, moderate loss of subcutaneous fat    Nutrition Interventions:   Food and/or Nutrient Delivery: Continue NPO, Continue Current Tube Feeding  Nutrition Education/Counseling: No recommendation at this time  Coordination of Nutrition Care: Continue to monitor while inpatient    Goals:  Previous Goal Met: Progressing toward Goal(s)  Goals:  Tolerate nutrition support at goal rate    Nutrition Monitoring and Evaluation:   Food/Nutrient Intake Outcomes: Enteral Nutrition Intake/Tolerance  Physical Signs/Symptoms Outcomes: Biochemical Data, GI Status, Nausea or Vomiting, Nutrition Focused Physical Findings, Weight    Discharge Planning:    Enteral Nutrition     Megan Chopra, MS, RD, LD  Contact: [phone]

## 2023-01-14 PROBLEM — B96.29 UTI DUE TO EXTENDED-SPECTRUM BETA LACTAMASE (ESBL) PRODUCING ESCHERICHIA COLI: Status: ACTIVE | Noted: 2022-09-20

## 2023-01-14 PROBLEM — Z16.12 UTI DUE TO EXTENDED-SPECTRUM BETA LACTAMASE (ESBL) PRODUCING ESCHERICHIA COLI: Status: ACTIVE | Noted: 2022-09-20

## 2023-01-14 LAB
ALBUMIN SERPL-MCNC: 3.2 G/DL (ref 3.5–5.2)
ALP BLD-CCNC: 52 U/L (ref 40–130)
ALT SERPL-CCNC: 59 U/L (ref 5–41)
ANION GAP SERPL CALCULATED.3IONS-SCNC: 7 MMOL/L (ref 7–19)
AST SERPL-CCNC: 16 U/L (ref 5–40)
BASOPHILS ABSOLUTE: 0 K/UL (ref 0–0.2)
BASOPHILS RELATIVE PERCENT: 0.6 % (ref 0–1)
BILIRUB SERPL-MCNC: 0.4 MG/DL (ref 0.2–1.2)
BUN BLDV-MCNC: 6 MG/DL (ref 6–20)
CALCIUM SERPL-MCNC: 8.8 MG/DL (ref 8.6–10)
CHLORIDE BLD-SCNC: 103 MMOL/L (ref 98–111)
CO2: 25 MMOL/L (ref 22–29)
CREAT SERPL-MCNC: 0.4 MG/DL (ref 0.5–1.2)
EOSINOPHILS ABSOLUTE: 0.3 K/UL (ref 0–0.6)
EOSINOPHILS RELATIVE PERCENT: 3.8 % (ref 0–5)
GFR SERPL CREATININE-BSD FRML MDRD: >60 ML/MIN/{1.73_M2}
GLUCOSE BLD-MCNC: 107 MG/DL (ref 70–99)
GLUCOSE BLD-MCNC: 125 MG/DL (ref 70–99)
GLUCOSE BLD-MCNC: 68 MG/DL (ref 70–99)
GLUCOSE BLD-MCNC: 94 MG/DL (ref 70–99)
GLUCOSE BLD-MCNC: 97 MG/DL (ref 74–109)
GLUCOSE BLD-MCNC: 99 MG/DL (ref 70–99)
HCT VFR BLD CALC: 40.6 % (ref 42–52)
HEMOGLOBIN: 12.6 G/DL (ref 14–18)
IMMATURE GRANULOCYTES #: 0 K/UL
LYMPHOCYTES ABSOLUTE: 1.1 K/UL (ref 1.1–4.5)
LYMPHOCYTES RELATIVE PERCENT: 16.4 % (ref 20–40)
MCH RBC QN AUTO: 27.7 PG (ref 27–31)
MCHC RBC AUTO-ENTMCNC: 31 G/DL (ref 33–37)
MCV RBC AUTO: 89.2 FL (ref 80–94)
MONOCYTES ABSOLUTE: 0.5 K/UL (ref 0–0.9)
MONOCYTES RELATIVE PERCENT: 7.8 % (ref 0–10)
NEUTROPHILS ABSOLUTE: 4.8 K/UL (ref 1.5–7.5)
NEUTROPHILS RELATIVE PERCENT: 71.1 % (ref 50–65)
ORGANISM: ABNORMAL
PDW BLD-RTO: 14.1 % (ref 11.5–14.5)
PERFORMED ON: ABNORMAL
PERFORMED ON: NORMAL
PERFORMED ON: NORMAL
PLATELET # BLD: 365 K/UL (ref 130–400)
PMV BLD AUTO: 10.2 FL (ref 9.4–12.4)
POTASSIUM REFLEX MAGNESIUM: 4.4 MMOL/L (ref 3.5–5)
RBC # BLD: 4.55 M/UL (ref 4.7–6.1)
SODIUM BLD-SCNC: 135 MMOL/L (ref 136–145)
TOTAL PROTEIN: 6.6 G/DL (ref 6.6–8.7)
URINE CULTURE, ROUTINE: ABNORMAL
URINE CULTURE, ROUTINE: ABNORMAL
WBC # BLD: 6.8 K/UL (ref 4.8–10.8)

## 2023-01-14 PROCEDURE — 2580000003 HC RX 258: Performed by: NURSE PRACTITIONER

## 2023-01-14 PROCEDURE — 85025 COMPLETE CBC W/AUTO DIFF WBC: CPT

## 2023-01-14 PROCEDURE — 80053 COMPREHEN METABOLIC PANEL: CPT

## 2023-01-14 PROCEDURE — 6360000002 HC RX W HCPCS: Performed by: HOSPITALIST

## 2023-01-14 PROCEDURE — 6370000000 HC RX 637 (ALT 250 FOR IP): Performed by: HOSPITALIST

## 2023-01-14 PROCEDURE — 82947 ASSAY GLUCOSE BLOOD QUANT: CPT

## 2023-01-14 PROCEDURE — 6370000000 HC RX 637 (ALT 250 FOR IP): Performed by: NURSE PRACTITIONER

## 2023-01-14 PROCEDURE — 1210000000 HC MED SURG R&B

## 2023-01-14 PROCEDURE — 36415 COLL VENOUS BLD VENIPUNCTURE: CPT

## 2023-01-14 RX ADMIN — METHOCARBAMOL TABLETS 750 MG: 750 TABLET, COATED ORAL at 07:49

## 2023-01-14 RX ADMIN — FOLIC ACID 1 MG: 1 TABLET ORAL at 07:49

## 2023-01-14 RX ADMIN — ARIPIPRAZOLE 20 MG: 10 TABLET ORAL at 07:49

## 2023-01-14 RX ADMIN — APIXABAN 5 MG: 5 TABLET, FILM COATED ORAL at 07:49

## 2023-01-14 RX ADMIN — APIXABAN 5 MG: 5 TABLET, FILM COATED ORAL at 21:49

## 2023-01-14 RX ADMIN — Medication 30 MG: at 16:18

## 2023-01-14 RX ADMIN — SODIUM CHLORIDE, SODIUM LACTATE, POTASSIUM CHLORIDE, CALCIUM CHLORIDE AND DEXTROSE MONOHYDRATE: 5; 600; 310; 30; 20 INJECTION, SOLUTION INTRAVENOUS at 16:15

## 2023-01-14 RX ADMIN — Medication 30 MG: at 07:49

## 2023-01-14 RX ADMIN — PROPRANOLOL HYDROCHLORIDE 10 MG: 10 TABLET ORAL at 07:49

## 2023-01-14 RX ADMIN — MEROPENEM AND SODIUM CHLORIDE 1000 MG: 1 INJECTION, SOLUTION INTRAVENOUS at 22:40

## 2023-01-14 RX ADMIN — PROPRANOLOL HYDROCHLORIDE 10 MG: 10 TABLET ORAL at 21:50

## 2023-01-14 RX ADMIN — SODIUM CHLORIDE, PRESERVATIVE FREE 10 ML: 5 INJECTION INTRAVENOUS at 07:58

## 2023-01-14 RX ADMIN — METHOCARBAMOL TABLETS 750 MG: 750 TABLET, COATED ORAL at 21:50

## 2023-01-14 RX ADMIN — DEXTROSE MONOHYDRATE 125 ML: 100 INJECTION, SOLUTION INTRAVENOUS at 07:55

## 2023-01-14 RX ADMIN — MEROPENEM AND SODIUM CHLORIDE 1000 MG: 1 INJECTION, SOLUTION INTRAVENOUS at 16:18

## 2023-01-14 RX ADMIN — IRON SUCROSE 200 MG: 20 INJECTION, SOLUTION INTRAVENOUS at 12:44

## 2023-01-14 RX ADMIN — PROPRANOLOL HYDROCHLORIDE 10 MG: 10 TABLET ORAL at 12:44

## 2023-01-14 RX ADMIN — MEROPENEM AND SODIUM CHLORIDE 1000 MG: 1 INJECTION, SOLUTION INTRAVENOUS at 09:07

## 2023-01-14 RX ADMIN — MEROPENEM AND SODIUM CHLORIDE 1000 MG: 1 INJECTION, SOLUTION INTRAVENOUS at 03:06

## 2023-01-14 RX ADMIN — Medication 500 MG: at 07:49

## 2023-01-14 RX ADMIN — Medication 500 MG: at 21:49

## 2023-01-14 ASSESSMENT — PAIN SCALES - GENERAL: PAINLEVEL_OUTOF10: 2

## 2023-01-14 ASSESSMENT — PAIN DESCRIPTION - DESCRIPTORS: DESCRIPTORS: ACHING

## 2023-01-14 ASSESSMENT — PAIN DESCRIPTION - LOCATION: LOCATION: ABDOMEN

## 2023-01-14 NOTE — PLAN OF CARE
Problem: Discharge Planning  Goal: Discharge to home or other facility with appropriate resources  Outcome: Progressing     Problem: Skin/Tissue Integrity  Goal: Absence of new skin breakdown  Description: 1. Monitor for areas of redness and/or skin breakdown  2. Assess vascular access sites hourly  3. Every 4-6 hours minimum:  Change oxygen saturation probe site  4. Every 4-6 hours:  If on nasal continuous positive airway pressure, respiratory therapy assess nares and determine need for appliance change or resting period. Outcome: Progressing     Problem: Safety - Adult  Goal: Free from fall injury  Outcome: Progressing     Problem: Safety - Medical Restraint  Goal: Remains free of injury from restraints (Restraint for Interference with Medical Device)  Description: INTERVENTIONS:  1. Determine that other, less restrictive measures have been tried or would not be effective before applying the restraint  2. Evaluate the patient's condition at the time of restraint application  3. Inform patient/family regarding the reason for restraint  4.  Q2H: Monitor safety, psychosocial status, comfort, nutrition and hydration  Outcome: Progressing     Problem: Nutrition Deficit:  Goal: Optimize nutritional status  Outcome: Progressing     Problem: Pain  Goal: Verbalizes/displays adequate comfort level or baseline comfort level  Outcome: Progressing     Problem: Neurosensory - Adult  Goal: Absence of seizures  Outcome: Progressing     Problem: Skin/Tissue Integrity - Adult  Goal: Skin integrity remains intact  Outcome: Progressing     Problem: Musculoskeletal - Adult  Goal: Return mobility to safest level of function  Outcome: Progressing     Problem: Gastrointestinal - Adult  Goal: Minimal or absence of nausea and vomiting  Outcome: Progressing  Goal: Maintains adequate nutritional intake  Outcome: Progressing     Problem: Genitourinary - Adult  Goal: Absence of urinary retention  Outcome: Progressing     Problem: Infection - Adult  Goal: Absence of infection at discharge  Outcome: Progressing     Problem: Coping  Goal: Pt/Family able to verbalize concerns and demonstrate effective coping strategies  Description: INTERVENTIONS:  1. Assist patient/family to identify coping skills, available support systems and cultural and spiritual values  2. Provide emotional support, including active listening and acknowledgement of concerns of patient and caregivers  3. Reduce environmental stimuli, as able  4. Instruct patient/family in relaxation techniques, as appropriate  5. Assess for spiritual pain/suffering and initiate Spiritual Care, Psychosocial Clinical Specialist consults as needed  Outcome: Progressing     Problem: Behavior  Goal: Pt/Family maintain appropriate behavior and adhere to behavioral management agreement, if implemented  Description: INTERVENTIONS:  1. Assess patient/family's coping skills and  non-compliant behavior (including use of illegal substances)  2. Notify security of behavior or suspected illegal substances which indicate the need for search of the family and/or belongings  3. Encourage verbalization of thoughts and concerns in a socially appropriate manner  4. Utilize positive, consistent limit setting strategies supporting safety of patient, staff and others  5. Encourage participation in the decision making process about the behavioral management agreement  6. If a visitor's behavior poses a threat to safety call refer to organization policy.  7. Initiate consult with , Psychosocial CNS, Spiritual Care as appropriate  Outcome: Progressing

## 2023-01-14 NOTE — PLAN OF CARE
Problem: Nutrition Deficit:  Goal: Optimize nutritional status  1/14/2023 1223 by Cynthia Harvey, MS, RD, LD  Outcome: Progressing  Flowsheets (Taken 1/14/2023 1217)  Nutrient intake appropriate for improving, restoring, or maintaining nutritional needs:   Assess nutritional status and recommend course of action   Recommend, monitor, and adjust tube feedings and TPN/PPN based on assessed needs  1/14/2023 1049 by Aldona Mohs, RN  Outcome: Progressing
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
## 2023-01-14 NOTE — PROGRESS NOTES
Nutrition Assessment     Type and Reason for Visit: Reassess    Nutrition Recommendations/Plan:   Continue current EN     Malnutrition Assessment:  Malnutrition Status: Severe malnutrition    Nutrition Assessment:  TF continues at 75ml/hr. No further emesis since 1/12/2023. No new wt. Continue current interventions    Estimated Daily Nutrient Needs:  Energy (kcal):  2235-2625 kcals/day Weight Used for Energy Requirements: Current     Protein (g):   g/protein/day Weight Used for Protein Requirements: Current        Fluid (ml/day):  1941-7515 mL/day Method Used for Fluid Requirements: 1 ml/kcal    Nutrition Related Findings:   BMP WNL Wound Type: None    Current Nutrition Therapies:    Diet NPO Exceptions are: Ice Chips  ADULT TUBE FEEDING; PEG; Standard without Fiber; Continuous; 25; Yes; 25; Q 8 hours; 75; 25; Q 1 hour    Anthropometric Measures:  Height: 6' 2\" (188 cm)  Current Body Wt: 145 lb (65.8 kg)   BMI: 18.6    Nutrition Diagnosis:   Severe malnutrition related to inadequate protein-energy intake as evidenced by NPO or clear liquid status due to medical condition, moderate loss of subcutaneous fat    Nutrition Interventions:   Food and/or Nutrient Delivery: Continue NPO, Continue Current Tube Feeding  Nutrition Education/Counseling: No recommendation at this time  Coordination of Nutrition Care: Continue to monitor while inpatient       Goals:  Previous Goal Met: Goal(s) Achieved  Goals:  Tolerate nutrition support at goal rate       Nutrition Monitoring and Evaluation:   Behavioral-Environmental Outcomes: None Identified  Food/Nutrient Intake Outcomes: Enteral Nutrition Intake/Tolerance  Physical Signs/Symptoms Outcomes: Biochemical Data, Nausea or Vomiting, Fluid Status or Edema, Weight, Skin    Discharge Planning:    Enteral Nutrition     Ajay Amanda, MS, RD, LD  Contact: [phone]
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
## 2023-01-15 LAB
ALBUMIN SERPL-MCNC: 3 G/DL (ref 3.5–5.2)
ALP BLD-CCNC: 49 U/L (ref 40–130)
ALT SERPL-CCNC: 45 U/L (ref 5–41)
ANION GAP SERPL CALCULATED.3IONS-SCNC: 7 MMOL/L (ref 7–19)
AST SERPL-CCNC: 16 U/L (ref 5–40)
BASOPHILS ABSOLUTE: 0.1 K/UL (ref 0–0.2)
BASOPHILS RELATIVE PERCENT: 1 % (ref 0–1)
BILIRUB SERPL-MCNC: 0.3 MG/DL (ref 0.2–1.2)
BUN BLDV-MCNC: 7 MG/DL (ref 6–20)
CALCIUM SERPL-MCNC: 8.6 MG/DL (ref 8.6–10)
CHLORIDE BLD-SCNC: 106 MMOL/L (ref 98–111)
CO2: 24 MMOL/L (ref 22–29)
CREAT SERPL-MCNC: 0.4 MG/DL (ref 0.5–1.2)
EOSINOPHILS ABSOLUTE: 0.2 K/UL (ref 0–0.6)
EOSINOPHILS RELATIVE PERCENT: 3.6 % (ref 0–5)
GFR SERPL CREATININE-BSD FRML MDRD: >60 ML/MIN/{1.73_M2}
GLUCOSE BLD-MCNC: 83 MG/DL (ref 70–99)
GLUCOSE BLD-MCNC: 85 MG/DL (ref 70–99)
GLUCOSE BLD-MCNC: 86 MG/DL (ref 74–109)
GLUCOSE BLD-MCNC: 87 MG/DL (ref 70–99)
GLUCOSE BLD-MCNC: 98 MG/DL (ref 70–99)
HCT VFR BLD CALC: 37.7 % (ref 42–52)
HEMOGLOBIN: 11.7 G/DL (ref 14–18)
IMMATURE GRANULOCYTES #: 0 K/UL
LYMPHOCYTES ABSOLUTE: 1.2 K/UL (ref 1.1–4.5)
LYMPHOCYTES RELATIVE PERCENT: 22.1 % (ref 20–40)
MCH RBC QN AUTO: 27.8 PG (ref 27–31)
MCHC RBC AUTO-ENTMCNC: 31 G/DL (ref 33–37)
MCV RBC AUTO: 89.5 FL (ref 80–94)
MONOCYTES ABSOLUTE: 0.5 K/UL (ref 0–0.9)
MONOCYTES RELATIVE PERCENT: 8.6 % (ref 0–10)
NEUTROPHILS ABSOLUTE: 3.4 K/UL (ref 1.5–7.5)
NEUTROPHILS RELATIVE PERCENT: 64.5 % (ref 50–65)
PDW BLD-RTO: 14.1 % (ref 11.5–14.5)
PERFORMED ON: NORMAL
PLATELET # BLD: 314 K/UL (ref 130–400)
PMV BLD AUTO: 10.5 FL (ref 9.4–12.4)
POTASSIUM REFLEX MAGNESIUM: 4.7 MMOL/L (ref 3.5–5)
RBC # BLD: 4.21 M/UL (ref 4.7–6.1)
SODIUM BLD-SCNC: 137 MMOL/L (ref 136–145)
TOTAL PROTEIN: 6.4 G/DL (ref 6.6–8.7)
WBC # BLD: 5.3 K/UL (ref 4.8–10.8)

## 2023-01-15 PROCEDURE — 6370000000 HC RX 637 (ALT 250 FOR IP): Performed by: NURSE PRACTITIONER

## 2023-01-15 PROCEDURE — 80053 COMPREHEN METABOLIC PANEL: CPT

## 2023-01-15 PROCEDURE — 82947 ASSAY GLUCOSE BLOOD QUANT: CPT

## 2023-01-15 PROCEDURE — 6360000002 HC RX W HCPCS: Performed by: HOSPITALIST

## 2023-01-15 PROCEDURE — 2580000003 HC RX 258: Performed by: NURSE PRACTITIONER

## 2023-01-15 PROCEDURE — 6360000002 HC RX W HCPCS: Performed by: NURSE PRACTITIONER

## 2023-01-15 PROCEDURE — 6370000000 HC RX 637 (ALT 250 FOR IP): Performed by: HOSPITALIST

## 2023-01-15 PROCEDURE — 1210000000 HC MED SURG R&B

## 2023-01-15 PROCEDURE — 85025 COMPLETE CBC W/AUTO DIFF WBC: CPT

## 2023-01-15 PROCEDURE — 36415 COLL VENOUS BLD VENIPUNCTURE: CPT

## 2023-01-15 RX ORDER — LACTOBACILLUS RHAMNOSUS GG 10B CELL
1 CAPSULE ORAL DAILY
Status: DISCONTINUED | OUTPATIENT
Start: 2023-01-15 | End: 2023-01-18 | Stop reason: HOSPADM

## 2023-01-15 RX ADMIN — ARIPIPRAZOLE 20 MG: 10 TABLET ORAL at 09:06

## 2023-01-15 RX ADMIN — PROPRANOLOL HYDROCHLORIDE 10 MG: 10 TABLET ORAL at 09:07

## 2023-01-15 RX ADMIN — Medication 30 MG: at 09:00

## 2023-01-15 RX ADMIN — SODIUM CHLORIDE, SODIUM LACTATE, POTASSIUM CHLORIDE, CALCIUM CHLORIDE AND DEXTROSE MONOHYDRATE: 5; 600; 310; 30; 20 INJECTION, SOLUTION INTRAVENOUS at 11:59

## 2023-01-15 RX ADMIN — Medication 1 CAPSULE: at 13:43

## 2023-01-15 RX ADMIN — SODIUM CHLORIDE, PRESERVATIVE FREE 10 ML: 5 INJECTION INTRAVENOUS at 09:07

## 2023-01-15 RX ADMIN — Medication 30 MG: at 17:30

## 2023-01-15 RX ADMIN — Medication 500 MG: at 21:30

## 2023-01-15 RX ADMIN — FOLIC ACID 1 MG: 1 TABLET ORAL at 09:07

## 2023-01-15 RX ADMIN — METHOCARBAMOL TABLETS 750 MG: 750 TABLET, COATED ORAL at 09:06

## 2023-01-15 RX ADMIN — MORPHINE SULFATE 2 MG: 2 INJECTION, SOLUTION INTRAMUSCULAR; INTRAVENOUS at 21:30

## 2023-01-15 RX ADMIN — MEROPENEM AND SODIUM CHLORIDE 1000 MG: 1 INJECTION, SOLUTION INTRAVENOUS at 17:32

## 2023-01-15 RX ADMIN — APIXABAN 5 MG: 5 TABLET, FILM COATED ORAL at 09:06

## 2023-01-15 RX ADMIN — PROPRANOLOL HYDROCHLORIDE 10 MG: 10 TABLET ORAL at 13:43

## 2023-01-15 RX ADMIN — MEROPENEM AND SODIUM CHLORIDE 1000 MG: 1 INJECTION, SOLUTION INTRAVENOUS at 09:00

## 2023-01-15 RX ADMIN — PROPRANOLOL HYDROCHLORIDE 10 MG: 10 TABLET ORAL at 21:30

## 2023-01-15 RX ADMIN — METHOCARBAMOL TABLETS 750 MG: 750 TABLET, COATED ORAL at 21:30

## 2023-01-15 RX ADMIN — IRON SUCROSE 200 MG: 20 INJECTION, SOLUTION INTRAVENOUS at 12:00

## 2023-01-15 RX ADMIN — Medication 500 MG: at 09:06

## 2023-01-15 RX ADMIN — SODIUM CHLORIDE, PRESERVATIVE FREE 10 ML: 5 INJECTION INTRAVENOUS at 21:30

## 2023-01-15 RX ADMIN — APIXABAN 5 MG: 5 TABLET, FILM COATED ORAL at 21:30

## 2023-01-15 ASSESSMENT — PAIN SCALES - GENERAL: PAINLEVEL_OUTOF10: 7

## 2023-01-15 ASSESSMENT — PAIN DESCRIPTION - DESCRIPTORS: DESCRIPTORS: DISCOMFORT

## 2023-01-15 ASSESSMENT — PAIN DESCRIPTION - LOCATION: LOCATION: GENERALIZED
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
## 2023-01-16 LAB
GLUCOSE BLD-MCNC: 114 MG/DL (ref 70–99)
GLUCOSE BLD-MCNC: 61 MG/DL (ref 70–99)
GLUCOSE BLD-MCNC: 81 MG/DL (ref 70–99)
GLUCOSE BLD-MCNC: 85 MG/DL (ref 70–99)
PERFORMED ON: ABNORMAL
PERFORMED ON: ABNORMAL
PERFORMED ON: NORMAL
PERFORMED ON: NORMAL

## 2023-01-16 PROCEDURE — 1210000000 HC MED SURG R&B

## 2023-01-16 PROCEDURE — 6370000000 HC RX 637 (ALT 250 FOR IP): Performed by: HOSPITALIST

## 2023-01-16 PROCEDURE — 2580000003 HC RX 258: Performed by: NURSE PRACTITIONER

## 2023-01-16 PROCEDURE — 6360000002 HC RX W HCPCS: Performed by: NURSE PRACTITIONER

## 2023-01-16 PROCEDURE — 6370000000 HC RX 637 (ALT 250 FOR IP): Performed by: NURSE PRACTITIONER

## 2023-01-16 PROCEDURE — 82947 ASSAY GLUCOSE BLOOD QUANT: CPT

## 2023-01-16 PROCEDURE — 6360000002 HC RX W HCPCS: Performed by: HOSPITALIST

## 2023-01-16 RX ADMIN — METHOCARBAMOL TABLETS 750 MG: 750 TABLET, COATED ORAL at 22:12

## 2023-01-16 RX ADMIN — PROPRANOLOL HYDROCHLORIDE 10 MG: 10 TABLET ORAL at 22:40

## 2023-01-16 RX ADMIN — FOLIC ACID 1 MG: 1 TABLET ORAL at 07:32

## 2023-01-16 RX ADMIN — APIXABAN 5 MG: 5 TABLET, FILM COATED ORAL at 22:12

## 2023-01-16 RX ADMIN — Medication 500 MG: at 22:12

## 2023-01-16 RX ADMIN — PROPRANOLOL HYDROCHLORIDE 10 MG: 10 TABLET ORAL at 07:32

## 2023-01-16 RX ADMIN — SODIUM CHLORIDE, PRESERVATIVE FREE 10 ML: 5 INJECTION INTRAVENOUS at 22:15

## 2023-01-16 RX ADMIN — MORPHINE SULFATE 2 MG: 2 INJECTION, SOLUTION INTRAMUSCULAR; INTRAVENOUS at 01:55

## 2023-01-16 RX ADMIN — METHOCARBAMOL TABLETS 750 MG: 750 TABLET, COATED ORAL at 07:32

## 2023-01-16 RX ADMIN — Medication 1 CAPSULE: at 07:32

## 2023-01-16 RX ADMIN — Medication 30 MG: at 15:30

## 2023-01-16 RX ADMIN — MEROPENEM AND SODIUM CHLORIDE 1000 MG: 1 INJECTION, SOLUTION INTRAVENOUS at 07:56

## 2023-01-16 RX ADMIN — Medication 30 MG: at 05:59

## 2023-01-16 RX ADMIN — PROPRANOLOL HYDROCHLORIDE 10 MG: 10 TABLET ORAL at 22:39

## 2023-01-16 RX ADMIN — Medication 500 MG: at 07:32

## 2023-01-16 RX ADMIN — MEROPENEM AND SODIUM CHLORIDE 1000 MG: 1 INJECTION, SOLUTION INTRAVENOUS at 19:27

## 2023-01-16 RX ADMIN — APIXABAN 5 MG: 5 TABLET, FILM COATED ORAL at 07:32

## 2023-01-16 RX ADMIN — ARIPIPRAZOLE 20 MG: 10 TABLET ORAL at 07:32

## 2023-01-16 RX ADMIN — PROPRANOLOL HYDROCHLORIDE 10 MG: 10 TABLET ORAL at 15:30

## 2023-01-16 RX ADMIN — SODIUM CHLORIDE, PRESERVATIVE FREE 10 ML: 5 INJECTION INTRAVENOUS at 07:33

## 2023-01-16 RX ADMIN — MEROPENEM AND SODIUM CHLORIDE 1000 MG: 1 INJECTION, SOLUTION INTRAVENOUS at 00:27

## 2023-01-16 ASSESSMENT — PAIN DESCRIPTION - LOCATION: LOCATION: ABDOMEN

## 2023-01-16 ASSESSMENT — PAIN SCALES - GENERAL
PAINLEVEL_OUTOF10: 0
PAINLEVEL_OUTOF10: 7

## 2023-01-16 ASSESSMENT — PAIN DESCRIPTION - DESCRIPTORS: DESCRIPTORS: DISCOMFORT

## 2023-01-16 NOTE — PROGRESS NOTES
Physician Progress Note      PATIENT:               Linn Cardenas  CSN #:                  [id number]  :                       1985  ADMIT DATE:       2023 11:59 AM  DISCH DATE:  RESPONDING  PROVIDER #:        Samuel Barnhart MD          QUERY TEXT:    Pt admitted with pancreatitis. Noted documentation of  severe malnutrition by   dietary consultant. If possible, please document in progress notes and   discharge summary:    The medical record reflects the following:  Risk Factors: Pancreatitis, UTI, G-tube complications. HX of TBI. Clinical Indicators: NPO, G-tube tube complications and moved to jejunum.   -Severe malnutrition related to inadequate protein-energy intake as evidenced   by NPO or clear liquid status due to medical condition, moderate loss of   subcutaneous fat  Treatment:  G tube moved to jejunum. Thank you    Sushma Martin RN, BSN, [facility]  [phone]  Options provided:  -- Severe malnutrition confirmed present on admission  -- Severe malnutrition ruled out  -- Other - I will add my own diagnosis  -- Disagree - Not applicable / Not valid  -- Disagree - Clinically unable to determine / Unknown  -- Refer to Clinical Documentation Reviewer    PROVIDER RESPONSE TEXT:    The diagnosis of severe malnutrition was confirmed as present on admission.     Query created by: Dandre Rod on 2023 1:02 PM      Electronically signed by:  Samuel Barnhart MD 2023 3:19 PM
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
## 2023-01-17 LAB
ANION GAP SERPL CALCULATED.3IONS-SCNC: 9 MMOL/L (ref 7–19)
BLOOD CULTURE, ROUTINE: NORMAL
BUN BLDV-MCNC: 11 MG/DL (ref 6–20)
CALCIUM SERPL-MCNC: 8.9 MG/DL (ref 8.6–10)
CHLORIDE BLD-SCNC: 101 MMOL/L (ref 98–111)
CO2: 27 MMOL/L (ref 22–29)
CREAT SERPL-MCNC: 0.4 MG/DL (ref 0.5–1.2)
CULTURE, BLOOD 2: NORMAL
GFR SERPL CREATININE-BSD FRML MDRD: >60 ML/MIN/{1.73_M2}
GLUCOSE BLD-MCNC: 77 MG/DL (ref 70–99)
GLUCOSE BLD-MCNC: 81 MG/DL (ref 70–99)
GLUCOSE BLD-MCNC: 85 MG/DL (ref 70–99)
GLUCOSE BLD-MCNC: 90 MG/DL (ref 70–99)
GLUCOSE BLD-MCNC: 98 MG/DL (ref 74–109)
HCT VFR BLD CALC: 37.4 % (ref 42–52)
HEMOGLOBIN: 11.7 G/DL (ref 14–18)
MCH RBC QN AUTO: 28 PG (ref 27–31)
MCHC RBC AUTO-ENTMCNC: 31.3 G/DL (ref 33–37)
MCV RBC AUTO: 89.5 FL (ref 80–94)
PDW BLD-RTO: 13.9 % (ref 11.5–14.5)
PERFORMED ON: NORMAL
PLATELET # BLD: 352 K/UL (ref 130–400)
PMV BLD AUTO: 10.3 FL (ref 9.4–12.4)
POTASSIUM SERPL-SCNC: 4.9 MMOL/L (ref 3.5–5)
RBC # BLD: 4.18 M/UL (ref 4.7–6.1)
SODIUM BLD-SCNC: 137 MMOL/L (ref 136–145)
WBC # BLD: 4.7 K/UL (ref 4.8–10.8)

## 2023-01-17 PROCEDURE — 80048 BASIC METABOLIC PNL TOTAL CA: CPT

## 2023-01-17 PROCEDURE — 97166 OT EVAL MOD COMPLEX 45 MIN: CPT

## 2023-01-17 PROCEDURE — 1210000000 HC MED SURG R&B

## 2023-01-17 PROCEDURE — 6360000002 HC RX W HCPCS: Performed by: HOSPITALIST

## 2023-01-17 PROCEDURE — 97530 THERAPEUTIC ACTIVITIES: CPT

## 2023-01-17 PROCEDURE — 99231 SBSQ HOSP IP/OBS SF/LOW 25: CPT | Performed by: PHYSICIAN ASSISTANT

## 2023-01-17 PROCEDURE — 6370000000 HC RX 637 (ALT 250 FOR IP): Performed by: HOSPITALIST

## 2023-01-17 PROCEDURE — 6370000000 HC RX 637 (ALT 250 FOR IP): Performed by: NURSE PRACTITIONER

## 2023-01-17 PROCEDURE — 85027 COMPLETE CBC AUTOMATED: CPT

## 2023-01-17 PROCEDURE — 82947 ASSAY GLUCOSE BLOOD QUANT: CPT

## 2023-01-17 PROCEDURE — 36415 COLL VENOUS BLD VENIPUNCTURE: CPT

## 2023-01-17 RX ADMIN — FOLIC ACID 1 MG: 1 TABLET ORAL at 07:57

## 2023-01-17 RX ADMIN — MEROPENEM AND SODIUM CHLORIDE 1000 MG: 1 INJECTION, SOLUTION INTRAVENOUS at 08:01

## 2023-01-17 RX ADMIN — APIXABAN 5 MG: 5 TABLET, FILM COATED ORAL at 07:57

## 2023-01-17 RX ADMIN — PROPRANOLOL HYDROCHLORIDE 10 MG: 10 TABLET ORAL at 15:09

## 2023-01-17 RX ADMIN — METHOCARBAMOL TABLETS 750 MG: 750 TABLET, COATED ORAL at 07:57

## 2023-01-17 RX ADMIN — PROPRANOLOL HYDROCHLORIDE 10 MG: 10 TABLET ORAL at 07:57

## 2023-01-17 RX ADMIN — METHOCARBAMOL TABLETS 750 MG: 750 TABLET, COATED ORAL at 20:45

## 2023-01-17 RX ADMIN — PROPRANOLOL HYDROCHLORIDE 10 MG: 10 TABLET ORAL at 20:45

## 2023-01-17 RX ADMIN — Medication 1 CAPSULE: at 07:57

## 2023-01-17 RX ADMIN — APIXABAN 5 MG: 5 TABLET, FILM COATED ORAL at 20:45

## 2023-01-17 RX ADMIN — Medication 30 MG: at 07:57

## 2023-01-17 RX ADMIN — Medication 500 MG: at 20:45

## 2023-01-17 RX ADMIN — Medication 500 MG: at 07:57

## 2023-01-17 RX ADMIN — Medication 30 MG: at 16:59

## 2023-01-17 RX ADMIN — MEROPENEM AND SODIUM CHLORIDE 1000 MG: 1 INJECTION, SOLUTION INTRAVENOUS at 00:00

## 2023-01-17 RX ADMIN — ARIPIPRAZOLE 20 MG: 10 TABLET ORAL at 07:57

## 2023-01-17 NOTE — PROGRESS NOTES
Comprehensive Nutrition Assessment    Type and Reason for Visit:  Reassess    Nutrition Recommendations/Plan:   As diarrhea has stopped, will try hanging formula back to Jevity 1.5 @ 60ml/hr with 25ml free water flush hourly     Malnutrition Assessment:  Malnutrition Status:  Severe malnutrition (01/14/23 1220)    Context:  Acute Illness     Findings of the 6 clinical characteristics of malnutrition:  Energy Intake:  50% or less of estimated energy requirements for 5 or more days  Weight Loss:  No significant weight loss     Body Fat Loss: Moderate body fat loss Orbital, Fat Overlying Ribs   Muscle Mass Loss:  Mild muscle mass loss Temples (temporalis), Clavicles (pectoralis & deltoids)  Fluid Accumulation:  No significant fluid accumulation Extremities   Strength:  Not Performed    Nutrition Assessment:    TF continues at 75ml/hr. No further emesis since 1/12/2023. No new wt. Continue current interventions    Nutrition Related Findings:    BMP WNL Wound Type: None       Current Nutrition Intake & Therapies:    Average Meal Intake: NPO  Average Supplements Intake: NPO  Current Tube Feeding (TF) Orders:  Feeding Route: PEG  Formula: Standard without Fiber  Schedule: Continuous  Feeding Regimen: Osmolite 1.2 @ 75ml/hr  Additives/Modulars: None  Water Flushes: 25ml hourly  Current TF & Flush Orders Provides: Osmolite 1.2 @ 75ml/hr = 2160 kcals with 100g protein, 283 g CHO 1476ml free water from formula. Flush gives another 600ml free water  Goal TF & Flush Orders Provides: Osmolite 1.2 @ 75ml/hr with 25ml free water flush hourly= 2160 kcals with 100g protein, 283 g CHO 2076 ml free water from formula and flush    Anthropometric Measures:  Height: 6' 2\" (188 cm)  Ideal Body Weight (IBW): 190 lbs (86 kg)    Admission Body Weight: 145 lb (65.8 kg)  Current Body Weight: 145 lb (65.8 kg), 76.3 % IBW.     Current BMI (kg/m2): 18.6  Usual Body Weight: 147 lb (66.7 kg) (10/2022)  % Weight Change (Calculated): -1.4  Weight Adjustment For: Quadriplegia  Total Adjusted Percentage (Calculated): 12.5  Adjusted Ideal Body Weight (lbs) (Calculated): 166.3 lbs  Adjusted Ideal Body Weight (kg) (Calculated): 75.59 kg  Adjusted % Ideal Body Weight (Calculated): 87.2  Adjusted BMI (kg/m2) (Calculated): 20.9  BMI Categories: Normal Weight (BMI 18.5-24. 9)    Estimated Daily Nutrient Needs:  Energy Requirements Based On: Kcal/kg  Weight Used for Energy Requirements: Current  Energy (kcal/day): 1998-8246 kcals/day  Weight Used for Protein Requirements: Current  Protein (g/day):  g/protein/day  Method Used for Fluid Requirements: 1 ml/kcal  Fluid (ml/day): 2393-4067 mL/day    Nutrition Diagnosis:   Severe malnutrition related to inadequate protein-energy intake as evidenced by NPO or clear liquid status due to medical condition, moderate loss of subcutaneous fat    Nutrition Interventions:   Food and/or Nutrient Delivery: Continue NPO, Continue Current Tube Feeding  Nutrition Education/Counseling: No recommendation at this time  Coordination of Nutrition Care: Continue to monitor while inpatient       Goals:  Previous Goal Met: Goal(s) Achieved  Goals:  Tolerate nutrition support at goal rate       Nutrition Monitoring and Evaluation:   Behavioral-Environmental Outcomes: None Identified  Food/Nutrient Intake Outcomes: Enteral Nutrition Intake/Tolerance  Physical Signs/Symptoms Outcomes: Biochemical Data, Nausea or Vomiting, Fluid Status or Edema, Weight, Skin    Discharge Planning:    Enteral Nutrition     Tootie Archer MS, RD, LD  Contact: [phone]
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
## 2023-01-17 NOTE — PLAN OF CARE
Problem: Nutrition Deficit:  Goal: Optimize nutritional status  1/17/2023 1314 by Camille Cade, MS, RD, LD  Outcome: Progressing  1/17/2023 0334 by Cyndy Sands RN  Outcome: Progressing
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
## 2023-01-18 VITALS
DIASTOLIC BLOOD PRESSURE: 76 MMHG | BODY MASS INDEX: 15.28 KG/M2 | WEIGHT: 119.06 LBS | TEMPERATURE: 98.2 F | OXYGEN SATURATION: 96 % | HEART RATE: 94 BPM | SYSTOLIC BLOOD PRESSURE: 106 MMHG | HEIGHT: 74 IN | RESPIRATION RATE: 18 BRPM

## 2023-01-18 LAB
ANION GAP SERPL CALCULATED.3IONS-SCNC: 11 MMOL/L (ref 7–19)
BUN BLDV-MCNC: 12 MG/DL (ref 6–20)
CALCIUM SERPL-MCNC: 9.5 MG/DL (ref 8.6–10)
CHLORIDE BLD-SCNC: 103 MMOL/L (ref 98–111)
CO2: 24 MMOL/L (ref 22–29)
CREAT SERPL-MCNC: 0.4 MG/DL (ref 0.5–1.2)
GFR SERPL CREATININE-BSD FRML MDRD: >60 ML/MIN/{1.73_M2}
GLUCOSE BLD-MCNC: 122 MG/DL (ref 74–109)
GLUCOSE BLD-MCNC: 83 MG/DL (ref 70–99)
GLUCOSE BLD-MCNC: 99 MG/DL (ref 70–99)
HCT VFR BLD CALC: 36.3 % (ref 42–52)
HEMOGLOBIN: 11.6 G/DL (ref 14–18)
MCH RBC QN AUTO: 27.7 PG (ref 27–31)
MCHC RBC AUTO-ENTMCNC: 32 G/DL (ref 33–37)
MCV RBC AUTO: 86.6 FL (ref 80–94)
PDW BLD-RTO: 13.8 % (ref 11.5–14.5)
PERFORMED ON: NORMAL
PERFORMED ON: NORMAL
PLATELET # BLD: 366 K/UL (ref 130–400)
PMV BLD AUTO: 10.2 FL (ref 9.4–12.4)
POTASSIUM SERPL-SCNC: 4.5 MMOL/L (ref 3.5–5)
RBC # BLD: 4.19 M/UL (ref 4.7–6.1)
SODIUM BLD-SCNC: 138 MMOL/L (ref 136–145)
WBC # BLD: 4.3 K/UL (ref 4.8–10.8)

## 2023-01-18 PROCEDURE — 82962 GLUCOSE BLOOD TEST: CPT

## 2023-01-18 PROCEDURE — 36415 COLL VENOUS BLD VENIPUNCTURE: CPT

## 2023-01-18 PROCEDURE — 6370000000 HC RX 637 (ALT 250 FOR IP): Performed by: HOSPITALIST

## 2023-01-18 PROCEDURE — 6370000000 HC RX 637 (ALT 250 FOR IP): Performed by: NURSE PRACTITIONER

## 2023-01-18 PROCEDURE — 80048 BASIC METABOLIC PNL TOTAL CA: CPT

## 2023-01-18 PROCEDURE — 85027 COMPLETE CBC AUTOMATED: CPT

## 2023-01-18 RX ORDER — FOLIC ACID 1 MG/1
1 TABLET ORAL DAILY
Qty: 30 TABLET | Refills: 3 | Status: SHIPPED | OUTPATIENT
Start: 2023-01-19

## 2023-01-18 RX ADMIN — Medication 1 CAPSULE: at 09:42

## 2023-01-18 RX ADMIN — METHOCARBAMOL TABLETS 750 MG: 750 TABLET, COATED ORAL at 09:42

## 2023-01-18 RX ADMIN — Medication 500 MG: at 09:42

## 2023-01-18 RX ADMIN — APIXABAN 5 MG: 5 TABLET, FILM COATED ORAL at 09:42

## 2023-01-18 RX ADMIN — FOLIC ACID 1 MG: 1 TABLET ORAL at 09:42

## 2023-01-18 RX ADMIN — ARIPIPRAZOLE 20 MG: 10 TABLET ORAL at 09:42

## 2023-01-18 RX ADMIN — PROPRANOLOL HYDROCHLORIDE 10 MG: 10 TABLET ORAL at 09:42

## 2023-01-18 NOTE — DISCHARGE INSTR - DIET
Good nutrition is important when healing from an illness, injury, or surgery. Follow any nutrition recommendations given to you during your hospital stay. If you were given an oral nutrition supplement while in the hospital, continue to take this supplement at home. You can take it with meals, in-between meals, and/or before bedtime. These supplements can be purchased at most local grocery stores, pharmacies, and chain [employer]-stores. If you have any questions about your diet or nutrition, call the hospital and ask for the dietitian. Bolus 315 mL Jevity 1.5 4 x daily. Flush tube with 115 mL/H2O prior to and after each feeding.

## 2023-01-18 NOTE — DISCHARGE SUMMARY
[place], [place], [address]    DEPARTMENT OF HOSPITALIST MEDICINE      DISCHARGE SUMMARY:      PATIENT NAME:  Beti Huerta  :  1985  MRN:  [id number]    Admission Date:   2023 11:59 AM Attending: Nancy Sánchez MD   Discharge Date:   2023              PCP: Medardo Dimas MD  Length of Stay: 12 days     Chief Complaint on Admission:   Chief Complaint   Patient presents with    Nausea    Emesis     One episode each day for 2 days       Consultants:     IP CONSULT TO GI  IP CONSULT TO PALLIATIVE CARE  IP CONSULT TO SOCIAL WORK  IP CONSULT TO RADIOLOGY  IP CONSULT TO HOME CARE NEEDS  IP CONSULT TO DIETITIAN  IP CONSULT TO [address]  COURSE  AND  TREATMENT:  The patient is a 30 y.o. male with PMH PE on Eliquis, bipolar disorder, and TBI (nonverbal at baseline) who presented to [address] [place] [address] ED complaining of emesis. Mother indicates patient has had 2-day history of vomiting. In the ED patient was found to have lipase of 1594 and CT scan abdomen indicated dilatation of the main pancreatic duct and new enlargement of the entire pancreas, incidentally indicates gastrostomy tube wound located within the proximal jejunum. Patient admitted to hospitalist service. We were concerned that malpositioned G tube was responsible for pancreatitis due to obstruction of enzyme outflow. RUQ/GB US completed and normal. GI recommends adjustment of PEG tube per radiology-consult placed. Peg tube was repositioned per radiology on 1/10/2023. Lipase improved. Eliquis restarted. Dr. Debra Leyva recommended repeat CT of the abdomen and pelvis with IV contrast which was performed on 2023 showed no evidence of pancreatitis and mild pancreatic duct dilation. There was concern for aspiration pneumonia due to intolerance to home feed regimen with vomiting and tubing feeds coming from nose. Chest x-ray ordered, no evidence of pneumonia. BC x2 NGTD, LA-0.8.  Clindamycin started, however changed to Merrem due to hx of ESBL and +UA. for E. Coli and ESBL confirmed. He received Meropenem x5 days for UTI prior to discharge. Able to tolerate tube feeding regimen. He was discharged home with home health in stable condition. Discharge Problem List:   Principal Problem:    Acute pancreatitis  Active Problems:    Nonverbal    UTI due to extended-spectrum beta lactamase (ESBL) producing Escherichia coli    Schizophrenia (HCC)    Bipolar disorder (HCC)    Severe malnutrition (HCC)    Palliative care patient    Traumatic brain injury with loss of consciousness ([facility] [address]    History of pulmonary embolus (PE)    PEG (percutaneous endoscopic gastrostomy) status ([facility] [address]  Resolved Problems:    * No resolved hospital problems. *         Last dated Assessment and Plan . .. 1/17/23        OBJECTIVE:  /76   Pulse 94   Temp 98.2 °F (36.8 °C) (Temporal)   Resp 18   Ht 6' 2\" (1.88 m)   Wt 119 lb 1 oz (54 kg)   SpO2 96%   BMI 15.29 kg/m²       Heart: RRR   Lungs: Bilateral fair air entry   Abdomen: Soft, non-tender   Extremities: No edema   Neurologic: Alert and oriented   Skin: Warm and dry          Laboratory Data:  Recent Labs     01/17/23  0341 01/18/23  0147   WBC 4.7* 4.3*   HGB 11.7* 11.6*    366     Recent Labs     01/17/23  0341 01/18/23  0147    138   K 4.9 4.5    103   CO2 27 24   BUN 11 12   CREATININE 0.4* 0.4*   GLUCOSE 98 122*     No results for input(s): AST, ALT, ALB, BILITOT, ALKPHOS in the last 72 hours. Troponin T: No results for input(s): TROPONINI in the last 72 hours. Pro-BNP: No results for input(s): BNP in the last 72 hours. INR: No results for input(s): INR in the last 72 hours. UA:No results for input(s): NITRITE, COLORU, PHUR, LABCAST, WBCUA, RBCUA, MUCUS, TRICHOMONAS, YEAST, BACTERIA, CLARITYU, SPECGRAV, LEUKOCYTESUR, UROBILINOGEN, BILIRUBINUR, BLOODU, GLUCOSEU, AMORPHOUS in the last 72 hours.     Invalid input(s): Vivien Masters  A1C: No results for input(s): LABA1C in the last 72 hours. ABG:No results for input(s): PHART, XUH8LGD, PO2ART, CGK0VEJ, BEART, HGBAE, E6CWOFLW, CARBOXHGBART in the last 72 hours. Impressions of imaging performed in 48 hours before discharge:    XR ABDOMEN (KUB) (SINGLE AP VIEW)    Result Date: 1/13/2023  NO PRIOR REPORT AVAILABLE Exam: X-RAY OF THE ABDOMEN Clinical data:Nausea, vomiting, diarrhea Technique: Single view of the abdomen was submitted. Prior studies: No prior studies submitted. Findings: There is an IVC filter. There is catheter tubing within the abdomen and pelvis suggestive of a peritoneal dialysis catheter. There is a nonspecific bowel gas pattern. Osseous structures are intact. Peritoneal dialysis catheter. IVC filter Non-specific bowel gas. Dictated and Electronically Signed by Severa Ego MD at 13-Jan-2023 10:31:55 AM             CT ABDOMEN PELVIS W IV CONTRAST Additional Contrast? None    Result Date: 1/11/2023  NO PRIOR REPORT AVAILABLE Exam: CT OF THE ABDOMEN/PELVIS WITH IV CONTRAST Clinical data: Recent pancreatitis dilated pancreatic duct and weight loss. Technique:Direct contiguousaxial CT images were acquired through the abdomen and pelvis with intravenouscontrastusing soft tissue and bone algorithms. Oral contrast was not administered. Reformatted/MPR images were performed. Contrast:Amount: Radiation dose: CTDIvol =90mGy, DLP = 2.0 mGy x cm. Limitations: Lack of oral contrast limits evaluation of the bowel loops. Prior Studies: No prior studies submitted. Findings: Lung bases: Clear Liver:Unremarkable size andcontour. Normal density. No evidence of mass. No evidence of dilated ducts. Gallbladder Fossa: Unremarkable Spleen: Grossly unremarkable. Pancreas/adrenal glands: Grossly unremarkable size, contour and density. There is slight pancreatic duct dilation. No evidence of acute pancreatitis Kidneys: In anatomic position. Grossly unremarkablerenal size, contour and density. No renal or ureteral calculi.  No evidence of a renal mass or cyst. Perinephric space is unremarkable. Retroperitoneum: No enlarged retroperitoneal lymphadenopathy. The aorta and IVC appear unremarkable. IVC filter is noted Peritoneal cavity: No evidence of free air or ascites. Gastrointestinal tract: No obstruction. G-tube in the stomach. Appendix: Unremarkable Pelvis: Solid and hollow viscera grossly unremarkable. Right-sided  shunt catheter terminating in the right hemipelvis Osseous structures: No acute or destructive bony process identified. 1.  No evidence of pancreatitis. Mild pancreatic duct dilation, nonspecific 2.  G-tube in the stomach 3. No acute findings Recommendation: Follow up as clinically indicated. All CT scans at this facility utilize dose modulation, iterative reconstruction, and/or weight based dosing when appropriate to reduce radiation dose to as low as reasonably achievable. Dictated and Electronically Signed by Edenilson Granger DO at 11-Jan-2023 11:37:36 PM             XR CHEST PORTABLE    Result Date: 1/12/2023  CHEST X-RAY, 1 view INDICATION: Leukocytosis COMPARISON: January 6, 2023. TECHNIQUE: Single frontal view of the chest. FINDINGS: There is a catheter overlying the right chest. The lungs are clear. There is no pleural effusion. No pneumothorax. Cardiomediastinal silhouette is normal in size. No significant osseous findings. No acute abnormality.           Medication List        START taking these medications      folic acid 1 MG tablet  Commonly known as: FOLVITE  Take 1 tablet by mouth daily  Start taking on: January 19, 2023     lactobacillus Caps capsule  1 capsule by PEG Tube route daily            CONTINUE taking these medications      acetaminophen 325 MG tablet  Commonly known as: TYLENOL  2 tablets by PEG Tube route every 4 hours as needed for Pain     apixaban 5 MG Tabs tablet  Commonly known as: ELIQUIS     ARIPiprazole 20 MG tablet  Commonly known as: ABILIFY  Take 1 tablet by mouth daily Per Peg tube [facility] PROBIOTIC/VITAMIN C PO     bisacodyl 5 MG EC tablet  Commonly known as: DULCOLAX     famotidine 20 MG tablet  Commonly known as: PEPCID     guaiFENesin 100 MG/5ML syrup  Commonly known as: ROBITUSSIN     levETIRAcetam 100 MG/ML solution  Commonly known as: KEPPRA     methocarbamol 750 MG tablet  Commonly known as: ROBAXIN     oxyCODONE 5 MG immediate release tablet  Commonly known as: ROXICODONE     propranolol 20 MG/5ML solution  Commonly known as: INDERAL            STOP taking these medications      Vitamin D (Ergocalciferol) 77176 units Caps            ASK your doctor about these medications      acetylcysteine 10 % nebulizer solution  Commonly known as: MUCOMYST  Inhale 4 mLs into the lungs every 4 hours     lansoprazole 3 MG/ML Susp               Where to Get Your Medications        These medications were sent to [id number] - [address] [facility] [place], [address] [place] [address]  [address] [facility] [address]  [address], [address] [place] [address]      Phone: [phone]   folic acid 1 MG tablet           ISSUES TO BE ADDRESSED AT HOSPITAL FOLLOW-UP VISIT:    Advised patient to follow-up closely with PCP upon discharge for management of chronic medical issues    Condition on Discharge: stable  Discharge Disposition: Home with [facility] [place] [facility]    Recommended Follow Up:  Biacna Lorenzo MD  [address] [address]  [phone]    Follow up in 1 week(s)  post hospital visit    Followup Appointments Scheduled at Time of Discharge:  Future Appointments   Date Time Provider Jennifer Diazi   1/30/2023  3:20 PM MIGUEL Luciano NP N KASEY Ortega [place]        Discharge Instructions:   Please see the discharge paperwork. Patient was seen at bedside today, and the examination shows improvement since yesterday. Diet Instructions:  Diet NPO Exceptions are: Ice Chips  ADULT TUBE FEEDING; PEG; Standard with Fiber; Bolus; 4 Times Daily; 315;  Infusion; 2; 115; Q 1 hour     Detailed discharge directions delivered to the patient by myself and our nursing staff, who verbalizes understanding and is satisfied with the plan. Patient has been advised to continue all medications as prescribed and advised, and f/u with PCP within 1 week. Patient is stable from medical standpoint to be discharged. Total time spent during patient evaluation and assessment, discussion with the nurse/family, addressing discharge medications/scripts and coordination of care for safe discharge was in excess of 32 minutes.       Signed Electronically:    Tricia Chan MD  12:57 PM 1/18/2023

## 2023-01-18 NOTE — PROGRESS NOTES
Comprehensive Nutrition Assessment    Type and Reason for Visit:  Reassess    Nutrition Recommendations/Plan:   Bolus 315 mL Jevity 1.5 4 x daily. Flush tube with 115 mL/H2O prior to and after each feeding. Malnutrition Assessment:  Malnutrition Status:  Severe malnutrition (01/18/23 1202)    Context:  Acute Illness     Findings of the 6 clinical characteristics of malnutrition:  Energy Intake:  No significant decrease in energy intake  Weight Loss:  Greater than 7.5% over 3 months     Body Fat Loss: Moderate body fat loss Orbital, Fat Overlying Ribs   Muscle Mass Loss:  Mild muscle mass loss Temples (temporalis), Clavicles (pectoralis & deltoids)  Fluid Accumulation:  No significant fluid accumulation Extremities   Strength:  Not Performed    Nutrition Assessment:    Pt is preparing for D/C. He shook his head yes when I asked him if he was tolerating his TF. I told him I was going to change the feedings back to four times daily and he shook his head yes again. Will modify orders. Pt relies on enteral nutrition to meet 100% of his nutritional needs. Current Nutrition Intake & Therapies:    Average Meal Intake: NPO  Average Supplements Intake: NPO  Current Tube Feeding (TF) Orders:  Feeding Route: PEG  Formula: Standard with Fiber  Schedule: Bolus  Feeding Regimen: Bolus 315 mL of Jevity 1.5 4 x daily. Flush tube with 115 mL/H2O prior to and after each feeding. Water Flushes: 920 mL/day  Goal TF & Flush Orders Provides: 1890 kcals, 80 g/protein, 272 g/CHO, and 1878 mL/fluid daily    Anthropometric Measures:  Height: 6' 2\" (188 cm)  Ideal Body Weight (IBW): 190 lbs (86 kg)    Admission Body Weight: 145 lb (65.8 kg)  Current Body Weight: 119 lb (54 kg), 76.3 % IBW.     Current BMI (kg/m2): 15.3  Usual Body Weight: 147 lb (66.7 kg) (10/2022)  % Weight Change (Calculated): -1.4  Weight Adjustment For: Quadriplegia  Total Adjusted Percentage (Calculated): 12.5  Adjusted Ideal Body Weight (lbs) (Calculated): 166.3 lbs  Adjusted Ideal Body Weight (kg) (Calculated): 75.59 kg  Adjusted % Ideal Body Weight (Calculated): 87.2  Adjusted BMI (kg/m2) (Calculated): 20.9  BMI Categories: Normal Weight (BMI 18.5-24. 9)    Estimated Daily Nutrient Needs:  Energy Requirements Based On: Kcal/kg  Weight Used for Energy Requirements: Current  Energy (kcal/day): 3841-8491 kcals/day  Weight Used for Protein Requirements: Current  Protein (g/day):  g/protein/day  Method Used for Fluid Requirements: 1 ml/kcal  Fluid (ml/day): 8205-3791 mL/day    Nutrition Diagnosis:   Severe malnutrition, In context of acute illness or injury related to swallowing difficulty as evidenced by weight loss 7.5% in 3 months, moderate loss of subcutaneous fat    Nutrition Interventions:   Food and/or Nutrient Delivery: Continue NPO, Modify Tube Feeding  Coordination of Nutrition Care: Continue to monitor while inpatient  Plan of Care discussed with: pt, nurse,     Goals:  Previous Goal Met: Progressing toward Goal(s)  Goals:  Tolerate nutrition support at goal rate    Nutrition Monitoring and Evaluation:   Food/Nutrient Intake Outcomes: Enteral Nutrition Intake/Tolerance  Physical Signs/Symptoms Outcomes: Biochemical Data, GI Status, Fluid Status or Edema, Nutrition Focused Physical Findings, Weight, Skin    Discharge Planning:    Enteral Nutrition     Jon Gamble, MS, RD, LD  Contact: [phone]

## 2023-01-30 ENCOUNTER — TELEPHONE (OUTPATIENT)
Dept: GASTROENTEROLOGY | Age: 38
End: 2023-01-30

## 2023-01-30 NOTE — TELEPHONE ENCOUNTER
Pt has three no shows with our office in the consecutive year. No showed on 11/7/22, 1/5/23 and 1/30/23. Conrad, do you give the okay that the pt is medically stable for discharge? Is the pt able to be dismissed?

## 2023-02-13 ENCOUNTER — APPOINTMENT (OUTPATIENT)
Dept: GENERAL RADIOLOGY | Age: 38
End: 2023-02-13
Payer: MEDICARE

## 2023-02-13 ENCOUNTER — HOSPITAL ENCOUNTER (EMERGENCY)
Age: 38
Discharge: HOME | End: 2023-02-13
Payer: MEDICARE

## 2023-02-13 VITALS
HEART RATE: 95 BPM | SYSTOLIC BLOOD PRESSURE: 127 MMHG | BODY MASS INDEX: 15.41 KG/M2 | WEIGHT: 120 LBS | DIASTOLIC BLOOD PRESSURE: 89 MMHG | TEMPERATURE: 97.6 F | RESPIRATION RATE: 18 BRPM | OXYGEN SATURATION: 95 %

## 2023-02-13 DIAGNOSIS — K94.20 COMPLICATION OF FEEDING TUBE (HCC): ICD-10-CM

## 2023-02-13 DIAGNOSIS — S06.9X9A TRAUMATIC BRAIN INJURY WITH LOSS OF CONSCIOUSNESS, INITIAL ENCOUNTER (HCC): ICD-10-CM

## 2023-02-13 DIAGNOSIS — K59.00 CONSTIPATION, UNSPECIFIED CONSTIPATION TYPE: Primary | ICD-10-CM

## 2023-02-13 LAB
ALBUMIN SERPL-MCNC: 4 G/DL (ref 3.5–5.2)
ALP BLD-CCNC: 55 U/L (ref 40–130)
ALT SERPL-CCNC: 23 U/L (ref 5–41)
AMORPHOUS: ABNORMAL /HPF
ANION GAP SERPL CALCULATED.3IONS-SCNC: 11 MMOL/L (ref 7–19)
AST SERPL-CCNC: 19 U/L (ref 5–40)
BACTERIA: ABNORMAL /HPF
BASOPHILS ABSOLUTE: 0.1 K/UL (ref 0–0.2)
BASOPHILS RELATIVE PERCENT: 0.6 % (ref 0–1)
BILIRUB SERPL-MCNC: 0.5 MG/DL (ref 0.2–1.2)
BILIRUBIN URINE: NEGATIVE
BLOOD, URINE: ABNORMAL
BUN BLDV-MCNC: 16 MG/DL (ref 6–20)
CALCIUM SERPL-MCNC: 9.8 MG/DL (ref 8.6–10)
CHLORIDE BLD-SCNC: 104 MMOL/L (ref 98–111)
CLARITY: ABNORMAL
CO2: 25 MMOL/L (ref 22–29)
COLOR: YELLOW
CREAT SERPL-MCNC: 0.5 MG/DL (ref 0.5–1.2)
EOSINOPHILS ABSOLUTE: 0.2 K/UL (ref 0–0.6)
EOSINOPHILS RELATIVE PERCENT: 3 % (ref 0–5)
EPITHELIAL CELLS, UA: ABNORMAL /HPF
GFR SERPL CREATININE-BSD FRML MDRD: >60 ML/MIN/{1.73_M2}
GLUCOSE BLD-MCNC: 86 MG/DL (ref 74–109)
GLUCOSE URINE: NEGATIVE MG/DL
HCT VFR BLD CALC: 42.4 % (ref 42–52)
HEMOGLOBIN: 13.5 G/DL (ref 14–18)
IMMATURE GRANULOCYTES #: 0 K/UL
KETONES, URINE: ABNORMAL MG/DL
LEUKOCYTE ESTERASE, URINE: ABNORMAL
LIPASE: 77 U/L (ref 13–60)
LYMPHOCYTES ABSOLUTE: 1.6 K/UL (ref 1.1–4.5)
LYMPHOCYTES RELATIVE PERCENT: 20.1 % (ref 20–40)
MCH RBC QN AUTO: 28.6 PG (ref 27–31)
MCHC RBC AUTO-ENTMCNC: 31.8 G/DL (ref 33–37)
MCV RBC AUTO: 89.8 FL (ref 80–94)
MONOCYTES ABSOLUTE: 0.4 K/UL (ref 0–0.9)
MONOCYTES RELATIVE PERCENT: 5.5 % (ref 0–10)
NEUTROPHILS ABSOLUTE: 5.6 K/UL (ref 1.5–7.5)
NEUTROPHILS RELATIVE PERCENT: 70.5 % (ref 50–65)
NITRITE, URINE: NEGATIVE
PDW BLD-RTO: 14.6 % (ref 11.5–14.5)
PH UA: 8.5 (ref 5–8)
PLATELET # BLD: 322 K/UL (ref 130–400)
PMV BLD AUTO: 10.7 FL (ref 9.4–12.4)
POTASSIUM REFLEX MAGNESIUM: 4.1 MMOL/L (ref 3.5–5)
PROTEIN UA: ABNORMAL MG/DL
RBC # BLD: 4.72 M/UL (ref 4.7–6.1)
RBC UA: ABNORMAL /HPF (ref 0–2)
SODIUM BLD-SCNC: 140 MMOL/L (ref 136–145)
SPECIFIC GRAVITY UA: 1.02 (ref 1–1.03)
TOTAL PROTEIN: 7.9 G/DL (ref 6.6–8.7)
UROBILINOGEN, URINE: 1 E.U./DL
WBC # BLD: 8 K/UL (ref 4.8–10.8)
WBC UA: ABNORMAL /HPF (ref 0–5)

## 2023-02-13 PROCEDURE — 36415 COLL VENOUS BLD VENIPUNCTURE: CPT

## 2023-02-13 PROCEDURE — 81001 URINALYSIS AUTO W/SCOPE: CPT

## 2023-02-13 PROCEDURE — 2500000003 HC RX 250 WO HCPCS: Performed by: PEDIATRICS

## 2023-02-13 PROCEDURE — 85025 COMPLETE CBC W/AUTO DIFF WBC: CPT

## 2023-02-13 PROCEDURE — 96374 THER/PROPH/DIAG INJ IV PUSH: CPT

## 2023-02-13 PROCEDURE — 99284 EMERGENCY DEPT VISIT MOD MDM: CPT

## 2023-02-13 PROCEDURE — 83690 ASSAY OF LIPASE: CPT

## 2023-02-13 PROCEDURE — 74018 RADEX ABDOMEN 1 VIEW: CPT | Performed by: RADIOLOGY

## 2023-02-13 PROCEDURE — 74018 RADEX ABDOMEN 1 VIEW: CPT

## 2023-02-13 PROCEDURE — 80053 COMPREHEN METABOLIC PANEL: CPT

## 2023-02-13 RX ORDER — KETAMINE HYDROCHLORIDE 50 MG/ML
0.5 INJECTION, SOLUTION, CONCENTRATE INTRAMUSCULAR; INTRAVENOUS ONCE
Status: COMPLETED | OUTPATIENT
Start: 2023-02-13 | End: 2023-02-13

## 2023-02-13 RX ADMIN — KETAMINE HYDROCHLORIDE 25 MG: 50 INJECTION, SOLUTION INTRAMUSCULAR; INTRAVENOUS at 18:08

## 2023-02-13 ASSESSMENT — ENCOUNTER SYMPTOMS: ABDOMINAL PAIN: 1

## 2023-02-13 NOTE — ED NOTES
Attempted to get blood work from patient. Pt combative with staff; biting, kicking, and pinching staff. Robby Reyes notified.       Wilfredo Barraza RN  02/13/23 [id number]
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
## 2023-02-20 ENCOUNTER — TELEPHONE (OUTPATIENT)
Dept: GASTROENTEROLOGY | Age: 38
End: 2023-02-20

## 2023-03-02 ENCOUNTER — HOSPITAL ENCOUNTER (INPATIENT)
Age: 38
LOS: 5 days | Discharge: HOME HEALTH | DRG: 393 | End: 2023-03-07
Attending: HOSPITALIST | Admitting: HOSPITALIST
Payer: MEDICARE

## 2023-03-02 ENCOUNTER — APPOINTMENT (OUTPATIENT)
Dept: GENERAL RADIOLOGY | Age: 38
DRG: 393 | End: 2023-03-02
Payer: MEDICARE

## 2023-03-02 DIAGNOSIS — T85.528A GASTROJEJUNOSTOMY TUBE DISLODGEMENT: Primary | ICD-10-CM

## 2023-03-02 DIAGNOSIS — K59.00 CONSTIPATION, UNSPECIFIED CONSTIPATION TYPE: ICD-10-CM

## 2023-03-02 DIAGNOSIS — R05.1 ACUTE COUGH: ICD-10-CM

## 2023-03-02 LAB
ADENOVIRUS BY PCR: NOT DETECTED
ALBUMIN SERPL-MCNC: 3.8 G/DL (ref 3.5–5.2)
ALP BLD-CCNC: 55 U/L (ref 40–130)
ALT SERPL-CCNC: 73 U/L (ref 5–41)
ANION GAP SERPL CALCULATED.3IONS-SCNC: 8 MMOL/L (ref 7–19)
AST SERPL-CCNC: 43 U/L (ref 5–40)
BASOPHILS ABSOLUTE: 0.1 K/UL (ref 0–0.2)
BASOPHILS RELATIVE PERCENT: 0.9 % (ref 0–1)
BILIRUB SERPL-MCNC: 0.3 MG/DL (ref 0.2–1.2)
BORDETELLA PARAPERTUSSIS BY PCR: NOT DETECTED
BORDETELLA PERTUSSIS BY PCR: NOT DETECTED
BUN BLDV-MCNC: 22 MG/DL (ref 6–20)
CALCIUM SERPL-MCNC: 9.5 MG/DL (ref 8.6–10)
CHLAMYDOPHILIA PNEUMONIAE BY PCR: NOT DETECTED
CHLORIDE BLD-SCNC: 105 MMOL/L (ref 98–111)
CO2: 26 MMOL/L (ref 22–29)
CORONAVIRUS 229E BY PCR: NOT DETECTED
CORONAVIRUS HKU1 BY PCR: NOT DETECTED
CORONAVIRUS NL63 BY PCR: NOT DETECTED
CORONAVIRUS OC43 BY PCR: NOT DETECTED
CREAT SERPL-MCNC: 0.5 MG/DL (ref 0.5–1.2)
EOSINOPHILS ABSOLUTE: 0.2 K/UL (ref 0–0.6)
EOSINOPHILS RELATIVE PERCENT: 3.2 % (ref 0–5)
GFR SERPL CREATININE-BSD FRML MDRD: >60 ML/MIN/{1.73_M2}
GLUCOSE BLD-MCNC: 81 MG/DL (ref 74–109)
HCT VFR BLD CALC: 40.9 % (ref 42–52)
HEMOGLOBIN: 13 G/DL (ref 14–18)
HUMAN METAPNEUMOVIRUS BY PCR: NOT DETECTED
HUMAN RHINOVIRUS/ENTEROVIRUS BY PCR: NOT DETECTED
IMMATURE GRANULOCYTES #: 0 K/UL
INFLUENZA A BY PCR: NOT DETECTED
INFLUENZA B BY PCR: NOT DETECTED
LYMPHOCYTES ABSOLUTE: 1.5 K/UL (ref 1.1–4.5)
LYMPHOCYTES RELATIVE PERCENT: 26.2 % (ref 20–40)
MCH RBC QN AUTO: 28.4 PG (ref 27–31)
MCHC RBC AUTO-ENTMCNC: 31.8 G/DL (ref 33–37)
MCV RBC AUTO: 89.3 FL (ref 80–94)
MONOCYTES ABSOLUTE: 0.6 K/UL (ref 0–0.9)
MONOCYTES RELATIVE PERCENT: 10.8 % (ref 0–10)
MYCOPLASMA PNEUMONIAE BY PCR: NOT DETECTED
NEUTROPHILS ABSOLUTE: 3.3 K/UL (ref 1.5–7.5)
NEUTROPHILS RELATIVE PERCENT: 58.7 % (ref 50–65)
PARAINFLUENZA VIRUS 1 BY PCR: NOT DETECTED
PARAINFLUENZA VIRUS 2 BY PCR: NOT DETECTED
PARAINFLUENZA VIRUS 3 BY PCR: NOT DETECTED
PARAINFLUENZA VIRUS 4 BY PCR: NOT DETECTED
PDW BLD-RTO: 14.9 % (ref 11.5–14.5)
PLATELET # BLD: 258 K/UL (ref 130–400)
PMV BLD AUTO: 11.5 FL (ref 9.4–12.4)
POTASSIUM REFLEX MAGNESIUM: 4.1 MMOL/L (ref 3.5–5)
RBC # BLD: 4.58 M/UL (ref 4.7–6.1)
RESPIRATORY SYNCYTIAL VIRUS BY PCR: NOT DETECTED
SARS-COV-2, PCR: NOT DETECTED
SODIUM BLD-SCNC: 139 MMOL/L (ref 136–145)
TOTAL PROTEIN: 7.7 G/DL (ref 6.6–8.7)
WBC # BLD: 5.6 K/UL (ref 4.8–10.8)

## 2023-03-02 PROCEDURE — 99285 EMERGENCY DEPT VISIT HI MDM: CPT

## 2023-03-02 PROCEDURE — 85025 COMPLETE CBC W/AUTO DIFF WBC: CPT

## 2023-03-02 PROCEDURE — 1210000000 HC MED SURG R&B

## 2023-03-02 PROCEDURE — 0202U NFCT DS 22 TRGT SARS-COV-2: CPT

## 2023-03-02 PROCEDURE — 71045 X-RAY EXAM CHEST 1 VIEW: CPT

## 2023-03-02 PROCEDURE — 80053 COMPREHEN METABOLIC PANEL: CPT

## 2023-03-02 PROCEDURE — 36415 COLL VENOUS BLD VENIPUNCTURE: CPT

## 2023-03-02 RX ORDER — LEVETIRACETAM 5 MG/ML
500 INJECTION INTRAVASCULAR EVERY 12 HOURS
Status: DISCONTINUED | OUTPATIENT
Start: 2023-03-03 | End: 2023-03-05

## 2023-03-02 RX ORDER — LABETALOL HYDROCHLORIDE 5 MG/ML
10 INJECTION, SOLUTION INTRAVENOUS EVERY 4 HOURS PRN
Status: DISCONTINUED | OUTPATIENT
Start: 2023-03-02 | End: 2023-03-07 | Stop reason: HOSPADM

## 2023-03-02 RX ORDER — ALBUTEROL SULFATE 2.5 MG/3ML
2.5 SOLUTION RESPIRATORY (INHALATION) EVERY 4 HOURS PRN
Status: DISCONTINUED | OUTPATIENT
Start: 2023-03-02 | End: 2023-03-07 | Stop reason: HOSPADM

## 2023-03-02 RX ORDER — ACETYLCYSTEINE 200 MG/ML
600 SOLUTION ORAL; RESPIRATORY (INHALATION) EVERY 4 HOURS PRN
Status: DISCONTINUED | OUTPATIENT
Start: 2023-03-02 | End: 2023-03-07 | Stop reason: HOSPADM

## 2023-03-02 ASSESSMENT — ENCOUNTER SYMPTOMS: COUGH: 1

## 2023-03-02 ASSESSMENT — PAIN - FUNCTIONAL ASSESSMENT: PAIN_FUNCTIONAL_ASSESSMENT: ADULT NONVERBAL PAIN SCALE (NPVS)

## 2023-03-03 PROBLEM — K59.00 CONSTIPATION: Status: ACTIVE | Noted: 2023-03-03

## 2023-03-03 PROBLEM — T85.528A GASTROJEJUNOSTOMY TUBE DISLODGEMENT: Status: ACTIVE | Noted: 2023-03-03

## 2023-03-03 LAB
ANION GAP SERPL CALCULATED.3IONS-SCNC: 9 MMOL/L (ref 7–19)
BASOPHILS ABSOLUTE: 0.1 K/UL (ref 0–0.2)
BASOPHILS RELATIVE PERCENT: 0.8 % (ref 0–1)
BUN BLDV-MCNC: 18 MG/DL (ref 6–20)
CALCIUM SERPL-MCNC: 9.6 MG/DL (ref 8.6–10)
CHLORIDE BLD-SCNC: 105 MMOL/L (ref 98–111)
CO2: 25 MMOL/L (ref 22–29)
CREAT SERPL-MCNC: 0.5 MG/DL (ref 0.5–1.2)
EOSINOPHILS ABSOLUTE: 0.1 K/UL (ref 0–0.6)
EOSINOPHILS RELATIVE PERCENT: 1.7 % (ref 0–5)
GFR SERPL CREATININE-BSD FRML MDRD: >60 ML/MIN/{1.73_M2}
GLUCOSE BLD-MCNC: 92 MG/DL (ref 74–109)
HCT VFR BLD CALC: 39.1 % (ref 42–52)
HEMOGLOBIN: 12.6 G/DL (ref 14–18)
IMMATURE GRANULOCYTES #: 0 K/UL
LYMPHOCYTES ABSOLUTE: 1.4 K/UL (ref 1.1–4.5)
LYMPHOCYTES RELATIVE PERCENT: 22 % (ref 20–40)
MCH RBC QN AUTO: 28.8 PG (ref 27–31)
MCHC RBC AUTO-ENTMCNC: 32.2 G/DL (ref 33–37)
MCV RBC AUTO: 89.3 FL (ref 80–94)
MONOCYTES ABSOLUTE: 0.6 K/UL (ref 0–0.9)
MONOCYTES RELATIVE PERCENT: 8.6 % (ref 0–10)
NEUTROPHILS ABSOLUTE: 4.3 K/UL (ref 1.5–7.5)
NEUTROPHILS RELATIVE PERCENT: 66.6 % (ref 50–65)
PDW BLD-RTO: 14.9 % (ref 11.5–14.5)
PLATELET # BLD: 278 K/UL (ref 130–400)
PMV BLD AUTO: 11.5 FL (ref 9.4–12.4)
POTASSIUM REFLEX MAGNESIUM: 4 MMOL/L (ref 3.5–5)
RBC # BLD: 4.38 M/UL (ref 4.7–6.1)
SODIUM BLD-SCNC: 139 MMOL/L (ref 136–145)
WBC # BLD: 6.4 K/UL (ref 4.8–10.8)

## 2023-03-03 PROCEDURE — 36415 COLL VENOUS BLD VENIPUNCTURE: CPT

## 2023-03-03 PROCEDURE — 99221 1ST HOSP IP/OBS SF/LOW 40: CPT | Performed by: INTERNAL MEDICINE

## 2023-03-03 PROCEDURE — 99223 1ST HOSP IP/OBS HIGH 75: CPT | Performed by: PHYSICIAN ASSISTANT

## 2023-03-03 PROCEDURE — 2500000003 HC RX 250 WO HCPCS: Performed by: HOSPITALIST

## 2023-03-03 PROCEDURE — 85025 COMPLETE CBC W/AUTO DIFF WBC: CPT

## 2023-03-03 PROCEDURE — 80048 BASIC METABOLIC PNL TOTAL CA: CPT

## 2023-03-03 PROCEDURE — 6370000000 HC RX 637 (ALT 250 FOR IP): Performed by: STUDENT IN AN ORGANIZED HEALTH CARE EDUCATION/TRAINING PROGRAM

## 2023-03-03 PROCEDURE — 1210000000 HC MED SURG R&B

## 2023-03-03 PROCEDURE — 94760 N-INVAS EAR/PLS OXIMETRY 1: CPT

## 2023-03-03 PROCEDURE — 2580000003 HC RX 258: Performed by: HOSPITALIST

## 2023-03-03 PROCEDURE — 6360000002 HC RX W HCPCS: Performed by: HOSPITALIST

## 2023-03-03 RX ORDER — MAGNESIUM SULFATE IN WATER 40 MG/ML
2000 INJECTION, SOLUTION INTRAVENOUS PRN
Status: DISCONTINUED | OUTPATIENT
Start: 2023-03-03 | End: 2023-03-07 | Stop reason: HOSPADM

## 2023-03-03 RX ORDER — SODIUM CHLORIDE 0.9 % (FLUSH) 0.9 %
5-40 SYRINGE (ML) INJECTION EVERY 12 HOURS SCHEDULED
Status: DISCONTINUED | OUTPATIENT
Start: 2023-03-03 | End: 2023-03-07 | Stop reason: HOSPADM

## 2023-03-03 RX ORDER — METHOCARBAMOL 500 MG/1
750 TABLET, FILM COATED ORAL 2 TIMES DAILY
Status: DISCONTINUED | OUTPATIENT
Start: 2023-03-03 | End: 2023-03-07 | Stop reason: HOSPADM

## 2023-03-03 RX ORDER — GUAIFENESIN AND DEXTROMETHORPHAN HYDROBROMIDE 100; 10 MG/5ML; MG/5ML
5 SOLUTION ORAL EVERY 4 HOURS PRN
Status: ON HOLD | COMMUNITY
End: 2023-03-07 | Stop reason: HOSPADM

## 2023-03-03 RX ORDER — ARIPIPRAZOLE 10 MG/1
20 TABLET ORAL DAILY
Status: DISCONTINUED | OUTPATIENT
Start: 2023-03-03 | End: 2023-03-07 | Stop reason: HOSPADM

## 2023-03-03 RX ORDER — PROPRANOLOL HYDROCHLORIDE 20 MG/1
20 TABLET ORAL 3 TIMES DAILY
Status: DISCONTINUED | OUTPATIENT
Start: 2023-03-03 | End: 2023-03-07 | Stop reason: HOSPADM

## 2023-03-03 RX ORDER — FLUOXETINE HYDROCHLORIDE 20 MG/5ML
20 LIQUID ORAL DAILY
Status: ON HOLD | COMMUNITY
End: 2023-03-07 | Stop reason: HOSPADM

## 2023-03-03 RX ORDER — MECOBALAMIN 5000 MCG
5 TABLET,DISINTEGRATING ORAL NIGHTLY PRN
Status: DISCONTINUED | OUTPATIENT
Start: 2023-03-03 | End: 2023-03-07 | Stop reason: HOSPADM

## 2023-03-03 RX ORDER — ONDANSETRON 2 MG/ML
4 INJECTION INTRAMUSCULAR; INTRAVENOUS EVERY 6 HOURS PRN
Status: DISCONTINUED | OUTPATIENT
Start: 2023-03-03 | End: 2023-03-07 | Stop reason: HOSPADM

## 2023-03-03 RX ORDER — OXYCODONE HYDROCHLORIDE 5 MG/1
5 TABLET ORAL EVERY 8 HOURS PRN
Status: DISCONTINUED | OUTPATIENT
Start: 2023-03-03 | End: 2023-03-07 | Stop reason: HOSPADM

## 2023-03-03 RX ORDER — SODIUM CHLORIDE 0.9 % (FLUSH) 0.9 %
5-40 SYRINGE (ML) INJECTION PRN
Status: DISCONTINUED | OUTPATIENT
Start: 2023-03-03 | End: 2023-03-07 | Stop reason: HOSPADM

## 2023-03-03 RX ORDER — SODIUM CHLORIDE 9 MG/ML
INJECTION, SOLUTION INTRAVENOUS PRN
Status: DISCONTINUED | OUTPATIENT
Start: 2023-03-03 | End: 2023-03-07 | Stop reason: HOSPADM

## 2023-03-03 RX ORDER — POTASSIUM CHLORIDE 7.45 MG/ML
10 INJECTION INTRAVENOUS PRN
Status: DISCONTINUED | OUTPATIENT
Start: 2023-03-03 | End: 2023-03-07 | Stop reason: HOSPADM

## 2023-03-03 RX ADMIN — ARIPIPRAZOLE 20 MG: 10 TABLET ORAL at 15:38

## 2023-03-03 RX ADMIN — METHOCARBAMOL 750 MG: 500 TABLET ORAL at 20:22

## 2023-03-03 RX ADMIN — SODIUM CHLORIDE, PRESERVATIVE FREE 20 MG: 5 INJECTION INTRAVENOUS at 00:33

## 2023-03-03 RX ADMIN — METHOCARBAMOL 750 MG: 500 TABLET ORAL at 15:38

## 2023-03-03 RX ADMIN — SODIUM CHLORIDE, PRESERVATIVE FREE 20 MG: 5 INJECTION INTRAVENOUS at 22:20

## 2023-03-03 RX ADMIN — PROPRANOLOL HYDROCHLORIDE 20 MG: 20 TABLET ORAL at 15:38

## 2023-03-03 RX ADMIN — LEVETIRACETAM 500 MG: 5 INJECTION INTRAVENOUS at 16:15

## 2023-03-03 RX ADMIN — PROPRANOLOL HYDROCHLORIDE 20 MG: 20 TABLET ORAL at 20:22

## 2023-03-03 RX ADMIN — LINACLOTIDE 145 MCG: 145 CAPSULE, GELATIN COATED ORAL at 15:38

## 2023-03-03 RX ADMIN — OXYCODONE HYDROCHLORIDE 5 MG: 5 TABLET ORAL at 15:38

## 2023-03-03 RX ADMIN — SODIUM CHLORIDE, PRESERVATIVE FREE 10 ML: 5 INJECTION INTRAVENOUS at 20:21

## 2023-03-03 RX ADMIN — LEVETIRACETAM 500 MG: 5 INJECTION INTRAVENOUS at 00:33
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
## 2023-03-03 NOTE — PROGRESS NOTES
Comprehensive Nutrition Assessment    Type and Reason for Visit:  Initial, Positive Nutrition Screen    Nutrition Recommendations/Plan:   Replacement of feeding tube with initiation of EN     Malnutrition Assessment:  Malnutrition Status:  Severe malnutrition (03/03/23 1012)    Context:  Acute Illness     Findings of the 6 clinical characteristics of malnutrition:  Energy Intake:  Mild decrease in energy intake (Comment)  Weight Loss:  Greater than 7.5% over 3 months     Body Fat Loss: Moderate body fat loss Orbital, Fat Overlying Ribs   Muscle Mass Loss:  Mild muscle mass loss Temples (temporalis), Clavicles (pectoralis & deltoids)  Fluid Accumulation:  No significant fluid accumulation     Strength:  Not Performed    Nutrition Assessment:    Pt is Severely Malnourished AEB significant wt loss, moderate fat loss, and mild muscle loss. Pt is NPO. He has pulled out his feeding tube. Will monitor clinical course and implement nutrition intervention as appropriate. Nutrition Related Findings:      Wound Type: Stage II       Current Nutrition Intake & Therapies:    Average Meal Intake: NPO  Diet NPO    Anthropometric Measures:  Height: 6' 2\" (188 cm)  Ideal Body Weight (IBW): 190 lbs (86 kg)    Current Body Weight: 121 lb (54.9 kg), 63.7 % IBW.     Current BMI (kg/m2): 15.5  BMI Categories: Underweight (BMI less than 18.5)    Estimated Daily Nutrient Needs:  Energy Requirements Based On: Kcal/kg  Weight Used for Energy Requirements: Current  Energy (kcal/day): 4130-8381 kcals/day  Weight Used for Protein Requirements: Current  Protein (g/day): 110 g/protein/day  Method Used for Fluid Requirements: 1 ml/kcal  Fluid (ml/day): 1131-6493 mL/day    Nutrition Diagnosis:   Severe malnutrition, In context of acute illness or injury related to inadequate protein-energy intake as evidenced by moderate loss of subcutaneous fat, weight loss 7.5% in 3 months    Nutrition Interventions:   Food and/or Nutrient Delivery: Continue NPO  Coordination of Nutrition Care: Continue to monitor while inpatient    Goals:  Goals: Initiate nutrition support    Nutrition Monitoring and Evaluation:   Food/Nutrient Intake Outcomes: Enteral Nutrition Intake/Tolerance  Physical Signs/Symptoms Outcomes: Biochemical Data, GI Status, Nutrition Focused Physical Findings, Skin, Weight    Sascha Mak MS, RD, LD  Contact: [phone]
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
## 2023-03-03 NOTE — PLAN OF CARE
Nutrition Problem #1: Severe malnutrition, In context of acute illness or injury  Intervention: Food and/or Nutrient Delivery: Continue NPO
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
## 2023-03-04 LAB
ANION GAP SERPL CALCULATED.3IONS-SCNC: 9 MMOL/L (ref 7–19)
BASOPHILS ABSOLUTE: 0.1 K/UL (ref 0–0.2)
BASOPHILS RELATIVE PERCENT: 0.8 % (ref 0–1)
BUN BLDV-MCNC: 16 MG/DL (ref 6–20)
CALCIUM SERPL-MCNC: 9.2 MG/DL (ref 8.6–10)
CHLORIDE BLD-SCNC: 105 MMOL/L (ref 98–111)
CO2: 26 MMOL/L (ref 22–29)
CREAT SERPL-MCNC: 0.6 MG/DL (ref 0.5–1.2)
EOSINOPHILS ABSOLUTE: 0.2 K/UL (ref 0–0.6)
EOSINOPHILS RELATIVE PERCENT: 2.8 % (ref 0–5)
GFR SERPL CREATININE-BSD FRML MDRD: >60 ML/MIN/{1.73_M2}
GLUCOSE BLD-MCNC: 84 MG/DL (ref 74–109)
HCT VFR BLD CALC: 39.8 % (ref 42–52)
HEMOGLOBIN: 12.7 G/DL (ref 14–18)
IMMATURE GRANULOCYTES #: 0 K/UL
LYMPHOCYTES ABSOLUTE: 1.3 K/UL (ref 1.1–4.5)
LYMPHOCYTES RELATIVE PERCENT: 20.6 % (ref 20–40)
MCH RBC QN AUTO: 28.8 PG (ref 27–31)
MCHC RBC AUTO-ENTMCNC: 31.9 G/DL (ref 33–37)
MCV RBC AUTO: 90.2 FL (ref 80–94)
MONOCYTES ABSOLUTE: 0.5 K/UL (ref 0–0.9)
MONOCYTES RELATIVE PERCENT: 8.3 % (ref 0–10)
NEUTROPHILS ABSOLUTE: 4.3 K/UL (ref 1.5–7.5)
NEUTROPHILS RELATIVE PERCENT: 67.3 % (ref 50–65)
PDW BLD-RTO: 14.9 % (ref 11.5–14.5)
PLATELET # BLD: 265 K/UL (ref 130–400)
PMV BLD AUTO: 11.3 FL (ref 9.4–12.4)
POTASSIUM SERPL-SCNC: 4.1 MMOL/L (ref 3.5–5)
RBC # BLD: 4.41 M/UL (ref 4.7–6.1)
SODIUM BLD-SCNC: 140 MMOL/L (ref 136–145)
WBC # BLD: 6.4 K/UL (ref 4.8–10.8)

## 2023-03-04 PROCEDURE — 80048 BASIC METABOLIC PNL TOTAL CA: CPT

## 2023-03-04 PROCEDURE — 85025 COMPLETE CBC W/AUTO DIFF WBC: CPT

## 2023-03-04 PROCEDURE — 6370000000 HC RX 637 (ALT 250 FOR IP): Performed by: STUDENT IN AN ORGANIZED HEALTH CARE EDUCATION/TRAINING PROGRAM

## 2023-03-04 PROCEDURE — 2500000003 HC RX 250 WO HCPCS: Performed by: HOSPITALIST

## 2023-03-04 PROCEDURE — 2580000003 HC RX 258: Performed by: HOSPITALIST

## 2023-03-04 PROCEDURE — 36415 COLL VENOUS BLD VENIPUNCTURE: CPT

## 2023-03-04 PROCEDURE — 6360000002 HC RX W HCPCS: Performed by: HOSPITALIST

## 2023-03-04 PROCEDURE — 1210000000 HC MED SURG R&B

## 2023-03-04 RX ORDER — DEXTROSE MONOHYDRATE 100 MG/ML
INJECTION, SOLUTION INTRAVENOUS CONTINUOUS PRN
Status: DISCONTINUED | OUTPATIENT
Start: 2023-03-04 | End: 2023-03-07 | Stop reason: HOSPADM

## 2023-03-04 RX ADMIN — ARIPIPRAZOLE 20 MG: 10 TABLET ORAL at 11:00

## 2023-03-04 RX ADMIN — PROPRANOLOL HYDROCHLORIDE 20 MG: 20 TABLET ORAL at 16:21

## 2023-03-04 RX ADMIN — PROPRANOLOL HYDROCHLORIDE 20 MG: 20 TABLET ORAL at 11:01

## 2023-03-04 RX ADMIN — LEVETIRACETAM 500 MG: 5 INJECTION INTRAVENOUS at 03:46

## 2023-03-04 RX ADMIN — METHOCARBAMOL 750 MG: 500 TABLET ORAL at 11:01

## 2023-03-04 RX ADMIN — PROPRANOLOL HYDROCHLORIDE 20 MG: 20 TABLET ORAL at 22:20

## 2023-03-04 RX ADMIN — SODIUM CHLORIDE, PRESERVATIVE FREE 10 ML: 5 INJECTION INTRAVENOUS at 10:47

## 2023-03-04 RX ADMIN — SODIUM CHLORIDE, PRESERVATIVE FREE 20 MG: 5 INJECTION INTRAVENOUS at 10:47

## 2023-03-04 RX ADMIN — SODIUM CHLORIDE, PRESERVATIVE FREE 10 ML: 5 INJECTION INTRAVENOUS at 22:21

## 2023-03-04 RX ADMIN — METHOCARBAMOL 750 MG: 500 TABLET ORAL at 22:20

## 2023-03-04 RX ADMIN — SODIUM CHLORIDE, PRESERVATIVE FREE 20 MG: 5 INJECTION INTRAVENOUS at 22:14

## 2023-03-04 RX ADMIN — LEVETIRACETAM 500 MG: 5 INJECTION INTRAVENOUS at 14:17

## 2023-03-04 RX ADMIN — LINACLOTIDE 145 MCG: 145 CAPSULE, GELATIN COATED ORAL at 11:01

## 2023-03-04 ASSESSMENT — PAIN SCALES - GENERAL
PAINLEVEL_OUTOF10: 0

## 2023-03-04 NOTE — PLAN OF CARE
Problem: Discharge Planning  Goal: Discharge to home or other facility with appropriate resources  Flowsheets (Taken 3/4/2023 6984)  Discharge to home or other facility with appropriate resources:   Identify barriers to discharge with patient and caregiver   Identify discharge learning needs (meds, wound care, etc)   Refer to discharge planning if patient needs post-hospital services based on physician order or complex needs related to functional status, cognitive ability or social support system   Arrange for needed discharge resources and transportation as appropriate
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
## 2023-03-04 NOTE — PLAN OF CARE
Problem: Pain  Goal: Verbalizes/displays adequate comfort level or baseline comfort level  3/4/2023 0337 by Doreen Stanford LPN  Outcome: Progressing  Flowsheets (Taken 3/4/2023 0103)  Verbalizes/displays adequate comfort level or baseline comfort level:   Encourage patient to monitor pain and request assistance   Assess pain using appropriate pain scale   Administer analgesics based on type and severity of pain and evaluate response   Implement non-pharmacological measures as appropriate and evaluate response   Consider cultural and social influences on pain and pain management   Notify Licensed Independent Practitioner if interventions unsuccessful or patient reports new pain  3/3/2023 1658 by Marisol Wagner RN  Outcome: Progressing     Problem: Skin/Tissue Integrity  Goal: Absence of new skin breakdown  Description: 1. Monitor for areas of redness and/or skin breakdown  2. Every 4-6 hours minimum:  Change oxygen saturation probe site  3. Every 4-6 hours:  If on nasal continuous positive airway pressure, respiratory therapy assess nares and determine need for appliance change or resting period.   3/4/2023 5711 by Doreen Stanford LPN  Outcome: Progressing  3/3/2023 1658 by Marisol Wagner RN  Outcome: Progressing     Problem: Safety - Adult  Goal: Free from fall injury  3/4/2023 0337 by Doreen Stanford LPN  Outcome: Progressing  Flowsheets (Taken 3/3/2023 2348)  Free From Fall Injury: Instruct family/caregiver on patient safety  3/3/2023 1658 by Marisol Wagner RN  Outcome: Progressing     Problem: Nutrition Deficit:  Goal: Optimize nutritional status  3/4/2023 0337 by Doreen Stanford LPN  Outcome: Progressing  Flowsheets (Taken 3/4/2023 3903)  Nutrient intake appropriate for improving, restoring, or maintaining nutritional needs:   Assess nutritional status and recommend course of action   Monitor oral intake, labs, and treatment plans   Provide specific nutrition education to patient or family as appropriate  3/3/2023 1658 by Elizabeth Pinedo RN  Outcome: Progressing

## 2023-03-05 LAB
ANION GAP SERPL CALCULATED.3IONS-SCNC: 11 MMOL/L (ref 7–19)
BASOPHILS ABSOLUTE: 0.1 K/UL (ref 0–0.2)
BASOPHILS RELATIVE PERCENT: 1.2 % (ref 0–1)
BUN BLDV-MCNC: 15 MG/DL (ref 6–20)
CALCIUM SERPL-MCNC: 9.2 MG/DL (ref 8.6–10)
CHLORIDE BLD-SCNC: 105 MMOL/L (ref 98–111)
CO2: 23 MMOL/L (ref 22–29)
CREAT SERPL-MCNC: 0.5 MG/DL (ref 0.5–1.2)
EOSINOPHILS ABSOLUTE: 0.2 K/UL (ref 0–0.6)
EOSINOPHILS RELATIVE PERCENT: 3.5 % (ref 0–5)
GFR SERPL CREATININE-BSD FRML MDRD: >60 ML/MIN/{1.73_M2}
GLUCOSE BLD-MCNC: 103 MG/DL (ref 70–99)
GLUCOSE BLD-MCNC: 135 MG/DL (ref 70–99)
GLUCOSE BLD-MCNC: 81 MG/DL (ref 74–109)
GLUCOSE BLD-MCNC: 89 MG/DL (ref 70–99)
GLUCOSE BLD-MCNC: 96 MG/DL (ref 70–99)
HCT VFR BLD CALC: 39.2 % (ref 42–52)
HEMOGLOBIN: 12.1 G/DL (ref 14–18)
IMMATURE GRANULOCYTES #: 0 K/UL
LYMPHOCYTES ABSOLUTE: 1.8 K/UL (ref 1.1–4.5)
LYMPHOCYTES RELATIVE PERCENT: 34.7 % (ref 20–40)
MCH RBC QN AUTO: 28.9 PG (ref 27–31)
MCHC RBC AUTO-ENTMCNC: 30.9 G/DL (ref 33–37)
MCV RBC AUTO: 93.8 FL (ref 80–94)
MONOCYTES ABSOLUTE: 0.5 K/UL (ref 0–0.9)
MONOCYTES RELATIVE PERCENT: 10.3 % (ref 0–10)
NEUTROPHILS ABSOLUTE: 2.6 K/UL (ref 1.5–7.5)
NEUTROPHILS RELATIVE PERCENT: 50.1 % (ref 50–65)
PDW BLD-RTO: 15 % (ref 11.5–14.5)
PERFORMED ON: ABNORMAL
PERFORMED ON: ABNORMAL
PERFORMED ON: NORMAL
PERFORMED ON: NORMAL
PLATELET # BLD: 235 K/UL (ref 130–400)
PMV BLD AUTO: 11 FL (ref 9.4–12.4)
POTASSIUM SERPL-SCNC: 3.8 MMOL/L (ref 3.5–5)
RBC # BLD: 4.18 M/UL (ref 4.7–6.1)
SODIUM BLD-SCNC: 139 MMOL/L (ref 136–145)
WBC # BLD: 5.2 K/UL (ref 4.8–10.8)

## 2023-03-05 PROCEDURE — 6370000000 HC RX 637 (ALT 250 FOR IP): Performed by: STUDENT IN AN ORGANIZED HEALTH CARE EDUCATION/TRAINING PROGRAM

## 2023-03-05 PROCEDURE — 82962 GLUCOSE BLOOD TEST: CPT

## 2023-03-05 PROCEDURE — 1210000000 HC MED SURG R&B

## 2023-03-05 PROCEDURE — 36415 COLL VENOUS BLD VENIPUNCTURE: CPT

## 2023-03-05 PROCEDURE — 2580000003 HC RX 258: Performed by: HOSPITALIST

## 2023-03-05 PROCEDURE — 6370000000 HC RX 637 (ALT 250 FOR IP): Performed by: NURSE PRACTITIONER

## 2023-03-05 PROCEDURE — 2500000003 HC RX 250 WO HCPCS: Performed by: HOSPITALIST

## 2023-03-05 PROCEDURE — 6360000002 HC RX W HCPCS: Performed by: HOSPITALIST

## 2023-03-05 PROCEDURE — 85025 COMPLETE CBC W/AUTO DIFF WBC: CPT

## 2023-03-05 PROCEDURE — 94760 N-INVAS EAR/PLS OXIMETRY 1: CPT

## 2023-03-05 PROCEDURE — 80048 BASIC METABOLIC PNL TOTAL CA: CPT

## 2023-03-05 RX ORDER — POLYETHYLENE GLYCOL 3350 17 G/17G
17 POWDER, FOR SOLUTION ORAL DAILY
Status: DISCONTINUED | OUTPATIENT
Start: 2023-03-05 | End: 2023-03-07 | Stop reason: HOSPADM

## 2023-03-05 RX ORDER — LEVETIRACETAM 100 MG/ML
500 SOLUTION ORAL 2 TIMES DAILY
Status: DISCONTINUED | OUTPATIENT
Start: 2023-03-06 | End: 2023-03-06

## 2023-03-05 RX ORDER — FOLIC ACID 1 MG/1
1 TABLET ORAL DAILY
Status: DISCONTINUED | OUTPATIENT
Start: 2023-03-05 | End: 2023-03-07 | Stop reason: HOSPADM

## 2023-03-05 RX ORDER — FLUOXETINE HYDROCHLORIDE 20 MG/5ML
20 LIQUID ORAL DAILY
Status: DISCONTINUED | OUTPATIENT
Start: 2023-03-06 | End: 2023-03-06

## 2023-03-05 RX ADMIN — SODIUM CHLORIDE, PRESERVATIVE FREE 20 MG: 5 INJECTION INTRAVENOUS at 09:13

## 2023-03-05 RX ADMIN — LEVETIRACETAM 500 MG: 5 INJECTION INTRAVENOUS at 02:00

## 2023-03-05 RX ADMIN — PROPRANOLOL HYDROCHLORIDE 20 MG: 20 TABLET ORAL at 13:13

## 2023-03-05 RX ADMIN — POLYETHYLENE GLYCOL 3350 17 G: 17 POWDER, FOR SOLUTION ORAL at 13:12

## 2023-03-05 RX ADMIN — DOCUSATE SODIUM LIQUID 100 MG: 100 LIQUID ORAL at 21:44

## 2023-03-05 RX ADMIN — PROPRANOLOL HYDROCHLORIDE 20 MG: 20 TABLET ORAL at 21:44

## 2023-03-05 RX ADMIN — LINACLOTIDE 145 MCG: 145 CAPSULE, GELATIN COATED ORAL at 09:13

## 2023-03-05 RX ADMIN — DOCUSATE SODIUM LIQUID 100 MG: 100 LIQUID ORAL at 13:13

## 2023-03-05 RX ADMIN — PROPRANOLOL HYDROCHLORIDE 20 MG: 20 TABLET ORAL at 09:13

## 2023-03-05 RX ADMIN — METHOCARBAMOL 750 MG: 500 TABLET ORAL at 21:45

## 2023-03-05 RX ADMIN — FOLIC ACID 1 MG: 1 TABLET ORAL at 13:13

## 2023-03-05 RX ADMIN — LEVETIRACETAM 500 MG: 5 INJECTION INTRAVENOUS at 12:08

## 2023-03-05 RX ADMIN — ARIPIPRAZOLE 20 MG: 10 TABLET ORAL at 09:12

## 2023-03-05 RX ADMIN — METHOCARBAMOL 750 MG: 500 TABLET ORAL at 09:12

## 2023-03-05 ASSESSMENT — PAIN SCALES - GENERAL
PAINLEVEL_OUTOF10: 0
PAINLEVEL_OUTOF10: 0
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
## 2023-03-05 NOTE — PLAN OF CARE
Problem: Pain  Goal: Verbalizes/displays adequate comfort level or baseline comfort level  Outcome: Progressing  Flowsheets (Taken 3/4/2023 2303)  Verbalizes/displays adequate comfort level or baseline comfort level:   Encourage patient to monitor pain and request assistance   Assess pain using appropriate pain scale   Administer analgesics based on type and severity of pain and evaluate response   Implement non-pharmacological measures as appropriate and evaluate response   Consider cultural and social influences on pain and pain management   Notify Licensed Independent Practitioner if interventions unsuccessful or patient reports new pain     Problem: Skin/Tissue Integrity  Goal: Absence of new skin breakdown  Description: 1. Monitor for areas of redness and/or skin breakdown  2. Every 4-6 hours minimum:  Change oxygen saturation probe site  3. Every 4-6 hours:  If on nasal continuous positive airway pressure, respiratory therapy assess nares and determine need for appliance change or resting period.   Outcome: Progressing     Problem: Safety - Adult  Goal: Free from fall injury  Outcome: Progressing  Flowsheets (Taken 3/4/2023 2324)  Free From Fall Injury: Instruct family/caregiver on patient safety     Problem: Nutrition Deficit:  Goal: Optimize nutritional status  Outcome: Progressing  Flowsheets (Taken 3/4/2023 9100)  Nutrient intake appropriate for improving, restoring, or maintaining nutritional needs:   Assess nutritional status and recommend course of action   Monitor oral intake, labs, and treatment plans   Provide specific nutrition education to patient or family as appropriate     Problem: Discharge Planning  Goal: Discharge to home or other facility with appropriate resources  Outcome: Progressing  Flowsheets (Taken 3/4/2023 2324)  Discharge to home or other facility with appropriate resources:   Identify barriers to discharge with patient and caregiver   Arrange for needed discharge resources and transportation as appropriate   Identify discharge learning needs (meds, wound care, etc)   Refer to discharge planning if patient needs post-hospital services based on physician order or complex needs related to functional status, cognitive ability or social support system

## 2023-03-06 ENCOUNTER — APPOINTMENT (OUTPATIENT)
Dept: GENERAL RADIOLOGY | Age: 38
DRG: 393 | End: 2023-03-06
Payer: MEDICARE

## 2023-03-06 LAB
ALBUMIN SERPL-MCNC: 4.1 G/DL (ref 3.5–5.2)
ALP BLD-CCNC: 58 U/L (ref 40–130)
ALT SERPL-CCNC: 36 U/L (ref 5–41)
ANION GAP SERPL CALCULATED.3IONS-SCNC: 8 MMOL/L (ref 7–19)
ANION GAP SERPL CALCULATED.3IONS-SCNC: 9 MMOL/L (ref 7–19)
AST SERPL-CCNC: 16 U/L (ref 5–40)
BASOPHILS ABSOLUTE: 0.1 K/UL (ref 0–0.2)
BASOPHILS RELATIVE PERCENT: 0.9 % (ref 0–1)
BILIRUB SERPL-MCNC: 0.4 MG/DL (ref 0.2–1.2)
BUN BLDV-MCNC: 14 MG/DL (ref 6–20)
BUN BLDV-MCNC: 14 MG/DL (ref 6–20)
CALCIUM SERPL-MCNC: 9.6 MG/DL (ref 8.6–10)
CALCIUM SERPL-MCNC: 9.8 MG/DL (ref 8.6–10)
CHLORIDE BLD-SCNC: 105 MMOL/L (ref 98–111)
CHLORIDE BLD-SCNC: 106 MMOL/L (ref 98–111)
CO2: 27 MMOL/L (ref 22–29)
CO2: 27 MMOL/L (ref 22–29)
CREAT SERPL-MCNC: 0.5 MG/DL (ref 0.5–1.2)
CREAT SERPL-MCNC: 0.6 MG/DL (ref 0.5–1.2)
EOSINOPHILS ABSOLUTE: 0.1 K/UL (ref 0–0.6)
EOSINOPHILS RELATIVE PERCENT: 2.3 % (ref 0–5)
GFR SERPL CREATININE-BSD FRML MDRD: >60 ML/MIN/{1.73_M2}
GFR SERPL CREATININE-BSD FRML MDRD: >60 ML/MIN/{1.73_M2}
GLUCOSE BLD-MCNC: 88 MG/DL (ref 70–99)
GLUCOSE BLD-MCNC: 89 MG/DL (ref 74–109)
GLUCOSE BLD-MCNC: 90 MG/DL (ref 74–109)
GLUCOSE BLD-MCNC: 93 MG/DL (ref 70–99)
HCT VFR BLD CALC: 40.9 % (ref 42–52)
HEMOGLOBIN: 13.3 G/DL (ref 14–18)
IMMATURE GRANULOCYTES #: 0 K/UL
INR BLD: 1.11 (ref 0.88–1.18)
LYMPHOCYTES ABSOLUTE: 1.6 K/UL (ref 1.1–4.5)
LYMPHOCYTES RELATIVE PERCENT: 28.1 % (ref 20–40)
MCH RBC QN AUTO: 29.5 PG (ref 27–31)
MCHC RBC AUTO-ENTMCNC: 32.5 G/DL (ref 33–37)
MCV RBC AUTO: 90.7 FL (ref 80–94)
MONOCYTES ABSOLUTE: 0.3 K/UL (ref 0–0.9)
MONOCYTES RELATIVE PERCENT: 6.1 % (ref 0–10)
NEUTROPHILS ABSOLUTE: 3.5 K/UL (ref 1.5–7.5)
NEUTROPHILS RELATIVE PERCENT: 62.2 % (ref 50–65)
PDW BLD-RTO: 15 % (ref 11.5–14.5)
PERFORMED ON: NORMAL
PERFORMED ON: NORMAL
PLATELET # BLD: 265 K/UL (ref 130–400)
PMV BLD AUTO: 11.7 FL (ref 9.4–12.4)
POTASSIUM REFLEX MAGNESIUM: 4 MMOL/L (ref 3.5–5)
POTASSIUM SERPL-SCNC: 3.8 MMOL/L (ref 3.5–5)
PROTHROMBIN TIME: 14.3 SEC (ref 12–14.6)
RBC # BLD: 4.51 M/UL (ref 4.7–6.1)
SODIUM BLD-SCNC: 141 MMOL/L (ref 136–145)
SODIUM BLD-SCNC: 141 MMOL/L (ref 136–145)
TOTAL PROTEIN: 7.4 G/DL (ref 6.6–8.7)
WBC # BLD: 5.6 K/UL (ref 4.8–10.8)

## 2023-03-06 PROCEDURE — 1210000000 HC MED SURG R&B

## 2023-03-06 PROCEDURE — 85025 COMPLETE CBC W/AUTO DIFF WBC: CPT

## 2023-03-06 PROCEDURE — 36415 COLL VENOUS BLD VENIPUNCTURE: CPT

## 2023-03-06 PROCEDURE — 6370000000 HC RX 637 (ALT 250 FOR IP): Performed by: NURSE PRACTITIONER

## 2023-03-06 PROCEDURE — 94760 N-INVAS EAR/PLS OXIMETRY 1: CPT

## 2023-03-06 PROCEDURE — 85610 PROTHROMBIN TIME: CPT

## 2023-03-06 PROCEDURE — 80053 COMPREHEN METABOLIC PANEL: CPT

## 2023-03-06 PROCEDURE — 92610 EVALUATE SWALLOWING FUNCTION: CPT

## 2023-03-06 PROCEDURE — 82962 GLUCOSE BLOOD TEST: CPT

## 2023-03-06 PROCEDURE — 71045 X-RAY EXAM CHEST 1 VIEW: CPT

## 2023-03-06 PROCEDURE — 6370000000 HC RX 637 (ALT 250 FOR IP): Performed by: STUDENT IN AN ORGANIZED HEALTH CARE EDUCATION/TRAINING PROGRAM

## 2023-03-06 PROCEDURE — 92523 SPEECH SOUND LANG COMPREHEN: CPT

## 2023-03-06 RX ORDER — LEVETIRACETAM 500 MG/1
500 TABLET ORAL 2 TIMES DAILY
Status: DISCONTINUED | OUTPATIENT
Start: 2023-03-06 | End: 2023-03-07 | Stop reason: HOSPADM

## 2023-03-06 RX ORDER — SERTRALINE HYDROCHLORIDE 25 MG/1
50 TABLET, FILM COATED ORAL DAILY
Status: DISCONTINUED | OUTPATIENT
Start: 2023-03-06 | End: 2023-03-07 | Stop reason: HOSPADM

## 2023-03-06 RX ADMIN — FOLIC ACID 1 MG: 1 TABLET ORAL at 10:59

## 2023-03-06 RX ADMIN — PROPRANOLOL HYDROCHLORIDE 20 MG: 20 TABLET ORAL at 10:59

## 2023-03-06 RX ADMIN — METHOCARBAMOL 750 MG: 500 TABLET ORAL at 10:59

## 2023-03-06 RX ADMIN — METHOCARBAMOL 750 MG: 500 TABLET ORAL at 20:10

## 2023-03-06 RX ADMIN — POLYETHYLENE GLYCOL 3350 17 G: 17 POWDER, FOR SOLUTION ORAL at 10:59

## 2023-03-06 RX ADMIN — SERTRALINE HYDROCHLORIDE 50 MG: 25 TABLET ORAL at 15:14

## 2023-03-06 RX ADMIN — ARIPIPRAZOLE 20 MG: 10 TABLET ORAL at 10:59

## 2023-03-06 RX ADMIN — LEVETIRACETAM 500 MG: 500 SOLUTION ORAL at 00:30

## 2023-03-06 RX ADMIN — LEVETIRACETAM 500 MG: 500 TABLET, FILM COATED ORAL at 20:09

## 2023-03-06 RX ADMIN — LINACLOTIDE 145 MCG: 145 CAPSULE, GELATIN COATED ORAL at 10:59

## 2023-03-06 RX ADMIN — PROPRANOLOL HYDROCHLORIDE 20 MG: 20 TABLET ORAL at 15:14

## 2023-03-06 RX ADMIN — LEVETIRACETAM 500 MG: 500 TABLET, FILM COATED ORAL at 15:14

## 2023-03-06 RX ADMIN — PROPRANOLOL HYDROCHLORIDE 20 MG: 20 TABLET ORAL at 20:09

## 2023-03-06 ASSESSMENT — PAIN SCALES - WONG BAKER: WONGBAKER_NUMERICALRESPONSE: 0

## 2023-03-06 NOTE — CONSULTS
Comprehensive Nutrition Assessment    Type and Reason for Visit:  Reassess, Consult    Nutrition Recommendations/Plan:   ONS TID. Appetite stimulant. Malnutrition Assessment:  Malnutrition Status:  Severe malnutrition (03/03/23 1012)    Context:  Acute Illness     Findings of the 6 clinical characteristics of malnutrition:  Energy Intake:  Mild decrease in energy intake (Comment)  Weight Loss:  Greater than 7.5% over 3 months     Body Fat Loss: Moderate body fat loss Orbital, Fat Overlying Ribs   Muscle Mass Loss:  Mild muscle mass loss Temples (temporalis), Clavicles (pectoralis & deltoids)  Fluid Accumulation:  No significant fluid accumulation     Strength:  Not Performed    Nutrition Assessment:    Pt has been seen by SLP and has been upgraded to a Puree diet with Moderately thick liquids. BMP is WNL. Will start ONS TID to help meet nutritional needs since feeding tube was pulled out by pt. Nutrition Related Findings:      Wound Type: Stage II       Current Nutrition Intake & Therapies:    Average Meal Intake: NPO  ADULT DIET; Dysphagia - Pureed; Moderately Thick (Honey)    Anthropometric Measures:  Height: 6' 2\" (188 cm)  Ideal Body Weight (IBW): 190 lbs (86 kg)    Current Body Weight: 118 lb (53.5 kg), 63.7 % IBW.     Current BMI (kg/m2): 15.1  BMI Categories: Underweight (BMI less than 18.5)    Estimated Daily Nutrient Needs:  Energy Requirements Based On: Kcal/kg  Weight Used for Energy Requirements: Current  Energy (kcal/day): 9770-2617 kcals/day  Weight Used for Protein Requirements: Current  Protein (g/day):  g/protein/day  Method Used for Fluid Requirements: 1 ml/kcal  Fluid (ml/day): 4975-8802 mL/day    Nutrition Diagnosis:   Severe malnutrition, In context of acute illness or injury related to inadequate protein-energy intake as evidenced by moderate loss of subcutaneous fat, weight loss 7.5% in 3 months    Nutrition Interventions:   Food and/or Nutrient Delivery: Continue Current Diet, Start Oral Nutrition Supplement  Coordination of Nutrition Care: Continue to monitor while inpatient    Goals:  Previous Goal Met: No Progress toward Goal(s)  Goals: PO intake 75% or greater    Nutrition Monitoring and Evaluation:   Food/Nutrient Intake Outcomes: Diet Advancement/Tolerance, Food and Nutrient Intake, Supplement Intake  Physical Signs/Symptoms Outcomes: Biochemical Data, GI Status, Nutrition Focused Physical Findings, Skin, Weight    Sascha Mak MS, RD, LD  Contact: [phone]
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
## 2023-03-06 NOTE — PROGRESS NOTES
Speech Language Pathology  Facility/Department: [facility] 4 ONCOLOGY UNIT   CLINICAL BEDSIDE SWALLOW EVALUATION  SPEECH LANGUAGE EVALUATION   NAME: Fuad Rodriguez  : 1985  MRN: [id number]    ADMISSION DATE: 3/2/2023  ADMITTING DIAGNOSIS: has Aspiration pneumonia of both lungs due to vomit, unspecified part of lung ([address] Nonverbal; Sepsis due to pneumonia [facility]); UTI due to extended-spectrum beta lactamase (ESBL) producing Escherichia coli; Schizophrenia ([address] Bipolar disorder ([address] Endotracheal tube present; Lactic acidosis; Severe malnutrition ([address] Other tracheostomy complication ([address] Vomiting; Palliative care patient; Traumatic brain injury with loss of consciousness ([address] Subarachnoid hemorrhage following injury; Cardiac arrest ([address] Respiratory failure following trauma ([address] Mucus plugging of bronchi; Hydrocephalus ([address] Acute pulmonary embolism ([address] History of traumatic brain injury; Sepsis with acute renal failure ([address] Gastroesophageal reflux disease with esophagitis without hemorrhage; Transfer dysphagia; Complication of feeding tube ([address] CATY (acute kidney injury) ([address] Anemia; Gastric tube granulation tissue ([address] Dislodged gastrostomy tube; Elevated ALT measurement; Leukocytosis; Encounter for PEG (percutaneous endoscopic gastrostomy) ([address] Feeding difficulties; Acute pancreatitis; History of pulmonary embolus (PE); PEG (percutaneous endoscopic gastrostomy) status ([address]  Displacement of other gastrointestinal device, initial encounter; Constipation; and Gastrojejunostomy tube dislodgement on their problem list.    Date of Eval: 3/6/2023  Evaluating Therapist: MAICOL Elizondo    Current Diet level:  Puree consistency with moderately thick/honey thick liquids    Pain:  Pain Assessment: Adult Nonverbal Pain Scale (NPVS)  Pain Level: 0  Patient's Stated Pain Goal: Unable to verbalize/indicate pain goal    Reason for Referral  Fuad Rodriguez was referred for a bedside swallow evaluation to assess the efficiency of his swallow function, identify signs and symptoms of aspiration and make recommendations regarding safe dietary consistencies, effective compensatory strategies, and safe eating environment. Impression  Assessed patient's swallowing function. Patient exhibited decreased oral prep, slow oral transit and suspected swallow delay with even the slowest moving food/drink consistencies possible, sluggish and moderately decreased laryngeal elevation for swallow airway protection, and multiple swallows with each trial. While no outward coughs/throat clears were noted with any consistency presented during evaluation, do suspect residue of foods/drinks in the throat post swallows. At this time, IF PO INTAKE IS PURSUED, would continue puree consistency with moderately thick/honey thick liquids. Recommend meds crushed in pudding/applesauce. If patient receives mouth care prior to intake, okay for ice chips IN BETWEEN MEALS for comfort. Will continue to follow. Thank you for this consult. Treatment Plan  Requires SLP Intervention: Yes     Recommended Diet and Intervention  Diet Solids Recommendation: Puree  Liquid Consistency Recommendation: Moderately thick (honey)  Recommended Form of Meds: Meds crushed in puree as able  Therapeutic Interventions: Patient/Family education;Diet tolerance monitoring; Therapeutic PO trials with SLP     Compensatory Swallowing Strategies  Compensatory Swallowing Strategies: Upright as possible for all oral intake;Small bites/sips;Eat/Feed slowly; Alternate solids and liquids; Remain upright for 30-45 minutes after meals     Treatment/Goals  Timeframe for Short-term Goals: 1-2x/day for 3 days   Goal 1: Patient will tolerate puree consistency and moderately thick/honey thick liquids with min S/S penetration/aspiration during PO intake.    Goal 2: Patient staff will follow swallow safety recommendations to decrease risk of penetration/aspiration during PO intake. Goal 3: Patient will receive daily oral care, via staff, to decrease bacteria from the oral cavity. Goal 4: Re-assessment of swallow function for potential diet upgrade. Goal 5: Patient will utilize AAC to express wants/needs/ideas with provision of mod ana/prompts. Goal 6: Patient will complete oral motor and expressive language tasks with provision of mod cues/prompts. General  Chart Reviewed: Yes  Behavior/Cognition: Alert; Cooperative  O2 Device: None (Room air)  Communication Observation: (Assessed speech/language. While delayed, patient was 100% answering simple yes/no questions regarding immediate environment and current state of being at independent level. While delayed, patient was 100% following simple 1 step commands independently. While delayed, patient was 100% modeling mouth postures necessary for speech production. No verbalizations were noted. Patient 100% attempting vocalization of vowels in isolation.)  Follows Directions: Simple   Patient Positioning: Upright in bed  Consistencies Administered: Ice chips;Dysphagia Pureed (Dysphagia I); Honey - spoon      Objective:  Oral Motor:   Labial ROM: Decreased, bilaterally, during labial retraction trials and labial protrusion trials. Labial Strength: Decreased during labial compression trials. Labial Coordination: Slowed movements were noted. Lingual ROM: Decreased during lingual extension trials with no full point achieved; decreased during lingual elevation trials without use of accessory jaw movement; decreased movements noted bilaterally. Lingual Strength: Decreased with fasciculations noted during lingual extension trials. Lingual Coordination: Slowed movements were noted.       Assessed patient's swallowing function with the following observations noted:     Oral Phase  Mastication: Ice chips;Puree (Patient exhibited decreased vertical jaw movement at the front of the mouth during oral prep of ice chip trials and puree consistency trials presented by SLP.)  Suspected Premature Bolus Loss: Puree; Honey - spoon (Patient exhibited slow oral transit of puree consistency trials and honey thick liquid trial presented via spoon by SLP.)  Oral Phase - Comment: Oral transit of ice chip trials primarily measured 1-2 seconds in length. Min puree consistency residue was observed post swallows; residue cleared from the mouth with additional dry swallows. Pharyngeal Phase  Suspected Swallow Delay: Puree; Honey - spoon (Suspect secondary to oral transit times.)  Laryngeal Elevation: (Patient exhibited sluggish, moderately decreased laryngeal elevation for swallow airway protection. Patient produced loud swallows. Patient produced multiple swallows with each consistency.)  Pharyngeal Phase - Comment: While no outward coughs/throat clears were noted with any consistency presented during evaluation, do suspect residue of foods/drinks in the throat post swallows. At this time, IF PO INTAKE IS PURSUED, would continue puree consistency with moderately thick/honey thick liquids. Recommend meds crushed in pudding/applesauce. If patient receives mouth care prior to intake, okay for ice chips IN BETWEEN MEALS for comfort. Will continue to follow.      Electronically signed by MAICOL Carreno on 3/6/2023 at 12:07 PM

## 2023-03-06 NOTE — PLAN OF CARE
Problem: Pain  Goal: Verbalizes/displays adequate comfort level or baseline comfort level  Outcome: Progressing  Flowsheets (Taken 3/5/2023 2313)  Verbalizes/displays adequate comfort level or baseline comfort level:   Encourage patient to monitor pain and request assistance   Assess pain using appropriate pain scale   Administer analgesics based on type and severity of pain and evaluate response   Implement non-pharmacological measures as appropriate and evaluate response   Consider cultural and social influences on pain and pain management     Problem: Skin/Tissue Integrity  Goal: Absence of new skin breakdown  Description: 1. Monitor for areas of redness and/or skin breakdown  2. Every 4-6 hours minimum:  Change oxygen saturation probe site  3. Every 4-6 hours:  If on nasal continuous positive airway pressure, respiratory therapy assess nares and determine need for appliance change or resting period.   Outcome: Progressing     Problem: Safety - Adult  Goal: Free from fall injury  Outcome: Progressing  Flowsheets (Taken 3/5/2023 2919)  Free From Fall Injury: Instruct family/caregiver on patient safety     Problem: Nutrition Deficit:  Goal: Optimize nutritional status  Outcome: Progressing  Flowsheets (Taken 3/4/2023 8311)  Nutrient intake appropriate for improving, restoring, or maintaining nutritional needs:   Assess nutritional status and recommend course of action   Monitor oral intake, labs, and treatment plans   Provide specific nutrition education to patient or family as appropriate     Problem: Discharge Planning  Goal: Discharge to home or other facility with appropriate resources  Outcome: Progressing  Flowsheets (Taken 3/5/2023 4756)  Discharge to home or other facility with appropriate resources:   Identify barriers to discharge with patient and caregiver   Arrange for needed discharge resources and transportation as appropriate   Identify discharge learning needs (meds, wound care, etc)   Refer to discharge planning if patient needs post-hospital services based on physician order or complex needs related to functional status, cognitive ability or social support system

## 2023-03-07 VITALS
WEIGHT: 118.19 LBS | BODY MASS INDEX: 15.17 KG/M2 | OXYGEN SATURATION: 98 % | SYSTOLIC BLOOD PRESSURE: 108 MMHG | HEART RATE: 70 BPM | DIASTOLIC BLOOD PRESSURE: 80 MMHG | RESPIRATION RATE: 18 BRPM | HEIGHT: 74 IN | TEMPERATURE: 97.9 F

## 2023-03-07 LAB
ALBUMIN SERPL-MCNC: 4 G/DL (ref 3.5–5.2)
ANION GAP SERPL CALCULATED.3IONS-SCNC: 8 MMOL/L (ref 7–19)
BASOPHILS ABSOLUTE: 0.1 K/UL (ref 0–0.2)
BASOPHILS RELATIVE PERCENT: 1 % (ref 0–1)
BUN BLDV-MCNC: 16 MG/DL (ref 6–20)
CALCIUM SERPL-MCNC: 9.6 MG/DL (ref 8.6–10)
CHLORIDE BLD-SCNC: 105 MMOL/L (ref 98–111)
CO2: 27 MMOL/L (ref 22–29)
CREAT SERPL-MCNC: 0.5 MG/DL (ref 0.5–1.2)
EOSINOPHILS ABSOLUTE: 0.1 K/UL (ref 0–0.6)
EOSINOPHILS RELATIVE PERCENT: 2.4 % (ref 0–5)
GFR SERPL CREATININE-BSD FRML MDRD: >60 ML/MIN/{1.73_M2}
GLUCOSE BLD-MCNC: 81 MG/DL (ref 74–109)
GLUCOSE BLD-MCNC: 88 MG/DL (ref 70–99)
HCT VFR BLD CALC: 39.9 % (ref 42–52)
HEMOGLOBIN: 12.9 G/DL (ref 14–18)
IMMATURE GRANULOCYTES #: 0 K/UL
LYMPHOCYTES ABSOLUTE: 1.7 K/UL (ref 1.1–4.5)
LYMPHOCYTES RELATIVE PERCENT: 28.6 % (ref 20–40)
MCH RBC QN AUTO: 28.9 PG (ref 27–31)
MCHC RBC AUTO-ENTMCNC: 32.3 G/DL (ref 33–37)
MCV RBC AUTO: 89.3 FL (ref 80–94)
MONOCYTES ABSOLUTE: 0.5 K/UL (ref 0–0.9)
MONOCYTES RELATIVE PERCENT: 8 % (ref 0–10)
NEUTROPHILS ABSOLUTE: 3.5 K/UL (ref 1.5–7.5)
NEUTROPHILS RELATIVE PERCENT: 59.7 % (ref 50–65)
PDW BLD-RTO: 14.9 % (ref 11.5–14.5)
PERFORMED ON: NORMAL
PLATELET # BLD: 272 K/UL (ref 130–400)
PMV BLD AUTO: 11.3 FL (ref 9.4–12.4)
POTASSIUM SERPL-SCNC: 4.3 MMOL/L (ref 3.5–5)
RBC # BLD: 4.47 M/UL (ref 4.7–6.1)
SODIUM BLD-SCNC: 140 MMOL/L (ref 136–145)
WBC # BLD: 5.8 K/UL (ref 4.8–10.8)

## 2023-03-07 PROCEDURE — 82962 GLUCOSE BLOOD TEST: CPT

## 2023-03-07 PROCEDURE — 80048 BASIC METABOLIC PNL TOTAL CA: CPT

## 2023-03-07 PROCEDURE — 36415 COLL VENOUS BLD VENIPUNCTURE: CPT

## 2023-03-07 PROCEDURE — 82040 ASSAY OF SERUM ALBUMIN: CPT

## 2023-03-07 PROCEDURE — 85025 COMPLETE CBC W/AUTO DIFF WBC: CPT

## 2023-03-07 PROCEDURE — 94760 N-INVAS EAR/PLS OXIMETRY 1: CPT

## 2023-03-07 RX ORDER — MECOBALAMIN 5000 MCG
5 TABLET,DISINTEGRATING ORAL NIGHTLY PRN
Qty: 30 TABLET | Refills: 0 | Status: SHIPPED | OUTPATIENT
Start: 2023-03-07

## 2023-03-07 RX ORDER — PROPRANOLOL HYDROCHLORIDE 20 MG/1
20 TABLET ORAL 3 TIMES DAILY
Qty: 90 TABLET | Refills: 0 | Status: SHIPPED | OUTPATIENT
Start: 2023-03-07

## 2023-03-07 RX ORDER — LEVETIRACETAM 500 MG/1
500 TABLET ORAL 2 TIMES DAILY
Qty: 60 TABLET | Refills: 0 | Status: SHIPPED | OUTPATIENT
Start: 2023-03-07

## 2023-03-07 RX ORDER — ARIPIPRAZOLE 20 MG/1
20 TABLET ORAL DAILY
Qty: 30 TABLET | Refills: 0 | Status: SHIPPED | OUTPATIENT
Start: 2023-03-07
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
## 2023-03-07 NOTE — PLAN OF CARE
Problem: Pain  Goal: Verbalizes/displays adequate comfort level or baseline comfort level  3/7/2023 0302 by Carine Carrero RN  Outcome: Progressing  3/6/2023 1815 by Sayra Ma RN  Outcome: Progressing     Problem: Skin/Tissue Integrity  Goal: Absence of new skin breakdown  Description: 1.  Monitor for areas of redness and/or skin breakdown  2.  Every 4-6 hours minimum:  Change oxygen saturation probe site  3.  Every 4-6 hours:  If on nasal continuous positive airway pressure, respiratory therapy assess nares and determine need for appliance change or resting period.  3/7/2023 0302 by Carine Carrero RN  Outcome: Progressing  3/6/2023 1815 by Sayra Ma RN  Outcome: Progressing     Problem: Safety - Adult  Goal: Free from fall injury  3/7/2023 0302 by Carine Carrero RN  Outcome: Progressing  3/6/2023 1815 by Sayra Ma RN  Outcome: Progressing     Problem: Nutrition Deficit:  Goal: Optimize nutritional status  3/7/2023 0302 by Carine Carrero RN  Outcome: Progressing  3/6/2023 1815 by Sayra Ma RN  Outcome: Progressing     Problem: Discharge Planning  Goal: Discharge to home or other facility with appropriate resources  3/7/2023 0302 by Carine Carrero RN  Outcome: Progressing  3/6/2023 1815 by Sayra Ma RN  Outcome: Progressing

## 2023-03-07 NOTE — CARE COORDINATION
03/07/23 1027   IMM Letter   IMM Letter given to Patient/Family/Significant other/Guardian/POA/by: Remberto called Pt legal guardian to give Pt IMM letter. IMM Letter date given: 03/07/23   IMM Letter time given: Jennifer Ceron called Pt legal guardian pt mom Lori Harris. Remberto explained IMM letter. Pt guardian requested an email copy of the IMM to [email or web address]  No other questions or concerns at this time.    Electronically signed by TORREY Yu on 3/7/2023 at 10:28 AM

## 2023-03-07 NOTE — CARE COORDINATION
Spoke with patient/family regarding MD orders for [facility] services. Patient agreeable and has chosen [facility]. Referral Faxed. [address] [place] [address] [phone]. [phone]. Please notify [address] [place] [address] when patient discharges and fax DC Summary,  DC med list and any new [facility] orders. The Patient and/or patient representative was provided with a choice of provider and agrees   with the discharge plan. [x] Yes [] No    Freedom of choice list was provided with basic dialogue that supports the patient's individualized plan of care/goals, treatment preferences and shares the quality data associated with the providers.  [x] Yes [] No  Electronically signed by Nathaly Modi on 3/7/2023 at 10:02 AM

## 2023-03-07 NOTE — PLAN OF CARE
Problem: Pain  Goal: Verbalizes/displays adequate comfort level or baseline comfort level  Outcome: Progressing     Problem: Skin/Tissue Integrity  Goal: Absence of new skin breakdown  Description: 1. Monitor for areas of redness and/or skin breakdown  2. Every 4-6 hours minimum:  Change oxygen saturation probe site  3. Every 4-6 hours:  If on nasal continuous positive airway pressure, respiratory therapy assess nares and determine need for appliance change or resting period.   Outcome: Progressing     Problem: Safety - Adult  Goal: Free from fall injury  Outcome: Progressing     Problem: Nutrition Deficit:  Goal: Optimize nutritional status  Outcome: Progressing     Problem: Discharge Planning  Goal: Discharge to home or other facility with appropriate resources  Outcome: Progressing

## 2023-03-07 NOTE — PROGRESS NOTES
Family requesting call with update on possible discharge date. As patient has many important appointments coming up.      Electronically signed by Heather Saeed RN on 3/6/2023 at 6:51 PM

## 2023-03-16 ENCOUNTER — OFFICE VISIT (OUTPATIENT)
Dept: GASTROENTEROLOGY | Age: 38
End: 2023-03-16
Payer: MEDICARE

## 2023-03-16 VITALS — WEIGHT: 118 LBS | BODY MASS INDEX: 15.14 KG/M2 | HEIGHT: 74 IN

## 2023-03-16 DIAGNOSIS — K59.09 CHRONIC CONSTIPATION: Primary | ICD-10-CM

## 2023-03-16 PROCEDURE — G8419 CALC BMI OUT NRM PARAM NOF/U: HCPCS | Performed by: NURSE PRACTITIONER

## 2023-03-16 PROCEDURE — 1111F DSCHRG MED/CURRENT MED MERGE: CPT | Performed by: NURSE PRACTITIONER

## 2023-03-16 PROCEDURE — 1036F TOBACCO NON-USER: CPT | Performed by: NURSE PRACTITIONER

## 2023-03-16 PROCEDURE — G8427 DOCREV CUR MEDS BY ELIG CLIN: HCPCS | Performed by: NURSE PRACTITIONER

## 2023-03-16 PROCEDURE — G8484 FLU IMMUNIZE NO ADMIN: HCPCS | Performed by: NURSE PRACTITIONER

## 2023-03-16 PROCEDURE — 99203 OFFICE O/P NEW LOW 30 MIN: CPT | Performed by: NURSE PRACTITIONER
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
## 2023-05-23 ENCOUNTER — HOSPITAL ENCOUNTER (EMERGENCY)
Facility: HOSPITAL | Age: 38
Discharge: HOME | End: 2023-05-23
Attending: EMERGENCY MEDICINE | Admitting: EMERGENCY MEDICINE
Payer: MEDICARE

## 2023-05-23 ENCOUNTER — HOSPITAL ENCOUNTER (EMERGENCY)
Age: 38
Discharge: HOME | End: 2023-05-23
Attending: EMERGENCY MEDICINE
Payer: MEDICARE

## 2023-05-23 ENCOUNTER — APPOINTMENT (OUTPATIENT)
Dept: GENERAL RADIOLOGY | Facility: HOSPITAL | Age: 38
End: 2023-05-23
Payer: MEDICARE

## 2023-05-23 ENCOUNTER — APPOINTMENT (OUTPATIENT)
Dept: GENERAL RADIOLOGY | Age: 38
End: 2023-05-23
Payer: MEDICARE

## 2023-05-23 VITALS
HEIGHT: 78 IN | OXYGEN SATURATION: 99 % | HEART RATE: 78 BPM | SYSTOLIC BLOOD PRESSURE: 120 MMHG | TEMPERATURE: 98 F | DIASTOLIC BLOOD PRESSURE: 80 MMHG | WEIGHT: 150 LBS | BODY MASS INDEX: 17.36 KG/M2 | RESPIRATION RATE: 20 BRPM

## 2023-05-23 VITALS
DIASTOLIC BLOOD PRESSURE: 86 MMHG | RESPIRATION RATE: 16 BRPM | TEMPERATURE: 98.7 F | SYSTOLIC BLOOD PRESSURE: 128 MMHG | HEART RATE: 80 BPM

## 2023-05-23 DIAGNOSIS — R11.2 NAUSEA AND VOMITING, UNSPECIFIED VOMITING TYPE: Primary | ICD-10-CM

## 2023-05-23 DIAGNOSIS — K59.00 CONSTIPATION, UNSPECIFIED CONSTIPATION TYPE: ICD-10-CM

## 2023-05-23 LAB
ALBUMIN SERPL-MCNC: 4 G/DL (ref 3.5–5.2)
ALBUMIN SERPL-MCNC: 4.6 G/DL (ref 3.5–5.2)
ALBUMIN/GLOB SERPL: 1.1 G/DL
ALP SERPL-CCNC: 76 U/L (ref 40–130)
ALP SERPL-CCNC: 85 U/L (ref 39–117)
ALT SERPL W P-5'-P-CCNC: 33 U/L (ref 1–41)
ALT SERPL-CCNC: 33 U/L (ref 5–41)
ANION GAP SERPL CALCULATED.3IONS-SCNC: 12 MMOL/L (ref 7–19)
ANION GAP SERPL CALCULATED.3IONS-SCNC: 13 MMOL/L (ref 5–15)
AST SERPL-CCNC: 23 U/L (ref 5–40)
AST SERPL-CCNC: 27 U/L (ref 1–40)
BACTERIA UR QL AUTO: ABNORMAL /HPF
BASOPHILS # BLD AUTO: 0.04 10*3/MM3 (ref 0–0.2)
BASOPHILS NFR BLD AUTO: 0.3 % (ref 0–1.5)
BILIRUB SERPL-MCNC: 0.5 MG/DL (ref 0.2–1.2)
BILIRUB SERPL-MCNC: 0.6 MG/DL (ref 0–1.2)
BILIRUB UR QL STRIP: NEGATIVE
BILIRUB UR QL STRIP: NEGATIVE
BUN SERPL-MCNC: 14 MG/DL (ref 6–20)
BUN SERPL-MCNC: 17 MG/DL (ref 6–20)
BUN/CREAT SERPL: 22.7 (ref 7–25)
CALCIUM SERPL-MCNC: 9.8 MG/DL (ref 8.6–10)
CALCIUM SPEC-SCNC: 9.7 MG/DL (ref 8.6–10.5)
CHLORIDE SERPL-SCNC: 102 MMOL/L (ref 98–111)
CHLORIDE SERPL-SCNC: 106 MMOL/L (ref 98–107)
CLARITY UR: CLEAR
CLARITY UR: CLEAR
CO2 SERPL-SCNC: 25 MMOL/L (ref 22–29)
CO2 SERPL-SCNC: 25 MMOL/L (ref 22–29)
COLOR UR: ABNORMAL
COLOR UR: YELLOW
CREAT SERPL-MCNC: 0.6 MG/DL (ref 0.5–1.2)
CREAT SERPL-MCNC: 0.75 MG/DL (ref 0.76–1.27)
DEPRECATED RDW RBC AUTO: 42.5 FL (ref 37–54)
EGFRCR SERPLBLD CKD-EPI 2021: 120.7 ML/MIN/1.73
EOSINOPHIL # BLD AUTO: 0.02 10*3/MM3 (ref 0–0.4)
EOSINOPHIL NFR BLD AUTO: 0.1 % (ref 0.3–6.2)
ERYTHROCYTE [DISTWIDTH] IN BLOOD BY AUTOMATED COUNT: 12.9 % (ref 11.5–14.5)
ERYTHROCYTE [DISTWIDTH] IN BLOOD BY AUTOMATED COUNT: 13.1 % (ref 12.3–15.4)
FLUAV RNA RESP QL NAA+PROBE: NOT DETECTED
FLUBV RNA RESP QL NAA+PROBE: NOT DETECTED
GLOBULIN UR ELPH-MCNC: 4.1 GM/DL
GLUCOSE SERPL-MCNC: 142 MG/DL (ref 65–99)
GLUCOSE SERPL-MCNC: 92 MG/DL (ref 74–109)
GLUCOSE UR STRIP-MCNC: NEGATIVE MG/DL
GLUCOSE UR STRIP.AUTO-MCNC: NEGATIVE MG/DL
HCT VFR BLD AUTO: 44.7 % (ref 37.5–51)
HCT VFR BLD AUTO: 44.8 % (ref 42–52)
HGB BLD-MCNC: 14.3 G/DL (ref 14–18)
HGB BLD-MCNC: 14.4 G/DL (ref 13–17.7)
HGB UR QL STRIP.AUTO: NEGATIVE
HGB UR STRIP.AUTO-MCNC: NEGATIVE MG/L
HOLD SPECIMEN: NORMAL
HOLD SPECIMEN: NORMAL
HYALINE CASTS UR QL AUTO: ABNORMAL /LPF
IMM GRANULOCYTES # BLD AUTO: 0.06 10*3/MM3 (ref 0–0.05)
IMM GRANULOCYTES NFR BLD AUTO: 0.4 % (ref 0–0.5)
KETONES UR QL STRIP: ABNORMAL
KETONES UR STRIP.AUTO-MCNC: NEGATIVE MG/DL
LEUKOCYTE ESTERASE UR QL STRIP.AUTO: NEGATIVE
LEUKOCYTE ESTERASE UR QL STRIP.AUTO: NEGATIVE
LIPASE SERPL-CCNC: 37 U/L (ref 13–60)
LYMPHOCYTES # BLD AUTO: 0.74 10*3/MM3 (ref 0.7–3.1)
LYMPHOCYTES NFR BLD AUTO: 5.5 % (ref 19.6–45.3)
MAGNESIUM SERPL-MCNC: 1.7 MG/DL (ref 1.6–2.6)
MCH RBC QN AUTO: 28.9 PG (ref 26.6–33)
MCH RBC QN AUTO: 29.5 PG (ref 27–31)
MCHC RBC AUTO-ENTMCNC: 31.9 G/DL (ref 33–37)
MCHC RBC AUTO-ENTMCNC: 32.2 G/DL (ref 31.5–35.7)
MCV RBC AUTO: 89.6 FL (ref 79–97)
MCV RBC AUTO: 92.6 FL (ref 80–94)
MONOCYTES # BLD AUTO: 0.73 10*3/MM3 (ref 0.1–0.9)
MONOCYTES NFR BLD AUTO: 5.4 % (ref 5–12)
NEUTROPHILS NFR BLD AUTO: 11.87 10*3/MM3 (ref 1.7–7)
NEUTROPHILS NFR BLD AUTO: 88.3 % (ref 42.7–76)
NITRITE UR QL STRIP.AUTO: NEGATIVE
NITRITE UR QL STRIP: NEGATIVE
NRBC BLD AUTO-RTO: 0 /100 WBC (ref 0–0.2)
PH UR STRIP.AUTO: 6 [PH] (ref 5–8)
PH UR STRIP.AUTO: 6.5 [PH] (ref 5–8)
PLATELET # BLD AUTO: 224 K/UL (ref 130–400)
PLATELET # BLD AUTO: 246 10*3/MM3 (ref 140–450)
PMV BLD AUTO: 10.9 FL (ref 9.4–12.4)
PMV BLD AUTO: 11.6 FL (ref 6–12)
POTASSIUM SERPL-SCNC: 3.6 MMOL/L (ref 3.5–5.2)
POTASSIUM SERPL-SCNC: 4 MMOL/L (ref 3.5–5)
PROT SERPL-MCNC: 7.8 G/DL (ref 6.6–8.7)
PROT SERPL-MCNC: 8.7 G/DL (ref 6–8.5)
PROT UR QL STRIP: ABNORMAL
PROT UR STRIP.AUTO-MCNC: NEGATIVE MG/DL
RBC # BLD AUTO: 4.84 M/UL (ref 4.7–6.1)
RBC # BLD AUTO: 4.99 10*6/MM3 (ref 4.14–5.8)
RBC # UR STRIP: ABNORMAL /HPF
REF LAB TEST METHOD: ABNORMAL
SARS-COV-2 RNA RESP QL NAA+PROBE: NOT DETECTED
SODIUM SERPL-SCNC: 139 MMOL/L (ref 136–145)
SODIUM SERPL-SCNC: 144 MMOL/L (ref 136–145)
SP GR UR STRIP.AUTO: 1.02 (ref 1–1.03)
SP GR UR STRIP: 1.03 (ref 1–1.03)
SQUAMOUS #/AREA URNS HPF: ABNORMAL /HPF
UROBILINOGEN UR QL STRIP: ABNORMAL
UROBILINOGEN UR STRIP.AUTO-MCNC: 1 E.U./DL
WBC # BLD AUTO: 7.2 K/UL (ref 4.8–10.8)
WBC # UR STRIP: ABNORMAL /HPF
WBC NRBC COR # BLD: 13.46 10*3/MM3 (ref 3.4–10.8)
WHOLE BLOOD HOLD COAG: NORMAL
WHOLE BLOOD HOLD SPECIMEN: NORMAL

## 2023-05-23 PROCEDURE — 81003 URINALYSIS AUTO W/O SCOPE: CPT

## 2023-05-23 PROCEDURE — 99284 EMERGENCY DEPT VISIT MOD MDM: CPT

## 2023-05-23 PROCEDURE — 80053 COMPREHEN METABOLIC PANEL: CPT | Performed by: EMERGENCY MEDICINE

## 2023-05-23 PROCEDURE — 74022 RADEX COMPL AQT ABD SERIES: CPT

## 2023-05-23 PROCEDURE — 96372 THER/PROPH/DIAG INJ SC/IM: CPT

## 2023-05-23 PROCEDURE — P9612 CATHETERIZE FOR URINE SPEC: HCPCS

## 2023-05-23 PROCEDURE — 85025 COMPLETE CBC W/AUTO DIFF WBC: CPT | Performed by: EMERGENCY MEDICINE

## 2023-05-23 PROCEDURE — 85027 COMPLETE CBC AUTOMATED: CPT

## 2023-05-23 PROCEDURE — 36415 COLL VENOUS BLD VENIPUNCTURE: CPT

## 2023-05-23 PROCEDURE — 6360000002 HC RX W HCPCS: Performed by: EMERGENCY MEDICINE

## 2023-05-23 PROCEDURE — 83690 ASSAY OF LIPASE: CPT

## 2023-05-23 PROCEDURE — 87636 SARSCOV2 & INF A&B AMP PRB: CPT | Performed by: EMERGENCY MEDICINE

## 2023-05-23 PROCEDURE — 81001 URINALYSIS AUTO W/SCOPE: CPT | Performed by: EMERGENCY MEDICINE

## 2023-05-23 PROCEDURE — 99283 EMERGENCY DEPT VISIT LOW MDM: CPT

## 2023-05-23 PROCEDURE — 25010000002 ZIPRASIDONE MESYLATE PER 10 MG: Performed by: EMERGENCY MEDICINE

## 2023-05-23 PROCEDURE — 96374 THER/PROPH/DIAG INJ IV PUSH: CPT

## 2023-05-23 PROCEDURE — 87040 BLOOD CULTURE FOR BACTERIA: CPT | Performed by: EMERGENCY MEDICINE

## 2023-05-23 PROCEDURE — 83735 ASSAY OF MAGNESIUM: CPT | Performed by: EMERGENCY MEDICINE

## 2023-05-23 PROCEDURE — 74018 RADEX ABDOMEN 1 VIEW: CPT

## 2023-05-23 PROCEDURE — 80053 COMPREHEN METABOLIC PANEL: CPT

## 2023-05-23 RX ORDER — SODIUM CHLORIDE 0.9 % (FLUSH) 0.9 %
10 SYRINGE (ML) INJECTION AS NEEDED
Status: DISCONTINUED | OUTPATIENT
Start: 2023-05-23 | End: 2023-05-24 | Stop reason: HOSPADM

## 2023-05-23 RX ORDER — DIPHENHYDRAMINE HYDROCHLORIDE 50 MG/ML
12.5 INJECTION INTRAMUSCULAR; INTRAVENOUS ONCE
Status: COMPLETED | OUTPATIENT
Start: 2023-05-23 | End: 2023-05-23

## 2023-05-23 RX ORDER — LORAZEPAM 2 MG/ML
2 INJECTION INTRAMUSCULAR ONCE
Status: COMPLETED | OUTPATIENT
Start: 2023-05-23 | End: 2023-05-23

## 2023-05-23 RX ORDER — ZIPRASIDONE MESYLATE 20 MG/ML
10 INJECTION, POWDER, LYOPHILIZED, FOR SOLUTION INTRAMUSCULAR ONCE
Status: COMPLETED | OUTPATIENT
Start: 2023-05-23 | End: 2023-05-23

## 2023-05-23 RX ORDER — 0.9 % SODIUM CHLORIDE 0.9 %
1000 INTRAVENOUS SOLUTION INTRAVENOUS ONCE
Status: DISCONTINUED | OUTPATIENT
Start: 2023-05-23 | End: 2023-05-23

## 2023-05-23 RX ORDER — ONDANSETRON 2 MG/ML
4 INJECTION INTRAMUSCULAR; INTRAVENOUS ONCE
Status: DISCONTINUED | OUTPATIENT
Start: 2023-05-23 | End: 2023-05-23

## 2023-05-23 RX ORDER — ONDANSETRON 4 MG/1
4 TABLET, ORALLY DISINTEGRATING ORAL 3 TIMES DAILY PRN
Qty: 10 TABLET | Refills: 0 | Status: SHIPPED | OUTPATIENT
Start: 2023-05-23

## 2023-05-23 RX ORDER — ZIPRASIDONE MESYLATE 20 MG/ML
10 INJECTION, POWDER, LYOPHILIZED, FOR SOLUTION INTRAMUSCULAR ONCE
Status: DISCONTINUED | OUTPATIENT
Start: 2023-05-23 | End: 2023-05-24 | Stop reason: HOSPADM

## 2023-05-23 RX ORDER — METOCLOPRAMIDE HYDROCHLORIDE 5 MG/ML
10 INJECTION INTRAMUSCULAR; INTRAVENOUS ONCE
Status: COMPLETED | OUTPATIENT
Start: 2023-05-23 | End: 2023-05-23

## 2023-05-23 RX ORDER — ONDANSETRON 4 MG/1
4 TABLET, ORALLY DISINTEGRATING ORAL EVERY 8 HOURS PRN
Qty: 15 TABLET | Refills: 0 | Status: SHIPPED | OUTPATIENT
Start: 2023-05-23 | End: 2023-05-28

## 2023-05-23 RX ORDER — PANTOPRAZOLE SODIUM 40 MG/10ML
80 INJECTION, POWDER, LYOPHILIZED, FOR SOLUTION INTRAVENOUS ONCE
Status: COMPLETED | OUTPATIENT
Start: 2023-05-23 | End: 2023-05-23

## 2023-05-23 RX ADMIN — ZIPRASIDONE MESYLATE 10 MG: 20 INJECTION, POWDER, LYOPHILIZED, FOR SOLUTION INTRAMUSCULAR at 19:26

## 2023-05-23 RX ADMIN — DIPHENHYDRAMINE HYDROCHLORIDE 12.5 MG: 50 INJECTION, SOLUTION INTRAMUSCULAR; INTRAVENOUS at 11:28

## 2023-05-23 RX ADMIN — LORAZEPAM 2 MG: 2 INJECTION INTRAMUSCULAR; INTRAVENOUS at 07:58

## 2023-05-23 RX ADMIN — METOCLOPRAMIDE 10 MG: 5 INJECTION, SOLUTION INTRAMUSCULAR; INTRAVENOUS at 11:29

## 2023-05-23 RX ADMIN — SODIUM CHLORIDE 1000 ML: 9 INJECTION, SOLUTION INTRAVENOUS at 20:29

## 2023-05-23 RX ADMIN — PANTOPRAZOLE SODIUM 80 MG: 40 INJECTION, POWDER, FOR SOLUTION INTRAVENOUS at 21:34

## 2023-05-23 NOTE — ED PROVIDER NOTES
[facility]  eMERGENCY dEPARTMENT eNCOUnter      Pt Name: Vonzell Baumgarten  MRN: [id number]  [place] 1985  Date of evaluation: 5/23/2023  Provider: Onelia Dumont MD    [address] [place] [address]       Chief Complaint   Patient presents with    Emesis     X2 days per caregiver           HISTORY OF PRESENT ILLNESS   (Location/Symptom, Timing/Onset,Context/Setting, Quality, Duration, Modifying Factors, Severity)  Note limiting factors. Vonzell Baumgarten is a 30 y.o. male who presents to the emergency department due to vomiting. Symptoms started yesterday. No fevers. No diarrhea. No hematemesis. Patient has history of prior TBI. All the history is from his mother. Patient is nonverbal.  Mother indicates the patient has not been able to keep anything down since the vomiting started. Has a history of G-tube in the past that he had pulled out but tolerates PO now and did not have the G-tube replaced. No indication of abdominal pain. Patient does seem to understand and respond to questioning shaking his head yes and no but given that he is nonverbal history is limited. Patient denies having abdominal pain. He is at his baseline. HPI    NursingNotes were reviewed. REVIEW OF SYSTEMS    (2-9 systems for level 4, 10 or more for level 5)     Review of Systems   Unable to perform ROS: Patient nonverbal     A complete review of systems was performed and is negative except as noted above in the HPI.        PAST MEDICAL HISTORY     Past Medical History:   Diagnosis Date    Acute pulmonary embolism ([facility] [address] 04/29/2022    Formatting of this note might be different from the original. Added automatically from request for surgery [id number]    Bipolar disorder ([facility] [address] 09/20/2022    Cardiac arrest ([facility] [address] 04/22/2022    Hydrocephalus ([facility] [address] 04/29/2022    Formatting of this note might be different from the original. Added automatically from request for surgery [id number]    Nonverbal     Palliative care patient 09/23/2022
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
## 2023-05-24 LAB — HOLD SPECIMEN: NORMAL
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
## 2023-05-28 LAB — BACTERIA SPEC AEROBE CULT: NORMAL

## 2024-06-21 NOTE — PROGRESS NOTES
Facility/Department: [facility] PROGRESSIVE CARE  Daily Treatment Note  NAME: Kostas Hardy  : 1985  MRN: [id number]    Date of Service: 2022    Discharge Recommendations:  Patient would benefit from continued therapy after discharge       Assessment   Assessment: Initiated working with patient on therapeutic positioning and establishing consistent motor responses. Short tx due to nurse closely monitering SPO2 and need for suction. Treatment Diagnosis: Aspiration pneumonia  Activity Tolerance  Activity Tolerance: Treatment limited secondary to medical complications (free text)  Activity Tolerance Comments: Nurse monitering SPO2, suctioning patient. Decision made to discontinue session         Patient Diagnosis(es): The primary encounter diagnosis was Other tracheostomy complication ([facility] [address] Diagnoses of History of traumatic brain injury, Urinary tract infection associated with indwelling urethral catheter, initial encounter ([facility] [address] and Aspiration pneumonia of upper lobe, unspecified aspiration pneumonia type, unspecified laterality ([address] were also pertinent to this visit. has a past medical history of Nonverbal.   has a past surgical history that includes tracheostomy and Upper gastrointestinal endoscopy (N/A, 2022).     Restrictions  Restrictions/Precautions  Restrictions/Precautions: Fall Risk  Position Activity Restriction  Other position/activity restrictions: trach, g tube, external catheter    Subjective   General  Chart Reviewed: Yes  Patient assessed for rehabilitation services?: Yes  Family / Caregiver Present: No  General Comment  Comments: Nurse yimi therapy  Pre Treatment Pain Screening  Pain at present: 0 (No apparent distress, but active with his legs in bed)  Scale Used: Numeric Score  Intervention List: Patient able to continue with treatment     Objective                Bed mobility  Scooting: Dependent/Total (assist of two to scoot to [address] [facility] [address], however patient slides himself back down part of way almost immediately)                          Cognition  Cognition Comment: Very awake and active in the bed, but appears calm and in no distress.  Initiated training on establishing consistent RLE movement upon demand--performed a few reps consistently                                         Plan   Plan  Times per Week: 3-5    Goals (On-Going)   Short Term Goals  Short Term Goal 1: The patient will demonstrate a consistent yes/ no motor response  Short Term Goal 2: Patient and family will be independent with therapeutic positioning devices       Tx Time  Minutes  13             Kenn Gaston OT/L  Electronically signed by Kenn Gaston OT/L on 9/22/2022 at 3:49 PM. [Negative] : Heme/Lymph

## 2025-05-08 ENCOUNTER — TELEPHONE (OUTPATIENT)
Dept: NEUROLOGY | Age: 40
End: 2025-05-08

## 2025-05-08 NOTE — TELEPHONE ENCOUNTER
1st attempt: Received a referral for this patient. Called and left patient a VM to call our office back to where we could get patient scheduled an appointment.

## 2025-07-28 ENCOUNTER — TELEPHONE (OUTPATIENT)
Dept: NEUROLOGY | Age: 40
End: 2025-07-28

## 2025-08-07 ENCOUNTER — HOSPITAL ENCOUNTER (EMERGENCY)
Age: 40
Discharge: HOME | End: 2025-08-07
Payer: MEDICARE

## 2025-08-07 VITALS
TEMPERATURE: 97.8 F | BODY MASS INDEX: 18.87 KG/M2 | SYSTOLIC BLOOD PRESSURE: 110 MMHG | WEIGHT: 147 LBS | OXYGEN SATURATION: 96 % | DIASTOLIC BLOOD PRESSURE: 68 MMHG | HEART RATE: 74 BPM | RESPIRATION RATE: 16 BRPM

## 2025-08-07 DIAGNOSIS — K94.20 COMPLICATION OF GASTROSTOMY TUBE (HCC): Primary | ICD-10-CM

## 2025-08-07 PROCEDURE — 99282 EMERGENCY DEPT VISIT SF MDM: CPT

## 2025-08-07 ASSESSMENT — ENCOUNTER SYMPTOMS
PHOTOPHOBIA: 0
BACK PAIN: 0
COLOR CHANGE: 0
EYE ITCHING: 0
COUGH: 0
SHORTNESS OF BREATH: 0
EYE DISCHARGE: 0
APNEA: 0

## (undated) DEVICE — ENDO KIT,LOURDES HOSPITAL: Brand: MEDLINE INDUSTRIES, INC.

## (undated) DEVICE — Device

## (undated) DEVICE — FORCEPS BX L240CM JAW DIA2.8MM L CAP W/ NDL MIC MESH TOOTH

## (undated) DEVICE — KIT PEG 20FR STD PUL EN ACCS DEV ENDOVIVE